# Patient Record
Sex: FEMALE | Race: WHITE | NOT HISPANIC OR LATINO | Employment: OTHER | URBAN - METROPOLITAN AREA
[De-identification: names, ages, dates, MRNs, and addresses within clinical notes are randomized per-mention and may not be internally consistent; named-entity substitution may affect disease eponyms.]

---

## 2018-04-11 ENCOUNTER — HOSPITAL ENCOUNTER (OUTPATIENT)
Dept: RADIOLOGY | Facility: CLINIC | Age: 58
Discharge: HOME | End: 2018-04-11
Attending: NURSE PRACTITIONER
Payer: MEDICARE

## 2018-04-11 DIAGNOSIS — Z12.31 VISIT FOR SCREENING MAMMOGRAM: Primary | ICD-10-CM

## 2018-04-11 DIAGNOSIS — Z12.31 VISIT FOR SCREENING MAMMOGRAM: ICD-10-CM

## 2018-04-11 PROCEDURE — 77067 SCR MAMMO BI INCL CAD: CPT | Mod: 26,,, | Performed by: RADIOLOGY

## 2018-04-11 PROCEDURE — 77063 BREAST TOMOSYNTHESIS BI: CPT | Mod: 26,,, | Performed by: RADIOLOGY

## 2018-04-11 PROCEDURE — 77067 SCR MAMMO BI INCL CAD: CPT | Mod: TC,PO

## 2019-04-02 ENCOUNTER — OFFICE VISIT (OUTPATIENT)
Dept: GASTROENTEROLOGY | Facility: CLINIC | Age: 59
End: 2019-04-02
Payer: MEDICARE

## 2019-04-02 VITALS — HEIGHT: 62 IN | WEIGHT: 240.75 LBS | BODY MASS INDEX: 44.3 KG/M2

## 2019-04-02 DIAGNOSIS — K62.5 BRBPR (BRIGHT RED BLOOD PER RECTUM): ICD-10-CM

## 2019-04-02 DIAGNOSIS — R15.2 FECAL URGENCY: Primary | ICD-10-CM

## 2019-04-02 DIAGNOSIS — Z86.010 HISTORY OF COLON POLYPS: ICD-10-CM

## 2019-04-02 PROCEDURE — 99204 PR OFFICE/OUTPT VISIT, NEW, LEVL IV, 45-59 MIN: ICD-10-PCS | Mod: S$PBB,,, | Performed by: INTERNAL MEDICINE

## 2019-04-02 PROCEDURE — 99212 OFFICE O/P EST SF 10 MIN: CPT | Mod: PBBFAC,PO | Performed by: INTERNAL MEDICINE

## 2019-04-02 PROCEDURE — 99999 PR PBB SHADOW E&M-EST. PATIENT-LVL II: CPT | Mod: PBBFAC,,, | Performed by: INTERNAL MEDICINE

## 2019-04-02 PROCEDURE — 99999 PR PBB SHADOW E&M-EST. PATIENT-LVL II: ICD-10-PCS | Mod: PBBFAC,,, | Performed by: INTERNAL MEDICINE

## 2019-04-02 PROCEDURE — 99204 OFFICE O/P NEW MOD 45 MIN: CPT | Mod: S$PBB,,, | Performed by: INTERNAL MEDICINE

## 2019-04-02 RX ORDER — BREXPIPRAZOLE 2 MG/1
TABLET ORAL
COMMUNITY
Start: 2019-03-19 | End: 2021-10-05

## 2019-04-02 RX ORDER — BUPROPION HYDROCHLORIDE 150 MG/1
TABLET ORAL
COMMUNITY
Start: 2019-03-02 | End: 2021-10-05

## 2019-04-02 RX ORDER — HYDROXYZINE HYDROCHLORIDE 10 MG/1
TABLET, FILM COATED ORAL
COMMUNITY
Start: 2019-02-27 | End: 2022-10-28

## 2019-04-02 RX ORDER — BUPROPION HYDROCHLORIDE 300 MG/1
TABLET ORAL
COMMUNITY
Start: 2019-03-22 | End: 2021-10-05

## 2019-04-02 NOTE — PROGRESS NOTES
"Subjective:       Patient ID: Jonna Pearl is a 59 y.o. female.    This patient is new to me.  Referring provider:  Padmini Lackey for rectal urgency/fecal urgency    Chief Complaint: Fecal urgency    Patient see for fecal urgency, onset several months ago, intermittent, occurring about twice per month with no alleviating/exacerbating factors.  She states that stool is soft and occurs about once per day.  She states that she sometimes sees scant BRBPR and this has been chronic for many years.  She has had adenomas in the past and last scope was with Dr. Winslow in 2013 and she had no polyps at that time per his notes.  She denies weight loss, dysphagia, or upper GI symptoms.  She is not eating a high fiber diet.    Review of Systems   Constitutional: Negative for chills, fatigue and fever.   HENT: Negative for sore throat and trouble swallowing.    Respiratory: Negative for cough, shortness of breath and wheezing.    Cardiovascular: Negative for chest pain and palpitations.   Gastrointestinal: Positive for blood in stool (chronic, small amount, ongoing for many years). Negative for abdominal pain, nausea and vomiting.        + soft stool with fecal urgency     Genitourinary: Negative for dysuria and hematuria.   Musculoskeletal: Negative for arthralgias and myalgias.   Skin: Negative for color change and rash.   Neurological: Negative for dizziness and headaches.   Hematological: Negative for adenopathy.   Psychiatric/Behavioral: Negative for confusion. The patient is not nervous/anxious.    All other systems reviewed and are negative.      Objective:       Vitals:    04/02/19 1331   Weight: 109.2 kg (240 lb 11.9 oz)   Height: 5' 2" (1.575 m)       Physical Exam   Constitutional: She appears well-developed and well-nourished.   HENT:   Head: Normocephalic and atraumatic.   Eyes: Pupils are equal, round, and reactive to light. No scleral icterus.   Neck: Normal range of motion.   Cardiovascular: Normal rate and " regular rhythm.   No murmur heard.  Pulmonary/Chest: Effort normal and breath sounds normal. She has no wheezes.   Abdominal: Soft. Bowel sounds are normal. She exhibits no distension. There is no tenderness.   obese   Musculoskeletal: She exhibits no edema or tenderness.   Lymphadenopathy:     She has no cervical adenopathy.   Neurological: She is alert.   Skin: Skin is warm and dry. No rash noted.   Vitals reviewed.        Lab Results   Component Value Date    WBC 12.73 (H) 10/31/2013    HGB 14.6 10/31/2013    HCT 44.0 10/31/2013    MCV 91 10/31/2013     10/31/2013       CMP  Sodium   Date Value Ref Range Status   10/31/2013 139 136 - 145 mmol/L Final     Potassium   Date Value Ref Range Status   10/31/2013 4.7 3.5 - 5.1 mmol/L Final     Chloride   Date Value Ref Range Status   10/31/2013 107 95 - 110 mmol/L Final     CO2   Date Value Ref Range Status   10/31/2013 24 23 - 29 mmol/L Final     Glucose   Date Value Ref Range Status   10/31/2013 108 70 - 110 mg/dL Final     BUN, Bld   Date Value Ref Range Status   10/31/2013 11 6 - 20 mg/dL Final     Creatinine   Date Value Ref Range Status   10/31/2013 0.9 0.5 - 1.4 mg/dL Final     Calcium   Date Value Ref Range Status   10/31/2013 9.5 8.7 - 10.5 mg/dL Final     Total Protein   Date Value Ref Range Status   05/21/2012 6.8 6.0 - 8.4 g/dL Final     Albumin   Date Value Ref Range Status   05/21/2012 3.6 3.5 - 5.2 g/dl Final     Total Bilirubin   Date Value Ref Range Status   05/21/2012 0.3 0.1 - 1.0 mg/dl Final     Comment:     For infants and newborns, interpretation of results should be based  on gestational age, weight and in agreement with clinical  observations.  .  Premature Infant recommended reference ranges:  Up to 24 hours.............<8.0 mg/dl  Up to 48 hours............<12.0 mg/dl  3-5 days..................<15.0 mg/dl  6-29 days.................<15.0 mg/dl     Alkaline Phosphatase   Date Value Ref Range Status   05/21/2012 122 55 - 135 U/L Final      AST   Date Value Ref Range Status   05/21/2012 23 10 - 40 U/L Final     ALT   Date Value Ref Range Status   05/21/2012 22 10 - 44 U/L Final     Anion Gap   Date Value Ref Range Status   10/31/2013 8 8 - 16 mmol/L Final     eGFR if    Date Value Ref Range Status   10/31/2013 >60.0 >60 mL/min/1.73 m^2 Final     eGFR if non    Date Value Ref Range Status   10/31/2013 >60.0 >60 mL/min/1.73 m^2 Final     Comment:     Calculation used to obtain the estimated glomerular filtration  rate (eGFR) is the CKD-EPI equation. Since race is unknown   in our information system, the eGFR values for   -American and Non--American patients are given   for each creatinine result.     CXR was independently visualized and reviewed by me and showed no acute process.    Old records from Dr. Winslow reviewed and are as summarized above in the HPI.      Assessment:       1. Fecal urgency    2. History of colon polyps    3. BRBPR (bright red blood per rectum)        Plan:       1.  Increase fiber to bulk stool  2.  Sitz baths PRN  3.  Schedule colonoscopy for history of polyps  4.  Check labs on day of scope.  5.  Further recommendations to follow after above.  6.  Communication will be sent to the referring provider regarding my assessment and plan on this patient via EPIC.

## 2019-04-10 ENCOUNTER — ANESTHESIA (OUTPATIENT)
Dept: ENDOSCOPY | Facility: HOSPITAL | Age: 59
End: 2019-04-10
Payer: MEDICARE

## 2019-04-10 ENCOUNTER — HOSPITAL ENCOUNTER (OUTPATIENT)
Facility: HOSPITAL | Age: 59
Discharge: HOME | End: 2019-04-10
Attending: INTERNAL MEDICINE | Admitting: INTERNAL MEDICINE
Payer: MEDICARE

## 2019-04-10 ENCOUNTER — ANESTHESIA EVENT (OUTPATIENT)
Dept: ENDOSCOPY | Facility: HOSPITAL | Age: 59
End: 2019-04-10
Payer: MEDICARE

## 2019-04-10 DIAGNOSIS — Z86.010 HISTORY OF COLON POLYPS: ICD-10-CM

## 2019-04-10 DIAGNOSIS — K64.8 INTERNAL HEMORRHOIDS: Primary | ICD-10-CM

## 2019-04-10 PROBLEM — Z86.0100 HISTORY OF COLON POLYPS: Status: ACTIVE | Noted: 2019-04-10

## 2019-04-10 PROCEDURE — G0105 COLORECTAL SCRN; HI RISK IND: HCPCS | Performed by: INTERNAL MEDICINE

## 2019-04-10 PROCEDURE — G0105 COLORECTAL SCRN; HI RISK IND: HCPCS | Mod: ,,, | Performed by: INTERNAL MEDICINE

## 2019-04-10 PROCEDURE — D9220A PRA ANESTHESIA: Mod: CRNA,,, | Performed by: NURSE ANESTHETIST, CERTIFIED REGISTERED

## 2019-04-10 PROCEDURE — D9220A PRA ANESTHESIA: ICD-10-PCS | Mod: CRNA,,, | Performed by: NURSE ANESTHETIST, CERTIFIED REGISTERED

## 2019-04-10 PROCEDURE — 25000003 PHARM REV CODE 250: Performed by: INTERNAL MEDICINE

## 2019-04-10 PROCEDURE — D9220A PRA ANESTHESIA: Mod: ANES,,, | Performed by: ANESTHESIOLOGY

## 2019-04-10 PROCEDURE — 37000008 HC ANESTHESIA 1ST 15 MINUTES: Performed by: INTERNAL MEDICINE

## 2019-04-10 PROCEDURE — 63600175 PHARM REV CODE 636 W HCPCS: Performed by: NURSE ANESTHETIST, CERTIFIED REGISTERED

## 2019-04-10 PROCEDURE — 37000009 HC ANESTHESIA EA ADD 15 MINS: Performed by: INTERNAL MEDICINE

## 2019-04-10 PROCEDURE — D9220A PRA ANESTHESIA: ICD-10-PCS | Mod: ANES,,, | Performed by: ANESTHESIOLOGY

## 2019-04-10 PROCEDURE — G0105 COLORECTAL SCRN; HI RISK IND: ICD-10-PCS | Mod: ,,, | Performed by: INTERNAL MEDICINE

## 2019-04-10 RX ORDER — SODIUM CHLORIDE 9 MG/ML
INJECTION, SOLUTION INTRAVENOUS CONTINUOUS
Status: DISCONTINUED | OUTPATIENT
Start: 2019-04-10 | End: 2019-04-10 | Stop reason: HOSPADM

## 2019-04-10 RX ORDER — PROPOFOL 10 MG/ML
VIAL (ML) INTRAVENOUS
Status: DISCONTINUED | OUTPATIENT
Start: 2019-04-10 | End: 2019-04-10

## 2019-04-10 RX ADMIN — PROPOFOL 30 MG: 10 INJECTION, EMULSION INTRAVENOUS at 10:04

## 2019-04-10 RX ADMIN — PROPOFOL 100 MG: 10 INJECTION, EMULSION INTRAVENOUS at 10:04

## 2019-04-10 RX ADMIN — SODIUM CHLORIDE: 0.9 INJECTION, SOLUTION INTRAVENOUS at 10:04

## 2019-04-10 NOTE — DISCHARGE INSTRUCTIONS
"Discharge Instructions: After Your Surgery/Procedure  Youve just had surgery. During surgery you were given medicine called anesthesia to keep you relaxed and free of pain. After surgery you may have some pain or nausea. This is common. Here are some tips for feeling better and getting well after surgery.     Stay on schedule with your medication.   Going home  Your doctor or nurse will show you how to take care of yourself when you go home. He or she will also answer your questions. Have an adult family member or friend drive you home.      For your safety we recommend these precaution for the first 24 hours after your procedure:  · Do not drive or use heavy equipment.  · Do not make important decisions or sign legal papers.  · Do not drink alcohol.  · Have someone stay with you, if needed. He or she can watch for problems and help keep you safe.  · Your concentration, balance, coordination, and judgement may be impaired for many hours after anesthesia.  Use caution when ambulating or standing up.     · You may feel weak and "washed out" after anesthesia and surgery.      Subtle residual effects of general anesthesia or sedation with regional / local anesthesia can last more than 24 hours.  Rest for the remainder of the day or longer if your Doctor/Surgeon has advised you to do so.  Although you may feel normal within the first 24 hours, your reflexes and mental ability may be impaired without you realizing it.  You may feel dizzy, lightheaded or sleepy for 24 hours or longer.      Be sure to go to all follow-up visits with your doctor. And rest after your surgery for as long as your doctor tells you to.  Coping with pain  If you have pain after surgery, pain medicine will help you feel better. Take it as told, before pain becomes severe. Also, ask your doctor or pharmacist about other ways to control pain. This might be with heat, ice, or relaxation. And follow any other instructions your surgeon or nurse gives " you.  Tips for taking pain medicine  To get the best relief possible, remember these points:  · Pain medicines can upset your stomach. Taking them with a little food may help.  · Most pain relievers taken by mouth need at least 20 to 30 minutes to start to work.  · Taking medicine on a schedule can help you remember to take it. Try to time your medicine so that you can take it before starting an activity. This might be before you get dressed, go for a walk, or sit down for dinner.  · Constipation is a common side effect of pain medicines. Call your doctor before taking any medicines such as laxatives or stool softeners to help ease constipation. Also ask if you should skip any foods. Drinking lots of fluids and eating foods such as fruits and vegetables that are high in fiber can also help. Remember, do not take laxatives unless your surgeon has prescribed them.  · Drinking alcohol and taking pain medicine can cause dizziness and slow your breathing. It can even be deadly. Do not drink alcohol while taking pain medicine.  · Pain medicine can make you react more slowly to things. Do not drive or run machinery while taking pain medicine.  Your health care provider may tell you to take acetaminophen to help ease your pain. Ask him or her how much you are supposed to take each day. Acetaminophen or other pain relievers may interact with your prescription medicines or other over-the-counter (OTC) drugs. Some prescription medicines have acetaminophen and other ingredients. Using both prescription and OTC acetaminophen for pain can cause you to overdose. Read the labels on your OTC medicines with care. This will help you to clearly know the list of ingredients, how much to take, and any warnings. It may also help you not take too much acetaminophen. If you have questions or do not understand the information, ask your pharmacist or health care provider to explain it to you before you take the OTC medicine.  Managing  nausea  Some people have an upset stomach after surgery. This is often because of anesthesia, pain, or pain medicine, or the stress of surgery. These tips will help you handle nausea and eat healthy foods as you get better. If you were on a special food plan before surgery, ask your doctor if you should follow it while you get better. These tips may help:  · Do not push yourself to eat. Your body will tell you when to eat and how much.  · Start off with clear liquids and soup. They are easier to digest.  · Next try semi-solid foods, such as mashed potatoes, applesauce, and gelatin, as you feel ready.  · Slowly move to solid foods. Dont eat fatty, rich, or spicy foods at first.  · Do not force yourself to have 3 large meals a day. Instead eat smaller amounts more often.  · Take pain medicines with a small amount of solid food, such as crackers or toast, to avoid nausea.     Call your surgeon if  · You still have pain an hour after taking medicine. The medicine may not be strong enough.  · You feel too sleepy, dizzy, or groggy. The medicine may be too strong.  · You have side effects like nausea, vomiting, or skin changes, such as rash, itching, or hives.       If you have obstructive sleep apnea  You were given anesthesia medicine during surgery to keep you comfortable and free of pain. After surgery, you may have more apnea spells because of this medicine and other medicines you were given. The spells may last longer than usual.   At home:  · Keep using the continuous positive airway pressure (CPAP) device when you sleep. Unless your health care provider tells you not to, use it when you sleep, day or night. CPAP is a common device used to treat obstructive sleep apnea.  · Talk with your provider before taking any pain medicine, muscle relaxants, or sedatives. Your provider will tell you about the possible dangers of taking these medicines.  © 0375-9110 [employer]. [address] [place] [address]  [place] [address]. All rights reserved. This information is not intended as a substitute for professional medical care. Always follow your healthcare professional's instructions.    Hemorrhoids    Hemorrhoids are swollen and inflamed veins inside the rectum and near the anus. The rectum is the last several inches of the colon. The anus is the passage between the rectum and the outside of the body.  Causes  The veins can become swollen due to increased pressure in them. This is most often caused by:  · Chronic constipation or diarrhea  · Straining when having a bowel movement  · Sitting too long on the toilet  · A low-fiber diet  · Pregnancy  Symptoms  · Bleeding from the rectum (this may be noticeable after bowel movements)  · Lump near the anus  · Itching around the anus  · Pain around the anus  There are different types of hemorrhoids. Depending on the type you have and the severity, you may be able to treat yourself at home. In some cases, a procedure may be the best treatment option. Your healthcare provider can tell you more about this, if needed.  Home care  General care  · To get relief from pain or itching, try:  ¨ Topical products. Your healthcare provider may prescribe or recommend creams, ointments, or pads that can be applied to the hemorrhoid. Use these exactly as directed.  ¨ Medicines. Your healthcare provider may recommend stool softeners, suppositories, or laxatives to help manage constipation. Use these exactly as directed.  ¨ Sitz baths. A sitz bath involves sitting in a few inches of warm bath water. Be careful not to make the water so hot that you burn yourself--test it before sitting in it. Soak for about 10 to 15 minutes a few times a day. This may help relieve pain.  Tips to help prevent hemorrhoids  · Eat more fiber. Fiber adds bulk to stool and absorbs water as it moves through your colon. This makes stool softer and easier to pass.  ¨ Increase the fiber in your diet with more fiber-rich foods.  These include fresh fruit, vegetables, and whole grains.  ¨ Take a fiber supplement or bulking agent, if advised to by your provider. These include products such as psyllium or methylcellulose.  · Drink plenty of water, if directed to by your provider. This can help keep stool soft.  · Be more active. Frequent exercise aids digestion and helps prevent constipation. It may also help make bowel movements more regular.  · Dont strain during bowel movements. This can make hemorrhoids more likely. Also, dont sit on the toilet for long periods of time.  Follow-up care  Follow up with your healthcare provider, or as advised. If a culture or imaging tests were done, you will be notified of the results when they are ready. This may take a few days or longer.  When to seek medical advice  Call your healthcare provider right away if any of these occur:  · Increased bleeding from the rectum  · Increased pain around the rectum or anus  · Weakness or dizziness  Call 911  Call 911 or return to the emergency department right away if any of these occur:  · Trouble breathing or swallowing  · Fainting or loss of consciousness  · Unusually fast heart rate  · Vomiting blood  · Large amounts of blood in stool  Date Last Reviewed: 6/22/2015  © 7638-0974 [employer]. [address] [place] [address], [place] [address]. All rights reserved. This information is not intended as a substitute for professional medical care. Always follow your healthcare professional's instructions.

## 2019-04-10 NOTE — INTERVAL H&P NOTE
The patient has been examined and the H&P has been reviewed:    I concur with the findings and no changes have occurred since H&P was written.    Anesthesia/Surgery risks, benefits and alternative options discussed and understood by patient/family.          Active Hospital Problems    Diagnosis  POA    History of colon polyps [Z86.010]  Not Applicable      Resolved Hospital Problems   No resolved problems to display.

## 2019-04-10 NOTE — ANESTHESIA PREPROCEDURE EVALUATION
04/10/2019  Jonna Pearl is a 59 y.o., female.    Anesthesia Evaluation      I have reviewed the Medications.     Review of Systems  Anesthesia Hx:  No problems with previous Anesthesia   Social:  Non-Smoker, No Alcohol Use    Cardiovascular:   Hypertension    Pulmonary:   Sleep Apnea    Renal/:   Chronic Renal Disease, CRI    Hepatic/GI:  Hepatic/GI Normal    Neurological:   Headaches    Endocrine:  Endocrine Normal    Psych:   Psychiatric History          Physical Exam  General:  Morbid Obesity    Airway/Jaw/Neck:  Airway Findings: Mouth Opening: Normal Tongue: Normal  General Airway Assessment: Adult, Average  Mallampati: II  Jaw/Neck Findings:  Neck ROM: Normal ROM       Chest/Lungs:  Chest/Lungs Findings: Clear to auscultation, Normal Respiratory Rate     Heart/Vascular:  Heart Findings: Rate: Normal  Rhythm: Regular Rhythm  Sounds: Normal  Heart murmur: negative       Mental Status:  Mental Status Findings:  Cooperative, Alert and Oriented         Anesthesia Plan  Type of Anesthesia, risks & benefits discussed:  Anesthesia Type:  general  Patient's Preference:   Intra-op Monitoring Plan:   Intra-op Monitoring Plan Comments:   Post Op Pain Control Plan:   Post Op Pain Control Plan Comments:   Induction:   IV  Beta Blocker:  Patient is not currently on a Beta-Blocker (No further documentation required).       Informed Consent: Patient understands risks and agrees with Anesthesia plan.  Questions answered. Anesthesia consent signed with patient.  ASA Score: 3     Day of Surgery Review of History & Physical:        Anesthesia Plan Notes: Propofol general        Ready For Surgery From Anesthesia Perspective.

## 2019-04-10 NOTE — TRANSFER OF CARE
"Anesthesia Transfer of Care Note    Patient: Jonan Pearl    Procedure(s) Performed: Procedure(s) (LRB):  COLONOSCOPY (N/A)    Patient location: GI    Anesthesia Type: general    Transport from OR: Transported from OR on 2-3 L/min O2 by NC with adequate spontaneous ventilation    Post pain: adequate analgesia    Post assessment: no apparent anesthetic complications    Post vital signs: stable    Level of consciousness: awake    Nausea/Vomiting: no nausea/vomiting    Complications: none    Transfer of care protocol was followed      Last vitals:   Visit Vitals  /64   Pulse 71   Temp 36.6 °C (97.9 °F) (Skin)   Resp 20   Ht 5' 2" (1.575 m)   Wt 108.9 kg (240 lb)   SpO2 100%   Breastfeeding? No   BMI 43.90 kg/m²     "

## 2019-04-10 NOTE — ANESTHESIA POSTPROCEDURE EVALUATION
Anesthesia Post Evaluation    Patient: Jonna Pearl    Procedure(s) Performed: Procedure(s) (LRB):  COLONOSCOPY (N/A)    Final Anesthesia Type: general  Patient location during evaluation: PACU  Patient participation: Yes- Able to Participate  Level of consciousness: awake and alert  Post-procedure vital signs: reviewed and stable  Pain management: adequate  Airway patency: patent  PONV status at discharge: No PONV  Anesthetic complications: no      Cardiovascular status: hemodynamically stable and blood pressure returned to baseline  Respiratory status: unassisted, spontaneous ventilation and room air  Hydration status: euvolemic  Follow-up not needed.          Vitals Value Taken Time   /74 4/10/2019 11:20 AM   Temp 36.3 °C (97.3 °F) 4/10/2019 11:20 AM   Pulse 75 4/10/2019 11:20 AM   Resp 20 4/10/2019 11:20 AM   SpO2 97 % 4/10/2019 11:20 AM         No case tracking events are documented in the log.      Pain/Escobar Score: Escobar Score: 10 (4/10/2019 11:20 AM)

## 2019-04-10 NOTE — [employer] PATIENT INSTRUCTIONS
Discharge Summary/Instructions after an Endoscopic Procedure  Patient Name: Jonna Pearl  Patient MRN: [id number]  Patient YOB: 1960  Wednesday, April 10, 2019  Matt Ballesteros MD  RESTRICTIONS:  During your procedure today, you received medications for sedation.  These   medications may affect your judgment, balance and coordination.  Therefore,   for 24 hours, you have the following restrictions:   - DO NOT drive a car, operate machinery, make legal/financial decisions,   sign important papers or drink alcohol.    ACTIVITY:  Today: no heavy lifting, straining or running due to procedural   sedation/anesthesia.  The following day: return to full activity including work.  DIET:  Eat and drink normally unless instructed otherwise.     TREATMENT FOR COMMON SIDE EFFECTS:  - Mild abdominal pain, nausea, belching, bloating or excessive gas:  rest,   eat lightly and use a heating pad.  - Sore Throat: treat with throat lozenges and/or gargle with warm salt   water.  - Because air was used during the procedure, expelling large amounts of air   from your rectum or belching is normal.  - If a bowel prep was taken, you may not have a bowel movement for 1-3 days.    This is normal.  SYMPTOMS TO WATCH FOR AND REPORT TO YOUR PHYSICIAN:  1. Abdominal pain or bloating, other than gas cramps.  2. Chest pain.  3. Back pain.  4. Signs of infection such as: chills or fever occurring within 24 hours   after the procedure.  5. Rectal bleeding, which would show as bright red, maroon, or black stools.   (A tablespoon of blood from the rectum is not serious, especially if   hemorrhoids are present.)  6. Vomiting.  7. Weakness or dizziness.  GO DIRECTLY TO THE NEAREST EMERGENCY ROOM IF YOU HAVE ANY OF THE FOLLOWING:      Difficulty breathing              Chills and/or fever over 101 F   Persistent vomiting and/or vomiting blood   Severe abdominal pain   Severe chest pain   Black, tarry stools   Bleeding- more than one  tablespoon   Any other symptom or condition that you feel may need urgent attention  Your doctor recommends these additional instructions:  If any biopsies were taken, your doctors clinic will contact you in 1 to 2   weeks with any results.  - Patient has a contact number available for emergencies.  The signs and   symptoms of potential delayed complications were discussed with the   patient.  Return to normal activities tomorrow.  Written discharge   instructions were provided to the patient.   - High fiber diet.   - Continue present medications.   - Repeat colonoscopy in 5 years for surveillance.   - Discharge patient to home (ambulatory).   - Return to my office PRN.  For questions, problems or results please call your physician - Matt Ballesteros MD at Work:  [phone].  [facility], EMERGENCY ROOM PHONE NUMBER: [phone]  IF A COMPLICATION OR EMERGENCY SITUATION ARISES AND YOU ARE UNABLE TO REACH   YOUR PHYSICIAN - GO DIRECTLY TO THE EMERGENCY ROOM.  Matt Ballesteros MD  4/10/2019 11:05:18 AM  This report has been verified and signed electronically.  [employer]

## 2019-04-11 VITALS
RESPIRATION RATE: 20 BRPM | TEMPERATURE: 97 F | HEIGHT: 62 IN | OXYGEN SATURATION: 97 % | WEIGHT: 240 LBS | DIASTOLIC BLOOD PRESSURE: 74 MMHG | SYSTOLIC BLOOD PRESSURE: 130 MMHG | HEART RATE: 75 BPM | BODY MASS INDEX: 44.16 KG/M2

## 2019-05-21 ENCOUNTER — TELEPHONE (OUTPATIENT)
Dept: FAMILY MEDICINE | Facility: CLINIC | Age: 59
End: 2019-05-21

## 2019-05-21 DIAGNOSIS — Z12.31 SCREENING MAMMOGRAM, ENCOUNTER FOR: Primary | ICD-10-CM

## 2019-05-21 NOTE — TELEPHONE ENCOUNTER
----- Message from Bhakti Ulrich sent at 5/21/2019 10:02 AM CDT -----  Contact: patient  Type: Needs Medical Advice    Who Called:  patient  Additional Information: would like orders for mammo. Patient will call back to verify that orders were put in the system because she does not have a phone number

## 2019-05-21 NOTE — TELEPHONE ENCOUNTER
Have not seen patient since 2013- last mammogram in epic- screening mammo orders created.    General

## 2019-05-21 NOTE — TELEPHONE ENCOUNTER
----- Message from Danielle Vallejo sent at 5/20/2019 12:18 PM CDT -----  Type: Needs Medical Advice    Who Called:  self  Symptoms (please be specific):  Requesting orders for mammogram   How long has patient had these symptoms:  Would like to have an appointment prior to end of May   Pharmacy name and phone #:     Best Call Back Number: has no phone #   Additional Information: will be calling to check on the orders

## 2019-05-22 ENCOUNTER — HOSPITAL ENCOUNTER (OUTPATIENT)
Dept: RADIOLOGY | Facility: CLINIC | Age: 59
Discharge: HOME | End: 2019-05-22
Attending: FAMILY MEDICINE
Payer: MEDICARE

## 2019-05-22 DIAGNOSIS — Z12.31 SCREENING MAMMOGRAM, ENCOUNTER FOR: ICD-10-CM

## 2019-05-22 PROCEDURE — 77067 MAMMO DIGITAL SCREENING BILAT WITH TOMOSYNTHESIS_CAD: ICD-10-PCS | Mod: 26,,, | Performed by: RADIOLOGY

## 2019-05-22 PROCEDURE — 77067 SCR MAMMO BI INCL CAD: CPT | Mod: TC,PO

## 2019-05-22 PROCEDURE — 77063 MAMMO DIGITAL SCREENING BILAT WITH TOMOSYNTHESIS_CAD: ICD-10-PCS | Mod: 26,,, | Performed by: RADIOLOGY

## 2019-05-22 PROCEDURE — 77067 SCR MAMMO BI INCL CAD: CPT | Mod: 26,,, | Performed by: RADIOLOGY

## 2019-05-22 PROCEDURE — 77063 BREAST TOMOSYNTHESIS BI: CPT | Mod: 26,,, | Performed by: RADIOLOGY

## 2019-07-31 ENCOUNTER — DOCUMENTATION ONLY (OUTPATIENT)
Dept: FAMILY MEDICINE | Facility: CLINIC | Age: 59
End: 2019-07-31

## 2019-07-31 NOTE — PROGRESS NOTES
Pre-Visit Chart Review  For Appointment Scheduled on 9-11-19    Health Maintenance Due   Topic Date Due    TETANUS VACCINE  03/22/1977    Lipid Panel  10/31/2018

## 2019-08-20 ENCOUNTER — TELEPHONE (OUTPATIENT)
Dept: FAMILY MEDICINE | Facility: CLINIC | Age: 59
End: 2019-08-20

## 2019-08-20 NOTE — TELEPHONE ENCOUNTER
Tried all #'s listed to contact patient. No answer machine. No answer.   Patient has booked apt with Dr Estes for a physical 9-11-19. Patient has not been seen by Dr Estes since 2013. Patient is listed as pcp = Dr Keatnig.

## 2019-09-05 ENCOUNTER — TELEPHONE (OUTPATIENT)
Dept: FAMILY MEDICINE | Facility: CLINIC | Age: 59
End: 2019-09-05

## 2019-09-05 NOTE — TELEPHONE ENCOUNTER
Patients brother Kit 2nd call. Patients PCP is Dr Lackey. She was last seen by Dr Estes 10-. She needs to cancel apt on 9-11-19 with Dr Estes and book with her pcp.

## 2019-09-11 ENCOUNTER — OFFICE VISIT (OUTPATIENT)
Dept: FAMILY MEDICINE | Facility: CLINIC | Age: 59
End: 2019-09-11
Attending: FAMILY MEDICINE
Payer: MEDICARE

## 2019-09-11 VITALS
DIASTOLIC BLOOD PRESSURE: 78 MMHG | HEART RATE: 81 BPM | WEIGHT: 236.56 LBS | SYSTOLIC BLOOD PRESSURE: 120 MMHG | BODY MASS INDEX: 43.53 KG/M2 | TEMPERATURE: 98 F | RESPIRATION RATE: 18 BRPM | HEIGHT: 62 IN

## 2019-09-11 DIAGNOSIS — Z13.6 ENCOUNTER FOR LIPID SCREENING FOR CARDIOVASCULAR DISEASE: ICD-10-CM

## 2019-09-11 DIAGNOSIS — E66.01 MORBID OBESITY WITH BMI OF 40.0-44.9, ADULT: ICD-10-CM

## 2019-09-11 DIAGNOSIS — Z13.220 ENCOUNTER FOR LIPID SCREENING FOR CARDIOVASCULAR DISEASE: ICD-10-CM

## 2019-09-11 DIAGNOSIS — D69.9 BLEEDING DISORDER: ICD-10-CM

## 2019-09-11 DIAGNOSIS — I10 GOOD HYPERTENSION CONTROL: ICD-10-CM

## 2019-09-11 DIAGNOSIS — K80.20 ASYMPTOMATIC GALLSTONES: ICD-10-CM

## 2019-09-11 DIAGNOSIS — N18.30 STAGE 3 CHRONIC KIDNEY DISEASE: ICD-10-CM

## 2019-09-11 DIAGNOSIS — I10 HYPERTENSION, ESSENTIAL: ICD-10-CM

## 2019-09-11 DIAGNOSIS — K57.30 DIVERTICULOSIS LARGE INTESTINE W/O PERFORATION OR ABSCESS W/O BLEEDING: ICD-10-CM

## 2019-09-11 DIAGNOSIS — Z76.89 ENCOUNTER TO ESTABLISH CARE: Primary | ICD-10-CM

## 2019-09-11 PROCEDURE — 99203 PR OFFICE/OUTPT VISIT, NEW, LEVL III, 30-44 MIN: ICD-10-PCS | Mod: S$PBB,,, | Performed by: FAMILY MEDICINE

## 2019-09-11 PROCEDURE — 99999 PR PBB SHADOW E&M-EST. PATIENT-LVL III: ICD-10-PCS | Mod: PBBFAC,,, | Performed by: FAMILY MEDICINE

## 2019-09-11 PROCEDURE — 99999 PR PBB SHADOW E&M-EST. PATIENT-LVL III: CPT | Mod: PBBFAC,,, | Performed by: FAMILY MEDICINE

## 2019-09-11 PROCEDURE — 99213 OFFICE O/P EST LOW 20 MIN: CPT | Mod: PBBFAC,PO | Performed by: FAMILY MEDICINE

## 2019-09-11 PROCEDURE — 99203 OFFICE O/P NEW LOW 30 MIN: CPT | Mod: S$PBB,,, | Performed by: FAMILY MEDICINE

## 2019-09-11 RX ORDER — VERAPAMIL HYDROCHLORIDE 240 MG/1
240 TABLET, FILM COATED, EXTENDED RELEASE ORAL NIGHTLY
Refills: 2 | COMMUNITY
Start: 2019-07-10 | End: 2023-08-27

## 2019-09-11 RX ORDER — GABAPENTIN 600 MG/1
600 TABLET ORAL 3 TIMES DAILY PRN
Refills: 9 | COMMUNITY
Start: 2019-07-10

## 2019-09-11 RX ORDER — LOSARTAN POTASSIUM 100 MG/1
100 TABLET ORAL DAILY
Qty: 90 TABLET | Refills: 3 | Status: SHIPPED | OUTPATIENT
Start: 2019-09-11 | End: 2021-10-05

## 2019-09-11 RX ORDER — LATANOPROST 50 UG/ML
1 SOLUTION/ DROPS OPHTHALMIC NIGHTLY
Refills: 6 | COMMUNITY
Start: 2019-08-30

## 2019-09-11 NOTE — PROGRESS NOTES
Subjective:       Patient ID: Jonna Pearl is a 59 y.o. female.    Chief Complaint: Annual Exam    59-year-old female former patient of mine not seen since October 31, 2013 coming back to establish care for a physical.  The patient reports she has been having some unusual bleeding problems affecting predominantly the left forearm.  She does not recall any trauma.  Bleeding is more bruising under the skin in what would normally be a relatively high trauma area.  She has no complaints of nose bleeds, gums bleeding, blood in urine, or blood in bowel movements.  She has not noted any excessive bleeding anywhere else.  She has a history of hypertension, atrophic left kidney since childhood, stage 3 chronic kidney disease, sleep apnea, osteoarthritis of the knees, migraines, depression.  She has not had any significant problems with the knees and she is not taking any nonsteroidal agents that would cause bleeding problems. She states she has not had a migraine in years.  She recently had a colonoscopy with Dr. Orozco April 10, 2019 with a five year recheck recommended and only some non bleeding hemorrhoids found.  Five years earlier a colonoscopy by Dr. Winslow did report two diverticuli in the sigmoid colon.  The patient also has an ultrasound of the abdomen and pelvis showing the atrophic kidney and large gallstones which are asymptomatic for what was reported to be a left lower quadrant pain. It may have been attack of diverticulitis but the patient is asymptomatic at this time.  The patient reports she stopped smoking about two years ago.    Past Medical History:  No date: Allergy  No date: Arthritis  No date: Atrophic kidney      Comment:  left. probably childhood illness or disease  No date: Chronic kidney disease  No date: Chronic rhinitis  No date: Colon polyps  No date: Depression  No date: HEARING LOSS  No date: Hypertension  No date: Insomnia  No date: Migraines  No date: Otitis media  No date: Sleep  apnea      Comment:  mild    Past Surgical History:  No date:  SECTION  2014: COLONOSCOPY      Comment:  Dr Escobar 5 year yossi  4/10/2019: COLONOSCOPY; N/A      Comment:  Performed by Matt Orozco MD at [facility]  2014: COLONOSCOPY; N/A      Comment:  Performed by Karthik Winslow MD at [facility] ENDO  No date: HEMORRHOID SURGERY  No date: HYSTERECTOMY  No date: left mandibular node resection  No date: OOPHORECTOMY    Review of patient's family history indicates:  Problem: Cancer      Relation: Sister          Age of Onset: (Not Specified)          Comment: breast  Problem: Diabetes      Relation: Mother          Age of Onset: (Not Specified)  Problem: Early death      Relation: Father          Age of Onset: (Not Specified)  Problem: Diabetes      Relation: Paternal Grandmother          Age of Onset: (Not Specified)  Problem: Breast cancer      Relation: Maternal Aunt          Age of Onset: (Not Specified)  Problem: Breast cancer      Relation: Paternal Aunt          Age of Onset: (Not Specified)    Social History    Tobacco Use      Smoking status: Former Smoker        Packs/day: 1.00        Years: 18.00        Pack years: 18        Quit date: 2018        Years since quittin.4      Smokeless tobacco: Never Used    Alcohol use: No    Drug use: No    Current Outpatient Medications on File Prior to Visit:  buPROPion (WELLBUTRIN XL) 150 MG TB24 tablet, , Disp: , Rfl:   buPROPion (WELLBUTRIN XL) 300 MG 24 hr tablet, , Disp: , Rfl:   gabapentin (NEURONTIN) 600 MG tablet, TAKE 1 TABLET BY MOUTH THREE TIMES DAILY TAKE ONE IN THE MORNING TAKE ONE IN THE AFTERNOON AND ONE AT BEDTIME, Disp: , Rfl: 9  hydrOXYzine HCl (ATARAX) 10 MG Tab, , Disp: , Rfl:   REXULTI 2 mg Tab, , Disp: , Rfl:   verapamil (CALAN-SR) 240 MG CR tablet, Take 240 mg by mouth every evening., Disp: , Rfl: 2  (DISCONTINUED) lisinopril (PRINIVIL,ZESTRIL) 40 MG tablet, Take 1 tablet (40 mg total) by mouth once daily., Disp: 90  tablet, Rfl: 0  latanoprost 0.005 % ophthalmic solution, INSTILL 1 DROP INTO AFFECTED EYE(S) ONCE DAILY AT BEDTIME, Disp: , Rfl: 6  (DISCONTINUED) verapamil (VERELAN) 180 MG C24P, TAKE ONE CAPSULE BY MOUTH ONCE DAILY IN THE EVENING, Disp: 30 capsule, Rfl: 0    No current facility-administered medications on file prior to visit.     TETANUS VACCINE due on 03/22/1977  Shingles Vaccine(1 of 2) due on 03/22/2009  Lipid Panel due on 10/31/2018  Influenza Vaccine(1) due on 09/01/2019      Review of Systems   Constitutional: Negative for chills, diaphoresis, fatigue, fever and unexpected weight change.   HENT: Negative for congestion, ear pain, hearing loss, postnasal drip, sinus pressure and sneezing.    Eyes: Negative for itching and visual disturbance.   Respiratory: Negative for cough, chest tightness, shortness of breath and wheezing.    Cardiovascular: Negative for chest pain, palpitations and leg swelling.   Gastrointestinal: Negative for abdominal pain, blood in stool, constipation, diarrhea, nausea and vomiting.   Genitourinary: Negative for dysuria, frequency, hematuria, menstrual problem, pelvic pain, vaginal bleeding and vaginal discharge.   Musculoskeletal: Negative for arthralgias, back pain, joint swelling and myalgias.   Neurological: Negative for dizziness and headaches.   Hematological: Negative for adenopathy. Bruises/bleeds easily.   Psychiatric/Behavioral: Negative for sleep disturbance. The patient is not nervous/anxious.        Objective:      Physical Exam   Constitutional: She is oriented to person, place, and time. She appears well-developed. No distress.   Good blood pressure control  Morbid obesity with a BMI of 43.3 she is down 17.8 lb since her last visit with me October 31, 2013   HENT:   Head: Normocephalic and atraumatic.   Right Ear: External ear normal.   Left Ear: External ear normal.   Nose: Nose normal.   Mouth/Throat: Oropharynx is clear and moist. No oropharyngeal exudate.   Eyes:  Pupils are equal, round, and reactive to light. Conjunctivae and EOM are normal. Right eye exhibits no discharge. Left eye exhibits no discharge. No scleral icterus.   Neck: Normal range of motion. Neck supple. No JVD present. No tracheal deviation present. No thyromegaly present.   Cardiovascular: Normal rate, regular rhythm and normal heart sounds. Exam reveals no gallop and no friction rub.   No murmur heard.  Carotid pulses 2+ no bruit   Pulmonary/Chest: Effort normal and breath sounds normal. No stridor. No respiratory distress. She has no wheezes. She has no rales. She exhibits no tenderness.   Abdominal: Soft. Bowel sounds are normal. She exhibits no distension and no mass. There is no tenderness. There is no rebound and no guarding. No hernia.   Musculoskeletal: Normal range of motion. She exhibits no edema or tenderness.   Lymphadenopathy:     She has no cervical adenopathy.   Neurological: She is alert and oriented to person, place, and time. She has normal reflexes. She displays normal reflexes. No cranial nerve deficit or sensory deficit. She exhibits normal muscle tone.   Skin: Skin is warm and dry. No rash noted. She is not diaphoretic. No erythema.        Psychiatric: She has a normal mood and affect. Her behavior is normal. Judgment and thought content normal.   Nursing note and vitals reviewed.      Assessment:       1. Encounter to establish care    2. Good hypertension control    3. Stage 3 chronic kidney disease    4. Asymptomatic gallstones    5. Bleeding disorder    6. Encounter for lipid screening for cardiovascular disease    7. Hypertension, essential    8. Diverticulosis large intestine w/o perforation or abscess w/o bleeding    9. Morbid obesity with BMI of 40.0-44.9, adult        Plan:       1. Encounter to establish care    2. Good hypertension control  Good control with no changes needed however we did discuss the [employer] research program that suspects a connection between ACE-inhibitor  use long-term and increased risk of lung cancer.  We discussed alternatives and she did elect to finish up her current supply of lisinopril and then switch to losartan 100 mg daily.  She was instructed to have a blood pressure check about four weeks after making the switch in case the dose needs to be adjusted.  - Lipid panel; Future  - Comprehensive metabolic panel; Future  - CBC auto differential; Future    3. Stage 3 chronic kidney disease  Awaiting labs which will be scheduled fasting  - Comprehensive metabolic panel; Future    4. Asymptomatic gallstones  Multiple stones seen on ultrasound, largest was 3 cm in size, smaller stones were not described    5. Bleeding disorder  Appears to occur only in a relatively high trauma area, doubt that she has any significant bleeding problems in the absence of other symptoms but will await lab results  - CBC auto differential; Future  - Protime-INR; Future    6. Encounter for lipid screening for cardiovascular disease  Await lab results  - Lipid panel; Future    7. Hypertension, essential  See above  - losartan (COZAAR) 100 MG tablet; Take 1 tablet (100 mg total) by mouth once daily.  Dispense: 90 tablet; Refill: 3    8. Diverticulosis large intestine w/o perforation or abscess w/o bleeding  Asymptomatic at this time may have been symptomatic when the ultrasound was done at [facility]    9. Morbid obesity with BMI of 40.0-44.9, adult  Losing well, she is going to [employer] three days weekly

## 2019-09-12 ENCOUNTER — LAB VISIT (OUTPATIENT)
Dept: LAB | Facility: HOSPITAL | Age: 59
End: 2019-09-12
Attending: FAMILY MEDICINE
Payer: MEDICARE

## 2019-09-12 DIAGNOSIS — N18.30 STAGE 3 CHRONIC KIDNEY DISEASE: ICD-10-CM

## 2019-09-12 DIAGNOSIS — I10 GOOD HYPERTENSION CONTROL: ICD-10-CM

## 2019-09-12 DIAGNOSIS — D69.9 BLEEDING DISORDER: ICD-10-CM

## 2019-09-12 DIAGNOSIS — Z13.6 ENCOUNTER FOR LIPID SCREENING FOR CARDIOVASCULAR DISEASE: ICD-10-CM

## 2019-09-12 DIAGNOSIS — Z13.220 ENCOUNTER FOR LIPID SCREENING FOR CARDIOVASCULAR DISEASE: ICD-10-CM

## 2019-09-12 LAB
ALBUMIN SERPL BCP-MCNC: 3.4 G/DL (ref 3.5–5.2)
ALP SERPL-CCNC: 98 U/L (ref 55–135)
ALT SERPL W/O P-5'-P-CCNC: 14 U/L (ref 10–44)
ANION GAP SERPL CALC-SCNC: 8 MMOL/L (ref 8–16)
AST SERPL-CCNC: 18 U/L (ref 10–40)
BASOPHILS # BLD AUTO: 0.03 K/UL (ref 0–0.2)
BASOPHILS NFR BLD: 0.4 % (ref 0–1.9)
BILIRUB SERPL-MCNC: 0.3 MG/DL (ref 0.1–1)
BUN SERPL-MCNC: 21 MG/DL (ref 6–20)
CALCIUM SERPL-MCNC: 9.1 MG/DL (ref 8.7–10.5)
CHLORIDE SERPL-SCNC: 109 MMOL/L (ref 95–110)
CHOLEST SERPL-MCNC: 160 MG/DL (ref 120–199)
CHOLEST/HDLC SERPL: 3.9 {RATIO} (ref 2–5)
CO2 SERPL-SCNC: 25 MMOL/L (ref 23–29)
CREAT SERPL-MCNC: 1.1 MG/DL (ref 0.5–1.4)
DIFFERENTIAL METHOD: ABNORMAL
EOSINOPHIL # BLD AUTO: 0.2 K/UL (ref 0–0.5)
EOSINOPHIL NFR BLD: 2.4 % (ref 0–8)
ERYTHROCYTE [DISTWIDTH] IN BLOOD BY AUTOMATED COUNT: 13.5 % (ref 11.5–14.5)
EST. GFR  (AFRICAN AMERICAN): >60 ML/MIN/1.73 M^2
EST. GFR  (NON AFRICAN AMERICAN): 54.7 ML/MIN/1.73 M^2
GLUCOSE SERPL-MCNC: 83 MG/DL (ref 70–110)
HCT VFR BLD AUTO: 41.2 % (ref 37–48.5)
HDLC SERPL-MCNC: 41 MG/DL (ref 40–75)
HDLC SERPL: 25.6 % (ref 20–50)
HGB BLD-MCNC: 13 G/DL (ref 12–16)
IMM GRANULOCYTES # BLD AUTO: 0.02 K/UL (ref 0–0.04)
IMM GRANULOCYTES NFR BLD AUTO: 0.3 % (ref 0–0.5)
INR PPP: 1 (ref 0.8–1.2)
LDLC SERPL CALC-MCNC: 103.8 MG/DL (ref 63–159)
LYMPHOCYTES # BLD AUTO: 3 K/UL (ref 1–4.8)
LYMPHOCYTES NFR BLD: 41.6 % (ref 18–48)
MCH RBC QN AUTO: 31 PG (ref 27–31)
MCHC RBC AUTO-ENTMCNC: 31.6 G/DL (ref 32–36)
MCV RBC AUTO: 98 FL (ref 82–98)
MONOCYTES # BLD AUTO: 0.6 K/UL (ref 0.3–1)
MONOCYTES NFR BLD: 8.4 % (ref 4–15)
NEUTROPHILS # BLD AUTO: 3.4 K/UL (ref 1.8–7.7)
NEUTROPHILS NFR BLD: 46.9 % (ref 38–73)
NONHDLC SERPL-MCNC: 119 MG/DL
NRBC BLD-RTO: 0 /100 WBC
PLATELET # BLD AUTO: 298 K/UL (ref 150–350)
PMV BLD AUTO: 10.9 FL (ref 9.2–12.9)
POTASSIUM SERPL-SCNC: 4.8 MMOL/L (ref 3.5–5.1)
PROT SERPL-MCNC: 6.1 G/DL (ref 6–8.4)
PROTHROMBIN TIME: 10.5 SEC (ref 9–12.5)
RBC # BLD AUTO: 4.19 M/UL (ref 4–5.4)
SODIUM SERPL-SCNC: 142 MMOL/L (ref 136–145)
TRIGL SERPL-MCNC: 76 MG/DL (ref 30–150)
WBC # BLD AUTO: 7.18 K/UL (ref 3.9–12.7)

## 2019-09-12 PROCEDURE — 36415 COLL VENOUS BLD VENIPUNCTURE: CPT | Mod: PO

## 2019-09-12 PROCEDURE — 85610 PROTHROMBIN TIME: CPT

## 2019-09-12 PROCEDURE — 80053 COMPREHEN METABOLIC PANEL: CPT

## 2019-09-12 PROCEDURE — 85025 COMPLETE CBC W/AUTO DIFF WBC: CPT

## 2019-09-12 PROCEDURE — 80061 LIPID PANEL: CPT

## 2021-01-14 ENCOUNTER — HOSPITAL ENCOUNTER (OUTPATIENT)
Dept: RADIOLOGY | Facility: HOSPITAL | Age: 61
Discharge: HOME | End: 2021-01-14
Attending: NURSE PRACTITIONER
Payer: MEDICARE

## 2021-01-14 DIAGNOSIS — Z12.31 ENCOUNTER FOR SCREENING MAMMOGRAM FOR MALIGNANT NEOPLASM OF BREAST: ICD-10-CM

## 2021-01-14 PROCEDURE — 77067 SCR MAMMO BI INCL CAD: CPT | Mod: 26,,, | Performed by: RADIOLOGY

## 2021-01-14 PROCEDURE — 77067 SCR MAMMO BI INCL CAD: CPT | Mod: TC

## 2021-01-14 PROCEDURE — 77063 MAMMO DIGITAL SCREENING BILAT WITH TOMOSYNTHESIS_CAD: ICD-10-PCS | Mod: 26,,, | Performed by: RADIOLOGY

## 2021-01-14 PROCEDURE — 77067 MAMMO DIGITAL SCREENING BILAT WITH TOMOSYNTHESIS_CAD: ICD-10-PCS | Mod: 26,,, | Performed by: RADIOLOGY

## 2021-01-14 PROCEDURE — 77063 BREAST TOMOSYNTHESIS BI: CPT | Mod: 26,,, | Performed by: RADIOLOGY

## 2021-10-05 ENCOUNTER — OFFICE VISIT (OUTPATIENT)
Dept: ORTHOPEDICS | Facility: CLINIC | Age: 61
End: 2021-10-05
Payer: MEDICARE

## 2021-10-05 VITALS — WEIGHT: 280 LBS | HEIGHT: 62 IN | BODY MASS INDEX: 51.53 KG/M2

## 2021-10-05 DIAGNOSIS — M70.52 PES ANSERINUS BURSITIS OF LEFT KNEE: ICD-10-CM

## 2021-10-05 DIAGNOSIS — M17.11 PRIMARY OSTEOARTHRITIS OF RIGHT KNEE: Primary | ICD-10-CM

## 2021-10-05 DIAGNOSIS — M17.12 PRIMARY OSTEOARTHRITIS OF LEFT KNEE: ICD-10-CM

## 2021-10-05 DIAGNOSIS — M70.51 PES ANSERINUS BURSITIS OF RIGHT KNEE: ICD-10-CM

## 2021-10-05 PROCEDURE — 20610 LARGE JOINT ASPIRATION/INJECTION: BILATERAL ANSERINE BURSA: ICD-10-PCS | Mod: 50,S$GLB,, | Performed by: ORTHOPAEDIC SURGERY

## 2021-10-05 PROCEDURE — 20610 DRAIN/INJ JOINT/BURSA W/O US: CPT | Mod: 50,S$GLB,, | Performed by: ORTHOPAEDIC SURGERY

## 2021-10-05 PROCEDURE — 99204 OFFICE O/P NEW MOD 45 MIN: CPT | Mod: 25,S$GLB,, | Performed by: ORTHOPAEDIC SURGERY

## 2021-10-05 PROCEDURE — 99204 PR OFFICE/OUTPT VISIT, NEW, LEVL IV, 45-59 MIN: ICD-10-PCS | Mod: 25,S$GLB,, | Performed by: ORTHOPAEDIC SURGERY

## 2021-10-05 RX ORDER — KETOCONAZOLE 20 MG/ML
SHAMPOO, SUSPENSION TOPICAL
COMMUNITY
Start: 2021-05-25 | End: 2023-08-27

## 2021-10-05 RX ORDER — TRIAMCINOLONE ACETONIDE 40 MG/ML
40 INJECTION, SUSPENSION INTRA-ARTICULAR; INTRAMUSCULAR
Status: DISCONTINUED | OUTPATIENT
Start: 2021-10-05 | End: 2021-10-05 | Stop reason: HOSPADM

## 2021-10-05 RX ORDER — NYSTATIN 100000 [USP'U]/G
1 POWDER TOPICAL
COMMUNITY
Start: 2021-05-25

## 2021-10-05 RX ORDER — LISINOPRIL 40 MG/1
40 TABLET ORAL DAILY
COMMUNITY
Start: 2021-10-04 | End: 2023-08-27

## 2021-10-05 RX ORDER — TIMOLOL MALEATE 5 MG/ML
1 SOLUTION/ DROPS OPHTHALMIC 2 TIMES DAILY
COMMUNITY
Start: 2021-09-24

## 2021-10-05 RX ADMIN — TRIAMCINOLONE ACETONIDE 40 MG: 40 INJECTION, SUSPENSION INTRA-ARTICULAR; INTRAMUSCULAR at 01:10

## 2022-06-20 ENCOUNTER — DOCUMENTATION ONLY (OUTPATIENT)
Dept: ENDOCRINOLOGY | Facility: CLINIC | Age: 62
End: 2022-06-20

## 2022-06-20 ENCOUNTER — LAB VISIT (OUTPATIENT)
Dept: LAB | Facility: HOSPITAL | Age: 62
End: 2022-06-20
Attending: INTERNAL MEDICINE
Payer: MEDICARE

## 2022-06-20 ENCOUNTER — OFFICE VISIT (OUTPATIENT)
Dept: ENDOCRINOLOGY | Facility: CLINIC | Age: 62
End: 2022-06-20
Payer: MEDICARE

## 2022-06-20 VITALS
BODY MASS INDEX: 53.71 KG/M2 | HEART RATE: 71 BPM | HEIGHT: 62 IN | OXYGEN SATURATION: 99 % | TEMPERATURE: 98 F | WEIGHT: 291.88 LBS | DIASTOLIC BLOOD PRESSURE: 86 MMHG | SYSTOLIC BLOOD PRESSURE: 120 MMHG

## 2022-06-20 DIAGNOSIS — I10 ESSENTIAL HYPERTENSION: ICD-10-CM

## 2022-06-20 DIAGNOSIS — R73.03 PREDIABETES: ICD-10-CM

## 2022-06-20 DIAGNOSIS — R53.82 CHRONIC FATIGUE: ICD-10-CM

## 2022-06-20 DIAGNOSIS — R94.6 THYROID FUNCTION TEST ABNORMAL: ICD-10-CM

## 2022-06-20 DIAGNOSIS — Z78.0 POSTMENOPAUSAL: ICD-10-CM

## 2022-06-20 DIAGNOSIS — R73.09 DYSGLYCEMIA: ICD-10-CM

## 2022-06-20 DIAGNOSIS — R73.9 HYPERGLYCEMIA: ICD-10-CM

## 2022-06-20 DIAGNOSIS — I12.9 HYPERTENSIVE CHRONIC KIDNEY DISEASE WITH STAGE 1 THROUGH STAGE 4 CHRONIC KIDNEY DISEASE, OR UNSPECIFIED CHRONIC KIDNEY DISEASE: ICD-10-CM

## 2022-06-20 DIAGNOSIS — E66.01 MORBID OBESITY: ICD-10-CM

## 2022-06-20 DIAGNOSIS — R63.5 WEIGHT GAIN: ICD-10-CM

## 2022-06-20 DIAGNOSIS — E55.9 HYPOVITAMINOSIS D: ICD-10-CM

## 2022-06-20 DIAGNOSIS — E78.5 DYSLIPIDEMIA: ICD-10-CM

## 2022-06-20 DIAGNOSIS — E88.810 DYSMETABOLIC SYNDROME: ICD-10-CM

## 2022-06-20 DIAGNOSIS — Z86.010 HISTORY OF COLONIC POLYPS: ICD-10-CM

## 2022-06-20 DIAGNOSIS — I10 ESSENTIAL HYPERTENSION: Primary | ICD-10-CM

## 2022-06-20 DIAGNOSIS — G47.33 OBSTRUCTIVE SLEEP APNEA SYNDROME: ICD-10-CM

## 2022-06-20 LAB
25(OH)D3+25(OH)D2 SERPL-MCNC: 40 NG/ML (ref 30–96)
ALBUMIN SERPL BCP-MCNC: 3.5 G/DL (ref 3.5–5.2)
ALP SERPL-CCNC: 67 U/L (ref 55–135)
ALT SERPL W/O P-5'-P-CCNC: 17 U/L (ref 10–44)
AMYLASE SERPL-CCNC: 69 U/L (ref 20–110)
ANION GAP SERPL CALC-SCNC: 9 MMOL/L (ref 8–16)
AST SERPL-CCNC: 18 U/L (ref 10–40)
BASOPHILS # BLD AUTO: 0.03 K/UL (ref 0–0.2)
BASOPHILS NFR BLD: 0.3 % (ref 0–1.9)
BILIRUB SERPL-MCNC: 0.4 MG/DL (ref 0.1–1)
BUN SERPL-MCNC: 15 MG/DL (ref 8–23)
CA-I BLDV-SCNC: 1.24 MMOL/L (ref 1.06–1.42)
CALCIUM SERPL-MCNC: 9.4 MG/DL (ref 8.7–10.5)
CHLORIDE SERPL-SCNC: 106 MMOL/L (ref 95–110)
CHOLEST SERPL-MCNC: 154 MG/DL (ref 120–199)
CHOLEST/HDLC SERPL: 3.5 {RATIO} (ref 2–5)
CO2 SERPL-SCNC: 25 MMOL/L (ref 23–29)
CORTIS SERPL-MCNC: 7.4 UG/DL
CREAT SERPL-MCNC: 0.9 MG/DL (ref 0.5–1.4)
DHEA-S SERPL-MCNC: 143.2 UG/DL (ref 29.7–182.2)
DIFFERENTIAL METHOD: ABNORMAL
EOSINOPHIL # BLD AUTO: 0.2 K/UL (ref 0–0.5)
EOSINOPHIL NFR BLD: 1.9 % (ref 0–8)
ERYTHROCYTE [DISTWIDTH] IN BLOOD BY AUTOMATED COUNT: 14.7 % (ref 11.5–14.5)
EST. GFR  (AFRICAN AMERICAN): >60 ML/MIN/1.73 M^2
EST. GFR  (NON AFRICAN AMERICAN): >60 ML/MIN/1.73 M^2
ESTIMATED AVG GLUCOSE: 114 MG/DL (ref 68–131)
GLUCOSE SERPL-MCNC: 103 MG/DL (ref 70–110)
HBA1C MFR BLD: 5.6 % (ref 4–5.6)
HCT VFR BLD AUTO: 39.7 % (ref 37–48.5)
HDLC SERPL-MCNC: 44 MG/DL (ref 40–75)
HDLC SERPL: 28.6 % (ref 20–50)
HGB BLD-MCNC: 12 G/DL (ref 12–16)
IMM GRANULOCYTES # BLD AUTO: 0.03 K/UL (ref 0–0.04)
IMM GRANULOCYTES NFR BLD AUTO: 0.3 % (ref 0–0.5)
LDLC SERPL CALC-MCNC: 91.4 MG/DL (ref 63–159)
LIPASE SERPL-CCNC: 19 U/L (ref 4–60)
LYMPHOCYTES # BLD AUTO: 2.9 K/UL (ref 1–4.8)
LYMPHOCYTES NFR BLD: 27.5 % (ref 18–48)
MCH RBC QN AUTO: 29.8 PG (ref 27–31)
MCHC RBC AUTO-ENTMCNC: 30.2 G/DL (ref 32–36)
MCV RBC AUTO: 99 FL (ref 82–98)
MONOCYTES # BLD AUTO: 0.8 K/UL (ref 0.3–1)
MONOCYTES NFR BLD: 7.6 % (ref 4–15)
NEUTROPHILS # BLD AUTO: 6.7 K/UL (ref 1.8–7.7)
NEUTROPHILS NFR BLD: 62.4 % (ref 38–73)
NONHDLC SERPL-MCNC: 110 MG/DL
NRBC BLD-RTO: 0 /100 WBC
PLATELET # BLD AUTO: 378 K/UL (ref 150–450)
PMV BLD AUTO: 11.1 FL (ref 9.2–12.9)
POTASSIUM SERPL-SCNC: 4.3 MMOL/L (ref 3.5–5.1)
PROT SERPL-MCNC: 6.6 G/DL (ref 6–8.4)
PTH-INTACT SERPL-MCNC: 95.1 PG/ML (ref 9–77)
RBC # BLD AUTO: 4.03 M/UL (ref 4–5.4)
SODIUM SERPL-SCNC: 140 MMOL/L (ref 136–145)
T3 SERPL-MCNC: 78 NG/DL (ref 60–180)
T4 FREE SERPL-MCNC: 1.04 NG/DL (ref 0.71–1.51)
TRIGL SERPL-MCNC: 93 MG/DL (ref 30–150)
TSH SERPL DL<=0.005 MIU/L-ACNC: 2.47 UIU/ML (ref 0.4–4)
URATE SERPL-MCNC: 7 MG/DL (ref 2.4–5.7)
WBC # BLD AUTO: 10.69 K/UL (ref 3.9–12.7)

## 2022-06-20 PROCEDURE — 85025 COMPLETE CBC W/AUTO DIFF WBC: CPT | Performed by: INTERNAL MEDICINE

## 2022-06-20 PROCEDURE — 83690 ASSAY OF LIPASE: CPT | Performed by: INTERNAL MEDICINE

## 2022-06-20 PROCEDURE — 82150 ASSAY OF AMYLASE: CPT | Performed by: INTERNAL MEDICINE

## 2022-06-20 PROCEDURE — 80053 COMPREHEN METABOLIC PANEL: CPT | Performed by: INTERNAL MEDICINE

## 2022-06-20 PROCEDURE — 84443 ASSAY THYROID STIM HORMONE: CPT | Performed by: INTERNAL MEDICINE

## 2022-06-20 PROCEDURE — 84439 ASSAY OF FREE THYROXINE: CPT | Performed by: INTERNAL MEDICINE

## 2022-06-20 PROCEDURE — 99999 PR PBB SHADOW E&M-EST. PATIENT-LVL IV: CPT | Mod: PBBFAC,,, | Performed by: INTERNAL MEDICINE

## 2022-06-20 PROCEDURE — 82330 ASSAY OF CALCIUM: CPT | Performed by: INTERNAL MEDICINE

## 2022-06-20 PROCEDURE — 80061 LIPID PANEL: CPT | Performed by: INTERNAL MEDICINE

## 2022-06-20 PROCEDURE — 83525 ASSAY OF INSULIN: CPT | Performed by: INTERNAL MEDICINE

## 2022-06-20 PROCEDURE — 82626 DEHYDROEPIANDROSTERONE: CPT | Performed by: INTERNAL MEDICINE

## 2022-06-20 PROCEDURE — 82024 ASSAY OF ACTH: CPT | Performed by: INTERNAL MEDICINE

## 2022-06-20 PROCEDURE — 83036 HEMOGLOBIN GLYCOSYLATED A1C: CPT | Performed by: INTERNAL MEDICINE

## 2022-06-20 PROCEDURE — 99204 PR OFFICE/OUTPT VISIT, NEW, LEVL IV, 45-59 MIN: ICD-10-PCS | Mod: S$PBB,,, | Performed by: INTERNAL MEDICINE

## 2022-06-20 PROCEDURE — 82088 ASSAY OF ALDOSTERONE: CPT | Performed by: INTERNAL MEDICINE

## 2022-06-20 PROCEDURE — 82306 VITAMIN D 25 HYDROXY: CPT | Performed by: INTERNAL MEDICINE

## 2022-06-20 PROCEDURE — 99214 OFFICE O/P EST MOD 30 MIN: CPT | Mod: PBBFAC,PO | Performed by: INTERNAL MEDICINE

## 2022-06-20 PROCEDURE — 84550 ASSAY OF BLOOD/URIC ACID: CPT | Performed by: INTERNAL MEDICINE

## 2022-06-20 PROCEDURE — 99999 PR PBB SHADOW E&M-EST. PATIENT-LVL IV: ICD-10-PCS | Mod: PBBFAC,,, | Performed by: INTERNAL MEDICINE

## 2022-06-20 PROCEDURE — 84480 ASSAY TRIIODOTHYRONINE (T3): CPT | Performed by: INTERNAL MEDICINE

## 2022-06-20 PROCEDURE — 82533 TOTAL CORTISOL: CPT | Performed by: INTERNAL MEDICINE

## 2022-06-20 PROCEDURE — 83970 ASSAY OF PARATHORMONE: CPT | Performed by: INTERNAL MEDICINE

## 2022-06-20 PROCEDURE — 99204 OFFICE O/P NEW MOD 45 MIN: CPT | Mod: S$PBB,,, | Performed by: INTERNAL MEDICINE

## 2022-06-20 PROCEDURE — 82627 DEHYDROEPIANDROSTERONE: CPT | Performed by: INTERNAL MEDICINE

## 2022-06-20 RX ORDER — ERGOCALCIFEROL 1.25 MG/1
50000 CAPSULE ORAL
Status: ON HOLD | COMMUNITY
Start: 2022-03-14 | End: 2023-08-11 | Stop reason: CLARIF

## 2022-06-20 NOTE — PROGRESS NOTES
I have reviewed the body composition on the patient obtained today 06/20/22.  The full details are scanned into the media tab section of the patients chart.  The % body fat is 53% (24-36%) indicate marked obesity.   Her estimated BMR based on Martinez [place] equation is 1896 kcal/dy and thus estimated AMARJIT based on her sedentary activity levels is ~ 2370kcal/day.  Suggested total daily dietary caloric intake goal to enable establishing sustained caloric deficit to enable steady weight loss till cheryl; ~ 2000kcl/day. Will await measured RMR for validation/correction of these estimates.

## 2022-06-20 NOTE — PROGRESS NOTES
"Subjective:      Patient ID: Jonna Pearl is a 62 y.o. female.    Chief Complaint:  Obesity    Patient is a 62 yr old lady seen for initial care visit today on account of chronic weight management and chronic fatigue.    History of Present Illness    The patient, Ms Pearl is a 62 yr old postmenopausal lady seen for initial care visit today on account of morbid obesity for chronic weight management and chronic fatigue.  Her background comorbidities are detailed below;    1. Dysmetabolic syndrome     2. History of colonic polyps     3. Morbid obesity     4. Essential hypertension     5. Postmenopausal     6. Weight gain     7. Chronic fatigue     8. Hypovitaminosis D       Her baseline Cambridge score is 9 and she does have known OSAS for which she uses a CPAP mask.  Compared to 3 yrs ago she has gained ~ 55 lb!!  Patient does have exertional dyspnea and major right knee arthralgia.  Patient is on long standing neurontin which she takes 3-4 weekly but in on daily amounts of naprosyn.    Patient is s/p JOAO+ BSO on account of severe menorrhagia ?? When. She was never on long standing HRT.  She does not have major headaches.  She has been told she was prediabetes.  Patients mother also has diabetes.  There is no known family history of thyroid disease though.  Patient does not smoke. Stopped smoking ~ 10 yrs ago. Prior this she smoked 1PPD. She smoked for 25-30 yrs.  Patient does not drink ETOH.  Grav 1 Para 1+0 (1 alive).  Patient is presently on disability and used to be a .  Father;  5  6" Max weight; 150lbs  Mother; 5 2" Max weight; 330lbs. She has 5 siblings (2 sisters alive, one passed way and 2 brothers).  None of her siblings have major weight problems.  She used to use Kasia José Miguel when she was much younger but otherwise has taken hydrocut and many other OTC weight supplement agents.  She has not been on prescriptions for weight management though.    Patient typically eats 2 meals daily and often " "skips break fast. Preferred snacks; pop corn.  Patients preferred drinks are diet coke. She doesn't drink fruit juices nor regular soda consistently.      Review of Systems   Constitutional: Positive for unexpected weight change. Negative for activity change, appetite change, diaphoresis and fatigue.   HENT: Negative for facial swelling, hearing loss, nosebleeds, sinus pressure, sore throat, trouble swallowing and voice change.    Eyes: Negative for photophobia and visual disturbance.   Respiratory: Negative for cough and shortness of breath.    Cardiovascular: Negative for chest pain, palpitations and leg swelling.   Gastrointestinal: Negative for abdominal distention, abdominal pain, constipation, diarrhea, nausea and vomiting.   Endocrine: Negative for polydipsia, polyphagia and polyuria.   Genitourinary: Negative for difficulty urinating, dysuria, flank pain, frequency, hematuria and urgency.   Musculoskeletal: Positive for arthralgias (mainly of right knee). Negative for back pain, gait problem, joint swelling, myalgias, neck pain and neck stiffness.   Skin: Negative for color change, pallor and rash.   Neurological: Negative for dizziness, tremors, seizures, syncope, weakness, light-headedness, numbness and headaches.   Hematological: Bruises/bleeds easily.   Psychiatric/Behavioral: Positive for sleep disturbance (OSAS on treatment with CPAP). Negative for agitation, confusion, dysphoric mood and hallucinations. The patient is not nervous/anxious.        Objective: /86 (BP Location: Left arm, Patient Position: Sitting, BP Method: Large (Manual))   Pulse 71   Temp 98.2 °F (36.8 °C) (Oral)   Ht 5' 1.5" (1.562 m)   Wt 132.4 kg (291 lb 14.2 oz)   SpO2 99%   BMI 54.26 kg/m²  Body surface area is 2.4 meters squared.   Neck circ;16", waist circ; 64" hip circ; 55" waist to hip ratio; 1.16         Physical Exam  Vitals and nursing note reviewed.   Constitutional:       General: She is not in acute " distress.     Appearance: She is well-developed. She is obese. She is not ill-appearing or diaphoretic.      Comments: Pleasant morbidly obese lady. Not pale, anicteric and afebrile. Well hydrated. Not in any acute nor chronic distress and not chronically ill looking Acyanotic. Has no moon facies, no malar flushing and no facial excess acne.  Has multiple neck and upper chest skin tags.   HENT:      Head: Normocephalic and atraumatic. Not macrocephalic. No right periorbital erythema or left periorbital erythema. Hair is normal.      Comments: Nil nuchal AN. Nil nuchal nor supraclavicular fat pads. Mallampati grade 3 fauces.     Nose: Nose normal.      Mouth/Throat:      Mouth: Mucous membranes are not pale and not dry.      Pharynx: No oropharyngeal exudate.   Eyes:      General: Lids are normal. No scleral icterus.        Right eye: No discharge.         Left eye: No discharge.      Extraocular Movements: Extraocular movements intact.      Conjunctiva/sclera: Conjunctivae normal.      Pupils: Pupils are equal, round, and reactive to light.   Neck:      Thyroid: No thyroid mass or thyromegaly.      Vascular: Normal carotid pulses. No carotid bruit or JVD.      Trachea: Trachea and phonation normal. No tracheal deviation.      Comments: Thick neck.  Cardiovascular:      Rate and Rhythm: Normal rate and regular rhythm.      Chest Wall: PMI is not displaced.      Pulses: Normal pulses.      Heart sounds: Normal heart sounds, S1 normal and S2 normal. No murmur heard.    No gallop.   Pulmonary:      Effort: Pulmonary effort is normal. No respiratory distress.      Breath sounds: Normal breath sounds. No stridor. No wheezing, rhonchi or rales.   Abdominal:      General: Bowel sounds are normal. There is no distension.      Palpations: Abdomen is soft. There is no hepatomegaly, splenomegaly or mass.      Tenderness: There is no abdominal tenderness. There is no guarding or rebound.      Hernia: No hernia is present. There  is no hernia in the ventral area.      Comments: Obese anterior abdominal wall. No striae   Musculoskeletal:         General: No swelling or tenderness.      Right shoulder: No deformity, bony tenderness or crepitus. Normal range of motion. Normal strength. Normal pulse.      Cervical back: Normal range of motion and neck supple. No rigidity or tenderness.      Comments: No pedal edema nor calf swelling or tenderness.   Lymphadenopathy:      Cervical: No cervical adenopathy.   Skin:     General: Skin is warm and dry.      Coloration: Skin is not jaundiced or pale.      Findings: No bruising, ecchymosis, erythema, petechiae or rash.      Nails: There is no clubbing.      Comments: Has multiple old and fresh ecchymoses; more on upper than lower limbs.   Neurological:      General: No focal deficit present.      Mental Status: She is alert and oriented to person, place, and time.      Cranial Nerves: No cranial nerve deficit.      Sensory: Sensation is intact. No sensory deficit.      Motor: Motor function is intact. No tremor, atrophy or abnormal muscle tone.      Coordination: Coordination is intact. Coordination normal.      Gait: Gait is intact. Gait normal.      Deep Tendon Reflexes: Reflexes are normal and symmetric. Reflexes normal.   Psychiatric:         Attention and Perception: Attention normal.         Mood and Affect: Mood normal.         Speech: Speech normal.         Behavior: Behavior normal. Behavior is cooperative.         Thought Content: Thought content normal.         Judgment: Judgment normal.         Lab Review:     No recent labs available for review in the [facility] data repository.    Assessment:     1. Essential hypertension  Comprehensive Metabolic Panel    Microalbumin/Creatinine Ratio, Urine    Urinalysis    Aldosterone    Renin    Ambulatory referral/consult to Nutrition Services   2. History of colonic polyps  CBC Auto Differential   3. Morbid obesity  Cortisol    DHEA    Nursing  communication    Nursing communication   4. Dysmetabolic syndrome  Comprehensive Metabolic Panel    CBC Auto Differential    Uric Acid    Lipid Panel    Amylase    Lipase    Calcitonin    Insulin, Random    Nursing communication    Nursing communication   5. Postmenopausal  DXA Bone Density Spine And Hip   6. Weight gain  DHEA-Sulfate    ACTH    Aldosterone    Lipid Panel    Cortisol    DHEA    Amylase    Lipase    Calcitonin    Insulin, Random   7. Chronic fatigue     8. Hypovitaminosis D  Vitamin D    Calcium, Ionized    PTH, Intact   9. Dysglycemia  Hemoglobin A1C   10. Thyroid function test abnormal  T4, Free    T3    Thyroglobulin    TSH   11. Dyslipidemia  Lipid Panel   12. Obstructive sleep apnea syndrome     13. Hyperglycemia     14. Prediabetes  Ambulatory referral/consult to Nutrition Services   15. Hypertensive chronic kidney disease with stage 1 through stage 4 chronic kidney disease, or unspecified chronic kidney disease   Ambulatory referral/consult to Nutrition Services      Regarding morbid obesity with progressive weight gain; Discussed with patient strategies for long term weight management. Provided documents to with medical literature for available resources for chronic weight management. To obtain basic screening labs to exclude secondary endocrine contributors to her progressive weight gain. To obtain baseline body composition and RMR measurement to guide lifestyle interventions.  To have formal dietary consultation to assist with developing a sustainable calorie deficit plan. Patient will likely need addition of pharmaceuticals and possibly also elective bariatric surgery as  Other options to be deployed as needed.  Regarding dysmetabolic syndrome; to obtain screening labs as detailed above.  Regarding reported prediabetes; to check current HBA1c and depending on results may need to consider addition of metformin to current management plan.  Regarding progressive weight gain; as  above.  Regarding chronic fatigue; to obtain basic screening lab as detailed above including thyroid and adrenal functional screening.  Regarding hypovitaminosis D; to check 25 OH Vit D and replete as needed based on results.  Regarding OSAS; to continue CPAP therapy as before.  Regarding essential hypertension; to continue serial ambulatory tracking of blood pressure and pulse rate trends. For now no change to her current antihypertensive regimen.  Regarding lipid profile; no established history of dyslipidemia; to check current lipid panel and decide based on results if antilipidemic therapy is indicated. May benefit from low dose asa (81mg QD) for primary ASCVD prophylaxis.    Plan:       FFup in ~ 4mths.

## 2022-06-21 DIAGNOSIS — N39.0 URINARY TRACT INFECTION WITHOUT HEMATURIA, SITE UNSPECIFIED: Primary | ICD-10-CM

## 2022-06-21 LAB
INSULIN COLLECTION INTERVAL: NORMAL
INSULIN SERPL-ACNC: 13.3 UU/ML

## 2022-06-21 RX ORDER — SULFAMETHOXAZOLE AND TRIMETHOPRIM 800; 160 MG/1; MG/1
1 TABLET ORAL 2 TIMES DAILY
Qty: 10 TABLET | Refills: 0 | Status: SHIPPED | OUTPATIENT
Start: 2022-06-21 | End: 2022-06-26

## 2022-06-22 ENCOUNTER — TELEPHONE (OUTPATIENT)
Dept: ENDOCRINOLOGY | Facility: CLINIC | Age: 62
End: 2022-06-22
Payer: MEDICARE

## 2022-06-22 LAB
CALCIT SERPL-MCNC: <5 PG/ML
THRYOGLOBULIN INTERPRETATION: ABNORMAL
THYROGLOB AB SERPL-ACNC: <1.8 IU/ML
THYROGLOB SERPL-MCNC: 3.7 NG/ML

## 2022-06-22 NOTE — TELEPHONE ENCOUNTER
Attempted to contact Ms. Pearl X 2 to call Lisa Umaña with Nutritional Services @ [phone] & schedule an  but there was no answer. A VM was left.

## 2022-06-23 LAB — ALDOST SERPL-MCNC: 11.1 NG/DL

## 2022-06-24 ENCOUNTER — DOCUMENTATION ONLY (OUTPATIENT)
Dept: ENDOCRINOLOGY | Facility: CLINIC | Age: 62
End: 2022-06-24

## 2022-06-24 ENCOUNTER — CLINICAL SUPPORT (OUTPATIENT)
Dept: ENDOCRINOLOGY | Facility: CLINIC | Age: 62
End: 2022-06-24
Payer: MEDICARE

## 2022-06-24 VITALS — WEIGHT: 290.56 LBS | BODY MASS INDEX: 53.47 KG/M2 | HEIGHT: 62 IN

## 2022-06-24 LAB
ACTH PLAS-MCNC: 14 PG/ML (ref 0–46)
RENIN PLAS-CCNC: 1.2 NG/ML/H

## 2022-06-24 PROCEDURE — 99211 OFF/OP EST MAY X REQ PHY/QHP: CPT | Mod: PBBFAC,PO

## 2022-06-24 PROCEDURE — 99999 PR PBB SHADOW E&M-EST. PATIENT-LVL I: CPT | Mod: PBBFAC,,,

## 2022-06-24 PROCEDURE — 99999 PR PBB SHADOW E&M-EST. PATIENT-LVL I: ICD-10-PCS | Mod: PBBFAC,,,

## 2022-06-24 NOTE — PROGRESS NOTES
Pt. arrived for a [employer] procedure reading. Two patient identifiers were utilized for verification. [employer] procedure explained with verbalization of understanding. [employer] procedure performed. Pt. tolerated well without any complaints. Dr. Hunter to call with the interpretation & a copy of the report was given to the patient.

## 2022-06-30 ENCOUNTER — TELEPHONE (OUTPATIENT)
Dept: ENDOCRINOLOGY | Facility: CLINIC | Age: 62
End: 2022-06-30

## 2022-06-30 LAB — DHEA SERPL-MCNC: 1.08 NG/ML (ref 0.63–4.7)

## 2022-06-30 NOTE — TELEPHONE ENCOUNTER
----- Message from Sylvain Hunter MD sent at 6/24/2022  1:41 PM CDT -----  Regarding: Body composition and metabolic rate measurements  Kindly inform the patient that her measured metabolic rate is >500kcal/day less than what it should be based on her age, sex and frame. This may be the result of menopause, genetcis or both but this is a big part of why she has been gaining weight the way she has. To improve this she has to ensure she is taking nor more than  1300-1500kcal/day from her meals. She can get this fixed by either doing calorir couting herself or enrolling in a commercial program like weight [employer], [employer] or [employer] to help her do this. She also needs to increase her daily physical activity levels and start tracking her daily steps with an aim of ~ 10,000 steps daily. At her follow up visit we can discuss medication options to assist her in her weight loss effort.  Thanks    Sylvain Hunter MD

## 2022-07-01 ENCOUNTER — HOSPITAL ENCOUNTER (OUTPATIENT)
Dept: RADIOLOGY | Facility: CLINIC | Age: 62
Discharge: HOME | End: 2022-07-01
Attending: INTERNAL MEDICINE
Payer: MEDICARE

## 2022-07-01 DIAGNOSIS — Z78.0 POSTMENOPAUSAL: ICD-10-CM

## 2022-07-01 PROBLEM — M85.89 OSTEOPENIA OF MULTIPLE SITES: Status: ACTIVE | Noted: 2022-07-01

## 2022-07-01 PROCEDURE — 77080 DEXA BONE DENSITY SPINE HIP: ICD-10-PCS | Mod: 26,,, | Performed by: RADIOLOGY

## 2022-07-01 PROCEDURE — 77080 DXA BONE DENSITY AXIAL: CPT | Mod: 26,,, | Performed by: RADIOLOGY

## 2022-07-01 PROCEDURE — 77080 DXA BONE DENSITY AXIAL: CPT | Mod: TC,PO

## 2022-07-18 ENCOUNTER — NUTRITION (OUTPATIENT)
Dept: NUTRITION | Facility: HOSPITAL | Age: 62
End: 2022-07-18
Payer: MEDICARE

## 2022-07-18 DIAGNOSIS — E66.01 MORBID OBESITY WITH BMI OF 50.0-59.9, ADULT: Primary | ICD-10-CM

## 2022-07-18 DIAGNOSIS — N18.30 STAGE 3 CHRONIC KIDNEY DISEASE, UNSPECIFIED WHETHER STAGE 3A OR 3B CKD: ICD-10-CM

## 2022-07-18 PROCEDURE — 97802 MEDICAL NUTRITION INDIV IN: CPT | Mod: GZ

## 2022-07-18 PROCEDURE — G0447 BEHAVIOR COUNSEL OBESITY 15M: HCPCS

## 2022-07-18 NOTE — PROGRESS NOTES
Diagnosis/Reason for Referral: Obesity  Medical Nutrition Prescription: Behavioral Counseling for Obesity      Ht: 61.5in   Weight: 290#   BMI: 53.91    Weight History:  Wt Readings from Last 3 Encounters:   06/24/22 131.8 kg (290 lb 9.1 oz)   06/20/22 132.4 kg (291 lb 14.2 oz)   10/05/21 127 kg (280 lb)        Weight Hx: Gained 50# over the past week    Sleep: varies, takes melatonin and on CPAP    Food allergies/intolerances: none    Meals: 2-3 meals, 1 snack    24hr recall:   BF:sausage biscuit   Lunch: pizza  Dinner: shrimp platter with fries and mac & cheese  Snacks: white cheddar popcorn  Fluids: 1L flavored water and 1-2 diet sodas    Exercise: none d/t kneed problems    Medications: reviewed    Reviewed:    ? Nutrition and meal planning  ? 1500 calorie meal plan   ? Plate method  ? Behavioral modification tips  ? Weight loss tips  ? 14-day meal ideas    Comments:  Patient reports goal for this visit is to help her lose weight. She has gained 50# over the last year and is trying to lose weight. RD reviewed the above with patient. Patient noted that she wants to get a pedometer to count steps, has a hard time walking d/t knee and noted that she has ankle edema.     Encouraged patient to keep sodium down to 2000mg, write down meals/calories, and increase physical activity. We discussed following the plate method, increasing intake of fruits and veggies.     Patient voiced understanding of the information given and had pertinent questions.     Handouts on the above information were given and contact information provided. Patient will return on 8/10/22.     Goals: 1. Keep log of meals and calories   2. Increase physical activity while seated (exercise handout provided)     Thank you for the referral.     Lisa Umaña, MS, RD/LDN

## 2022-08-10 ENCOUNTER — NUTRITION (OUTPATIENT)
Dept: NUTRITION | Facility: HOSPITAL | Age: 62
End: 2022-08-10
Payer: MEDICARE

## 2022-08-10 VITALS
BODY MASS INDEX: 52.81 KG/M2 | HEIGHT: 62 IN | WEIGHT: 287 LBS | HEIGHT: 62 IN | WEIGHT: 287 LBS | BODY MASS INDEX: 52.81 KG/M2

## 2022-08-10 DIAGNOSIS — N18.30 STAGE 3 CHRONIC KIDNEY DISEASE, UNSPECIFIED WHETHER STAGE 3A OR 3B CKD: ICD-10-CM

## 2022-08-10 DIAGNOSIS — E66.01 MORBID OBESITY WITH BMI OF 50.0-59.9, ADULT: Primary | ICD-10-CM

## 2022-08-10 PROCEDURE — 97803 MED NUTRITION INDIV SUBSEQ: CPT | Mod: GZ

## 2022-08-10 PROCEDURE — G0447 BEHAVIOR COUNSEL OBESITY 15M: HCPCS

## 2022-08-10 NOTE — PROGRESS NOTES
Diagnosis/Reason for Referral: Obesity  Medical Nutrition Prescription: Behavioral Counseling for Obesity      Ht: 61.5in   Weight: 287#   BMI: 53    Weight History:  Wt Readings from Last 5 Encounters:   08/10/22 130.2 kg (287 lb)   06/24/22 131.8 kg (290 lb 9.1 oz)   06/20/22 132.4 kg (291 lb 14.2 oz)   10/05/21 127 kg (280 lb)   09/11/19 107.3 kg (236 lb 8.9 oz)        Meals: 2-3 meals/day, 1-2 snacks    24hr recall:   BF: sausage and biscuit   Lunch and dinner: 1 muffaletta sandwich (eaten throughout lunch and dinner)  Snacks: sausage and biscuit sandwich, saltine crackers and cheese  Fluids: 4-6 water bottles, diet Dr. Pepper    Exercise: none    Comments:  Patient reports counting calories and sodium, writing meals down which has helped her learn foods high in sodium and calories. She is going back to the store in the next few days and is going to buy other food options that are low in sodium and calories such as fruits and vegetables. She agreed to increase physical activity to at least 5 minutes for 5 days/week, we discussed some easy ideas and is getting a pedometer eventually.     Patient noted that she has had some issues at home and got out of track with writing down meals/counting calories but goal is to restart this week.       Goals: 1. 5 minutes of physical activity daily    2. Restart logging meals and calories     Thank you for the referral.     Lisa Umaña, MS, RD/LDN

## 2022-10-10 ENCOUNTER — HOSPITAL ENCOUNTER (EMERGENCY)
Facility: HOSPITAL | Age: 62
Discharge: HOME | End: 2022-10-10
Attending: EMERGENCY MEDICINE
Payer: MEDICARE

## 2022-10-10 VITALS
SYSTOLIC BLOOD PRESSURE: 151 MMHG | BODY MASS INDEX: 53.35 KG/M2 | DIASTOLIC BLOOD PRESSURE: 69 MMHG | RESPIRATION RATE: 18 BRPM | TEMPERATURE: 98 F | HEART RATE: 64 BPM | OXYGEN SATURATION: 100 % | WEIGHT: 287 LBS

## 2022-10-10 DIAGNOSIS — R10.13 EPIGASTRIC ABDOMINAL PAIN: Primary | ICD-10-CM

## 2022-10-10 DIAGNOSIS — R10.9 ABDOMINAL PAIN: ICD-10-CM

## 2022-10-10 LAB
ALBUMIN SERPL BCP-MCNC: 3.1 G/DL (ref 3.5–5.2)
ALP SERPL-CCNC: 72 U/L (ref 55–135)
ALT SERPL W/O P-5'-P-CCNC: 17 U/L (ref 10–44)
ANION GAP SERPL CALC-SCNC: 8 MMOL/L (ref 8–16)
AST SERPL-CCNC: 16 U/L (ref 10–40)
BASOPHILS # BLD AUTO: 0.02 K/UL (ref 0–0.2)
BASOPHILS NFR BLD: 0.2 % (ref 0–1.9)
BILIRUB SERPL-MCNC: 0.3 MG/DL (ref 0.1–1)
BILIRUB UR QL STRIP: NEGATIVE
BUN SERPL-MCNC: 19 MG/DL (ref 8–23)
CALCIUM SERPL-MCNC: 10.1 MG/DL (ref 8.7–10.5)
CHLORIDE SERPL-SCNC: 105 MMOL/L (ref 95–110)
CLARITY UR: CLEAR
CO2 SERPL-SCNC: 26 MMOL/L (ref 23–29)
COLOR UR: COLORLESS
CREAT SERPL-MCNC: 0.9 MG/DL (ref 0.5–1.4)
DIFFERENTIAL METHOD: ABNORMAL
EOSINOPHIL # BLD AUTO: 0.2 K/UL (ref 0–0.5)
EOSINOPHIL NFR BLD: 1.4 % (ref 0–8)
ERYTHROCYTE [DISTWIDTH] IN BLOOD BY AUTOMATED COUNT: 14.6 % (ref 11.5–14.5)
EST. GFR  (NO RACE VARIABLE): >60 ML/MIN/1.73 M^2
GLUCOSE SERPL-MCNC: 88 MG/DL (ref 70–110)
GLUCOSE UR QL STRIP: NEGATIVE
HCT VFR BLD AUTO: 35.9 % (ref 37–48.5)
HGB BLD-MCNC: 11.7 G/DL (ref 12–16)
HGB UR QL STRIP: NEGATIVE
IMM GRANULOCYTES # BLD AUTO: 0.04 K/UL (ref 0–0.04)
IMM GRANULOCYTES NFR BLD AUTO: 0.3 % (ref 0–0.5)
KETONES UR QL STRIP: NEGATIVE
LEUKOCYTE ESTERASE UR QL STRIP: NEGATIVE
LIPASE SERPL-CCNC: 30 U/L (ref 4–60)
LYMPHOCYTES # BLD AUTO: 3.1 K/UL (ref 1–4.8)
LYMPHOCYTES NFR BLD: 24.5 % (ref 18–48)
MCH RBC QN AUTO: 30 PG (ref 27–31)
MCHC RBC AUTO-ENTMCNC: 32.6 G/DL (ref 32–36)
MCV RBC AUTO: 92 FL (ref 82–98)
MONOCYTES # BLD AUTO: 1.1 K/UL (ref 0.3–1)
MONOCYTES NFR BLD: 8.5 % (ref 4–15)
NEUTROPHILS # BLD AUTO: 8.2 K/UL (ref 1.8–7.7)
NEUTROPHILS NFR BLD: 65.1 % (ref 38–73)
NITRITE UR QL STRIP: NEGATIVE
NRBC BLD-RTO: 0 /100 WBC
PH UR STRIP: 7 [PH] (ref 5–8)
PLATELET # BLD AUTO: 350 K/UL (ref 150–450)
PMV BLD AUTO: 10.3 FL (ref 9.2–12.9)
POTASSIUM SERPL-SCNC: 4 MMOL/L (ref 3.5–5.1)
PROT SERPL-MCNC: 6.3 G/DL (ref 6–8.4)
PROT UR QL STRIP: NEGATIVE
RBC # BLD AUTO: 3.9 M/UL (ref 4–5.4)
SODIUM SERPL-SCNC: 139 MMOL/L (ref 136–145)
SP GR UR STRIP: <1.005 (ref 1–1.03)
TROPONIN I SERPL DL<=0.01 NG/ML-MCNC: 0.04 NG/ML (ref 0–0.03)
TROPONIN I SERPL DL<=0.01 NG/ML-MCNC: <0.006 NG/ML (ref 0–0.03)
URN SPEC COLLECT METH UR: ABNORMAL
UROBILINOGEN UR STRIP-ACNC: NEGATIVE EU/DL
WBC # BLD AUTO: 12.63 K/UL (ref 3.9–12.7)

## 2022-10-10 PROCEDURE — 84484 ASSAY OF TROPONIN QUANT: CPT | Performed by: PHYSICIAN ASSISTANT

## 2022-10-10 PROCEDURE — 93010 ELECTROCARDIOGRAM REPORT: CPT | Mod: ,,, | Performed by: INTERNAL MEDICINE

## 2022-10-10 PROCEDURE — 84484 ASSAY OF TROPONIN QUANT: CPT | Mod: 91 | Performed by: EMERGENCY MEDICINE

## 2022-10-10 PROCEDURE — 83690 ASSAY OF LIPASE: CPT | Performed by: PHYSICIAN ASSISTANT

## 2022-10-10 PROCEDURE — 93005 ELECTROCARDIOGRAM TRACING: CPT

## 2022-10-10 PROCEDURE — 93010 EKG 12-LEAD: ICD-10-PCS | Mod: ,,, | Performed by: INTERNAL MEDICINE

## 2022-10-10 PROCEDURE — 81003 URINALYSIS AUTO W/O SCOPE: CPT | Performed by: PHYSICIAN ASSISTANT

## 2022-10-10 PROCEDURE — 25000003 PHARM REV CODE 250: Performed by: PHYSICIAN ASSISTANT

## 2022-10-10 PROCEDURE — 25000003 PHARM REV CODE 250: Performed by: EMERGENCY MEDICINE

## 2022-10-10 PROCEDURE — 99284 EMERGENCY DEPT VISIT MOD MDM: CPT | Mod: 25

## 2022-10-10 PROCEDURE — 36000 PLACE NEEDLE IN VEIN: CPT

## 2022-10-10 PROCEDURE — 85025 COMPLETE CBC W/AUTO DIFF WBC: CPT | Performed by: PHYSICIAN ASSISTANT

## 2022-10-10 PROCEDURE — 80053 COMPREHEN METABOLIC PANEL: CPT | Performed by: PHYSICIAN ASSISTANT

## 2022-10-10 RX ORDER — ONDANSETRON 4 MG/1
4 TABLET, ORALLY DISINTEGRATING ORAL EVERY 8 HOURS PRN
Qty: 15 TABLET | Refills: 0 | Status: SHIPPED | OUTPATIENT
Start: 2022-10-10 | End: 2022-10-17

## 2022-10-10 RX ORDER — MAG HYDROX/ALUMINUM HYD/SIMETH 200-200-20
5 SUSPENSION, ORAL (FINAL DOSE FORM) ORAL
Status: COMPLETED | OUTPATIENT
Start: 2022-10-10 | End: 2022-10-10

## 2022-10-10 RX ORDER — ONDANSETRON 4 MG/1
4 TABLET, ORALLY DISINTEGRATING ORAL
Status: COMPLETED | OUTPATIENT
Start: 2022-10-10 | End: 2022-10-10

## 2022-10-10 RX ORDER — FAMOTIDINE 20 MG/1
20 TABLET, FILM COATED ORAL 2 TIMES DAILY
Qty: 60 TABLET | Refills: 0 | Status: ON HOLD | OUTPATIENT
Start: 2022-10-10 | End: 2023-08-11 | Stop reason: HOSPADM

## 2022-10-10 RX ADMIN — ALUMINUM HYDROXIDE, MAGNESIUM HYDROXIDE, AND SIMETHICONE 5 ML: 200; 200; 20 SUSPENSION ORAL at 03:10

## 2022-10-10 RX ADMIN — ONDANSETRON 4 MG: 4 TABLET, ORALLY DISINTEGRATING ORAL at 02:10

## 2022-10-10 NOTE — ED PROVIDER NOTES
Encounter Date: 10/10/2022       History     Chief Complaint   Patient presents with    Abdominal Pain     Pt reports generalized lower abd pain with nausea that began last night, last BM yesterday, vss, denies urinary symptoms      62-year-old female, with history of arthritis, atrophic kidney, hypertension, presents to the ED for abdominal pain. Patient reports that she has abdominal problem for a long time due to her obesity, was referred to GI before but lost follow up. Usually her abdominal pain is subside with Tums but last night she felt nauseous and vomited. Abdominal pain diffuse and cramping, intermittent, no known aggravating or alleviating factors. Denies alcohol use or history of gallstone. Endorses taking naproxen chronically for knee pain. Reports normal bowel movement last night, no bloody stool, denies fever/chill, chest pain or shortness of breath.        Review of patient's allergies indicates:   Allergen Reactions    Neomycin-polymyxin Swelling    Cephalexin      Other reaction(s): Unknown    Doxycycline      Other reaction(s): Unknown    Iodinated contrast media      Other reaction(s): Unknown    Penicillins      Other reaction(s): Unknown    Valsartan      Other reaction(s): Unknown     Past Medical History:   Diagnosis Date    Allergy     Arthritis     Atrophic kidney     left. probably childhood illness or disease    Chronic kidney disease     Chronic rhinitis     Colon polyps     Depression     HEARING LOSS     Hypertension     Insomnia     Migraines     Otitis media     Sleep apnea     mild     Past Surgical History:   Procedure Laterality Date     SECTION      COLONOSCOPY  2014    Dr Escobar 5 year yossi    COLONOSCOPY N/A 4/10/2019    Procedure: COLONOSCOPY;  Surgeon: Matt Orozco MD;  Location: [facility];  Service: Endoscopy;  Laterality: N/A;    HEMORRHOID SURGERY      HYSTERECTOMY      left mandibular node resection      OOPHORECTOMY       Family History   Problem  Relation Age of Onset    Cancer Sister         breast    Diabetes Mother     Early death Father     Diabetes Paternal Grandmother     Breast cancer Maternal Aunt     Breast cancer Paternal Aunt      Social History     Tobacco Use    Smoking status: Former     Packs/day: 1.00     Years: 18.00     Pack years: 18.00     Types: Cigarettes     Quit date: 2018     Years since quittin.5    Smokeless tobacco: Never   Substance Use Topics    Alcohol use: No    Drug use: No     Review of Systems   Constitutional:  Negative for chills and fever.   HENT:  Negative for congestion and sore throat.    Eyes:  Negative for pain and redness.   Respiratory:  Negative for cough and shortness of breath.    Cardiovascular:  Negative for chest pain and palpitations.   Gastrointestinal:  Positive for abdominal pain, nausea and vomiting. Negative for abdominal distention, constipation and diarrhea.   Genitourinary:  Negative for dysuria and flank pain.   Musculoskeletal:  Negative for back pain and neck pain.   Skin:  Negative for rash.   Neurological:  Negative for weakness and headaches.   Psychiatric/Behavioral:  Negative for behavioral problems and confusion.      Physical Exam     Initial Vitals [10/10/22 1310]   BP Pulse Resp Temp SpO2   (!) 180/77 76 18 97.9 °F (36.6 °C) 97 %      MAP       --         Physical Exam    Nursing note and vitals reviewed.  Constitutional: She appears well-developed. No distress.   HENT:   Head: Normocephalic and atraumatic.   Mouth/Throat: Oropharynx is clear and moist.   Eyes: Conjunctivae and EOM are normal.   Neck: No JVD present.   Normal range of motion.  Cardiovascular:  Normal rate, regular rhythm, normal heart sounds and intact distal pulses.           Pulmonary/Chest: Breath sounds normal. No respiratory distress.   Abdominal: Abdomen is soft. Bowel sounds are normal. She exhibits no distension. There is no abdominal tenderness. There is no rebound and no guarding.   Musculoskeletal:          General: No tenderness or edema. Normal range of motion.      Cervical back: Normal range of motion.     Neurological: She is alert and oriented to person, place, and time. She has normal strength.   Skin: Skin is warm and dry. Capillary refill takes less than 2 seconds.       ED Course   Procedures  Labs Reviewed   CBC W/ AUTO DIFFERENTIAL - Abnormal; Notable for the following components:       Result Value    RBC 3.90 (*)     Hemoglobin 11.7 (*)     Hematocrit 35.9 (*)     RDW 14.6 (*)     Gran # (ANC) 8.2 (*)     Mono # 1.1 (*)     All other components within normal limits   COMPREHENSIVE METABOLIC PANEL - Abnormal; Notable for the following components:    Albumin 3.1 (*)     All other components within normal limits   URINALYSIS, REFLEX TO URINE CULTURE - Abnormal; Notable for the following components:    Color, UA Colorless (*)     Specific Gravity, UA <1.005 (*)     All other components within normal limits    Narrative:     Specimen Source->Urine   TROPONIN I - Abnormal; Notable for the following components:    Troponin I 0.039 (*)     All other components within normal limits   LIPASE   TROPONIN I   TROPONIN I          Imaging Results    None          Medications   ondansetron disintegrating tablet 4 mg (4 mg Oral Given 10/10/22 1414)   aluminum-magnesium hydroxide-simethicone 200-200-20 mg/5 mL suspension 5 mL (5 mLs Oral Given 10/10/22 1526)     Medical Decision Making:   Initial Assessment:   62-year-old female, with history of arthritis, atrophic kidney, hypertension, presents to the ED for abdominal pain. Patient is well appearing, afebrile, hemodynamically stable. Abdomen is obese but soft, non-tender to palpation.   Differential Diagnosis:   Gastritis, colitis, pancreatitis, UTI, ACS, doubt cholecystitis.            ED Course as of 10/11/22 0711   Mon Oct 10, 2022   1453 WBC: 12.63 [NC]   1453 Hemoglobin(!): 11.7 [NC]   1453 Platelets: 350 [NC]   1511 Sodium: 139 [NC]   1511 Potassium: 4.0 [NC]    1511 BUN: 19 [NC]   1511 Creatinine: 0.9 [NC]   1547 Lipase: 30 [NC]   1649 Troponin I(!): 0.039  Mildly elevated, will repeat [NC]   1649 Re-examination: patient reports symptoms resolved with the ED treatment. Will consider discharge with GI follow up if delta troponin is negative [NC]      ED Course User Index  [NC] Bj Jules MD                 Clinical Impression:   Final diagnoses:  [R10.9] Abdominal pain  [R10.13] Epigastric abdominal pain (Primary)      ED Disposition Condition    Discharge Stable          ED Prescriptions       Medication Sig Dispense Start Date End Date Auth. Provider    famotidine (PEPCID) 20 MG tablet Take 1 tablet (20 mg total) by mouth 2 (two) times daily. 60 tablet 10/10/2022 11/9/2022 Isra Tirado MD    ondansetron (ZOFRAN ODT) 4 MG TbDL Take 1 tablet (4 mg total) by mouth every 8 (eight) hours as needed (nausea/vomiting). 15 tablet 10/10/2022 10/17/2022 Isra Tirado MD          Follow-up Information       Follow up With Specialties Details Why Contact Info    [facility] Schedule an appointment as soon as possible for a visit   [address] [facility]  [address]  [facility] [id number] [phone]               Bj Jules MD  Resident  10/11/22 2942

## 2022-10-10 NOTE — ED NOTES
Patient arrives for evaluation of generalized abdominal pain x 6-8  months - today she has experienced some nausea without emesis - states a decrease in appetite since yesterday - patient in no apparent distress

## 2022-10-10 NOTE — FIRST PROVIDER EVALUATION
Emergency Department TeleTriage Encounter Note      CHIEF COMPLAINT    Chief Complaint   Patient presents with    Abdominal Pain     Pt reports generalized lower abd pain with nausea that began last night, last BM yesterday, vss, denies urinary symptoms        VITAL SIGNS   Initial Vitals [10/10/22 1310]   BP Pulse Resp Temp SpO2   (!) 180/77 76 18 97.9 °F (36.6 °C) 97 %      MAP       --            ALLERGIES    Review of patient's allergies indicates:   Allergen Reactions    Neomycin-polymyxin Swelling    Cephalexin      Other reaction(s): Unknown    Doxycycline      Other reaction(s): Unknown    Iodinated contrast media      Other reaction(s): Unknown    Penicillins      Other reaction(s): Unknown    Valsartan      Other reaction(s): Unknown       PROVIDER TRIAGE NOTE  HPI: Jonna Pearl, a 62 y.o. female presents to the ED for evaluation of lower abdominal pain and nausea x 2 - 3 days.      Constitutional: well nourished, well developed, appearing stated age, NAD   HEENT: NCAT, symmetrical lids, No obvious facial deformity.  Normal phonation. Normal Conjunctiva, Gross EOMs intact   Neck: NAROM   Respiratory: Normal effort.  No obvious use of accessory muscles   Musculoskeletal: Moved upper extremities well   Neuro: Alert, answers questions appropriately    Psych: appropriate mood and affect          ORDERS  Labs Reviewed - No data to display    ED Orders (720h ago, onward)      None              Virtual Visit Note: The provider triage portion of this emergency department evaluation and documentation was performed via [employer], a HIPAA-compliant telemedicine application, in concert with a tele-presenter in the room. A face to face patient evaluation with one of my colleagues will occur once the patient is placed in an emergency department room.      DISCLAIMER: This note was prepared with [employer] voice recognition transcription software. Garbled syntax, mangled pronouns, and other bizarre constructions may  be attributed to that software system.

## 2022-10-10 NOTE — DISCHARGE INSTRUCTIONS
Thank you for coming in to see us at [facility]! It was nice to meet you, and I hope you feel better soon. Please feel free to return to the ER at any time should your symptoms get worse, or if you have different emergent concerns.    Our goal in the emergency department is to always give you outstanding care and exceptional service. You may receive a survey by mail or e-mail in the next week regarding your experience in our ED. We would greatly appreciate your completing and returning the survey. Your feedback provides us with a way to recognize our staff who give very good care and it helps us learn how to improve when your experience was below our aspiration of excellence.       Sincerely,    Isra Tirado MD  Medical Director  Emergency Department  [facility]

## 2022-10-20 ENCOUNTER — OFFICE VISIT (OUTPATIENT)
Dept: ENDOCRINOLOGY | Facility: CLINIC | Age: 62
End: 2022-10-20
Payer: MEDICARE

## 2022-10-20 VITALS
HEART RATE: 58 BPM | DIASTOLIC BLOOD PRESSURE: 80 MMHG | TEMPERATURE: 98 F | BODY MASS INDEX: 52.07 KG/M2 | SYSTOLIC BLOOD PRESSURE: 118 MMHG | OXYGEN SATURATION: 98 % | HEIGHT: 62 IN | WEIGHT: 282.94 LBS

## 2022-10-20 DIAGNOSIS — E66.9 OBESITY (BMI 30-39.9): ICD-10-CM

## 2022-10-20 DIAGNOSIS — G44.221 CHRONIC TENSION-TYPE HEADACHE, INTRACTABLE: ICD-10-CM

## 2022-10-20 DIAGNOSIS — M85.89 OSTEOPENIA OF MULTIPLE SITES: Primary | ICD-10-CM

## 2022-10-20 PROCEDURE — 99999 PR PBB SHADOW E&M-EST. PATIENT-LVL IV: ICD-10-PCS | Mod: PBBFAC,,, | Performed by: PHYSICIAN ASSISTANT

## 2022-10-20 PROCEDURE — 99999 PR PBB SHADOW E&M-EST. PATIENT-LVL IV: CPT | Mod: PBBFAC,,, | Performed by: PHYSICIAN ASSISTANT

## 2022-10-20 PROCEDURE — 99214 OFFICE O/P EST MOD 30 MIN: CPT | Mod: PBBFAC,PO | Performed by: PHYSICIAN ASSISTANT

## 2022-10-20 PROCEDURE — 99214 PR OFFICE/OUTPT VISIT, EST, LEVL IV, 30-39 MIN: ICD-10-PCS | Mod: S$PBB,,, | Performed by: PHYSICIAN ASSISTANT

## 2022-10-20 PROCEDURE — 99214 OFFICE O/P EST MOD 30 MIN: CPT | Mod: S$PBB,,, | Performed by: PHYSICIAN ASSISTANT

## 2022-10-20 RX ORDER — SEMAGLUTIDE 1.34 MG/ML
0.25 INJECTION, SOLUTION SUBCUTANEOUS
Qty: 1 PEN | Refills: 5 | Status: SHIPPED | OUTPATIENT
Start: 2022-10-20 | End: 2022-10-28

## 2022-10-20 RX ORDER — SEMAGLUTIDE 0.25 MG/.5ML
0.25 INJECTION, SOLUTION SUBCUTANEOUS
Qty: 2 ML | Refills: 3 | Status: SHIPPED | OUTPATIENT
Start: 2022-10-20 | End: 2022-10-20

## 2022-10-20 NOTE — PROGRESS NOTES
"CC: Osteopenia    HPI: Jonna Pearl is a 62 y.o. female here for osteopenia along with pending conditions listed in the Visit Diagnosis. +FHx of DM in her mother. Seeing Lisa Umaña. Dexa 7/22 shows osteopenia in the hip and spine. She started gaining wt in 10/21. Her mother gained weight when she become elderly. She is on a 1300 calorie diet. She walks 3-4x weekly. No falls, fractures or steriod injections.    Diet:   BF-banana, juice  LH-steak  DN-chicken, potato salad  Snacks on crackers. Drink water.    PMHx, PSHx: reviewed in epic.  Social Hx: no ETOH/tobacco use.     Wt Readings from Last 20 Encounters:   10/20/22 128.4 kg (282 lb 15.4 oz)   10/10/22 130.2 kg (287 lb)   08/10/22 130.2 kg (287 lb)   08/10/22 130.2 kg (287 lb)   06/24/22 131.8 kg (290 lb 9.1 oz)   06/20/22 132.4 kg (291 lb 14.2 oz)   10/05/21 127 kg (280 lb)   09/11/19 107.3 kg (236 lb 8.9 oz)   04/10/19 108.9 kg (240 lb)   04/02/19 109.2 kg (240 lb 11.9 oz)   01/17/14 108.9 kg (240 lb)   10/31/13 115.4 kg (254 lb 6.4 oz)   12/12/12 120.7 kg (266 lb)   10/31/12 119.7 kg (263 lb 14.4 oz)   10/17/12 117.9 kg (260 lb)   10/10/12 118.2 kg (260 lb 8 oz)   07/11/12 117.8 kg (259 lb 9.6 oz)   06/25/12 115.3 kg (254 lb 1.6 oz)   05/18/12 115.9 kg (255 lb 9.6 oz)   02/10/12 113.4 kg (250 lb)       ROS:   Constitutional: + wt loss (9 lbs).   Eyes: No recent visual changes  Cardiovascular: Denies current anginal symptoms  Respiratory: Denies current respiratory difficulty  Gastrointestinal: Denies recent bowel disturbances  GenitoUrinary - No dysuria  Skin: No new skin rash  Neurologic: No focal neurologic complaints  Musculoskeletal: no joint pain  Endocrine: no polyphagia, polydipsia or polyuria  Remainder ROS negative     /80 (BP Location: Left arm, Patient Position: Sitting, BP Method: Large (Manual))   Pulse (!) 58   Temp 97.5 °F (36.4 °C) (Oral)   Ht 5' 1.5" (1.562 m)   Wt 128.4 kg (282 lb 15.4 oz)   SpO2 98%   BMI 52.60 kg/m²  "     Personally reviewed labs below:    Lab Results   Component Value Date    TSH 2.468 06/20/2022    Z5NVEPF 78 06/20/2022    FREET4 1.04 06/20/2022        Chemistry        Component Value Date/Time     10/10/2022 1415    K 4.0 10/10/2022 1415     10/10/2022 1415    CO2 26 10/10/2022 1415    BUN 19 10/10/2022 1415    CREATININE 0.9 10/10/2022 1415    GLU 88 10/10/2022 1415        Component Value Date/Time    CALCIUM 10.1 10/10/2022 1415    ALKPHOS 72 10/10/2022 1415    AST 16 10/10/2022 1415    ALT 17 10/10/2022 1415    BILITOT 0.3 10/10/2022 1415    ESTGFRAFRICA >60.0 06/20/2022 1040    EGFRNONAA >60.0 06/20/2022 1040           Lab Results   Component Value Date    HGBA1C 5.6 06/20/2022      PE:  GENERAL: middle aged female, Well developed, well nourished  NECK: Supple neck, normal thyroid. No bruit  LYMPHATIC: No cervical or supraclavicular lymphadenopathy  CARDIOVASCULAR: Normal heart sounds, no pedal edema  RESPIRATORY: Normal effort, clear to auscultation  MUSC: 2+ DTR UE/LE  NEURO: steady gait, CN ll-Xll grossly intact  PSYCH: normal mood and affect    Assessment/Plan:   1. Osteopenia of multiple sites        2. Chronic tension-type headache, intractable  topiramate (TOPAMAX) 50 MG tablet      3. Obesity (BMI 30-39.9)  DISCONTINUED: semaglutide, weight loss, (WEGOVY) 0.25 mg/0.5 mL PnIj         Osteopenia-Dexa 7/24. Continue taking  vitamin D and  calcium. Also, participate in weight bearing and resistance exercises for 40 min 4x weekly to maintain your bone density.   Obesity-Body mass index is 52.6 kg/m². Increase exercise to 30 min daily.  HA-start topiramate 50 mg qd.     Print medgem interpretation from Dr. Hunter  3 mths

## 2022-10-24 ENCOUNTER — TELEPHONE (OUTPATIENT)
Dept: ENDOCRINOLOGY | Facility: CLINIC | Age: 62
End: 2022-10-24
Payer: MEDICARE

## 2022-10-24 RX ORDER — TOPIRAMATE 50 MG/1
50 TABLET, FILM COATED ORAL DAILY
Qty: 30 TABLET | Refills: 11 | Status: SHIPPED | OUTPATIENT
Start: 2022-10-24 | End: 2023-10-24

## 2022-10-24 NOTE — TELEPHONE ENCOUNTER
Informed patient [facility]  verbalized understanding.

## 2022-10-28 DIAGNOSIS — R10.13 ABDOMINAL PAIN, EPIGASTRIC: ICD-10-CM

## 2022-10-28 DIAGNOSIS — N18.9 ANEMIA IN CKD (CHRONIC KIDNEY DISEASE): ICD-10-CM

## 2022-10-28 DIAGNOSIS — D63.1 ANEMIA IN CKD (CHRONIC KIDNEY DISEASE): ICD-10-CM

## 2022-10-28 DIAGNOSIS — K21.9 GERD (GASTROESOPHAGEAL REFLUX DISEASE): Primary | ICD-10-CM

## 2022-11-14 ENCOUNTER — HOSPITAL ENCOUNTER (OUTPATIENT)
Dept: RADIOLOGY | Facility: HOSPITAL | Age: 62
Discharge: HOME | End: 2022-11-14
Attending: INTERNAL MEDICINE
Payer: MEDICARE

## 2022-11-14 DIAGNOSIS — N18.9 ANEMIA IN CKD (CHRONIC KIDNEY DISEASE): ICD-10-CM

## 2022-11-14 DIAGNOSIS — D63.1 ANEMIA IN CKD (CHRONIC KIDNEY DISEASE): ICD-10-CM

## 2022-11-14 DIAGNOSIS — K21.9 GERD (GASTROESOPHAGEAL REFLUX DISEASE): ICD-10-CM

## 2022-11-14 DIAGNOSIS — R10.13 ABDOMINAL PAIN, EPIGASTRIC: ICD-10-CM

## 2022-11-14 PROCEDURE — 76705 ECHO EXAM OF ABDOMEN: CPT | Mod: TC,PO

## 2022-11-30 ENCOUNTER — TELEPHONE (OUTPATIENT)
Dept: ENDOCRINOLOGY | Facility: CLINIC | Age: 62
End: 2022-11-30
Payer: MEDICARE

## 2022-12-20 ENCOUNTER — HOSPITAL ENCOUNTER (OUTPATIENT)
Dept: RADIOLOGY | Facility: HOSPITAL | Age: 62
Discharge: HOME | End: 2022-12-20
Attending: ORTHOPAEDIC SURGERY
Payer: MEDICARE

## 2022-12-20 ENCOUNTER — OFFICE VISIT (OUTPATIENT)
Dept: ORTHOPEDICS | Facility: CLINIC | Age: 62
End: 2022-12-20
Payer: MEDICARE

## 2022-12-20 VITALS — WEIGHT: 283.06 LBS | HEIGHT: 62 IN | RESPIRATION RATE: 16 BRPM | BODY MASS INDEX: 52.09 KG/M2

## 2022-12-20 DIAGNOSIS — M17.12 PRIMARY OSTEOARTHRITIS OF LEFT KNEE: ICD-10-CM

## 2022-12-20 DIAGNOSIS — M17.11 PRIMARY OSTEOARTHRITIS OF RIGHT KNEE: Primary | ICD-10-CM

## 2022-12-20 DIAGNOSIS — M17.11 PRIMARY OSTEOARTHRITIS OF RIGHT KNEE: ICD-10-CM

## 2022-12-20 PROCEDURE — 20610 DRAIN/INJ JOINT/BURSA W/O US: CPT | Mod: PBBFAC,PN,RT | Performed by: ORTHOPAEDIC SURGERY

## 2022-12-20 PROCEDURE — 99999 PR PBB SHADOW E&M-EST. PATIENT-LVL III: ICD-10-PCS | Mod: PBBFAC,,, | Performed by: ORTHOPAEDIC SURGERY

## 2022-12-20 PROCEDURE — 99203 OFFICE O/P NEW LOW 30 MIN: CPT | Mod: 25,S$PBB,, | Performed by: ORTHOPAEDIC SURGERY

## 2022-12-20 PROCEDURE — 73562 X-RAY EXAM OF KNEE 3: CPT | Mod: 26,LT,, | Performed by: RADIOLOGY

## 2022-12-20 PROCEDURE — 99213 OFFICE O/P EST LOW 20 MIN: CPT | Mod: PBBFAC,PN | Performed by: ORTHOPAEDIC SURGERY

## 2022-12-20 PROCEDURE — 73562 XR KNEE ORTHO RIGHT WITH FLEXION: ICD-10-PCS | Mod: 26,LT,, | Performed by: RADIOLOGY

## 2022-12-20 PROCEDURE — 20610 PR DRAIN/INJECT LARGE JOINT/BURSA: ICD-10-PCS | Mod: S$PBB,RT,, | Performed by: ORTHOPAEDIC SURGERY

## 2022-12-20 PROCEDURE — 73564 XR KNEE ORTHO RIGHT WITH FLEXION: ICD-10-PCS | Mod: 26,RT,, | Performed by: RADIOLOGY

## 2022-12-20 PROCEDURE — 73564 X-RAY EXAM KNEE 4 OR MORE: CPT | Mod: 26,RT,, | Performed by: RADIOLOGY

## 2022-12-20 PROCEDURE — 99203 PR OFFICE/OUTPT VISIT, NEW, LEVL III, 30-44 MIN: ICD-10-PCS | Mod: 25,S$PBB,, | Performed by: ORTHOPAEDIC SURGERY

## 2022-12-20 PROCEDURE — 73564 X-RAY EXAM KNEE 4 OR MORE: CPT | Mod: TC,PN,RT

## 2022-12-20 PROCEDURE — 99999 PR PBB SHADOW E&M-EST. PATIENT-LVL III: CPT | Mod: PBBFAC,,, | Performed by: ORTHOPAEDIC SURGERY

## 2022-12-20 PROCEDURE — 20610 DRAIN/INJ JOINT/BURSA W/O US: CPT | Mod: S$PBB,RT,, | Performed by: ORTHOPAEDIC SURGERY

## 2022-12-20 RX ADMIN — Medication 4 ML: at 01:12

## 2022-12-20 NOTE — PROCEDURES
Large Joint Aspiration/Injection: R knee joint    Date/Time: 12/20/2022 1:00 PM  Performed by: Mirza Jeffries MD  Authorized by: Mirza Jeffries MD     Consent Done?:  Yes (Verbal)  Indications:  Pain  Site marked: the procedure site was marked    Timeout: prior to procedure the correct patient, procedure, and site was verified      Details:  Needle Size:  22 G  Approach:  Anterolateral  Location:  Knee  Site:  R knee joint  Medications:  4 mL hyaluronate sodium, stabilized 88 mg/4 mL  Patient tolerance:  Patient tolerated the procedure well with no immediate complications

## 2022-12-20 NOTE — PROGRESS NOTES
Patient ID: Jonna Pearl is a 62 y.o. female    Chief Complaint: No chief complaint on file.      History of Present Illness:    Jonna Pearl is a pleasant 62 y.o. female who presents for evaluation of right knee pain as well as shin pain. She  reports pain for the past few months. She does endorse a history of trauma.  She sustained a fall x2.  Walking and prolonged sitting and standing makes it worse and nothing makes it better. The pain is mostly medial. She does endorse nighttime pain.   She is independent with all activities of daily living.     In the past she has tried the following treatments:    NSAIDS -- patient did not tolerate  Cortisone injection -- helped for a few days    Instability: No  Mechanical Symptoms: No    Diabetic:  No  Smoker:  No        PAST MEDICAL HISTORY:   Past Medical History:   Diagnosis Date    Allergy     Arthritis     Atrophic kidney     left. probably childhood illness or disease    Chronic kidney disease     Chronic rhinitis     Colon polyps     Depression     HEARING LOSS     Hypertension     Insomnia     Migraines     Otitis media     Sleep apnea     mild     PAST SURGICAL HISTORY:   Past Surgical History:   Procedure Laterality Date     SECTION      COLONOSCOPY  2014    Dr Escobar 5 year yossi    COLONOSCOPY N/A 4/10/2019    Procedure: COLONOSCOPY;  Surgeon: Matt Orozco MD;  Location: [facility];  Service: Endoscopy;  Laterality: N/A;    HEMORRHOID SURGERY      HYSTERECTOMY      left mandibular node resection      OOPHORECTOMY       FAMILY HISTORY:   Family History   Problem Relation Age of Onset    Cancer Sister         breast    Diabetes Mother     Early death Father     Diabetes Paternal Grandmother     Breast cancer Maternal Aunt     Breast cancer Paternal Aunt      SOCIAL HISTORY:   Social History     Occupational History    Not on file   Tobacco Use    Smoking status: Former     Packs/day: 1.00     Years: 18.00     Pack years: 18.00     Types:  Cigarettes     Quit date: 2018     Years since quittin.7    Smokeless tobacco: Never   Substance and Sexual Activity    Alcohol use: No    Drug use: No    Sexual activity: Not on file        MEDICATIONS:   Current Outpatient Medications:     ergocalciferol (ERGOCALCIFEROL) 50,000 unit Cap, Take 50,000 Units by mouth every 7 days., Disp: , Rfl:     famotidine (PEPCID) 20 MG tablet, Take 1 tablet (20 mg total) by mouth 2 (two) times daily., Disp: 60 tablet, Rfl: 0    gabapentin (NEURONTIN) 600 MG tablet, TAKE 1 TABLET BY MOUTH THREE TIMES DAILY TAKE ONE IN THE MORNING TAKE ONE IN THE AFTERNOON AND ONE AT BEDTIME, Disp: , Rfl: 9    ketoconazole (NIZORAL) 2 % shampoo, APPLY ONE APPLICATION TO WET HAIR EVERY 3 TO 4 DAYS AS NEEDED, Disp: , Rfl:     latanoprost 0.005 % ophthalmic solution, INSTILL 1 DROP INTO AFFECTED EYE(S) ONCE DAILY AT BEDTIME, Disp: , Rfl: 6    lisinopriL (PRINIVIL,ZESTRIL) 40 MG tablet, , Disp: , Rfl:     NYSTOP powder, APPLY ONE APPLICATIONT TO AFFECED AREA TWICE A DAY AS DIRECTED, Disp: , Rfl:     timolol maleate 0.5% (TIMOPTIC) 0.5 % Drop, INSTILL 1 DROP INTO EACH EYE TWICE DAILY, Disp: , Rfl:     topiramate (TOPAMAX) 50 MG tablet, Take 1 tablet (50 mg total) by mouth once daily., Disp: 30 tablet, Rfl: 11    verapamil (CALAN-SR) 240 MG CR tablet, Take 240 mg by mouth every evening., Disp: , Rfl: 2  ALLERGIES:   Review of patient's allergies indicates:   Allergen Reactions    Neomycin-polymyxin Swelling    Cephalexin      Other reaction(s): Unknown    Doxycycline      Other reaction(s): Unknown    Iodinated contrast media      Other reaction(s): Unknown    Penicillins      Other reaction(s): Unknown    Valsartan      Other reaction(s): Unknown         Physical Exam     Vitals:    22 1321   Resp: 16     Alert and oriented to person, place and time. No acute distress. Well-groomed, not ill appearing. Pupils round and reactive, normal respiratory effort, no audible wheezing.     GENERAL:   A well-developed, well-nourished 62 y.o. female who is alert and       oriented in no acute distress.      Gait: She  walks with a antalgic gait.                   EXTREMITIES:  Examination of lower extremities reveals there is no visible mass or deformity.      Right knee:  ROM 0-125    Ligamentously stable to varus/valgus stress.    Anterior and posterior drawers negative.    + pain over pes bursa.    No warmth    No erythema    Effusion No    medial joint line tenderness    Positive Patellofemoral grind/crepitus     The skin over both lower extremities is normal and unremarkable.  She has a  painless range of motion of the hips and ankles bilaterally.   Sensation is intact in both lower extremities.    There are no motor deficits in the lower extremities bilaterally.   Pedal pulses are palpable distally bilaterally.    She has no calf tenderness to palpation nor edema.       Imaging:     X-Ray: I have reviewed all pertinent results/findings and my personal findings are:  Severe osteoarthritis of the right knee with medial compartment disease.  No evidence of fracture.  Subchondral sclerosis.  Varus deformity.      Assessment & Plan    Primary osteoarthritis of right knee  -     Ambulatory referral/consult to Physical/Occupational Therapy; Future; Expected date: 12/27/2022    Primary osteoarthritis of left knee  -     Ambulatory referral/consult to Physical/Occupational Therapy; Future; Expected date: 12/27/2022         I made the decision to obtain old records of the patient including previous notes and imaging. New imaging, if ordered today of the extremity or extremities, were evaluated. I independently reviewed and interpreted the radiographs and/or MRIs/CT scan today as well as prior imaging.    We discussed at length different treatment options including anti-inflammatories, acetaminophen, rest, ice, heat, formal physical therapy including strengthening and stretching exercises, home exercise programs,  injections, dry needling, and finally surgical intervention if conservative treatment fails.       Patient would like to proceed with a trial of:     Formal Physical Therapy  Visco injection right knee  Weight loss        All questions were answered and patient is agreeable to the above plan.

## 2023-01-09 ENCOUNTER — HOSPITAL ENCOUNTER (OUTPATIENT)
Dept: PREADMISSION TESTING | Facility: HOSPITAL | Age: 63
Discharge: HOME | End: 2023-01-09
Attending: INTERNAL MEDICINE
Payer: MEDICARE

## 2023-01-09 DIAGNOSIS — Z01.818 PRE-OP TESTING: Primary | ICD-10-CM

## 2023-01-09 LAB
BASOPHILS # BLD AUTO: 0.04 K/UL (ref 0–0.2)
BASOPHILS NFR BLD: 0.4 % (ref 0–1.9)
DIFFERENTIAL METHOD: ABNORMAL
EOSINOPHIL # BLD AUTO: 0.1 K/UL (ref 0–0.5)
EOSINOPHIL NFR BLD: 1.2 % (ref 0–8)
ERYTHROCYTE [DISTWIDTH] IN BLOOD BY AUTOMATED COUNT: 13.9 % (ref 11.5–14.5)
HCT VFR BLD AUTO: 38.8 % (ref 37–48.5)
HGB BLD-MCNC: 12.2 G/DL (ref 12–16)
IMM GRANULOCYTES # BLD AUTO: 0.04 K/UL (ref 0–0.04)
IMM GRANULOCYTES NFR BLD AUTO: 0.4 % (ref 0–0.5)
LYMPHOCYTES # BLD AUTO: 3.7 K/UL (ref 1–4.8)
LYMPHOCYTES NFR BLD: 33.7 % (ref 18–48)
MCH RBC QN AUTO: 29.2 PG (ref 27–31)
MCHC RBC AUTO-ENTMCNC: 31.4 G/DL (ref 32–36)
MCV RBC AUTO: 93 FL (ref 82–98)
MONOCYTES # BLD AUTO: 1 K/UL (ref 0.3–1)
MONOCYTES NFR BLD: 9.4 % (ref 4–15)
NEUTROPHILS # BLD AUTO: 6 K/UL (ref 1.8–7.7)
NEUTROPHILS NFR BLD: 54.9 % (ref 38–73)
NRBC BLD-RTO: 0 /100 WBC
PLATELET # BLD AUTO: 433 K/UL (ref 150–450)
PMV BLD AUTO: 9.6 FL (ref 9.2–12.9)
RBC # BLD AUTO: 4.18 M/UL (ref 4–5.4)
WBC # BLD AUTO: 10.87 K/UL (ref 3.9–12.7)

## 2023-01-09 PROCEDURE — 85025 COMPLETE CBC W/AUTO DIFF WBC: CPT | Performed by: ANESTHESIOLOGY

## 2023-01-09 PROCEDURE — 36415 COLL VENOUS BLD VENIPUNCTURE: CPT | Performed by: ANESTHESIOLOGY

## 2023-01-12 ENCOUNTER — ANESTHESIA (OUTPATIENT)
Dept: SURGERY | Facility: HOSPITAL | Age: 63
End: 2023-01-12
Payer: MEDICARE

## 2023-01-12 ENCOUNTER — HOSPITAL ENCOUNTER (OUTPATIENT)
Facility: HOSPITAL | Age: 63
Discharge: HOME | End: 2023-01-12
Attending: INTERNAL MEDICINE | Admitting: INTERNAL MEDICINE
Payer: MEDICARE

## 2023-01-12 ENCOUNTER — ANESTHESIA EVENT (OUTPATIENT)
Dept: SURGERY | Facility: HOSPITAL | Age: 63
End: 2023-01-12
Payer: MEDICARE

## 2023-01-12 VITALS
OXYGEN SATURATION: 98 % | WEIGHT: 275 LBS | SYSTOLIC BLOOD PRESSURE: 101 MMHG | BODY MASS INDEX: 50.61 KG/M2 | HEART RATE: 72 BPM | DIASTOLIC BLOOD PRESSURE: 63 MMHG | RESPIRATION RATE: 15 BRPM | HEIGHT: 62 IN | HEART RATE: 76 BPM | SYSTOLIC BLOOD PRESSURE: 140 MMHG | OXYGEN SATURATION: 97 % | TEMPERATURE: 98 F | RESPIRATION RATE: 18 BRPM | DIASTOLIC BLOOD PRESSURE: 54 MMHG

## 2023-01-12 DIAGNOSIS — K26.9 DUODENAL ULCER: ICD-10-CM

## 2023-01-12 PROCEDURE — 43239 EGD BIOPSY SINGLE/MULTIPLE: CPT | Performed by: INTERNAL MEDICINE

## 2023-01-12 PROCEDURE — 25000003 PHARM REV CODE 250: Performed by: NURSE ANESTHETIST, CERTIFIED REGISTERED

## 2023-01-12 PROCEDURE — D9220A PRA ANESTHESIA: ICD-10-PCS | Mod: CRNA,,, | Performed by: NURSE ANESTHETIST, CERTIFIED REGISTERED

## 2023-01-12 PROCEDURE — 27200043 HC FORCEPS, BIOPSY: Performed by: INTERNAL MEDICINE

## 2023-01-12 PROCEDURE — 88305 TISSUE EXAM BY PATHOLOGIST: CPT | Mod: TC,59

## 2023-01-12 PROCEDURE — 63600175 PHARM REV CODE 636 W HCPCS: Performed by: NURSE ANESTHETIST, CERTIFIED REGISTERED

## 2023-01-12 PROCEDURE — D9220A PRA ANESTHESIA: Mod: CRNA,,, | Performed by: NURSE ANESTHETIST, CERTIFIED REGISTERED

## 2023-01-12 PROCEDURE — D9220A PRA ANESTHESIA: Mod: ANES,,, | Performed by: ANESTHESIOLOGY

## 2023-01-12 PROCEDURE — 37000008 HC ANESTHESIA 1ST 15 MINUTES: Performed by: INTERNAL MEDICINE

## 2023-01-12 PROCEDURE — D9220A PRA ANESTHESIA: ICD-10-PCS | Mod: ANES,,, | Performed by: ANESTHESIOLOGY

## 2023-01-12 PROCEDURE — 37000009 HC ANESTHESIA EA ADD 15 MINS: Performed by: INTERNAL MEDICINE

## 2023-01-12 RX ORDER — SODIUM CHLORIDE 0.9 % (FLUSH) 0.9 %
2 SYRINGE (ML) INJECTION
Status: DISCONTINUED | OUTPATIENT
Start: 2023-01-12 | End: 2023-01-12 | Stop reason: HOSPADM

## 2023-01-12 RX ORDER — PROPOFOL 10 MG/ML
VIAL (ML) INTRAVENOUS
Status: DISCONTINUED | OUTPATIENT
Start: 2023-01-12 | End: 2023-01-12

## 2023-01-12 RX ADMIN — PROPOFOL 50 MG: 10 INJECTION, EMULSION INTRAVENOUS at 08:01

## 2023-01-12 RX ADMIN — PROPOFOL 40 MG: 10 INJECTION, EMULSION INTRAVENOUS at 08:01

## 2023-01-12 RX ADMIN — SODIUM CHLORIDE: 0.9 INJECTION, SOLUTION INTRAVENOUS at 08:01

## 2023-01-12 RX ADMIN — PROPOFOL 50 MG: 10 INJECTION, EMULSION INTRAVENOUS at 09:01

## 2023-01-12 NOTE — PROVATION PATIENT INSTRUCTIONS
Discharge Summary/Instructions after an Endoscopic Procedure  Patient Name: Jonna Pearl  Patient MRN: [id number]  Patient YOB: 1960  Thursday, January 12, 2023  Merrill Vela MD  RESTRICTIONS:  During your procedure today, you received medications for sedation.  These   medications may affect your judgment, balance and coordination.  Therefore,   for 24 hours, you have the following restrictions:   - DO NOT drive a car, operate machinery, make legal/financial decisions,   sign important papers or drink alcohol.    ACTIVITY:  Today: no heavy lifting, straining or running due to procedural   sedation/anesthesia.  The following day: return to full activity including work.  DIET:  Eat and drink normally unless instructed otherwise.     TREATMENT FOR COMMON SIDE EFFECTS:  - Mild abdominal pain, nausea, belching, bloating or excessive gas:  rest,   eat lightly and use a heating pad.  - Sore Throat: treat with throat lozenges and/or gargle with warm salt   water.  - Because air was used during the procedure, expelling large amounts of air   from your rectum or belching is normal.  - If a bowel prep was taken, you may not have a bowel movement for 1-3 days.    This is normal.  SYMPTOMS TO WATCH FOR AND REPORT TO YOUR PHYSICIAN:  1. Abdominal pain or bloating, other than gas cramps.  2. Chest pain.  3. Back pain.  4. Signs of infection such as: chills or fever occurring within 24 hours   after the procedure.  5. Rectal bleeding, which would show as bright red, maroon, or black stools.   (A tablespoon of blood from the rectum is not serious, especially if   hemorrhoids are present.)  6. Vomiting.  7. Weakness or dizziness.  GO DIRECTLY TO THE NEAREST EMERGENCY ROOM IF YOU HAVE ANY OF THE FOLLOWING:      Difficulty breathing              Chills and/or fever over 101 F   Persistent vomiting and/or vomiting blood   Severe abdominal pain   Severe chest pain   Black, tarry stools   Bleeding- more than one  tablespoon   Any other symptom or condition that you feel may need urgent attention  Your doctor recommends these additional instructions:  If any biopsies were taken, your doctors clinic will contact you in 1 to 2   weeks with any results.  - Patient has a contact number available for emergencies.  The signs and   symptoms of potential delayed complications were discussed with the   patient.  Return to normal activities tomorrow.  Written discharge   instructions were provided to the patient.   - Resume previous diet.   - Continue present medications.   - Await pathology results.   - Continue ppi daily  - Discharge patient to home (with escort).  For questions, problems or results please call your physician - Merrill Vela MD at Work:  [phone].  Former[facility], later [facility], EMERGENCY ROOM PHONE NUMBER: [phone]  IF A COMPLICATION OR EMERGENCY SITUATION ARISES AND YOU ARE UNABLE TO REACH   YOUR PHYSICIAN - GO DIRECTLY TO THE EMERGENCY ROOM.  MD Merrill Carrillo MD  1/12/2023 9:07:24 AM  This report has been verified and signed electronically.  Dear patient,  As a result of recent federal legislation (The Federal Cures Act), you may   receive lab or pathology results from your procedure in your [facility]   account before your physician is able to contact you. Your physician or   their representative will relay the results to you with their   recommendations at their soonest availability.  Thank you,  PROVATION

## 2023-01-12 NOTE — TRANSFER OF CARE
"Anesthesia Transfer of Care Note    Patient: Jonna Pearl    Procedure(s) Performed: Procedure(s) (LRB):  EGD (ESOPHAGOGASTRODUODENOSCOPY) (N/A)    Patient location: GI    Anesthesia Type: MAC    Transport from OR: Transported from OR on room air with adequate spontaneous ventilation    Post pain: adequate analgesia    Post assessment: no apparent anesthetic complications    Post vital signs: stable    Level of consciousness: awake and alert    Nausea/Vomiting: no nausea/vomiting    Complications: none    Transfer of care protocol was followed      Last vitals:   Visit Vitals  BP (!) 148/73   Pulse 70   Temp 36.5 °C (97.7 °F)   Resp 16   Ht 5' 1.5" (1.562 m)   Wt 124.7 kg (275 lb)   SpO2 99%   BMI 51.12 kg/m²     "

## 2023-01-12 NOTE — ANESTHESIA POSTPROCEDURE EVALUATION
Anesthesia Post Evaluation    Patient: Jonna Pearl    Procedure(s) Performed: Procedure(s) (LRB):  EGD (ESOPHAGOGASTRODUODENOSCOPY) (N/A)    Final Anesthesia Type: MAC      Patient location during evaluation: GI PACU  Patient participation: Yes- Able to Participate  Level of consciousness: awake and alert  Post-procedure vital signs: reviewed and stable  Pain management: adequate  Airway patency: patent    PONV status at discharge: No PONV  Anesthetic complications: no      Cardiovascular status: blood pressure returned to baseline and stable  Respiratory status: unassisted and room air  Hydration status: euvolemic  Follow-up not needed.          Vitals Value Taken Time   /63 01/12/23 0930   Temp 36.4 °C (97.5 °F) 01/12/23 0923   Pulse 68 01/12/23 0933   Resp 18 01/12/23 0930   SpO2 98 % 01/12/23 0933   Vitals shown include unvalidated device data.      No case tracking events are documented in the log.      Pain/Escobar Score: No data recorded

## 2023-01-12 NOTE — H&P
GASTROENTEROLOGY PRE-PROCEDURE H&P NOTE  Patient Name: Jonna Pearl  Patient MRN: [id number]  Patient : 1960    Service date: 2023    PCP: GIOVANNI Floyd    No chief complaint on file.      HPI: Patient is a 62 y.o. female with PMHx as below here for evaluation of     Jonna Pearl is a 62 year old female patient who is seen today for an initial visit. pt with acute on chronic mild to moderate epigastric burning pain better with a lot of tums / pepcid.  present for months worse over past week. went to [place] [facility] and had labs showing hgb 11 and hct 35 wiht mcv 92.  no overt bleeding. uses mobic. stools normal. has h/o gallstones.  last colon in  and polyps per patient.      Chart Review      PMH  -CKD 3, WES, obesity, migraines, h/o polyps 2019, htn, djd, tics, chronic fatigue, galltstones    .     Past Medical History:  Past Medical History:   Diagnosis Date    Allergy     Arthritis     Atrophic kidney     left. probably childhood illness or disease    Chronic kidney disease     Chronic rhinitis     Colon polyps     Depression     HEARING LOSS     Hiatal hernia     Hypertension     Insomnia     Migraines     Otitis media     Sleep apnea     mild    TIA (transient ischemic attack)         Past Surgical History:  Past Surgical History:   Procedure Laterality Date     SECTION      COLONOSCOPY  2014    Dr Escobar 5 year yossi    COLONOSCOPY N/A 4/10/2019    Procedure: COLONOSCOPY;  Surgeon: Matt Orozco MD;  Location: [facility];  Service: Endoscopy;  Laterality: N/A;    HEMORRHOID SURGERY      HYSTERECTOMY      left mandibular node resection      OOPHORECTOMY          Home Medications:  No medications prior to admission.       Inpatient Medications:        Review of patient's allergies indicates:   Allergen Reactions    Neomycin-polymyxin Swelling    Cephalexin      Other reaction(s): Unknown    Doxycycline      Other reaction(s): Unknown    Iodinated contrast media      Other  reaction(s): Unknown    Penicillins      Other reaction(s): Unknown    Valsartan      Other reaction(s): Unknown       Social History:   Social History     Occupational History    Not on file   Tobacco Use    Smoking status: Former     Packs/day: 1.00     Years: 18.00     Pack years: 18.00     Types: Cigarettes     Quit date: 2018     Years since quittin.7    Smokeless tobacco: Never   Substance and Sexual Activity    Alcohol use: No    Drug use: No    Sexual activity: Not on file       Family History:   Family History   Problem Relation Age of Onset    Cancer Sister         breast    Diabetes Mother     Early death Father     Diabetes Paternal Grandmother     Breast cancer Maternal Aunt     Breast cancer Paternal Aunt        Review of Systems:  A 10 point review of systems was performed and was normal, except as mentioned in the HPI, including constitutional, HEENT, heme, lymph, cardiovascular, respiratory, gastrointestinal, genitourinary, neurologic, endocrine, psychiatric and musculoskeletal.      OBJECTIVE:    Physical Exam:  24 Hour Vital Sign Ranges:    Most recent vitals: There were no vitals taken for this visit.   GEN: well-developed, well-nourished, awake and alert, non-toxic appearing adult  HEENT: PERRL, sclera anicteric, oral mucosa pink and moist without lesion  NECK: trachea midline; Good ROM  CV: regular rate and rhythm, no murmurs or gallops  RESP: clear to auscultation bilaterally, no wheezes, rhonci or rales  ABD: soft, non-tender, non-distended, normal bowel sounds  EXT: no swelling or edema, 2+ pulses distally  SKIN: no rashes or jaundice  PSYCH: normal affect    Labs:   Recent Labs     23  1012   WBC 10.87   MCV 93        Recent Labs     23  1012      K 4.0      CO2 23   BUN 21        No results for input(s): ALB in the last 72 hours.    Invalid input(s): ALKP, SGOT, SGPT, TBIL, DBIL, TPRO  No results for input(s): PT, INR, PTT in the last 72  hours.      IMPRESSION / RECOMMENDATIONS:  Pt with nsaid use reporting epigastric discomfort with known h/o gallstones  -suspect more dyspesia  -get egd and sent in ppi  -gb us if egd negative  -check iron labs and if TAINA encouraged cscope.      with interventions as warranted.   RIsks, benefits, alternatives discussed in detail regarding upcoming procedures and sedation. Some of the more common endoscopic complications include but not limited to immediate or delayed perforation, bleeding, infections, pain, inadvertent injury to surrounding tissue / organs and possible need for surgical evaluation. Patient expressed understanding, all questions answered and will proceed with procedure as planned.     Merrill Vela  1/12/2023  7:07 AM

## 2023-01-12 NOTE — ANESTHESIA PREPROCEDURE EVALUATION
2023  Jonna Pearl is a 62 y.o., female.    Patient Active Problem List   Diagnosis    Pain in joint, upper arm    Spinal enthesopathy    Hypertension, essential    Renal insufficiency    Personal history of kidney stones    DJD (degenerative joint disease) of knee    Migraine    History of colonic polyps    ALLERGIC RHINITIS    History of colon polyps    Diverticulosis large intestine w/o perforation or abscess w/o bleeding    Morbid obesity with BMI of 40.0-44.9, adult    Asymptomatic gallstones    Stage 3 chronic kidney disease    Morbid obesity    Dysmetabolic syndrome    Essential hypertension    Postmenopausal    Weight gain    Chronic fatigue    Hypovitaminosis D    Obstructive sleep apnea syndrome    Osteopenia of multiple sites       Past Surgical History:   Procedure Laterality Date     SECTION      COLONOSCOPY  2014    Dr Escobar 5 year yossi    COLONOSCOPY N/A 4/10/2019    Procedure: COLONOSCOPY;  Surgeon: Matt Orozco MD;  Location: [facility];  Service: Endoscopy;  Laterality: N/A;    HEMORRHOID SURGERY      HYSTERECTOMY      left mandibular node resection      OOPHORECTOMY          Tobacco Use:  The patient  reports that she quit smoking about 4 years ago. Her smoking use included cigarettes. She has a 18.00 pack-year smoking history. She has never used smokeless tobacco.     Results for orders placed or performed during the hospital encounter of 10/10/22   EKG 12-lead    Collection Time: 10/10/22  3:34 PM    Narrative    Test Reason : R10.9,    Vent. Rate : 064 BPM     Atrial Rate : 064 BPM     P-R Int : 144 ms          QRS Dur : 076 ms      QT Int : 410 ms       P-R-T Axes : 055 048 062 degrees     QTc Int : 422 ms    Sinus rhythm with Premature supraventricular complexes  Otherwise normal ECG  When compared with ECG of 2008  11:39,  Premature supraventricular complexes are now Present  Confirmed by Lm Day MD ([id number]) on 10/11/2022 6:30:49 PM    Referred By: CAROLYNERR   SELF           Confirmed By:Lm Day MD             Lab Results   Component Value Date    WBC 10.87 01/09/2023    HGB 12.2 01/09/2023    HCT 38.8 01/09/2023    MCV 93 01/09/2023     01/09/2023     BMP  Lab Results   Component Value Date     01/09/2023    K 4.0 01/09/2023     01/09/2023    CO2 23 01/09/2023    BUN 21 01/09/2023    CREATININE 1.1 01/09/2023    CALCIUM 8.9 01/09/2023    ANIONGAP 9 01/09/2023     01/09/2023    GLU 88 10/10/2022     06/20/2022       No results found for this or any previous visit.            Pre-op Assessment    I have reviewed the Patient Summary Reports.     I have reviewed the Nursing Notes. I have reviewed the NPO Status.   I have reviewed the Medications.     Review of Systems  Anesthesia Hx:  No problems with previous Anesthesia  Denies Family Hx of Anesthesia complications.   Denies Personal Hx of Anesthesia complications.   Social:  Non-Smoker    Hematology/Oncology:  Hematology Normal        EENT/Dental:   chronic allergic rhinitis   Cardiovascular:   Hypertension    Pulmonary:   Sleep Apnea    Education provided regarding risk of obstructive sleep apnea     Renal/:   Chronic Renal Disease (Stage III), CKD    Hepatic/GI:   Hiatal Hernia,    Musculoskeletal:   Arthritis (DJD knees)     Neurological:   TIA, Headaches (migraines)    Endocrine:  Endocrine Normal  Morbid Obesity / BMI > 40  Psych:   depression          Physical Exam  General: Well nourished    Airway:  Mallampati: II   Mouth Opening: Normal  TM Distance: Normal  Tongue: Normal  Neck ROM: Normal ROM    Dental:  Intact    Chest/Lungs:  Clear to auscultation, Normal Respiratory Rate    Heart:  Rate: Normal  Rhythm: Regular Rhythm        Anesthesia Plan  Type of Anesthesia, risks & benefits discussed:    Anesthesia Type:  MAC  Intra-op Monitoring Plan: Standard ASA Monitors  Post Op Pain Control Plan: IV/PO Opioids PRN  (medical reason for not using multimodal pain management)  Informed Consent: Informed consent signed with the Patient and all parties understand the risks and agree with anesthesia plan.  All questions answered.   ASA Score: 3  Anesthesia Plan Notes: MAC with propofol    Ready For Surgery From Anesthesia Perspective.     .

## 2023-01-13 ENCOUNTER — CLINICAL SUPPORT (OUTPATIENT)
Dept: REHABILITATION | Facility: HOSPITAL | Age: 63
End: 2023-01-13
Attending: ORTHOPAEDIC SURGERY
Payer: MEDICARE

## 2023-01-13 DIAGNOSIS — R26.9 GAIT ABNORMALITY: ICD-10-CM

## 2023-01-13 DIAGNOSIS — M17.12 PRIMARY OSTEOARTHRITIS OF LEFT KNEE: ICD-10-CM

## 2023-01-13 DIAGNOSIS — M17.11 PRIMARY OSTEOARTHRITIS OF RIGHT KNEE: ICD-10-CM

## 2023-01-13 PROCEDURE — 97161 PT EVAL LOW COMPLEX 20 MIN: CPT | Mod: PN

## 2023-01-13 PROCEDURE — 97110 THERAPEUTIC EXERCISES: CPT | Mod: PN

## 2023-01-13 NOTE — PLAN OF CARE
Addended by: JAMAR CABRAL on: 9/30/2020 03:22 PM     Modules accepted: Orders     [facility]   Physical Therapy Initial Evaluation     Date: 1/13/2023   Name: Jonna Pearl  [facility] Number: [id number]    Therapy Diagnosis:   Encounter Diagnoses   Name Primary?    Primary osteoarthritis of right knee     Primary osteoarthritis of left knee     Gait abnormality      Physician: Mirza Jeffries MD    Physician Orders: PT Eval and Treat   Medical Diagnosis from Referral: Primary OA of right knee.  Primary OA of left knee.  Evaluation Date: 1/13/2023  Authorization Period Expiration: 12/30/23  Plan of Care Expiration: 3/17/23  Progress Note Due: 2/12/23  Visit # / Visits authorized: 1/ 20   FOTO: 58/100    Precautions: Standard     Time In: 1405  Time Out: 1450  Total Appointment Time (timed & untimed codes): 45 minutes      SUBJECTIVE     Date of onset: Insidious    History of current condition - Jonna reports: pain in both knees started ~ 8 monts ago after pt fell at home and landed on her knees.Right hurts more then left.Pt reports difficulties with ADLs,functional activities,homemaking,walking    Falls: 8/12 ago x 1.    Imaging, see Epic.    Prior Therapy: none  Social History: in home lives with their family  Occupation: Not working.  Prior Level of Function: Independent.  Current Level of Function: Modified independent    Pain:  Current 6/10, worst 9/10, best 4/10   Location: bilateral knees.  Description: Aching, Dull, Throbbing, Deep, Sharp, and Variable  Aggravating Factors: Standing, Bending, Walking, Extension, Flexing, Lifting, and Getting out of bed/chair  Easing Factors: relaxation, pain medication, lying down, rest, and elevation    Patients goals: walk with less pain     Medical History:   Past Medical History:   Diagnosis Date    Allergy     Arthritis     Atrophic kidney     left. probably childhood illness or disease    Chronic kidney disease     Chronic rhinitis     Colon polyps     Depression     HEARING LOSS     Hiatal hernia     Hypertension      Insomnia     Migraines     Otitis media     Sleep apnea     mild    TIA (transient ischemic attack)        Surgical History:   Jonna Pearl  has a past surgical history that includes  section; Hysterectomy; Hemorrhoid surgery; left mandibular node resection; Colonoscopy (2014); Oophorectomy; and Colonoscopy (N/A, 4/10/2019).    Medications:   Jonna has a current medication list which includes the following prescription(s): ergocalciferol, famotidine, gabapentin, ketoconazole, latanoprost, lisinopril, nystop, timolol maleate 0.5%, topiramate, and verapamil.    Allergies:   Review of patient's allergies indicates:   Allergen Reactions    Neomycin-polymyxin Swelling    Cephalexin      Other reaction(s): Unknown    Doxycycline      Other reaction(s): Unknown    Iodinated contrast media      Other reaction(s): Unknown    Penicillins      Other reaction(s): Unknown    Valsartan      Other reaction(s): Unknown          OBJECTIVE       Knee  Right   Left  Pain/Dysfunction with Movement    AROM PROM MMT AROM PROM MMT    Flexion 110 120 3- 120 125 3-    Extension -10 0 3- -5 0 3-      Gait guarded,antalgic    Special tests  Right knee  Anterior and posterior drawer test;neg  Bren's;neg  DADIR;neg  Knee is stable to varus and valgus stress.  Palpation;medial joint .    Left knee;   Anterior and posterior drawer test;neg.  BREN;S;neg.  FADIR;NEG  Knee is stable to varus and valgus stress.  Palpation;no tenderness.      Limitation/Restriction for FOTO knee Survey    Therapist reviewed FOTO scores for Jonna Pearl on 2023.   FOTO documents entered into EPIC - see Media section.    Limitation Score: 42%         TREATMENT     Total Treatment time (time-based codes) separate from Evaluation: 8 minutes      Jonna received the treatments listed below:    8 min.    PATIENT EDUCATION AND HOME EXERCISES     Education provided:   - Role of PT.POC.    ASSESSMENT     Jonna is a 62 y.o. female  referred to outpatient Physical Therapy with a medical diagnosis of primary OA of both knees. Patient presents with ROM and strength deficits,gait abnormality,impaired function due to pain and above listed deficits.    Patient prognosis is Fair.   Patient will benefit from skilled outpatient Physical Therapy to address the deficits stated above and in the chart below, provide patient /family education, and to maximize patientt's level of independence.     Plan of care discussed with patient: Yes  Patient's spiritual, cultural and educational needs considered and patient is agreeable to the plan of care and goals as stated below:     Anticipated Barriers for therapy: no    Medical Necessity is demonstrated by the following  History  Co-morbidities and personal factors that may impact the plan of care Co-morbidities:   high BMI    Personal Factors:   no deficits     low   Examination  Body Structures and Functions, activity limitations and participation restrictions that may impact the plan of care Body Regions:   lower extremities    Body Systems:    gross symmetry  ROM  strength  gait    Participation Restrictions:   no    Activity limitations:   Learning and applying knowledge  no deficits    General Tasks and Commands  no deficits    Communication  no deficits    Mobility  lifting and carrying objects  walking    Self care  no deficits    Domestic Life  no deficits    Interactions/Relationships  no deficits    Life Areas  no deficits    Community and Social Life  no deficits         low   Clinical Presentation stable and uncomplicated low   Decision Making/ Complexity Score: low     Goals:  Short Term Goals (STG) # weeks Goal Review Date Reviewed Date Met   Pt will demonstrate independence with initial HEP to facilitate therex progression and improvements in functional mobility 3 Initial 1/13/2023    The patient will increase bilateral lower extremity strength to greater than or equal to 1/2 manual muscle grade  for all deficient areas in order to facilitate ambulation x 100 without pain 3 Initial 1/13/2023 .   Pt will start HEP 3 Initial 1/13/2023      Initial 1/13/2023 1/13/2023 1/13/2023 1/13/2023 1/13/2023 1/13/2023 1/13/2023      Long Term Goals (LTG) # weeks Goal Review Date Reviewed Date Met   The patient will improve the Lower Extremity Functional Scale to less than 40% Disability 6 Initial 1/13/2023    The patient will increase bilateral lower extremity strength to greater than or equal to 1.5 manual muscle grade for all deficient areas in order to get in and out of vehicle with no limitations 6 Initial 1/13/2023    The patient will increase bilateral hip ROM to WFL in order to improve stride length for more normalized gait pattern.0-135 6 Initial 1/13/2023    Pt will ambulate 300' without pain 6 Initial 1/13/2023    Pt will return to previous LOF 6 Initial 1/13/2023    Tolerable pain level 0-4/10. 6 Initial 1/13/2023 1/13/2023 1/13/2023 1/13/2023 1/13/2023       PLAN   Plan of care Certification: 1/13/2023 to 3/17/23.    Outpatient Physical Therapy 2 times weekly for 6 weeks to include the following interventions: Electrical Stimulation PRN, Gait Training, Manual Therapy, Moist Heat/ Ice, Patient Education, Therapeutic Activities, Therapeutic Exercise, and dry needling PRN.IMS PRN. .     Merrill Courtney, PT      I CERTIFY THE NEED FOR THESE SERVICES FURNISHED UNDER THIS PLAN OF TREATMENT AND WHILE UNDER MY CARE   Physician's comments:     Physician's Signature: ___________________________________________________

## 2023-01-18 NOTE — PROGRESS NOTES
[facility]   Physical Therapy Treatment Note     Name: Jonna Pearl  Clinic Number: [id number]    Therapy Diagnosis:   Encounter Diagnosis   Name Primary?    Gait abnormality Yes     Physician: Mirza Jeffries MD    Visit Date: 1/19/2023    Physician Orders: PT Eval and Treat   Medical Diagnosis from Referral: Primary OA of right knee.  Primary OA of left knee.  Evaluation Date: 1/13/2023  Authorization Period Expiration: 12/30/23  Plan of Care Expiration: 3/17/23  Progress Note Due: 2/12/23  Visit # / Visits authorized: 1/ 20   FOTO: 58/100     Precautions: Standard     PTA Visit #: 1/5     Time In: 1105  Time Out: 1140  Total Billable Time: 35 minutes    SUBJECTIVE     Pt reports: right knee is hurting today.   She was not compliant with home exercise program.  Response to previous treatment: eval   Functional change: ongoing    Pain: 8/10  Location: right knee      OBJECTIVE     Objective Measures updated at progress report unless specified.     Treatment     Jonna received the treatments listed below:      therapeutic exercises to develop strength and ROM for 35 minutes including:    Bike x 8 minutes   Quad sets 30x   Short Arc Quads 30x Bilateral   Straight Leg Raise 30x   Bridges 20x   Hip adduction 30x  Hip abduction 30x  Long Arc Quad 30x   Standing heel/toe raises 20x         Patient Education and Home Exercises     Home Exercises Provided and Patient Education Provided     Education provided:   - home exercises     Written Home Exercises Provided: yes. Exercises were reviewed and Jonna was able to demonstrate them prior to the end of the session.  Jonna demonstrated good  understanding of the education provided. See EMR under Patient Instructions for exercises provided during therapy sessions    ASSESSMENT     Patient tolerated session fair limited by overall fatigue and lifestyle. Home exercises given today to improve carry over with therapy. Continue to progress to  reduce right knee discomfort and improve functional mobility.     Jonna Is progressing well towards her goals.   Pt prognosis is Good.     Pt will continue to benefit from skilled outpatient physical therapy to address the deficits listed in the problem list box on initial evaluation, provide pt/family education and to maximize pt's level of independence in the home and community environment.     Pt's spiritual, cultural and educational needs considered and pt agreeable to plan of care and goals.     Anticipated barriers to physical therapy: none     Goals:   Short Term Goals (STG) # weeks Goal Review Date Reviewed Date Met   Pt will demonstrate independence with initial HEP to facilitate therex progression and improvements in functional mobility 3 Ongoing 1/19/2023       The patient will increase bilateral lower extremity strength to greater than or equal to 1/2 manual muscle grade for all deficient areas in order to facilitate ambulation x 100 without pain 3 Ongoing 1/19/2023   .   Pt will start HEP 3 Ongoing 1/19/2023                                                                                      Long Term Goals (LTG) # weeks Goal Review Date Reviewed Date Met   The patient will improve the Lower Extremity Functional Scale to less than 40% Disability 6 Ongoing 1/19/2023       The patient will increase bilateral lower extremity strength to greater than or equal to 1.5 manual muscle grade for all deficient areas in order to get in and out of vehicle with no limitations 6 Ongoing 1/19/2023       The patient will increase bilateral hip ROM to WFL in order to improve stride length for more normalized gait pattern.0-135 6 Ongoing 1/19/2023       Pt will ambulate 300' without pain 6 Ongoing 1/19/2023       Pt will return to previous LOF 6 Ongoing 1/19/2023       Tolerable pain level 0-4/10. 6 Ongoing 1/19/2023                                                        PLAN     Continue per Plan of Care     Viridiana  Ritu, PTA

## 2023-01-19 ENCOUNTER — TELEPHONE (OUTPATIENT)
Dept: ORTHOPEDICS | Facility: CLINIC | Age: 63
End: 2023-01-19
Payer: MEDICARE

## 2023-01-19 ENCOUNTER — CLINICAL SUPPORT (OUTPATIENT)
Dept: REHABILITATION | Facility: HOSPITAL | Age: 63
End: 2023-01-19
Payer: MEDICARE

## 2023-01-19 DIAGNOSIS — R26.9 GAIT ABNORMALITY: Primary | ICD-10-CM

## 2023-01-19 PROCEDURE — 97110 THERAPEUTIC EXERCISES: CPT | Mod: PN,CQ

## 2023-01-23 NOTE — PROGRESS NOTES
I have reviewed the results of the patients measured RMR using the Medgem device and the full details are in the media tab section of the chart.  The measured RMR is 1330kca/day. In comparison to the estimated BMR using the Martinez Jackson equation (which was 1896kcal /day) this is a 566 kcal/day deficit!!  Consequently thus patient true estimated AMARJIT is ~ 1670kcal/day and thus her daily dietary intake budget to enable her achieve negative caloric balanace to enable her lose weight would be ~ 1300-1500kcal/day.  The result also indicates the importance of her significantly increasing her daily physical activity levels to increase her daily energy output. To inform patient of findings and suggested interventions.     Walk in

## 2023-01-24 ENCOUNTER — CLINICAL SUPPORT (OUTPATIENT)
Dept: REHABILITATION | Facility: HOSPITAL | Age: 63
End: 2023-01-24
Payer: MEDICARE

## 2023-01-24 DIAGNOSIS — R26.9 GAIT ABNORMALITY: Primary | ICD-10-CM

## 2023-01-24 PROCEDURE — 97110 THERAPEUTIC EXERCISES: CPT | Mod: PN

## 2023-01-24 NOTE — PROGRESS NOTES
[facility]   Physical Therapy Treatment Note     Name: Jonna Pearl  Clinic Number: [id number]    Therapy Diagnosis:   Encounter Diagnosis   Name Primary?    Gait abnormality Yes     Physician: Mirza Jeffries MD    Visit Date: 1/24/2023    Physician Orders: PT Eval and Treat   Medical Diagnosis from Referral: Primary OA of right knee.  Primary OA of left knee.  Evaluation Date: 1/13/2023  Authorization Period Expiration: 12/30/23  Plan of Care Expiration: 3/17/23  Progress Note Due: 2/12/23  Visit # / Visits authorized: 1/ 20   FOTO: 58/100     Precautions: Standard     PTA Visit #: 1/5     Time In: 1300  Time Out: 1353  Total Billable Time: 53 minutes    SUBJECTIVE     Pt reports: right knee is hurting today.   She was not compliant with home exercise program.  Response to previous treatment: eval   Functional change: ongoing    Pain: 9/10  Location: right knee      OBJECTIVE     Objective Measures updated at progress report unless specified.     Treatment     Jonna received the treatments listed below:      therapeutic exercises to develop strength and ROM for 53 minutes including:    Bike x 10 minutes Shuttle 25# 6'  Quad sets 30x   Short Arc Quads 30x Bilateral   Straight Leg Raise 30x Bilaterally  Bridges 20x pt refused  Hip adduction 30x  Hip abduction 30x  Long Arc Quad 30x   Standing heel/toe raises 20x       Patient Education and Home Exercises     Home Exercises Provided and Patient Education Provided     Education provided:   - home exercises     Written Home Exercises Provided: yes. Exercises were reviewed and Jonna was able to demonstrate them prior to the end of the session.  Jonna demonstrated good  understanding of the education provided. See EMR under Patient Instructions for exercises provided during therapy sessions    ASSESSMENT     Patient tolerated session fair limited by overall fatigue and lifestyle. Home exercises given today to improve carry over with  therapy. Continue to progress to reduce right knee discomfort and improve functional mobility.     Jonna Is progressing well towards her goals.   Pt prognosis is Good.     Pt will continue to benefit from skilled outpatient physical therapy to address the deficits listed in the problem list box on initial evaluation, provide pt/family education and to maximize pt's level of independence in the home and community environment.     Pt's spiritual, cultural and educational needs considered and pt agreeable to plan of care and goals.     Anticipated barriers to physical therapy: none     Goals:   Short Term Goals (STG) # weeks Goal Review Date Reviewed Date Met   Pt will demonstrate independence with initial HEP to facilitate therex progression and improvements in functional mobility 3 Ongoing 1/24/2023       The patient will increase bilateral lower extremity strength to greater than or equal to 1/2 manual muscle grade for all deficient areas in order to facilitate ambulation x 100 without pain 3 Ongoing 1/24/2023   .   Pt will start HEP 3 Ongoing 1/24/2023                                                                                      Long Term Goals (LTG) # weeks Goal Review Date Reviewed Date Met   The patient will improve the Lower Extremity Functional Scale to less than 40% Disability 6 Ongoing 1/24/2023       The patient will increase bilateral lower extremity strength to greater than or equal to 1.5 manual muscle grade for all deficient areas in order to get in and out of vehicle with no limitations 6 Ongoing 1/24/2023       The patient will increase bilateral hip ROM to WFL in order to improve stride length for more normalized gait pattern.0-135 6 Ongoing 1/24/2023       Pt will ambulate 300' without pain 6 Ongoing 1/24/2023       Pt will return to previous LOF 6 Ongoing 1/24/2023       Tolerable pain level 0-4/10. 6 Ongoing 1/24/2023                                                        PLAN      Continue per Plan of Care     Merrill Courtney, PT

## 2023-02-07 ENCOUNTER — CLINICAL SUPPORT (OUTPATIENT)
Dept: REHABILITATION | Facility: HOSPITAL | Age: 63
End: 2023-02-07
Payer: MEDICARE

## 2023-02-07 DIAGNOSIS — R26.9 GAIT ABNORMALITY: Primary | ICD-10-CM

## 2023-02-07 PROCEDURE — 97110 THERAPEUTIC EXERCISES: CPT | Mod: PN

## 2023-02-07 NOTE — PROGRESS NOTES
[facility]   Physical Therapy Treatment Note     Name: Jonna Pearl  Clinic Number: [id number]    Therapy Diagnosis:   Encounter Diagnosis   Name Primary?    Gait abnormality Yes     Physician: Mirza Jeffries MD    Visit Date: 2/7/2023    Physician Orders: PT Eval and Treat   Medical Diagnosis from Referral: Primary OA of right knee.  Primary OA of left knee.  Evaluation Date: 1/13/2023  Authorization Period Expiration: 12/30/23  Plan of Care Expiration: 3/17/23  Progress Note Due: 2/12/23  Visit # / Visits authorized: 3/ 20   FOTO: 58/100     Precautions: Standard     PTA Visit #: 0/5     Time In: 1300  Time Out: 1353  Total Billable Time: 53 minutes    SUBJECTIVE     Pt reports: both knees is hurting today.   She was not compliant with home exercise program.  Response to previous treatment: eval   Functional change: ongoing    Pain: 5/10  Location: both knees      OBJECTIVE     Objective Measures updated at progress report unless specified.     Treatment     Jonna received the treatments listed below:      therapeutic exercises to develop strength and ROM for 53 minutes including:    Bike x 8.5 minutes Shuttle 25# 6'  Quad sets 30x   Short Arc Quads 30x Bilateral   Straight Leg Raise 30x Bilaterally  Bridges 20x pt refused  Hip adduction 30x w/ball  Hip abduction 30x RTB  Long Arc Quad 30x   // bars  Standing heel/toe raises 30x   Mini squats 30 airex  Gastroc stretch 3x30    Patient Education and Home Exercises     Home Exercises Provided and Patient Education Provided     Education provided:   - home exercises     Written Home Exercises Provided: yes. Exercises were reviewed and Jonna was able to demonstrate them prior to the end of the session.  Jonna demonstrated good  understanding of the education provided. See EMR under Patient Instructions for exercises provided during therapy sessions    ASSESSMENT     Patient tolerated session fair limited by overall fatigue and  lifestyle. Home exercises given today to improve carry over with therapy. Continue to progress to reduce right knee discomfort and improve functional mobility.     Jonna Is progressing well towards her goals.   Pt prognosis is Good.     Pt will continue to benefit from skilled outpatient physical therapy to address the deficits listed in the problem list box on initial evaluation, provide pt/family education and to maximize pt's level of independence in the home and community environment.     Pt's spiritual, cultural and educational needs considered and pt agreeable to plan of care and goals.     Anticipated barriers to physical therapy: none     Goals:   Short Term Goals (STG) # weeks Goal Review Date Reviewed Date Met   Pt will demonstrate independence with initial HEP to facilitate therex progression and improvements in functional mobility 3 Ongoing 2/7/2023       The patient will increase bilateral lower extremity strength to greater than or equal to 1/2 manual muscle grade for all deficient areas in order to facilitate ambulation x 100 without pain 3 Ongoing 2/7/2023   .   Pt will start HEP 3 Ongoing 2/7/2023                                                                                      Long Term Goals (LTG) # weeks Goal Review Date Reviewed Date Met   The patient will improve the Lower Extremity Functional Scale to less than 40% Disability 6 Ongoing 2/7/2023       The patient will increase bilateral lower extremity strength to greater than or equal to 1.5 manual muscle grade for all deficient areas in order to get in and out of vehicle with no limitations 6 Ongoing 2/7/2023       The patient will increase bilateral hip ROM to WFL in order to improve stride length for more normalized gait pattern.0-135 6 Ongoing 2/7/2023       Pt will ambulate 300' without pain 6 Ongoing 2/7/2023       Pt will return to previous LOF 6 Ongoing 2/7/2023       Tolerable pain level 0-4/10. 6 Ongoing 2/7/2023                                                         PLAN     Continue per Plan of Care     Merrill Courtney, PT

## 2023-02-09 ENCOUNTER — CLINICAL SUPPORT (OUTPATIENT)
Dept: REHABILITATION | Facility: HOSPITAL | Age: 63
End: 2023-02-09
Payer: MEDICARE

## 2023-02-09 DIAGNOSIS — R26.9 GAIT ABNORMALITY: Primary | ICD-10-CM

## 2023-02-09 PROCEDURE — 97110 THERAPEUTIC EXERCISES: CPT | Mod: PN

## 2023-02-09 NOTE — PROGRESS NOTES
[facility]   Physical Therapy Treatment Note     Name: Jonna Pearl  Clinic Number: [id number]    Therapy Diagnosis:   Encounter Diagnosis   Name Primary?    Gait abnormality Yes     Physician: Mirza Jeffries MD    Visit Date: 2/9/2023    Physician Orders: PT Eval and Treat   Medical Diagnosis from Referral: Primary OA of right knee.  Primary OA of left knee.  Evaluation Date: 1/13/2023  Authorization Period Expiration: 12/30/23  Plan of Care Expiration: 3/17/23  Progress Note Due: 2/12/23  Visit # / Visits authorized: 3/ 20   FOTO: 58/100     Precautions: Standard     PTA Visit #: 0/5     Time In: 1300  Time Out: 1353  Total Billable Time: 40 minutes    SUBJECTIVE     Pt reports: both knees is hurting less today.   She was not compliant with home exercise program.  Response to previous treatment: eval   Functional change: ongoing    Pain: 4/10  Location: both knees      OBJECTIVE     Objective Measures updated at progress report unless specified.     Treatment     Jonna received the treatments listed below:      therapeutic exercises to develop strength and ROM for 53 minutes including:    Bike x 8.5 minutes Shuttle 25# 6'  Quad sets 30x   Short Arc Quads 30x Bilateral   Straight Leg Raise 30x Bilaterally  Bridges 20x pt refused  Hip adduction 30x w/ball  Hip abduction 30x RTB  Long Arc Quad 30x   // bars  Standing heel/toe raises 30x   Mini squats 30 airex  Gastroc stretch 3x30    Patient Education and Home Exercises     Home Exercises Provided and Patient Education Provided     Education provided:   - home exercises     Written Home Exercises Provided: yes. Exercises were reviewed and Jonna was able to demonstrate them prior to the end of the session.  Jonna demonstrated good  understanding of the education provided. See EMR under Patient Instructions for exercises provided during therapy sessions    ASSESSMENT     During 53 minutes of therapeutic exercise, lory Coyle  supervised by a rehabilitation technician under the direction of the treating therapist.   Patient tolerated session fair limited by overall fatigue and lifestyle. Home exercises given today to improve carry over with therapy. Continue to progress to reduce right knee discomfort and improve functional mobility.     Jonna Is progressing well towards her goals.   Pt prognosis is Good.     Pt will continue to benefit from skilled outpatient physical therapy to address the deficits listed in the problem list box on initial evaluation, provide pt/family education and to maximize pt's level of independence in the home and community environment.     Pt's spiritual, cultural and educational needs considered and pt agreeable to plan of care and goals.     Anticipated barriers to physical therapy: none     Goals:   Short Term Goals (STG) # weeks Goal Review Date Reviewed Date Met   Pt will demonstrate independence with initial HEP to facilitate therex progression and improvements in functional mobility 3 Ongoing 2/9/2023       The patient will increase bilateral lower extremity strength to greater than or equal to 1/2 manual muscle grade for all deficient areas in order to facilitate ambulation x 100 without pain 3 Ongoing 2/9/2023   .   Pt will start HEP 3 Ongoing 2/9/2023                                                                                      Long Term Goals (LTG) # weeks Goal Review Date Reviewed Date Met   The patient will improve the Lower Extremity Functional Scale to less than 40% Disability 6 Ongoing 2/9/2023       The patient will increase bilateral lower extremity strength to greater than or equal to 1.5 manual muscle grade for all deficient areas in order to get in and out of vehicle with no limitations 6 Ongoing 2/9/2023       The patient will increase bilateral hip ROM to WFL in order to improve stride length for more normalized gait pattern.0-135 6 Ongoing 2/9/2023       Pt will ambulate 300'  without pain 6 Ongoing 2/9/2023       Pt will return to previous LOF 6 Ongoing 2/9/2023       Tolerable pain level 0-4/10. 6 Ongoing 2/9/2023                                                        PLAN     Continue per Plan of Care     Merrill Courtney, PT

## 2023-02-14 ENCOUNTER — CLINICAL SUPPORT (OUTPATIENT)
Dept: REHABILITATION | Facility: HOSPITAL | Age: 63
End: 2023-02-14
Payer: MEDICARE

## 2023-02-14 DIAGNOSIS — R26.9 GAIT ABNORMALITY: Primary | ICD-10-CM

## 2023-02-14 PROCEDURE — 97110 THERAPEUTIC EXERCISES: CPT | Mod: PN

## 2023-02-14 NOTE — PROGRESS NOTES
[facility]   Physical Therapy Treatment Note     Name: Jonna Pearl  Clinic Number: [id number]    Therapy Diagnosis:   Encounter Diagnosis   Name Primary?    Gait abnormality Yes     Physician: Mirza Jeffries MD    Visit Date: 2/14/2023    Physician Orders: PT Eval and Treat   Medical Diagnosis from Referral: Primary OA of right knee.  Primary OA of left knee.  Evaluation Date: 1/13/2023  Authorization Period Expiration: 12/30/23  Plan of Care Expiration: 3/17/23  Progress Note Due: 2/12/23  Visit # / Visits authorized: 3/ 20   FOTO: 58/100     Precautions: Standard     PTA Visit #: 0/5     Time In: 215  Time Out: 300  Total Billable Time: 45 minutes    SUBJECTIVE     Pt reports: both knees hurt she had to take pain pill  She was not compliant with home exercise program.  Response to previous treatment: eval   Functional change: ongoing    Pain: 4/10  Location: both knees      OBJECTIVE     Objective Measures updated at progress report unless specified.     Treatment     Jonna received the treatments listed below:      therapeutic exercises to develop strength and ROM for 53 minutes including:    Bike x 10 minutes Shuttle 25# 6'  Quad sets 30x   Short Arc Quads 30x Bilateral 2#  Straight Leg Raise 30x Bilaterally  Bridges 20x pt refused  Hip adduction 30x w/ball  Hip abduction 30x RTB  Long Arc Quad 30x   // bars  Standing heel/toe raises 30x   Mini squats 30 airex  Gastroc stretch 3x30    Patient Education and Home Exercises     Home Exercises Provided and Patient Education Provided     Education provided:   - home exercises     Written Home Exercises Provided: yes. Exercises were reviewed and Jonna was able to demonstrate them prior to the end of the session.  Jonna demonstrated good  understanding of the education provided. See EMR under Patient Instructions for exercises provided during therapy sessions    ASSESSMENT     Patient tolerated session fair limited by overall  fatigue and lifestyle. Home exercises given today to improve carry over with therapy. Continue to progress to reduce right knee discomfort and improve functional mobility.     Jonna Is progressing well towards her goals.   Pt prognosis is Good.     Pt will continue to benefit from skilled outpatient physical therapy to address the deficits listed in the problem list box on initial evaluation, provide pt/family education and to maximize pt's level of independence in the home and community environment.     Pt's spiritual, cultural and educational needs considered and pt agreeable to plan of care and goals.     Anticipated barriers to physical therapy: none     Goals:   Short Term Goals (STG) # weeks Goal Review Date Reviewed Date Met   Pt will demonstrate independence with initial HEP to facilitate therex progression and improvements in functional mobility 3 Ongoing 2/14/2023       The patient will increase bilateral lower extremity strength to greater than or equal to 1/2 manual muscle grade for all deficient areas in order to facilitate ambulation x 100 without pain 3 Ongoing 2/14/2023   .   Pt will start HEP 3 Ongoing 2/14/2023                                                                                      Long Term Goals (LTG) # weeks Goal Review Date Reviewed Date Met   The patient will improve the Lower Extremity Functional Scale to less than 40% Disability 6 Ongoing 2/14/2023       The patient will increase bilateral lower extremity strength to greater than or equal to 1.5 manual muscle grade for all deficient areas in order to get in and out of vehicle with no limitations 6 Ongoing 2/14/2023       The patient will increase bilateral hip ROM to WFL in order to improve stride length for more normalized gait pattern.0-135 6 Ongoing 2/14/2023       Pt will ambulate 300' without pain 6 Ongoing 2/14/2023       Pt will return to previous LOF 6 Ongoing 2/14/2023       Tolerable pain level 0-4/10. 6 Ongoing  2/14/2023                                                        PLAN     Continue per Plan of Care     Rui Layne, PT

## 2023-02-16 ENCOUNTER — CLINICAL SUPPORT (OUTPATIENT)
Dept: REHABILITATION | Facility: HOSPITAL | Age: 63
End: 2023-02-16
Payer: MEDICARE

## 2023-02-16 DIAGNOSIS — R26.9 GAIT ABNORMALITY: Primary | ICD-10-CM

## 2023-02-16 PROCEDURE — 97110 THERAPEUTIC EXERCISES: CPT | Mod: PN,CQ

## 2023-02-16 NOTE — PROGRESS NOTES
[facility]   Physical Therapy Treatment Note     Name: Jonna Pearl  Clinic Number: [id number]    Therapy Diagnosis:   Encounter Diagnosis   Name Primary?    Gait abnormality Yes     Physician: Mirza Jeffries MD    Visit Date: 2/16/2023    Physician Orders: PT Eval and Treat   Medical Diagnosis from Referral: Primary OA of right knee.  Primary OA of left knee.  Evaluation Date: 1/13/2023  Authorization Period Expiration: 12/30/23  Plan of Care Expiration: 3/17/23  Progress Note Due: 2/12/23  Visit # / Visits authorized: 6/ 20   FOTO: 58/100     Precautions: Standard     PTA Visit #: 1/5     Time In: 211 (patient late)   Time Out: 245 (patient needed to leave early)  Total Billable Time: 34 minutes    SUBJECTIVE     Pt reports: doing alright upon arrival.  She was not compliant with home exercise program.  Response to previous treatment: no complaints reported   Functional change: ongoing    Pain: 4/10  Location: both knees      OBJECTIVE     Objective Measures updated at progress report unless specified.     Treatment     Jonna received the treatments listed below:      therapeutic exercises to develop strength and ROM for 34 minutes including:    Bike x 10 minutes   Quad sets 30x   Short Arc Quads 30x Bilateral 2#  Straight Leg Raise 30x Bilaterally  Bridges 20x pt refused  Hip adduction 30x w/ball  Hip abduction 30x RTB  Long Arc Quad 30x     // bars  Standing heel/toe raises 30x   Mini squats 30 airex  Gastroc stretch 3x30    Patient Education and Home Exercises     Home Exercises Provided and Patient Education Provided     Education provided:   - home exercises     Written Home Exercises Provided: yes. Exercises were reviewed and Jonna was able to demonstrate them prior to the end of the session.  Jonna demonstrated good  understanding of the education provided. See EMR under Patient Instructions for exercises provided during therapy sessions    ASSESSMENT     Patient  performed exercises fair with focus on Bilateral knee strength and stability. Limited with progressions due to time since patient arrived late and needed to leave early. Challenged appropriately with overall strength and endurance.     Jonna Is progressing well towards her goals.   Pt prognosis is Good.     Pt will continue to benefit from skilled outpatient physical therapy to address the deficits listed in the problem list box on initial evaluation, provide pt/family education and to maximize pt's level of independence in the home and community environment.     Pt's spiritual, cultural and educational needs considered and pt agreeable to plan of care and goals.     Anticipated barriers to physical therapy: none     Goals:   Short Term Goals (STG) # weeks Goal Review Date Reviewed Date Met   Pt will demonstrate independence with initial HEP to facilitate therex progression and improvements in functional mobility 3 Ongoing 2/16/2023       The patient will increase bilateral lower extremity strength to greater than or equal to 1/2 manual muscle grade for all deficient areas in order to facilitate ambulation x 100 without pain 3 Ongoing 2/16/2023   .   Pt will start HEP 3 Ongoing 2/16/2023                                                                                      Long Term Goals (LTG) # weeks Goal Review Date Reviewed Date Met   The patient will improve the Lower Extremity Functional Scale to less than 40% Disability 6 Ongoing 2/16/2023       The patient will increase bilateral lower extremity strength to greater than or equal to 1.5 manual muscle grade for all deficient areas in order to get in and out of vehicle with no limitations 6 Ongoing 2/16/2023       The patient will increase bilateral hip ROM to WFL in order to improve stride length for more normalized gait pattern.0-135 6 Ongoing 2/16/2023       Pt will ambulate 300' without pain 6 Ongoing 2/16/2023       Pt will return to previous LOF 6 Ongoing  2/16/2023       Tolerable pain level 0-4/10. 6 Ongoing 2/16/2023                                                        PLAN     Continue per Plan of Care     Viridiana Chaney, PTA

## 2023-03-06 ENCOUNTER — TELEPHONE (OUTPATIENT)
Dept: ORTHOPEDICS | Facility: CLINIC | Age: 63
End: 2023-03-06
Payer: MEDICARE

## 2023-03-06 NOTE — TELEPHONE ENCOUNTER
----- Message from Rohith Rocha sent at 3/6/2023  8:18 AM CST -----  Regarding: est pt Fx her R arm and wants to be seen ASAP, call pt   Contact: pt   est pt Fx her R arm and wants to be seen ASAP, call pt

## 2023-03-07 ENCOUNTER — OFFICE VISIT (OUTPATIENT)
Dept: ORTHOPEDICS | Facility: CLINIC | Age: 63
End: 2023-03-07
Payer: MEDICARE

## 2023-03-07 DIAGNOSIS — S52.124A CLOSED NONDISPLACED FRACTURE OF HEAD OF RIGHT RADIUS, INITIAL ENCOUNTER: Primary | ICD-10-CM

## 2023-03-07 PROCEDURE — 24650 CLTX RDL HEAD/NCK FX WO MNPJ: CPT | Mod: S$PBB,RT,, | Performed by: ORTHOPAEDIC SURGERY

## 2023-03-07 PROCEDURE — 24650 CLTX RDL HEAD/NCK FX WO MNPJ: CPT | Mod: PBBFAC,PN | Performed by: ORTHOPAEDIC SURGERY

## 2023-03-07 PROCEDURE — 99214 PR OFFICE/OUTPT VISIT, EST, LEVL IV, 30-39 MIN: ICD-10-PCS | Mod: S$PBB,57,, | Performed by: ORTHOPAEDIC SURGERY

## 2023-03-07 PROCEDURE — 24650 PR CLOSED RX RADIAL HEAD/NECK FX: ICD-10-PCS | Mod: S$PBB,RT,, | Performed by: ORTHOPAEDIC SURGERY

## 2023-03-07 PROCEDURE — 99214 OFFICE O/P EST MOD 30 MIN: CPT | Mod: S$PBB,57,, | Performed by: ORTHOPAEDIC SURGERY

## 2023-03-07 NOTE — PROGRESS NOTES
Patient ID: Jonna Pearl is a 62 y.o. female    Chief Complaint: No chief complaint on file.      History of Present Illness:    This is a pleasant 62 y.o. female who presents for evaluation of right elbow and forearm pain. She reports an injury 2-3 days ago while falling onto an outstretched hand. She had immediate pain and difficulty bearing weight on that extremity. She presented to outside ER and was diagnosed with a radial head fracture. She was placed in a splint and referred to us for orthopedic evaluation. She is independent with activities of daily living at baseline. She walks without an assistive device.     Diabetic: No  Smoker: No  Hx of DVT, PE: No    Hand dominance: Right        PAST MEDICAL HISTORY:   Past Medical History:   Diagnosis Date    Allergy     Arthritis     Atrophic kidney     left. probably childhood illness or disease    Chronic kidney disease     Chronic rhinitis     Colon polyps     Depression     HEARING LOSS     Hiatal hernia     Hypertension     Insomnia     Migraines     Otitis media     Sleep apnea     mild    TIA (transient ischemic attack)      PAST SURGICAL HISTORY:   Past Surgical History:   Procedure Laterality Date     SECTION      COLONOSCOPY  2014    Dr Escobar 5 year yossi    COLONOSCOPY N/A 4/10/2019    Procedure: COLONOSCOPY;  Surgeon: Matt Orozco MD;  Location: [facility];  Service: Endoscopy;  Laterality: N/A;    ESOPHAGOGASTRODUODENOSCOPY N/A 2023    Procedure: EGD (ESOPHAGOGASTRODUODENOSCOPY);  Surgeon: Merrill Vela MD;  Location: [facility];  Service: Endoscopy;  Laterality: N/A;    HEMORRHOID SURGERY      HYSTERECTOMY      left mandibular node resection      OOPHORECTOMY       FAMILY HISTORY:   Family History   Problem Relation Age of Onset    Cancer Sister         breast    Diabetes Mother     Early death Father     Diabetes Paternal Grandmother     Breast cancer Maternal Aunt     Breast cancer Paternal Aunt      SOCIAL HISTORY:    Social History     Occupational History    Not on file   Tobacco Use    Smoking status: Former     Packs/day: 1.00     Years: 18.00     Pack years: 18.00     Types: Cigarettes     Quit date: 2018     Years since quittin.9    Smokeless tobacco: Never   Substance and Sexual Activity    Alcohol use: No    Drug use: No    Sexual activity: Not on file        MEDICATIONS:   Current Outpatient Medications:     ergocalciferol (ERGOCALCIFEROL) 50,000 unit Cap, Take 50,000 Units by mouth every 7 days., Disp: , Rfl:     famotidine (PEPCID) 20 MG tablet, Take 1 tablet (20 mg total) by mouth 2 (two) times daily., Disp: 60 tablet, Rfl: 0    gabapentin (NEURONTIN) 600 MG tablet, TAKE 1 TABLET BY MOUTH THREE TIMES DAILY TAKE ONE IN THE MORNING TAKE ONE IN THE AFTERNOON AND ONE AT BEDTIME, Disp: , Rfl: 9    ketoconazole (NIZORAL) 2 % shampoo, APPLY ONE APPLICATION TO WET HAIR EVERY 3 TO 4 DAYS AS NEEDED, Disp: , Rfl:     latanoprost 0.005 % ophthalmic solution, INSTILL 1 DROP INTO AFFECTED EYE(S) ONCE DAILY AT BEDTIME, Disp: , Rfl: 6    lisinopriL (PRINIVIL,ZESTRIL) 40 MG tablet, , Disp: , Rfl:     NYSTOP powder, APPLY ONE APPLICATIONT TO AFFECED AREA TWICE A DAY AS DIRECTED, Disp: , Rfl:     timolol maleate 0.5% (TIMOPTIC) 0.5 % Drop, INSTILL 1 DROP INTO EACH EYE TWICE DAILY, Disp: , Rfl:     topiramate (TOPAMAX) 50 MG tablet, Take 1 tablet (50 mg total) by mouth once daily., Disp: 30 tablet, Rfl: 11    verapamil (CALAN-SR) 240 MG CR tablet, Take 240 mg by mouth every evening., Disp: , Rfl: 2  ALLERGIES:   Review of patient's allergies indicates:   Allergen Reactions    Neomycin-polymyxin Swelling    Cephalexin      Other reaction(s): Unknown    Doxycycline      Other reaction(s): Unknown    Iodinated contrast media      Other reaction(s): Unknown    Penicillins      Other reaction(s): Unknown    Valsartan      Other reaction(s): Unknown         Physical Exam     There were no vitals filed for this visit.  Alert and  oriented to person, place and time. No acute distress. Well-groomed, not ill appearing. Pupils round and reactive, normal respiratory effort, no audible wheezing.     There is mild bruising ecchymosis of the right elbow and forearm.  There is tenderness over the radial head.  There was no mechanical block.  There is pain with pronation and supination.  There is extension to 10° and flexion 90°.  No pain at the DRUJ.  No snuffbox tenderness.  Neurovascularly intact.      Imaging:       X-Ray: I have reviewed all pertinent results/findings and my personal findings are:  Minimally displaced fracture of the right radial head.  There is congruent radial capitellar and ulnar humeral joint      Assessment & Plan    Closed nondisplaced fracture of head of right radius, initial encounter  -     Ambulatory referral/consult to Physical/Occupational Therapy; Future; Expected date: 03/14/2023         Treatment options were discussed in detail.  I discussed her x-rays which show fracture of the right radial head status post fall.  We discussed operative and nonoperative treatment options.  We discussed early functional physical therapy and range of motion with formal PT.  We discussed operative treatment with open reduction and fixation versus radial head replacement.  Based on the fracture displacement and patient's functional status I think this will heal without long-term sequela with nonoperative treatment.  We will get her enrolled in early functional physical therapy to work on range of motion and strengthening.  She will be nonweightbearing greater than 5 lb.  I will see her back in 3 weeks for repeat x-rays of the right elbow and range-of-motion check.

## 2023-03-21 PROBLEM — M25.521 RIGHT ELBOW PAIN: Status: ACTIVE | Noted: 2023-03-21

## 2023-03-21 NOTE — PROGRESS NOTES
Patient evaluated and plan of care established.  Please sign and return if in agreement.    Thank you,  HERNANDEZ Florian       Occupational Therapy [facility]  Initial Evaluation     Date: 3/28/2023  Name: Jonna Pearl  [facility] Number: [id number]    Therapy Diagnosis: S52.124A (ICD-10-CM) - Closed nondisplaced fracture of head of right radius  Encounter Diagnosis   Name Primary?    Right elbow pain      Physician: Mirza Jeffries MD    Physician Orders: S52.124A (ICD-10-CM) - Closed nondisplaced fracture of head of right radius; evaluate and treat; Patient can progress with strength as tolerated with therapy  Surgical Procedure and Date: Not Applicable   Dominant Side: left  Date of Onset: 2023   post injury: 3 weeks, 4 days  History / Mechanism of Injury: Patient had a fall early March on an outstretched hand and hurt her (Right) elbow and forearm.  She had immediate pain and difficulty bearing weight on that extremity. She presented to outside urgent care and was diagnosed with a radial head fracture. She was placed in a splint and referred to orthopedic for evaluation on 2023. She is independent with activities of daily living at baseline.  Previous Therapy:  outpatient Physical Therapist for lower extremity     Environmental Concerns/Fall Risk: FALL RISK !!!!  Language: None  Cultural/Spiritual: None  Abuse/Neglect/Nutritional: None  Imaging / Tests: See Epic ( per note on 2023) Minimally displaced fracture of the right radial head.  There is congruent radial capitellar and ulnar humeral joint    Past Medical History/Physical Systems Review:    has a past medical history of Allergy, Arthritis, Atrophic kidney, Chronic kidney disease, Chronic rhinitis, Colon polyps, Depression, HEARING LOSS, Hiatal hernia, Hypertension, Insomnia, Migraines, Otitis media, Sleep apnea, and TIA (transient ischemic attack).     has a past surgical history that includes  section; Hysterectomy; Hemorrhoid  "surgery; left mandibular node resection; Colonoscopy (1/17/2014); Oophorectomy; Colonoscopy (N/A, 4/10/2019); and Esophagogastroduodenoscopy (N/A, 1/12/2023).    has a current medication list which includes the following prescription(s): ergocalciferol, famotidine, gabapentin, ketoconazole, latanoprost, lisinopril, nystop, timolol maleate 0.5%, topiramate, and verapamil.    Review of patient's allergies indicates:   Allergen Reactions    Neomycin-polymyxin Swelling    Cephalexin      Other reaction(s): Unknown    Doxycycline      Other reaction(s): Unknown    Iodinated contrast media      Other reaction(s): Unknown    Penicillins      Other reaction(s): Unknown    Valsartan      Other reaction(s): Unknown        Insurance Authorization Period Expiration: 03/07/2023-03/06/2024  Plan of Care Certification Period: 03/28/2023-06/28/2023  Date of Return to MD: as needed    Occupation:  Not Applicable   Working presently: disability  Workers Compensation:  no      Visit # / Visits authorized: 1 / 1  Time In:  3:45  Time Out: 4:25  Total Billable Time: 20  minutes        Precautions:  Patient. Reports, "no known Cancer, no pacemaker, no allergies to adhesives, allergies to latex."     Subjective     Patient Reports, "I had a fall in early March and landed on my (Right) hand.  I went to the ED where they did xrays and told me a had a radial head fracture.  They gave me a splint and referred me to orthopedics.  I saw the orthopedic doctor on 03/07/2023 and he referred me to therapy for my (Right) elbow. He told me I did not need the splint and just wrap it in an Ace wrap.  I went again today back to the doctor and they did an xray.  He said the xray looked good.  I told him I was still in pain and he said the pain would get better.  He did not gave me any limitation.  I am feeling weak.  I have trouble reaching to turn off/ on the lamp, carrying a grocery bag, opening bottle, reaching for the seatbelt, turn the faucet off " "outside."    Pain   At rest:  5/10  With work/ Activity:  7/10  Sleepin/10  Location of Pain: (Right) elbow    Patient's Goals for Therapy: decrease pain increase function    Objective     Sensation: grossly intact  Scar / Wound: Not Applicable   Edema:   centimeters (Right) / (Left)  Proximal Wrist Crease:  16.9  / 15.8  Elbow Crease: 30.4  / 29.0                                                            Active Range of Motion: elbow/wrist                         (Right)   //  (Left)  Elbow Extension/Flexion: 12 / 126  // 0/139  Dorsal Flexion /Volar Flexion:  60 / 60  //  70/85  Radial Deviation /Ulnar Deviation: 15 / 40 // 15/35  Supination / Pronation:  55 / 75 //  85/90    Passive Range of Motion: elbow/wrist     ( submaximally, within pain tolerance)                      (Right)     Elbow Extension/Flexion:  5 / 134  Dorsal Flexion /Volar Flexion : 75 / 70  Radial Deviation /Ulnar Deviation: WNL / WNL   Supination / Pronation: 70 / 80    Manual Muscle Test:  Elbow / Wrist   (submaximally within pain tolerance)         (Right)       Elbow Flexion:      3/5  Elbow Extension:   3-/5  Dorsal Flexion:  3/5  Volar Flexion:  3-/5         Strength: (ELLEN Dynamometer in pounds),Position II   (submaximally within pain tolerance)     (Right):  20  (Left):  45    Pinch Strength: (Pinch Gauge in pounds)   (submaximally within pain tolerance)                (Right) /  (Left)  Lateral Pinch:  7  / 11  3 Point Pinch:  4    /  8   2 Point Pinch: 2  / 5           FOTO SCORE: 43%       Treatment Included:   Occupational Therapy  evaluation and instruction in written Home Exercise Program including 0# low load prolonged stretch on towel roll x 30 seconds holds x 5 repetitions;  0# bicep curls for elbow Extension / flexion (Supination  / neutral / Pronation) x 10 repetitions each; and 0# Supination /Pronation x 3 seconds holds x 10 repetitions x 3 x daily.    Patient. Educated on modalities for home pain management, " activity modifications and precautions.   Patient. Demo understanding of above.     Patient/Family Education: role of Occupational Therapy, goals for Occupational Therapy, scheduling/cancellations - patient verbalized understanding. Discussed insurance limitations with patient.        Assessment:     Problem List :       Decreased Range of motion,  Decreased  and pinch strength,   Decreased Manual Muscle Testing,   Decreased functional abilities,  Increased pain,   Edema      During the evaluation, the patient required Minimal modification or assistance.  The following co-morbid conditions/personal factors may affect the plan of care: none .        Jonna Pearl is a 62 y.o. female referred to outpatient occupational therapy and presents with a medical diagnosis of S52.124A (ICD-10-CM) - Closed nondisplaced fracture of head of right radius, resulting in limited range of motion, strength, functional abilities, increased pain and inflammation and demonstrates limitations as described in the chart above. Following medical record review it is determined that patient will benefit from occupational therapy services in order to maximize pain free and/or functional use of right elbow. The following goals were discussed with the patient and patient is in agreement with them as to be addressed in the treatment plan. The patient's rehab potential is Good.     Anticipated Barriers to Therapy: None     The following goals were discussed with the patient and patient is in agreement with them as to be addressed in the treatment plan.     Goals:   Goals:  1)   Patient to be Independent with Home exercise program and modalities for pain management by 1 week.   2)   Patient will report   5/10 pain on average with activity to assist with exercises by 6-8 weeks.  3)   Patient will increase range of Motion by 3-5 degrees to increase functional use for activities of daily living by 6-8 weeks.  4)   Patient will increase Manual  Muscle Testing by a grade to assist with lifting light groceries by 6-8 weeks.   5)    Patient will increase  strength 3-5 pounds to open containers by 6-8 weeks.   6)   Patient will increase pinch by 1-3 pounds for buttoning by 6-8 weeks.    7)   Patient will decrease edema by 0.1-0.3 millimeters  to increase joint mobility /flexibility by 6-8 weeks.         Plan:   Patient to be treated by Occupational Therapy    2    times per week for     90 days   during the certification period from   03/28/2023  to 06/28/2023     to achieve the established goals.  Treatment to include :  paraffin, fluidotherapy, manual therapy/joint mobilizations, kinesiotaping, dry needling performed by certified physical therapist,   modalities for pain management, Ultrasound 1.0 /3.0mhz, therapeutic exercises/activities,        strengthening,    as well as any other treatments deemed necessary based on the patient's needs or progress.     Will reassess as needed and adjust treatment plan accordingly.              I certify the need for these services furnished under this plan of treatment and while under my care    ____________________________________                         __________________  Physician/Referring Practitioner                                               Date of Signature    Rosetta Gonzales OT

## 2023-03-27 DIAGNOSIS — S52.124A CLOSED NONDISPLACED FRACTURE OF HEAD OF RIGHT RADIUS, INITIAL ENCOUNTER: Primary | ICD-10-CM

## 2023-03-28 ENCOUNTER — HOSPITAL ENCOUNTER (OUTPATIENT)
Dept: RADIOLOGY | Facility: HOSPITAL | Age: 63
Discharge: HOME | End: 2023-03-28
Attending: ORTHOPAEDIC SURGERY
Payer: MEDICARE

## 2023-03-28 ENCOUNTER — CLINICAL SUPPORT (OUTPATIENT)
Dept: REHABILITATION | Facility: HOSPITAL | Age: 63
End: 2023-03-28
Attending: ORTHOPAEDIC SURGERY
Payer: MEDICARE

## 2023-03-28 ENCOUNTER — OFFICE VISIT (OUTPATIENT)
Dept: ORTHOPEDICS | Facility: CLINIC | Age: 63
End: 2023-03-28
Payer: MEDICARE

## 2023-03-28 VITALS — RESPIRATION RATE: 16 BRPM | WEIGHT: 275 LBS | BODY MASS INDEX: 51.92 KG/M2 | HEIGHT: 61 IN

## 2023-03-28 DIAGNOSIS — M25.521 RIGHT ELBOW PAIN: ICD-10-CM

## 2023-03-28 DIAGNOSIS — S52.124A CLOSED NONDISPLACED FRACTURE OF HEAD OF RIGHT RADIUS, INITIAL ENCOUNTER: ICD-10-CM

## 2023-03-28 DIAGNOSIS — S52.124A CLOSED NONDISPLACED FRACTURE OF HEAD OF RIGHT RADIUS, INITIAL ENCOUNTER: Primary | ICD-10-CM

## 2023-03-28 PROCEDURE — 97110 THERAPEUTIC EXERCISES: CPT | Mod: PN

## 2023-03-28 PROCEDURE — 99999 PR PBB SHADOW E&M-EST. PATIENT-LVL III: ICD-10-PCS | Mod: PBBFAC,,, | Performed by: ORTHOPAEDIC SURGERY

## 2023-03-28 PROCEDURE — 73080 X-RAY EXAM OF ELBOW: CPT | Mod: 26,RT,, | Performed by: RADIOLOGY

## 2023-03-28 PROCEDURE — 97165 OT EVAL LOW COMPLEX 30 MIN: CPT | Mod: PN

## 2023-03-28 PROCEDURE — 99024 POSTOP FOLLOW-UP VISIT: CPT | Mod: POP,,, | Performed by: ORTHOPAEDIC SURGERY

## 2023-03-28 PROCEDURE — 73080 XR ELBOW COMPLETE 3 VIEW RIGHT: ICD-10-PCS | Mod: 26,RT,, | Performed by: RADIOLOGY

## 2023-03-28 PROCEDURE — 73080 X-RAY EXAM OF ELBOW: CPT | Mod: TC,PN,RT

## 2023-03-28 PROCEDURE — 99213 OFFICE O/P EST LOW 20 MIN: CPT | Mod: PBBFAC,PN | Performed by: ORTHOPAEDIC SURGERY

## 2023-03-28 PROCEDURE — 99024 PR POST-OP FOLLOW-UP VISIT: ICD-10-PCS | Mod: POP,,, | Performed by: ORTHOPAEDIC SURGERY

## 2023-03-28 PROCEDURE — 99999 PR PBB SHADOW E&M-EST. PATIENT-LVL III: CPT | Mod: PBBFAC,,, | Performed by: ORTHOPAEDIC SURGERY

## 2023-03-28 NOTE — PROGRESS NOTES
Patient ID: Jonna Pearl is a 63 y.o. female    Chief Complaint:   Chief Complaint   Patient presents with    Right Elbow - Pain, Injury       History of Present Illness:    This is a pleasant 63 y.o. female who presents for evaluation of right elbow and forearm pain. She reports an injury 2-3 days ago while falling onto an outstretched hand. She had immediate pain and difficulty bearing weight on that extremity. She presented to outside ER and was diagnosed with a radial head fracture. She was placed in a splint and referred to us for orthopedic evaluation. She is independent with activities of daily living at baseline. She walks without an assistive device.     Diabetic: No  Smoker: No  Hx of DVT, PE: No    Hand dominance: Right  ________________________________________________________________    Interval history 2023 : patient returns today for fracture follow up. They are being currently treated for a radial head fracture. At the last visit they were placed in a sling for comfort.  Overall doing well.  Pain well controlled.  Currently are in physical therapy but has not had any appointments yet.  Denies any paresthesias, numbness or tingling.  Had a rash of the right elbow from the Ace wrap but otherwise doing well.        PAST MEDICAL HISTORY:   Past Medical History:   Diagnosis Date    Allergy     Arthritis     Atrophic kidney     left. probably childhood illness or disease    Chronic kidney disease     Chronic rhinitis     Colon polyps     Depression     HEARING LOSS     Hiatal hernia     Hypertension     Insomnia     Migraines     Otitis media     Sleep apnea     mild    TIA (transient ischemic attack)      PAST SURGICAL HISTORY:   Past Surgical History:   Procedure Laterality Date     SECTION      COLONOSCOPY  2014    Dr Escobar 5 year yossi    COLONOSCOPY N/A 4/10/2019    Procedure: COLONOSCOPY;  Surgeon: Matt Orozco MD;  Location: [facility];  Service: Endoscopy;  Laterality:  N/A;    ESOPHAGOGASTRODUODENOSCOPY N/A 2023    Procedure: EGD (ESOPHAGOGASTRODUODENOSCOPY);  Surgeon: Merrill Vela MD;  Location: [facility];  Service: Endoscopy;  Laterality: N/A;    HEMORRHOID SURGERY      HYSTERECTOMY      left mandibular node resection      OOPHORECTOMY       FAMILY HISTORY:   Family History   Problem Relation Age of Onset    Cancer Sister         breast    Diabetes Mother     Early death Father     Diabetes Paternal Grandmother     Breast cancer Maternal Aunt     Breast cancer Paternal Aunt      SOCIAL HISTORY:   Social History     Occupational History    Not on file   Tobacco Use    Smoking status: Former     Packs/day: 1.00     Years: 18.00     Pack years: 18.00     Types: Cigarettes     Quit date: 2018     Years since quittin.9    Smokeless tobacco: Never   Substance and Sexual Activity    Alcohol use: No    Drug use: No    Sexual activity: Not on file        MEDICATIONS:   Current Outpatient Medications:     ergocalciferol (ERGOCALCIFEROL) 50,000 unit Cap, Take 50,000 Units by mouth every 7 days., Disp: , Rfl:     gabapentin (NEURONTIN) 600 MG tablet, TAKE 1 TABLET BY MOUTH THREE TIMES DAILY TAKE ONE IN THE MORNING TAKE ONE IN THE AFTERNOON AND ONE AT BEDTIME, Disp: , Rfl: 9    ketoconazole (NIZORAL) 2 % shampoo, APPLY ONE APPLICATION TO WET HAIR EVERY 3 TO 4 DAYS AS NEEDED, Disp: , Rfl:     latanoprost 0.005 % ophthalmic solution, INSTILL 1 DROP INTO AFFECTED EYE(S) ONCE DAILY AT BEDTIME, Disp: , Rfl: 6    lisinopriL (PRINIVIL,ZESTRIL) 40 MG tablet, , Disp: , Rfl:     NYSTOP powder, APPLY ONE APPLICATIONT TO AFFECED AREA TWICE A DAY AS DIRECTED, Disp: , Rfl:     timolol maleate 0.5% (TIMOPTIC) 0.5 % Drop, INSTILL 1 DROP INTO EACH EYE TWICE DAILY, Disp: , Rfl:     topiramate (TOPAMAX) 50 MG tablet, Take 1 tablet (50 mg total) by mouth once daily., Disp: 30 tablet, Rfl: 11    verapamil (CALAN-SR) 240 MG CR tablet, Take 240 mg by mouth every evening., Disp: , Rfl: 2     famotidine (PEPCID) 20 MG tablet, Take 1 tablet (20 mg total) by mouth 2 (two) times daily., Disp: 60 tablet, Rfl: 0  ALLERGIES:   Review of patient's allergies indicates:   Allergen Reactions    Neomycin-polymyxin Swelling    Cephalexin      Other reaction(s): Unknown    Doxycycline      Other reaction(s): Unknown    Iodinated contrast media      Other reaction(s): Unknown    Penicillins      Other reaction(s): Unknown    Valsartan      Other reaction(s): Unknown         Physical Exam     Vitals:    03/28/23 1342   Resp: 16     Alert and oriented to person, place and time. No acute distress. Well-groomed, not ill appearing. Pupils round and reactive, normal respiratory effort, no audible wheezing.     There is no significant bruising or ecchymosis of the right elbow and forearm.  There is improved tenderness over the radial head.  There was no mechanical block.  There is pain with pronation and supination.  There is extension to 10° and flexion 120°.  No pain at the DRUJ.  No snuffbox tenderness.  Neurovascularly intact.      Imaging:       X-Ray: I have reviewed all pertinent results/findings and my personal findings are:  Minimally displaced fracture of the right radial head.  There is congruent radial capitellar and ulnar humeral joint.  No interval displacement since previous x-ray.  Less than 1 mm step-off.      Assessment & Plan    Closed nondisplaced fracture of head of right radius, initial encounter           Treatment options were discussed in detail.  She is scheduled to start physical therapy this week.  Her range of motion is fairly good at this point despite not having any therapy yet.  We discussed continuation of home exercise program and physical therapy.  I have no range-of-motion restrictions.  Patient can progress with strength as tolerated with therapy.  She wants to follow up as needed.

## 2023-03-28 NOTE — PLAN OF CARE
Patient evaluated and plan of care established.  Please sign and return if in agreement.    Thank you,  HERNANDEZ Florian       Occupational Therapy [facility]  Initial Evaluation     Date: 3/28/2023  Name: Jonna Pearl  [facility] Number: [id number]    Therapy Diagnosis: S52.124A (ICD-10-CM) - Closed nondisplaced fracture of head of right radius  Encounter Diagnosis   Name Primary?    Right elbow pain      Physician: Mirza Jeffries MD    Physician Orders: S52.124A (ICD-10-CM) - Closed nondisplaced fracture of head of right radius; evaluate and treat; Patient can progress with strength as tolerated with therapy  Surgical Procedure and Date: Not Applicable   Dominant Side: left  Date of Onset: 2023   post injury: 3 weeks, 4 days  History / Mechanism of Injury: Patient had a fall early March on an outstretched hand and hurt her (Right) elbow and forearm.  She had immediate pain and difficulty bearing weight on that extremity. She presented to outside urgent care and was diagnosed with a radial head fracture. She was placed in a splint and referred to orthopedic for evaluation on 2023. She is independent with activities of daily living at baseline.  Previous Therapy:  outpatient Physical Therapist for lower extremity     Environmental Concerns/Fall Risk: FALL RISK !!!!  Language: None  Cultural/Spiritual: None  Abuse/Neglect/Nutritional: None  Imaging / Tests: See Epic ( per note on 2023) Minimally displaced fracture of the right radial head.  There is congruent radial capitellar and ulnar humeral joint    Past Medical History/Physical Systems Review:    has a past medical history of Allergy, Arthritis, Atrophic kidney, Chronic kidney disease, Chronic rhinitis, Colon polyps, Depression, HEARING LOSS, Hiatal hernia, Hypertension, Insomnia, Migraines, Otitis media, Sleep apnea, and TIA (transient ischemic attack).     has a past surgical history that includes  section; Hysterectomy; Hemorrhoid  "surgery; left mandibular node resection; Colonoscopy (1/17/2014); Oophorectomy; Colonoscopy (N/A, 4/10/2019); and Esophagogastroduodenoscopy (N/A, 1/12/2023).    has a current medication list which includes the following prescription(s): ergocalciferol, famotidine, gabapentin, ketoconazole, latanoprost, lisinopril, nystop, timolol maleate 0.5%, topiramate, and verapamil.    Review of patient's allergies indicates:   Allergen Reactions    Neomycin-polymyxin Swelling    Cephalexin      Other reaction(s): Unknown    Doxycycline      Other reaction(s): Unknown    Iodinated contrast media      Other reaction(s): Unknown    Penicillins      Other reaction(s): Unknown    Valsartan      Other reaction(s): Unknown        Insurance Authorization Period Expiration: 03/07/2023-03/06/2024  Plan of Care Certification Period: 03/28/2023-06/28/2023  Date of Return to MD: as needed    Occupation:  Not Applicable   Working presently: disability  Workers Compensation:  no      Visit # / Visits authorized: 1 / 1  Time In:  3:45  Time Out: 4:25  Total Billable Time: 20  minutes        Precautions:  Patient. Reports, "no known Cancer, no pacemaker, no allergies to adhesives, allergies to latex."     Subjective     Patient Reports, "I had a fall in early March and landed on my (Right) hand.  I went to the ED where they did xrays and told me a had a radial head fracture.  They gave me a splint and referred me to orthopedics.  I saw the orthopedic doctor on 03/07/2023 and he referred me to therapy for my (Right) elbow. He told me I did not need the splint and just wrap it in an Ace wrap.  I went again today back to the doctor and they did an xray.  He said the xray looked good.  I told him I was still in pain and he said the pain would get better.  He did not gave me any limitation.  I am feeling weak.  I have trouble reaching to turn off/ on the lamp, carrying a grocery bag, opening bottle, reaching for the seatbelt, turn the faucet off " "outside."    Pain   At rest:  5/10  With work/ Activity:  7/10  Sleepin/10  Location of Pain: (Right) elbow    Patient's Goals for Therapy: decrease pain increase function    Objective     Sensation: grossly intact  Scar / Wound: Not Applicable   Edema:   centimeters (Right) / (Left)  Proximal Wrist Crease:  16.9  / 15.8  Elbow Crease: 30.4  / 29.0                                                            Active Range of Motion: elbow/wrist                         (Right)   //  (Left)  Elbow Extension/Flexion: 12 / 126  // 0/139  Dorsal Flexion /Volar Flexion:  60 / 60  //  70/85  Radial Deviation /Ulnar Deviation: 15 / 40 // 15/35  Supination / Pronation:  55 / 75 //  85/90    Passive Range of Motion: elbow/wrist     ( submaximally, within pain tolerance)                      (Right)     Elbow Extension/Flexion:  5 / 134  Dorsal Flexion /Volar Flexion : 75 / 70  Radial Deviation /Ulnar Deviation: WNL / WNL   Supination / Pronation: 70 / 80    Manual Muscle Test:  Elbow / Wrist   (submaximally within pain tolerance)         (Right)       Elbow Flexion:      3/5  Elbow Extension:   3-/5  Dorsal Flexion:  3/5  Volar Flexion:  3-/5         Strength: (ELLEN Dynamometer in pounds),Position II   (submaximally within pain tolerance)     (Right):  20  (Left):  45    Pinch Strength: (Pinch Gauge in pounds)   (submaximally within pain tolerance)                (Right) /  (Left)  Lateral Pinch:  7  / 11  3 Point Pinch:  4    /  8   2 Point Pinch: 2  / 5           FOTO SCORE: 43%       Treatment Included:   Occupational Therapy  evaluation and instruction in written Home Exercise Program including 0# low load prolonged stretch on towel roll x 30 seconds holds x 5 repetitions;  0# bicep curls for elbow Extension / flexion (Supination  / neutral / Pronation) x 10 repetitions each; and 0# Supination /Pronation x 3 seconds holds x 10 repetitions x 3 x daily.    Patient. Educated on modalities for home pain management, " activity modifications and precautions.   Patient. Demo understanding of above.     Patient/Family Education: role of Occupational Therapy, goals for Occupational Therapy, scheduling/cancellations - patient verbalized understanding. Discussed insurance limitations with patient.        Assessment:     Problem List :       Decreased Range of motion,  Decreased  and pinch strength,   Decreased Manual Muscle Testing,   Decreased functional abilities,  Increased pain,   Edema      During the evaluation, the patient required Minimal modification or assistance.  The following co-morbid conditions/personal factors may affect the plan of care: none .        Jonna Pearl is a 62 y.o. female referred to outpatient occupational therapy and presents with a medical diagnosis of S52.124A (ICD-10-CM) - Closed nondisplaced fracture of head of right radius, resulting in limited range of motion, strength, functional abilities, increased pain and inflammation and demonstrates limitations as described in the chart above. Following medical record review it is determined that patient will benefit from occupational therapy services in order to maximize pain free and/or functional use of right elbow. The following goals were discussed with the patient and patient is in agreement with them as to be addressed in the treatment plan. The patient's rehab potential is Good.     Anticipated Barriers to Therapy: None     The following goals were discussed with the patient and patient is in agreement with them as to be addressed in the treatment plan.     Goals:   Goals:  1)   Patient to be Independent with Home exercise program and modalities for pain management by 1 week.   2)   Patient will report   5/10 pain on average with activity to assist with exercises by 6-8 weeks.  3)   Patient will increase range of Motion by 3-5 degrees to increase functional use for activities of daily living by 6-8 weeks.  4)   Patient will increase Manual  Muscle Testing by a grade to assist with lifting light groceries by 6-8 weeks.   5)    Patient will increase  strength 3-5 pounds to open containers by 6-8 weeks.   6)   Patient will increase pinch by 1-3 pounds for buttoning by 6-8 weeks.    7)   Patient will decrease edema by 0.1-0.3 millimeters  to increase joint mobility /flexibility by 6-8 weeks.         Plan:   Patient to be treated by Occupational Therapy    2    times per week for     90 days   during the certification period from   03/28/2023  to 06/28/2023     to achieve the established goals.  Treatment to include :  paraffin, fluidotherapy, manual therapy/joint mobilizations, kinesiotaping, dry needling performed by certified physical therapist,   modalities for pain management, Ultrasound 1.0 /3.0mhz, therapeutic exercises/activities,        strengthening,    as well as any other treatments deemed necessary based on the patient's needs or progress.     Will reassess as needed and adjust treatment plan accordingly.              I certify the need for these services furnished under this plan of treatment and while under my care    ____________________________________                         __________________  Physician/Referring Practitioner                                               Date of Signature    Rosetta Gonzales OT

## 2023-03-29 ENCOUNTER — PATIENT MESSAGE (OUTPATIENT)
Dept: REHABILITATION | Facility: HOSPITAL | Age: 63
End: 2023-03-29
Payer: MEDICARE

## 2023-03-29 NOTE — PROGRESS NOTES
"                                Occupational Therapy Daily Treatment Note       Date: 4/3/2023  Name: Jonna Pearl  Clinic Number: [id number]    Therapy Diagnosis: S52.124A (ICD-10-CM) - Closed nondisplaced fracture of head of right radius  Encounter Diagnosis   Name Primary?    Right elbow pain Yes     Physician: Mirza Jeffries MD    Physician Orders: S52.124A (ICD-10-CM) - Closed nondisplaced fracture of head of right radius; evaluate and treat;Patient can progress with strength as tolerated with therapy   Medical Diagnosis: S52.124A (ICD-10-CM) - Closed nondisplaced fracture of head of right radius; (Right) elbow pain  Surgical Procedure and Date: Not Applicable     Insurance Authorization Period Expiration: 03/28/2023-12/31/2023   Plan of Care Certification Period: 03/28/2023-06/28/2023  Date of Return to MD: as needed    Evaluation FOTO: 03/28/2023 = 43%    Visit # / Visits authorized: 1 / 20   Time In: 1:00  Time Out: 1:40   Total Billable Time: 38  minutes    Precautions:  Standard;  FALL RISK !!! Latex allergy      Post Injury:  4 weeks, 3 days      Subjective     Patient reports: "I have not been doing the exercises at home.  I am feeling ok today.  Getting wet clothes out of the washing machine is difficult."    Pain: 4 /10   Location of pain: (Right) elbow     Objective    Patient seen by Occupational Therapy this session. Tx consisted of:        Manual therapy techniques to increase joint mobilization /// soft tissue mobilization   x   8   minutes:     -Manual Therapy techniques to  (Right) elbow and wrist  including  LIGHT stretching, and Passive Range of Motion to increase joint mobility, range of motion  and for pain management  x  4 minutes     -Soft Tissue Mobilization to (Right) hand into wrist and forearm from distal to proximal to decrease edema and increase range of motion and functional abilities  x  2  minutes    -Soft Tissue Massage to distal biceps tendon and muscle belly to relax " "muscle and increase range of motion and functional abilities  x  2  minutes      Therapeutic  Exercises to improve functional performance while increasing strength, endurance, range of motion,  and flexibility  x   30  minutes:      - Low load prolonged stretch with Moist Heat (on towel roll) in sitting for elbow Extension x 5 minutes ( rest breaks as needed)     - 0# bicep curls for elbow Extension / flexion ( Supination  / neutral / Pronation) x 10 repetitions each  - 0# pulleys for elbow Extension  x 3 minutes   - Wrist wheel for Supination / Pronation stretch x 10 repetitions   - Wrist roller coaster for Active Range of Motion  of wrist in all planes x 5 repetitions    - Weighted ball on tabletop for Dorsiflexion /Volar Flexion  stretches x 20 repetitions   - LARGE dowel board for elbow Extension /Flexion and Supination /Pronation x 2 repetitions   - RED Theraputty for composite  x 20 repetitions   - RED Theraputty for rolling x 20 repetitions   - RED Theraputty for  PVC for "cookie cutting" and medicine top x 10 repetitions   - Wooden pegboard with GREEN clothespins with 3PT pinch x 2 repetitions      Initiate in future therapy sessions:    - ### digiflex for isolated x 10 repetitions;  ### digiflex for composite x 20 repetitions      -  ###Progressive Hand Gripper (black spring) for composite  x 20 repetitions   - ### Theraputty for pulling x 10 Repetitions    - YELLOW digiweb for composite digital Extension and  intrinsics x 20 repetitions  -  ### Dorsiflexion/Volar flexion (Supination /Pronation ), Radial Deviation / Ulnar Deviation x 10 repetitions  -  #### dowel for Supination /Pronation  x 20 repetitions    - ### Flexbar for Supination /Pronation  and Dorsiflexion /Volar Flexion x 20 repetitions    - ### Wrist exerstick in standing for Dorsiflexion / Volar Flexion  x 3 repetitions       - 1# UBE in clockwise motion x 6 minutes to increase endurance and range of motion   - Biceps stretch on wall " "with Elbow Extension x 15 second holds x 5 repetitions  -  ### Theraband for scapular retraction x 20 repetitions  - Isometrics with   ####   Theraband for External Rotation and Internal Rotation x 10 repetitions  -  ### Theraband for shoulder Flexion, Abduction, Extension, External Rotation and Internal Rotation x ###  repetitions       Assessment     Patient will continue to benefit from skilled Occupational Therapy intervention to increase functional abilities, range of motion, and strength and pain control.  Patient. demonstrated proper understanding of each exercise.  Patient continues to require verbal and tactile cues for throughout therapy session to maintain position and prevent compensation.   Patient continues to be limited in functional and leisurely pursuits. Pain limits patient's participation in activities of daily living.  Patient is not able to carryout necessary vocational tasks.   Patient's spiritual, cultural and educational needs considered and patient agreeable to plan of care and goals.  Patient is making good progress towards established goals.  Patient tolerated exercises within pain tolerance.   Reviewed Home Exercise Program with patient., see EPIC under "patient instructions" for provided exercises, activity modifications and limitations, modalities for home pain management.  Patient. demo understanding of above.    Patient demo fatigue during composite  with Theraputty but able to complete exercise.       New/Revised Goals: Continue Plan Of Care   Goals:  1)   Patient to be Independent with Home exercise program and modalities for pain management by 1 week. (MET)  2)   Patient will report   5/10 pain on average with activity to assist with exercises by 6-8 weeks. ( In Progress)   3)   Patient will increase range of Motion by 3-5 degrees to increase functional use for activities of daily living by 6-8 weeks.  ( In Progress)   4)   Patient will increase Manual Muscle Testing by a grade " to assist with lifting light groceries by 6-8 weeks. (In Progress)   5)    Patient will increase  strength 3-5 pounds to open containers by 6-8 weeks. (In Progress)  6)   Patient will increase pinch by 1-3 pounds for buttoning by 6-8 weeks.  (In Progress)   7)   Patient will decrease edema by 0.1-0.3 millimeters  to increase joint mobility /flexibility by 6-8 weeks.  ( In Progress)     Plan      Continue 2x week during the 90 day certification period   03/28/2023   to 06/28/2023   with established Plan Of Care in pursuit of Occupational Therapy goals.      Rosetta Gonzales, OT

## 2023-04-03 ENCOUNTER — CLINICAL SUPPORT (OUTPATIENT)
Dept: REHABILITATION | Facility: HOSPITAL | Age: 63
End: 2023-04-03
Attending: ORTHOPAEDIC SURGERY
Payer: MEDICARE

## 2023-04-03 DIAGNOSIS — M25.521 RIGHT ELBOW PAIN: Primary | ICD-10-CM

## 2023-04-03 PROCEDURE — 97110 THERAPEUTIC EXERCISES: CPT | Mod: PN

## 2023-04-03 PROCEDURE — 97140 MANUAL THERAPY 1/> REGIONS: CPT | Mod: PN

## 2023-04-08 ENCOUNTER — DOCUMENTATION ONLY (OUTPATIENT)
Dept: ENDOCRINOLOGY | Facility: CLINIC | Age: 63
End: 2023-04-08
Payer: MEDICARE

## 2023-04-08 DIAGNOSIS — E55.9 HYPOVITAMINOSIS D: Primary | ICD-10-CM

## 2023-04-08 DIAGNOSIS — M85.89 OSTEOPENIA OF MULTIPLE SITES: ICD-10-CM

## 2023-04-08 DIAGNOSIS — G47.33 OBSTRUCTIVE SLEEP APNEA SYNDROME: ICD-10-CM

## 2023-04-08 DIAGNOSIS — E78.5 DYSLIPIDEMIA: ICD-10-CM

## 2023-04-08 DIAGNOSIS — R94.6 THYROID FUNCTION TEST ABNORMAL: ICD-10-CM

## 2023-04-08 DIAGNOSIS — R73.9 HYPERGLYCEMIA: ICD-10-CM

## 2023-04-08 DIAGNOSIS — I10 ESSENTIAL HYPERTENSION: ICD-10-CM

## 2023-04-08 DIAGNOSIS — E88.810 DYSMETABOLIC SYNDROME: ICD-10-CM

## 2023-04-08 DIAGNOSIS — R73.03 PREDIABETES: ICD-10-CM

## 2023-04-08 PROBLEM — G43.711 CHRONIC MIGRAINE WITHOUT AURA, WITH INTRACTABLE MIGRAINE, SO STATED, WITH STATUS MIGRAINOSUS: Status: ACTIVE | Noted: 2023-04-08

## 2023-04-08 PROBLEM — Z87.442 HISTORY OF NEPHROLITHIASIS: Status: ACTIVE | Noted: 2023-04-08

## 2023-04-08 NOTE — PROGRESS NOTES
Subjective:      Patient ID: Jonna Pearl is a 63 y.o. female.    Chief Complaint:      Patient is a 63 yr old postmenopausal lady seen in [facility] today on account of chronic weight management and chronic fatigue. This is a video televisit and thus the examination aspects of the visit are limited.      History of Present Illness    The patient, Ms Pearl is a 62 yr old postmenopausal lady seen in [facility] today on account of morbid obesity for chronic weight management and chronic fatigue.  This is a video televisit.  The patient location is: Home  The chief complaint leading to consultation is: Dysmetabolic syndrome and Obesity    Visit type: Synchronous video and audio televisit    Face to Face time with patient: ~ ?? minutes of total time spent on the encounter, which includes face to face time and non-face to face time preparing to see the patient (eg, review of tests), Obtaining and/or reviewing separately obtained history, Documenting clinical information in the electronic or other health record, Independently interpreting results (not separately reported) and communicating results to the patient/family/caregiver, or Care coordination (not separately reported).         Each patient to whom he or she provides medical services by telemedicine is:  (1) informed of the relationship between the physician and patient and the respective role of any other health care provider with respect to management of the patient; and (2) notified that he or she may decline to receive medical services by telemedicine and may withdraw from such care at any time.    Notes:     Her background comorbidities are detailed below;    1. Dysmetabolic syndrome     2. History of colonic polyps     3. Morbid obesity     4. Essential hypertension     5. Postmenopausal     6. Weight gain     7. Chronic fatigue     8. Hypovitaminosis D       Her baseline Hoven score is 9 and she does have known OSAS for which she uses a CPAP mask.  Compared to 3  "yrs ago she has gained ~ 55 lb!!  Patient does have exertional dyspnea and major right knee arthralgia.  Patient is on long standing neurontin which she takes 3-4 weekly but in on daily amounts of naprosyn.    Patient is s/p JOAO+ BSO on account of severe menorrhagia ?? When. She was never on long standing HRT.  She does not have major headaches.  She has been told she was prediabetes.  Patients mother also has diabetes.  There is no known family history of thyroid disease though.  Patient does not smoke. Stopped smoking ~ 10 yrs ago. Prior this she smoked 1PPD. She smoked for 25-30 yrs.  Patient does not drink ETOH.  Grav 1 Para 1+0 (1 alive).  Patient is presently on disability and used to be a .  Father;  5  6" Max weight; 150lbs  Mother; 5 2" Max weight; 330lbs. She has 5 siblings (2 sisters alive, one passed way and 2 brothers).  None of her siblings have major weight problems.  She used to use Kasia José Miguel when she was much younger but otherwise has taken hydrocut and many other OTC weight supplement agents.  She has not been on prescriptions for weight management though.    Patient typically eats 2 meals daily and often skips break fast. Preferred snacks; pop corn.  Patients preferred drinks are diet coke. She doesn't drink fruit juices nor regular soda consistently.    I have reviewed the body composition on the patient obtained today 06/20/22.  The full details are scanned into the media tab section of the patients chart.  The % body fat is 53% (24-36%) indicate marked obesity.   Her estimated BMR based on Martinez [place] equation is 1896 kcal/dy and thus estimated AMARJIT based on her sedentary activity levels is ~ 2370kcal/day.  Suggested total daily dietary caloric intake goal to enable establishing sustained caloric deficit to enable steady weight loss till cheryl; ~ 2000kcl/day.    I have reviewed the results of the patients measured RMR using the Jumia device and the full details are in the " media tab section of the chart.  The measured RMR is 1330kca/day. In comparison to the estimated BMR using the Martinez [place] equation (which was 1896kcal /day) this is a 566 kcal/day deficit!!  Consequently thus patient true estimated AMARJIT is ~ 1670kcal/day and thus her daily dietary intake budget to enable her achieve negative caloric balanace to enable her lose weight would be ~ 1300-1500kcal/day.  The result also indicates the importance of her significantly increasing her daily physical activity levels to increase her daily energy output. To inform patient of findings and suggested interventions.            Review of Systems   Constitutional:  Positive for unexpected weight change. Negative for activity change, appetite change, diaphoresis and fatigue.   HENT:  Negative for facial swelling, hearing loss, nosebleeds, sinus pressure, sore throat, trouble swallowing and voice change.    Eyes:  Negative for photophobia and visual disturbance.   Respiratory:  Negative for cough and shortness of breath.    Cardiovascular:  Negative for chest pain, palpitations and leg swelling.   Gastrointestinal:  Negative for abdominal distention, abdominal pain, constipation, diarrhea, nausea and vomiting.   Endocrine: Negative for polydipsia, polyphagia and polyuria.   Genitourinary:  Negative for difficulty urinating, dysuria, flank pain, frequency, hematuria and urgency.   Musculoskeletal:  Positive for arthralgias (mainly of right knee). Negative for back pain, gait problem, joint swelling, myalgias, neck pain and neck stiffness.   Skin:  Negative for color change, pallor and rash.   Neurological:  Negative for dizziness, tremors, seizures, syncope, weakness, light-headedness, numbness and headaches.   Hematological:  Bruises/bleeds easily.   Psychiatric/Behavioral:  Positive for sleep disturbance (OSAS on treatment with CPAP). Negative for agitation, confusion, dysphoric mood and hallucinations. The patient is not  nervous/anxious.      Objective: Nil vitals; video televisit. Her latest recorded weight from 03/23 was 275lbs representing a 16lb weight deficit from ~ 10 mths ago.         Physical Exam  Vitals and nursing note reviewed.   Constitutional:       General: She is not in acute distress.     Appearance: She is well-developed. She is obese. She is not ill-appearing or diaphoretic.      Comments: Pleasant morbidly obese lady. Not pale, anicteric and afebrile. Well hydrated. Not in any acute nor chronic distress and not chronically ill looking Acyanotic. Has no moon facies, no malar flushing and no facial excess acne.  Has multiple neck and upper chest skin tags.   HENT:      Head: Normocephalic and atraumatic. Not macrocephalic. No right periorbital erythema or left periorbital erythema. Hair is normal.      Comments: Nil nuchal AN. Nil nuchal nor supraclavicular fat pads. Mallampati grade 3 fauces.     Nose: Nose normal.      Mouth/Throat:      Mouth: Mucous membranes are not pale and not dry.      Pharynx: No oropharyngeal exudate.   Eyes:      General: Lids are normal. No scleral icterus.        Right eye: No discharge.         Left eye: No discharge.      Extraocular Movements: Extraocular movements intact.      Conjunctiva/sclera: Conjunctivae normal.      Pupils: Pupils are equal, round, and reactive to light.   Neck:      Thyroid: No thyroid mass or thyromegaly.      Vascular: Normal carotid pulses. No carotid bruit or JVD.      Trachea: Trachea and phonation normal. No tracheal deviation.      Comments: Thick neck.  Cardiovascular:      Rate and Rhythm: Normal rate and regular rhythm.      Chest Wall: PMI is not displaced.      Pulses: Normal pulses.      Heart sounds: Normal heart sounds, S1 normal and S2 normal. No murmur heard.    No gallop.   Pulmonary:      Effort: Pulmonary effort is normal. No respiratory distress.      Breath sounds: Normal breath sounds. No stridor. No wheezing, rhonchi or rales.    Abdominal:      General: Bowel sounds are normal. There is no distension.      Palpations: Abdomen is soft. There is no hepatomegaly, splenomegaly or mass.      Tenderness: There is no abdominal tenderness. There is no guarding or rebound.      Hernia: No hernia is present. There is no hernia in the ventral area.      Comments: Obese anterior abdominal wall. No striae   Musculoskeletal:         General: No swelling or tenderness.      Right shoulder: No deformity, bony tenderness or crepitus. Normal range of motion. Normal strength. Normal pulse.      Cervical back: Normal range of motion and neck supple. No rigidity or tenderness.      Comments: No pedal edema nor calf swelling or tenderness.   Lymphadenopathy:      Cervical: No cervical adenopathy.   Skin:     General: Skin is warm and dry.      Coloration: Skin is not jaundiced or pale.      Findings: No bruising, ecchymosis, erythema, petechiae or rash.      Nails: There is no clubbing.      Comments: Has multiple old and fresh ecchymoses; more on upper than lower limbs.   Neurological:      General: No focal deficit present.      Mental Status: She is alert and oriented to person, place, and time.      Cranial Nerves: No cranial nerve deficit.      Sensory: Sensation is intact. No sensory deficit.      Motor: Motor function is intact. No tremor, atrophy or abnormal muscle tone.      Coordination: Coordination is intact. Coordination normal.      Gait: Gait is intact. Gait normal.      Deep Tendon Reflexes: Reflexes are normal and symmetric. Reflexes normal.   Psychiatric:         Attention and Perception: Attention normal.         Mood and Affect: Mood normal.         Speech: Speech normal.         Behavior: Behavior normal. Behavior is cooperative.         Thought Content: Thought content normal.         Judgment: Judgment normal.       Lab Review:        Latest Reference Range & Units 06/20/22 10:30 06/20/22 10:40 10/10/22 13:20 10/10/22 14:15 10/10/22  15:09 10/10/22 17:28 01/09/23 10:12   WBC 3.90 - 12.70 K/uL  10.69  12.63   10.87   RBC 4.00 - 5.40 M/uL  4.03  3.90 (L)   4.18   Hemoglobin 12.0 - 16.0 g/dL  12.0  11.7 (L)   12.2   Hematocrit 37.0 - 48.5 %  39.7  35.9 (L)   38.8   MCV 82 - 98 fL  99 (H)  92   93   MCH 27.0 - 31.0 pg  29.8  30.0   29.2   MCHC 32.0 - 36.0 g/dL  30.2 (L)  32.6   31.4 (L)   RDW 11.5 - 14.5 %  14.7 (H)  14.6 (H)   13.9   Platelets 150 - 450 K/uL  378  350   433   MPV 9.2 - 12.9 fL  11.1  10.3   9.6   Gran % 38.0 - 73.0 %  62.4  65.1   54.9   Lymph % 18.0 - 48.0 %  27.5  24.5   33.7   Mono % 4.0 - 15.0 %  7.6  8.5   9.4   Eosinophil % 0.0 - 8.0 %  1.9  1.4   1.2   Basophil % 0.0 - 1.9 %  0.3  0.2   0.4   Immature Granulocytes 0.0 - 0.5 %  0.3  0.3   0.4   Gran # (ANC) 1.8 - 7.7 K/uL  6.7  8.2 (H)   6.0   Lymph # 1.0 - 4.8 K/uL  2.9  3.1   3.7   Mono # 0.3 - 1.0 K/uL  0.8  1.1 (H)   1.0   Eos # 0.0 - 0.5 K/uL  0.2  0.2   0.1   Baso # 0.00 - 0.20 K/uL  0.03  0.02   0.04   Immature Grans (Abs) 0.00 - 0.04 K/uL  0.03  0.04   0.04   nRBC 0 /100 WBC  0  0   0   Differential Method   Automated  Automated   Automated   Sodium 136 - 145 mmol/L  140  139   136   Potassium 3.5 - 5.1 mmol/L  4.3  4.0   4.0   Chloride 95 - 110 mmol/L  106  105   104   CO2 23 - 29 mmol/L  25  26   23   Anion Gap 8 - 16 mmol/L  9  8   9   BUN 8 - 23 mg/dL  15  19   21   Creatinine 0.5 - 1.4 mg/dL  0.9  0.9   1.1   eGFR >60 mL/min/1.73 m^2    >60   56.5 !   eGFR if non African American >60 mL/min/1.73 m^2  >60.0        eGFR if African American >60 mL/min/1.73 m^2  >60.0        Glucose 70 - 110 mg/dL  103  88   101   Calcium 8.7 - 10.5 mg/dL  9.4  10.1   8.9   Ionized Calcium 1.06 - 1.42 mmol/L  1.24        Alkaline Phosphatase 55 - 135 U/L  67  72      PROTEIN TOTAL 6.0 - 8.4 g/dL  6.6  6.3      Albumin 3.5 - 5.2 g/dL  3.5  3.1 (L)      Uric Acid 2.4 - 5.7 mg/dL  7.0 (H)        BILIRUBIN TOTAL 0.1 - 1.0 mg/dL  0.4  0.3      AST 10 - 40 U/L  18  16      ALT 10 - 44 U/L   17  17      Amylase 20 - 110 U/L  69        Lipase 4 - 60 U/L  19  30      Cholesterol 120 - 199 mg/dL  154        HDL 40 - 75 mg/dL  44        HDL/Cholesterol Ratio 20.0 - 50.0 %  28.6        LDL Cholesterol External 63.0 - 159.0 mg/dL  91.4        Non-HDL Cholesterol mg/dL  110        Total Cholesterol/HDL Ratio 2.0 - 5.0   3.5        Triglycerides 30 - 150 mg/dL  93        Troponin I 0.000 - 0.026 ng/mL   0.039 (H)   <0.006    Vit D, 25-Hydroxy 30 - 96 ng/mL  40        ALDOSTERONE ng/dL  11.1        Cortisol ug/dL  7.40        Hemoglobin A1C External 4.0 - 5.6 %  5.6        Estimated Avg Glucose 68 - 131 mg/dL  114        Insulin <25.0 uU/mL  13.3        Insulin Collection Interval   random        ACTH 0 - 46 pg/mL  14        TSH 0.400 - 4.000 uIU/mL  2.468        T3, Total 60 - 180 ng/dL  78        Free T4 0.71 - 1.51 ng/dL  1.04        Thyroglobulin Interpretation   SEE BELOW        Thyroglobulin Antibody Screen <1.8 IU/mL  <1.8        Thyroglobulin, Tumor Marker ng/mL  3.7 (H)        PTH 9.0 - 77.0 pg/mL  95.1 (H)        Calcitonin <=7.6 pg/mL  <5.0        DHEA 0.630 - 4.700 ng/mL  1.084        DHEA-SO4 29.7 - 182.2 ug/dL  143.2        Specimen UA  Urine, Clean Catch    Urine, Clean Catch     Color, UA Yellow, Straw, Batsheva  Yellow    Colorless !     Appearance, UA Clear  Hazy !    Clear     Specific Gravity, UA 1.005 - 1.030  1.015    <1.005 !     pH, UA 5.0 - 8.0  6.0    7.0     Protein, UA Negative  Negative    Negative     Glucose, UA Negative  Negative    Negative     Ketones, UA Negative  Negative    Negative     Occult Blood UA Negative  Negative    Negative     NITRITE UA Negative  Positive !    Negative     UROBILINOGEN UA <2.0 EU/dL     Negative     Bilirubin (UA) Negative  Negative    Negative     Leukocytes, UA Negative  3+ !    Negative     RBC, UA 0 - 4 /hpf 6 (H)         WBC, UA 0 - 5 /hpf 75 (H)         Bacteria, UA None-Occ /hpf Many !         Microscopic Comment  SEE COMMENT          Creatinine, Urine 15.0 - 325.0 mg/dL 96.0         Urine Microalbumin ug/mL 12.0         MICROALB/CREAT RATIO 0.0 - 30.0 ug/mg 12.5         (L): Data is abnormally low  (H): Data is abnormally high  !: Data is abnormal    Assessment:     1. Dysmetabolic syndrome        2. Morbid obesity        3. Weight gain        4. History of colonic polyps        5. Diverticulosis large intestine w/o perforation or abscess w/o bleeding        6. Asymptomatic gallstones        7. Osteopenia of multiple sites        8. Obstructive sleep apnea syndrome        9. Hypovitaminosis D        10. Stage 3 chronic kidney disease, unspecified whether stage 3a or 3b CKD        11. Hypertension, essential        12. Postmenopausal        13. History of nephrolithiasis        14. Chronic migraine without aura, with intractable migraine, so stated, with status migrainosus             Regarding morbid obesity with progressive weight gain; Discussed with patient strategies for long term weight management. Provided documents to with medical literature for available resources for chronic weight management. To obtain basic screening labs to exclude secondary endocrine contributors to her progressive weight gain. To obtain baseline body composition and RMR measurement to guide lifestyle interventions.  To have formal dietary consultation to assist with developing a sustainable calorie deficit plan. Patient will likely need addition of pharmaceuticals and possibly also elective bariatric surgery as  Other options to be deployed as needed.  Regarding dysmetabolic syndrome; to obtain screening labs as detailed above.  Regarding reported prediabetes; to check current HBA1c and depending on results may need to consider addition of metformin to current management plan.  Regarding progressive weight gain; as above.  Regarding chronic fatigue; to obtain basic screening lab as detailed above including thyroid and adrenal functional screening.  Regarding  hypovitaminosis D; to check 25 OH Vit D and replete as needed based on results.  Regarding OSAS; to continue CPAP therapy as before.  Regarding essential hypertension; to continue serial ambulatory tracking of blood pressure and pulse rate trends. For now no change to her current antihypertensive regimen.  Regarding lipid profile; no established history of dyslipidemia; to check current lipid panel and decide based on results if antilipidemic therapy is indicated. May benefit from low dose asa (81mg QD) for primary ASCVD prophylaxis.    Plan:       The patient cancelled her scheduled appt right before the visit was to commence. She will need to call back to reschedule but may get the ordered labs (which are non fasting ) prior her rescheduled visit date.

## 2023-04-12 NOTE — PROGRESS NOTES
"                                Occupational Therapy Daily Treatment Note       Date: 4/19/2023  Name: Jonna Pearl  Clinic Number: [id number]    Therapy Diagnosis: S52.124A (ICD-10-CM) - Closed nondisplaced fracture of head of right radius  Encounter Diagnosis   Name Primary?    Right elbow pain Yes     Physician: Mirza Jeffries MD    Physician Orders: S52.124A (ICD-10-CM) - Closed nondisplaced fracture of head of right radius; evaluate and treat;Patient can progress with strength as tolerated with therapy   Medical Diagnosis: S52.124A (ICD-10-CM) - Closed nondisplaced fracture of head of right radius; (Right) elbow pain  Surgical Procedure and Date: Not Applicable     Insurance Authorization Period Expiration: 03/28/2023-12/31/2023   Plan of Care Certification Period: 03/28/2023-06/28/2023  Date of Return to MD: as needed    Evaluation FOTO: 03/28/2023 = 43%    Visit # / Visits authorized: 2 / 20   Time In: 1:00  Time Out:  1:40   Total Billable Time: 38  minutes    Precautions:  Standard;  FALL RISK !!! Latex allergy      Post Injury:  6 weeks, 5 days      Subjective     Patient reports: "I think the pain in my arm is a little better.  I am having a dull ache in my forearm.  I have trouble reaching behind my head to do my hair, turning the lamp switch."    Pain: 3 /10   Location of pain: (Right) elbow     Objective    Patient seen by Occupational Therapy this session. Tx consisted of:        Manual therapy techniques to increase joint mobilization /// soft tissue mobilization   x   8   minutes:     -Manual Therapy techniques to  (Right) elbow and wrist  including  LIGHT stretching, and Passive Range of Motion to increase joint mobility, range of motion  and for pain management  x  4 minutes     -Soft Tissue Mobilization to (Right) hand into wrist and forearm from distal to proximal to decrease edema and increase range of motion and functional abilities  x  2  minutes    -Soft Tissue Massage to distal biceps " "tendon and muscle belly to relax muscle and increase range of motion and functional abilities  x  2  minutes      Therapeutic  Exercises to improve functional performance while increasing strength, endurance, range of motion,  and flexibility  x   30  minutes:      - Low load prolonged stretch with Moist Heat (on towel roll) in sitting for elbow Extension x 5 minutes ( rest breaks as needed)     - 1# bicep curls for elbow Extension / flexion ( Supination  / neutral / Pronation) x 10 repetitions each  - 0# pulleys for elbow Extension  x 3 minutes   - Wrist wheel for Supination / Pronation stretch x 10 repetitions   - Wrist roller coaster for Active Range of Motion  of wrist in all planes x 5 repetitions -NP   - Weighted ball on tabletop for Dorsiflexion /Volar Flexion  stretches x 20 repetitions -NP  - LARGE dowel board for elbow Extension /Flexion and Supination /Pronation x 2 repetitions   - HAMMER for Supination /Pronation  x 10 repetitions   - 25# Progressive Hand Gripper ( black spring)  for composite  x 20 repetitions   - RED Theraputty for rolling x 20 repetitions   - RED Theraputty for  PVC for "cookie cutting" and medicine top x 10 repetitions   - YELLOW digiflex for isolated x 10 repetitions;  RED digiflex for composite x 20 repetitions  - Wooden pegboard with GREEN clothespins with 3PT pinch x 2 repetitions    -NP = Not Performed     Initiate in future therapy sessions:       - ### Theraputty for pulling x 10 Repetitions    - YELLOW digiweb for composite digital Extension and  intrinsics x 20 repetitions  -  ### Dorsiflexion/Volar flexion (Supination /Pronation ), Radial Deviation / Ulnar Deviation x 10 repetitions   - ### Flexbar for Supination /Pronation  and Dorsiflexion /Volar Flexion x 20 repetitions    - ### Wrist exerstick in standing for Dorsiflexion / Volar Flexion  x 3 repetitions       - 1# UBE in clockwise motion x 6 minutes to increase endurance and range of motion   - Biceps stretch on " "wall with Elbow Extension x 15 second holds x 5 repetitions  -  ### Theraband for scapular retraction x 20 repetitions  - Isometrics with   ####   Theraband for External Rotation and Internal Rotation x 10 repetitions  -  ### Theraband for shoulder Flexion, Abduction, Extension, External Rotation and Internal Rotation x ###  repetitions       Assessment     Patient will continue to benefit from skilled Occupational Therapy intervention to increase functional abilities, range of motion, and strength and pain control.  Patient. demonstrated proper understanding of each exercise.  Patient continues to require verbal and tactile cues for throughout therapy session to maintain position and prevent compensation.   Patient continues to be limited in functional and leisurely pursuits. Pain limits patient's participation in activities of daily living.  Patient is not able to carryout necessary vocational tasks.   Patient's spiritual, cultural and educational needs considered and patient agreeable to plan of care and goals.  Patient is making good progress towards established goals.  Patient tolerated exercises within pain tolerance.   Reviewed Home Exercise Program with patient., see EPIC under "patient instructions" for provided exercises, activity modifications and limitations, modalities for home pain management.  Patient. demo understanding of above.    Patient tolerated addition of hammer for Supination / Pronation stretch with tolerable stretching pain reported.  Patient tolerated addition of Progressive Hand Gripper for composite ; digiflex for isolated and composite digital flexion; increase in resistance during bicep curls for elbow Extension / flexion  with no reported pain.        New/Revised Goals: Continue Plan Of Care   Goals:  1)   Patient to be Independent with Home exercise program and modalities for pain management by 1 week. (MET)  2)   Patient will report   5/10 pain on average with activity to assist " with exercises by 6-8 weeks. ( In Progress)   3)   Patient will increase range of Motion by 3-5 degrees to increase functional use for activities of daily living by 6-8 weeks.  ( In Progress)   4)   Patient will increase Manual Muscle Testing by a grade to assist with lifting light groceries by 6-8 weeks. (In Progress)   5)    Patient will increase  strength 3-5 pounds to open containers by 6-8 weeks. (In Progress)  6)   Patient will increase pinch by 1-3 pounds for buttoning by 6-8 weeks.  (In Progress)   7)   Patient will decrease edema by 0.1-0.3 millimeters  to increase joint mobility /flexibility by 6-8 weeks.  ( In Progress)     Plan      Continue 2x week during the 90 day certification period   03/28/2023   to 06/28/2023   with established Plan Of Care in pursuit of Occupational Therapy goals.      Rosetta Gonzales, OT

## 2023-04-13 ENCOUNTER — CLINICAL SUPPORT (OUTPATIENT)
Dept: REHABILITATION | Facility: HOSPITAL | Age: 63
End: 2023-04-13
Attending: ORTHOPAEDIC SURGERY
Payer: MEDICARE

## 2023-04-13 DIAGNOSIS — R26.9 GAIT ABNORMALITY: Primary | ICD-10-CM

## 2023-04-13 PROCEDURE — 97110 THERAPEUTIC EXERCISES: CPT | Mod: PN

## 2023-04-13 NOTE — PROGRESS NOTES
[facility]   Physical Therapy Treatment Note     Name: Jonna Pearl  Clinic Number: [id number]    Therapy Diagnosis:   Encounter Diagnosis   Name Primary?    Gait abnormality Yes       Physician: Mirza Jeffries MD    Visit Date: 4/13/2023    Physician Orders: PT Eval and Treat   Medical Diagnosis from Referral: Primary OA of right knee.  Primary OA of left knee.  Evaluation Date: 1/13/2023  Authorization Period Expiration: 12/30/23  Plan of Care Expiration: 5/13/23  Visit # / Visits authorized: 7/ 20   FOTO: 58/100     Precautions: Standard     PTA Visit #: 1/5     Time In: 1000a  Time Out: 1045a  Total Billable Time: 45 minutes    SUBJECTIVE     Pt reports: she was in therapy down the street but is doing hand therapy here so requested a transfer. States her knee pain started after a fall. Had x-rays but was only told she has bad OA. States she has pain with walking and prolonged standing. States her therapy was working well at the other clinic she was at. States she uses a cane at times when she has to walk long distances.     She was not compliant with home exercise program.  Response to previous treatment: no complaints reported   Functional change: ongoing    Pain: 4/10  Location: both knees      OBJECTIVE     ROM: -2-125 (painful)  SLB: 3s on R; 2s on L   5xSTS: 13s without use of UE's    Treatment     Jonna received the treatments listed below:      therapeutic exercises to develop strength and ROM for 45 minutes including:    Assessment as above   Heel prop 5min x 2#   SLR 2# 3 x 5   SL Hip Abd 3 x 8   Seated LAQ machine SL 10# 4 x 8   Seated Hip Abd machine 50# 4 x 8   NuStep for aerobic conditioning x 8 min Level 3    Patient Education and Home Exercises     Home Exercises Provided and Patient Education Provided     Education provided:   - home exercises   - POC     Written Home Exercises Provided: yes. Exercises were reviewed and Jonna was able to demonstrate them  prior to the end of the session.  Jonna demonstrated good  understanding of the education provided. See EMR under Patient Instructions for exercises provided during therapy sessions    ASSESSMENT     Ms. Coyle is a new transfer patient to our clinic and presents with impairments and functional deficits after having a fall months ago. She displays decreased knee ROM and hip/knee weakness. She remains appropriate for skilled PT to address these deficits. She is agreeable to continue therapy for 1 month at 2x/week. I have adjusted her goals for this next month. HEP provided at the end of session.     Jonna Is progressing well towards her goals.   Pt prognosis is Good.     Pt will continue to benefit from skilled outpatient physical therapy to address the deficits listed in the problem list box on initial evaluation, provide pt/family education and to maximize pt's level of independence in the home and community environment.     Pt's spiritual, cultural and educational needs considered and pt agreeable to plan of care and goals.     Anticipated barriers to physical therapy: none     Goals:   Pt will be I with HEP to supplement PT visits and manage symptoms outside of therapy  Pt will be able to balance for 10s on RLE   Pt will perform 5xSTS in 10 seconds     PLAN     Continue per Plan of Care     Nathan Schroeder, PT

## 2023-04-19 ENCOUNTER — CLINICAL SUPPORT (OUTPATIENT)
Dept: REHABILITATION | Facility: HOSPITAL | Age: 63
End: 2023-04-19
Payer: MEDICARE

## 2023-04-19 ENCOUNTER — CLINICAL SUPPORT (OUTPATIENT)
Dept: REHABILITATION | Facility: HOSPITAL | Age: 63
End: 2023-04-19
Attending: ORTHOPAEDIC SURGERY
Payer: MEDICARE

## 2023-04-19 DIAGNOSIS — M25.521 RIGHT ELBOW PAIN: Primary | ICD-10-CM

## 2023-04-19 DIAGNOSIS — R26.9 GAIT ABNORMALITY: Primary | ICD-10-CM

## 2023-04-19 PROCEDURE — 97140 MANUAL THERAPY 1/> REGIONS: CPT | Mod: PN

## 2023-04-19 PROCEDURE — 97110 THERAPEUTIC EXERCISES: CPT | Mod: PN

## 2023-04-19 NOTE — PROGRESS NOTES
"                                Occupational Therapy Daily Treatment Note       Date: 4/26/2023  Name: Jonna Pearl  Clinic Number: [id number]    Therapy Diagnosis: S52.124A (ICD-10-CM) - Closed nondisplaced fracture of head of right radius  Encounter Diagnosis   Name Primary?    Right elbow pain Yes     Physician: Mirza Jeffries MD    Physician Orders: S52.124A (ICD-10-CM) - Closed nondisplaced fracture of head of right radius; evaluate and treat;Patient can progress with strength as tolerated with therapy   Medical Diagnosis: S52.124A (ICD-10-CM) - Closed nondisplaced fracture of head of right radius; (Right) elbow pain  Surgical Procedure and Date: Not Applicable     Insurance Authorization Period Expiration: 03/28/2023-12/31/2023   Plan of Care Certification Period: 03/28/2023-06/28/2023  Date of Return to MD: as needed    Evaluation FOTO: 03/28/2023 = 43%    Visit # / Visits authorized: 3 / 20   Time In: 3:55  Time Out:  4:35   Total Billable Time: 38  minutes    Precautions:  Standard;  FALL RISK !!! Latex allergy      Post Injury:  7 weeks, 5 days      Subjective     Patient reports: "I am still having difficulty reaching back.  But I think its getting better. I am having more pain in my shoulder today."      Pain: 4 /10   Location of pain: (Right) elbow     Objective    Patient seen by Occupational Therapy this session. Tx consisted of:        Manual therapy techniques to increase joint mobilization /// soft tissue mobilization   x   8   minutes:     -Manual Therapy techniques to  (Right) elbow and wrist  including  LIGHT stretching, and Passive Range of Motion to increase joint mobility, range of motion  and for pain management  x  4 minutes   -Soft Tissue Mobilization to (Right) hand into wrist and forearm from distal to proximal to decrease edema and increase range of motion and functional abilities  x  2  minutes  -Soft Tissue Massage to distal biceps tendon and muscle belly to relax muscle and " "increase range of motion and functional abilities  x  2  minutes      Therapeutic  Exercises to improve functional performance while increasing strength, endurance, range of motion,  and flexibility  x   22  minutes:      - Low load prolonged stretch with Moist Heat (on towel roll) in sitting for elbow Extension x 5 minutes ( rest breaks as needed)     - 1# bicep curls for elbow Extension / flexion ( Supination  / neutral / Pronation) x 10 repetitions each  - 0# pulleys for elbow Extension  x 3 minutes   - Wrist wheel for Supination / Pronation stretch x 10 repetitions   - Wrist roller coaster for Active Range of Motion  of wrist in all planes x 5 repetitions -NP   - Weighted ball on tabletop for Dorsiflexion /Volar Flexion  stretches x 20 repetitions -NP  - HAMMER for Supination /Pronation  x 10 repetitions   - 25# Progressive Hand Gripper ( black spring)  for composite  x 20 repetitions   - RED Theraputty for rolling x 20 repetitions -NP  - RED Theraputty for  PVC for "cookie cutting" and medicine top x 10 repetitions   - YELLOW digiflex for isolated x 10 repetitions;  GREEN digiflex for composite x 20 repetitions      Therapeutic Activities to improve functional performance with ADLs and IADLs  x   8  minutes:    - 1# UBE in clockwise motion x 4 minutes to increase endurance and range of motion  - YELLOW Flexbar for Supination /Pronation  and Dorsiflexion /Volar Flexion x 10 repetitions   - Wooden pegboard with GREEN clothespins with 3PT pinch x 2 repetitions      -NP = Not Performed     Initiate in future therapy sessions:    - LARGE dowel board for elbow Extension /Flexion and Supination /Pronation x 2 repetitions      - ### Theraputty for pulling x 10 Repetitions    - YELLOW digiweb for composite digital Extension and  intrinsics x 20 repetitions  -  ### Dorsiflexion/Volar flexion (Supination /Pronation ), Radial Deviation / Ulnar Deviation x 10 repetitions   - ### Wrist exerstick in standing for " "Dorsiflexion / Volar Flexion  x 3 repetitions        - Biceps stretch on wall with Elbow Extension x 15 second holds x 5 repetitions  -  ### Theraband for scapular retraction x 20 repetitions  - Isometrics with   ####   Theraband for External Rotation and Internal Rotation x 10 repetitions  -  ### Theraband for shoulder Flexion, Abduction, Extension, External Rotation and Internal Rotation x ###  repetitions       Assessment     Patient will continue to benefit from skilled Occupational Therapy intervention to increase functional abilities, range of motion, and strength and pain control.  Patient. demonstrated proper understanding of each exercise.  Patient continues to require verbal and tactile cues for throughout therapy session to maintain position and prevent compensation.   Patient continues to be limited in functional and leisurely pursuits. Pain limits patient's participation in activities of daily living.  Patient is not able to carryout necessary vocational tasks.   Patient's spiritual, cultural and educational needs considered and patient agreeable to plan of care and goals.  Patient is making good progress towards established goals.  Patient tolerated exercises within pain tolerance.   Reviewed Home Exercise Program with patient., see EPIC under "patient instructions" for provided exercises, activity modifications and limitations, modalities for home pain management.  Patient. demo understanding of above.    Patient tolerated addition of ergometer with no reported pain but demo fatigue.   Patient tolerated addition of Flexbar for Supination /Pronation, Dorsal Flexion / Volar Flexion; increase in digiflex for composite digital flexion with no reported pain.        New/Revised Goals: Continue Plan Of Care   Goals:  1)   Patient to be Independent with Home exercise program and modalities for pain management by 1 week. (MET)  2)   Patient will report   5/10 pain on average with activity to assist with " exercises by 6-8 weeks. ( In Progress)   3)   Patient will increase range of Motion by 3-5 degrees to increase functional use for activities of daily living by 6-8 weeks.  ( In Progress)   4)   Patient will increase Manual Muscle Testing by a grade to assist with lifting light groceries by 6-8 weeks. (In Progress)   5)    Patient will increase  strength 3-5 pounds to open containers by 6-8 weeks. (In Progress)  6)   Patient will increase pinch by 1-3 pounds for buttoning by 6-8 weeks.  (In Progress)   7)   Patient will decrease edema by 0.1-0.3 millimeters  to increase joint mobility /flexibility by 6-8 weeks.  ( In Progress)     Plan      Continue 2x week during the 90 day certification period   03/28/2023   to 06/28/2023   with established Plan Of Care in pursuit of Occupational Therapy goals.      Rosetta Gonzales, OT

## 2023-04-19 NOTE — PROGRESS NOTES
[facility]   Physical Therapy Treatment Note     Name: Jonna Pearl  Clinic Number: [id number]    Therapy Diagnosis:   Encounter Diagnosis   Name Primary?    Gait abnormality Yes     Physician: Mirza Jeffries MD    Visit Date: 4/19/2023    Physician Orders: PT Eval and Treat   Medical Diagnosis from Referral: Primary OA of right knee.  Primary OA of left knee.  Evaluation Date: 1/13/2023  Authorization Period Expiration: 12/30/23  Plan of Care Expiration: 5/13/23  Visit # / Visits authorized: 8/ 20   FOTO: 58/100     Precautions: Standard     PTA Visit #: 1/5     Time In: 1100a  Time Out: 1154a  Total Billable Time: 54 minutes    SUBJECTIVE     Pt reports: she is doing well. Has been performing her exercises.     She was not compliant with home exercise program.  Response to previous treatment: no complaints reported   Functional change: ongoing    Pain: 4/10  Location: both knees      OBJECTIVE     ROM: -2-125 (painful)  SLB: 3s on R; 2s on L   5xSTS: 13s without use of UE's    Treatment     Jonna received the treatments listed below:      PT Tech assisted with 20 min of the session    therapeutic exercises to develop strength and ROM for 45 minutes including:    Assessment as above   Heel prop 5min x 2#   SLR 2# 3 x 5   SL Hip Abd 3 x 8   Standing Hip Ext 3# 3 x 10 B   DL Shuttle 75# 3 x 10   SL Shuttle 50# 3 x 10   NuStep for aerobic conditioning x 8 min Level 3    Patient Education and Home Exercises     Home Exercises Provided and Patient Education Provided     Education provided:   - home exercises   - POC     Written Home Exercises Provided: yes. Exercises were reviewed and Jonna was able to demonstrate them prior to the end of the session.  Jonna demonstrated good  understanding of the education provided. See EMR under Patient Instructions for exercises provided during therapy sessions    ASSESSMENT     Ms. Coyle tolerated tx well. She reported fatigue by the end of  the session. Pt educated to continue to work on HEP and to incorporate exercise into her ADL's to improve her endurance and tolerance to physical activity.     Jonna Is progressing well towards her goals.   Pt prognosis is Good.     Pt will continue to benefit from skilled outpatient physical therapy to address the deficits listed in the problem list box on initial evaluation, provide pt/family education and to maximize pt's level of independence in the home and community environment.     Pt's spiritual, cultural and educational needs considered and pt agreeable to plan of care and goals.     Anticipated barriers to physical therapy: none     Goals:   Pt will be I with HEP to supplement PT visits and manage symptoms outside of therapy  Pt will be able to balance for 10s on RLE   Pt will perform 5xSTS in 10 seconds     PLAN     Continue per Plan of Care     Nathan Schroeder, PT

## 2023-04-26 ENCOUNTER — CLINICAL SUPPORT (OUTPATIENT)
Dept: REHABILITATION | Facility: HOSPITAL | Age: 63
End: 2023-04-26
Payer: MEDICARE

## 2023-04-26 ENCOUNTER — CLINICAL SUPPORT (OUTPATIENT)
Dept: REHABILITATION | Facility: HOSPITAL | Age: 63
End: 2023-04-26
Attending: ORTHOPAEDIC SURGERY
Payer: MEDICARE

## 2023-04-26 DIAGNOSIS — R26.9 GAIT ABNORMALITY: Primary | ICD-10-CM

## 2023-04-26 DIAGNOSIS — M25.521 RIGHT ELBOW PAIN: Primary | ICD-10-CM

## 2023-04-26 PROCEDURE — 97140 MANUAL THERAPY 1/> REGIONS: CPT | Mod: PN

## 2023-04-26 PROCEDURE — 97110 THERAPEUTIC EXERCISES: CPT | Mod: PN

## 2023-04-26 PROCEDURE — 97530 THERAPEUTIC ACTIVITIES: CPT | Mod: PN

## 2023-04-26 NOTE — PROGRESS NOTES
"                                Occupational Therapy Daily Treatment Note       Date: 5/1/2023  Name: Jonna Pearl  Clinic Number: [id number]    Therapy Diagnosis: S52.124A (ICD-10-CM) - Closed nondisplaced fracture of head of right radius  Encounter Diagnosis   Name Primary?    Right elbow pain Yes     Physician: Mirza Jeffries MD    Physician Orders: S52.124A (ICD-10-CM) - Closed nondisplaced fracture of head of right radius; evaluate and treat;Patient can progress with strength as tolerated with therapy   Medical Diagnosis: S52.124A (ICD-10-CM) - Closed nondisplaced fracture of head of right radius; (Right) elbow pain  Surgical Procedure and Date: Not Applicable     Insurance Authorization Period Expiration: 03/28/2023-12/31/2023   Plan of Care Certification Period: 03/28/2023-06/28/2023  Date of Return to MD: as needed    Evaluation FOTO: 03/28/2023 = 43%    Visit # / Visits authorized: 5 / 20   Time In: 11:00  Time Out:  11:40   Total Billable Time: 38  minutes    Precautions:  Standard;  FALL RISK !!! Latex allergy      Post Injury:  8 weeks, 3 days      Subjective     Patient reports: "I am pretty itchy on the back side of my elbow.  I am still having trouble reaching back.  I think the strength is coming back but still a little weak."       Pain: 4 /10   Location of pain: (Right) elbow     Objective    Patient seen by Occupational Therapy this session. Tx consisted of:        Manual therapy techniques to increase joint mobilization /// soft tissue mobilization   x   8   minutes:     -Manual Therapy techniques to  (Right) elbow and wrist  including  LIGHT stretching, and Passive Range of Motion to increase joint mobility, range of motion  and for pain management  x  4 minutes   -Soft Tissue Mobilization to (Right) hand into wrist and forearm from distal to proximal to decrease edema and increase range of motion and functional abilities  x  2  minutes  -Soft Tissue Massage to distal biceps tendon and " "muscle belly to relax muscle and increase range of motion and functional abilities  x  2  minutes      Therapeutic  Exercises to improve functional performance while increasing strength, endurance, range of motion,  and flexibility  x   21 minutes:      - Low load prolonged stretch with Moist Heat (on towel roll) in sitting for elbow Extension x 3 minutes ( rest breaks as needed)     - 1# bicep curls for elbow Extension / flexion ( Supination  / neutral / Pronation) x 10 repetitions each  - 0# pulleys for elbow Extension  x 3 minutes   - YELLOW Theraband for Wrist wheel for Supination / Pronation  x 10 repetitions   - Wrist roller coaster for Active Range of Motion  of wrist in all planes x 5 repetitions -NP   - Weighted ball on tabletop for Dorsiflexion /Volar Flexion  stretches x 20 repetitions -NP  - HAMMER for Supination /Pronation  x 10 repetitions   - 25# Progressive Hand Gripper ( black spring)  for composite  x 20 repetitions   - RED Theraputty for rolling x 20 repetitions -NP  - RED Theraputty for  PVC for "cookie cutting" and medicine top x 10 repetitions   - RED digiflex for isolated x 10 repetitions;  GREEN digiflex for composite x 20 repetitions      Therapeutic Activities to improve functional performance with ADLs and IADLs  x   9  minutes:    - 1# UBE in clockwise motion x 5 minutes to increase endurance and range of motion  - YELLOW Flexbar for Supination /Pronation  and Dorsiflexion /Volar Flexion x 10 repetitions   - Wooden pegboard with GREEN clothespins with 3PT pinch x 2 repetitions      -NP = Not Performed     Initiate in future therapy sessions:    - LARGE dowel board for elbow Extension /Flexion and Supination /Pronation x 2 repetitions      - ### Theraputty for pulling x 10 Repetitions    - YELLOW digiweb for composite digital Extension and  intrinsics x 20 repetitions  -  ### Dorsiflexion/Volar flexion (Supination /Pronation ), Radial Deviation / Ulnar Deviation x 10 repetitions   - " "### Wrist exerstick in standing for Dorsiflexion / Volar Flexion  x 3 repetitions        - Biceps stretch on wall with Elbow Extension x 15 second holds x 5 repetitions  -  ### Theraband for scapular retraction x 20 repetitions  - Isometrics with   ####   Theraband for External Rotation and Internal Rotation x 10 repetitions  -  ### Theraband for shoulder Flexion, Abduction, Extension, External Rotation and Internal Rotation x ###  repetitions       Assessment     Patient will continue to benefit from skilled Occupational Therapy intervention to increase functional abilities, range of motion, and strength and pain control.  Patient. demonstrated proper understanding of each exercise.  Patient continues to require verbal and tactile cues for throughout therapy session to maintain position and prevent compensation.   Patient continues to be limited in functional and leisurely pursuits. Pain limits patient's participation in activities of daily living.  Patient is not able to carryout necessary vocational tasks.   Patient's spiritual, cultural and educational needs considered and patient agreeable to plan of care and goals.  Patient is making good progress towards established goals.  Patient tolerated exercises within pain tolerance.   Reviewed Home Exercise Program with patient., see EPIC under "patient instructions" for provided exercises, activity modifications and limitations, modalities for home pain management.  Patient. demo understanding of above.    Patient tolerated increase in resistance during digilfex for isolated digital flexion; addition of Theraband with wrist wheel no reported pain.  Patient tolerated increase in time during ergometer with no reported pain but demo fatigue.        New/Revised Goals: Continue Plan Of Care   Goals:  1)   Patient to be Independent with Home exercise program and modalities for pain management by 1 week. (MET)  2)   Patient will report   5/10 pain on average with activity " to assist with exercises by 6-8 weeks. ( In Progress)   3)   Patient will increase range of Motion by 3-5 degrees to increase functional use for activities of daily living by 6-8 weeks.  ( In Progress)   4)   Patient will increase Manual Muscle Testing by a grade to assist with lifting light groceries by 6-8 weeks. (In Progress)   5)    Patient will increase  strength 3-5 pounds to open containers by 6-8 weeks. (In Progress)  6)   Patient will increase pinch by 1-3 pounds for buttoning by 6-8 weeks.  (In Progress)   7)   Patient will decrease edema by 0.1-0.3 millimeters  to increase joint mobility /flexibility by 6-8 weeks.  ( In Progress)     Plan      Continue 2x week during the 90 day certification period   03/28/2023   to 06/28/2023   with established Plan Of Care in pursuit of Occupational Therapy goals.      Rosetta Gonzales, OT

## 2023-04-26 NOTE — PROGRESS NOTES
[facility]   Physical Therapy Treatment Note     Name: Jonna Pearl  Clinic Number: [id number]    Therapy Diagnosis:   Encounter Diagnosis   Name Primary?    Gait abnormality Yes     Physician: Mirza Jeffries MD    Visit Date: 4/26/2023    Physician Orders: PT Eval and Treat   Medical Diagnosis from Referral: Primary OA of right knee.  Primary OA of left knee.  Evaluation Date: 1/13/2023  Authorization Period Expiration: 12/30/23  Plan of Care Expiration: 5/13/23  Visit # / Visits authorized: 9/ 20   FOTO: 58/100     Precautions: Standard     PTA Visit #: 1/5     Time In: 300pm  Time Out: 354pm  Total Billable Time: 54 minutes    SUBJECTIVE     Pt reports: she is doing well. Has been performing her exercises. Agreeable to d/c in 2 weeks.     She was not compliant with home exercise program.  Response to previous treatment: no complaints reported   Functional change: ongoing    Pain: 0/10  Location: both knees      OBJECTIVE     ROM: -2-125 (painful)  SLB: 3s on R; 2s on L   5xSTS: 13s without use of UE's    Treatment     Jonna received the treatments listed below:      PT Tech assisted with 40 min of the session    therapeutic exercises to develop strength and ROM for 54 minutes including:    SLR 2# 3 x 10   SL Hip Abd 3 x 8   Standing Hip Ext 3# 3 x 10 B   DL Shuttle 75# 3 x 10   SL Shuttle 50# 3 x 10   Seated Knee ext 10# 3 x 10   Seated Hip Abd 50# 3 x 10   NuStep for aerobic conditioning x 8 min Level 3    Patient Education and Home Exercises     Home Exercises Provided and Patient Education Provided     Education provided:   - home exercises   - POC     Written Home Exercises Provided: yes. Exercises were reviewed and Jonna was able to demonstrate them prior to the end of the session.  Jonna demonstrated good  understanding of the education provided. See EMR under Patient Instructions for exercises provided during therapy sessions    ASSESSMENT     Ms. Coyle tolerated  tx well. She reported fatigue throughout the session and required more rest breaks compared to last session. Pt verbalized understanding that she has 2 more weeks and that she should join a gym for long term maintenance plan.     Jonna Is progressing well towards her goals.   Pt prognosis is Good.     Pt will continue to benefit from skilled outpatient physical therapy to address the deficits listed in the problem list box on initial evaluation, provide pt/family education and to maximize pt's level of independence in the home and community environment.     Pt's spiritual, cultural and educational needs considered and pt agreeable to plan of care and goals.     Anticipated barriers to physical therapy: none     Goals:   Pt will be I with HEP to supplement PT visits and manage symptoms outside of therapy  Pt will be able to balance for 10s on RLE   Pt will perform 5xSTS in 10 seconds     PLAN     Continue per Plan of Care     Nathan Schroeder, PT

## 2023-05-01 ENCOUNTER — CLINICAL SUPPORT (OUTPATIENT)
Dept: REHABILITATION | Facility: HOSPITAL | Age: 63
End: 2023-05-01
Payer: MEDICARE

## 2023-05-01 DIAGNOSIS — M25.521 RIGHT ELBOW PAIN: Primary | ICD-10-CM

## 2023-05-01 DIAGNOSIS — R26.9 GAIT ABNORMALITY: Primary | ICD-10-CM

## 2023-05-01 PROCEDURE — 97110 THERAPEUTIC EXERCISES: CPT | Mod: PN

## 2023-05-01 PROCEDURE — 97530 THERAPEUTIC ACTIVITIES: CPT | Mod: PN

## 2023-05-01 PROCEDURE — 97140 MANUAL THERAPY 1/> REGIONS: CPT | Mod: PN

## 2023-05-01 NOTE — PROGRESS NOTES
[facility]   Physical Therapy Treatment Note     Name: Jonna Pearl  Clinic Number: [id number]    Therapy Diagnosis:   Encounter Diagnosis   Name Primary?    Gait abnormality Yes       Physician: Mirza Jeffries MD    Visit Date: 5/1/2023    Physician Orders: PT Eval and Treat   Medical Diagnosis from Referral: Primary OA of right knee.  Primary OA of left knee.  Evaluation Date: 1/13/2023  Authorization Period Expiration: 12/30/23  Plan of Care Expiration: 5/13/23  Visit # / Visits authorized: 10/ 20   FOTO: 58/100     Precautions: Standard     PTA Visit #: 1/5     Time In: 1145am  Time Out: 1225pm  Total Billable Time: 40 minutes    SUBJECTIVE     Pt reports: she is doing well. Denies muscle soreness after last session but states that the knee is still sore.     She was not compliant with home exercise program.  Response to previous treatment: no complaints reported   Functional change: ongoing     Pain: 0/10  Location: both knees      OBJECTIVE     ROM: -2-125 (painful)  SLB: 3s on R; 2s on L   5xSTS: 13s without use of UE's    Treatment     Jonna received the treatments listed below:      therapeutic exercises to develop strength and ROM for 40 minutes including:    Standing Hip Abd 5# 3 x 8    Standing Hip Ext 5# 3 x 8    DL Box Squat at 24 inch height 3 x max reps ; holding 10# DB    Standing Hip Ext on shuttle 12# 3 x 8 B   Seated Hip Abd 70# 4 x 8   Seated DL Knee ext 30# 4 x 8   NuStep for aerobic conditioning x 6 min Level 3    Patient Education and Home Exercises     Home Exercises Provided and Patient Education Provided     Education provided:   - home exercises   - POC     Written Home Exercises Provided: yes. Exercises were reviewed and Jonna was able to demonstrate them prior to the end of the session.  Jonna demonstrated good  understanding of the education provided. See EMR under Patient Instructions for exercises provided during therapy sessions    ASSESSMENT      Ms. Coyle tolerated tx well. Progressed her intensity with exercises this visit. Pt required prolonged rest breaks this session. Will continue to progress as tolerated.     Jonna Is progressing well towards her goals.   Pt prognosis is Good.     Pt will continue to benefit from skilled outpatient physical therapy to address the deficits listed in the problem list box on initial evaluation, provide pt/family education and to maximize pt's level of independence in the home and community environment.     Pt's spiritual, cultural and educational needs considered and pt agreeable to plan of care and goals.     Anticipated barriers to physical therapy: none     Goals:   Pt will be I with HEP to supplement PT visits and manage symptoms outside of therapy  Pt will be able to balance for 10s on RLE   Pt will perform 5xSTS in 10 seconds     PLAN     Continue per Plan of Care     Nathan Schroeder, PT

## 2023-05-01 NOTE — PROGRESS NOTES
"                                Occupational Therapy Daily Treatment Note       Date: 5/3/2023  Name: Jonna Pearl  Clinic Number: [id number]    Therapy Diagnosis: S52.124A (ICD-10-CM) - Closed nondisplaced fracture of head of right radius  Encounter Diagnosis   Name Primary?    Right elbow pain Yes     Physician: Mirza Jeffries MD    Physician Orders: S52.124A (ICD-10-CM) - Closed nondisplaced fracture of head of right radius; evaluate and treat;Patient can progress with strength as tolerated with therapy   Medical Diagnosis: S52.124A (ICD-10-CM) - Closed nondisplaced fracture of head of right radius; (Right) elbow pain  Surgical Procedure and Date: Not Applicable     Insurance Authorization Period Expiration: 03/28/2023-12/31/2023   Plan of Care Certification Period: 03/28/2023-06/28/2023  Date of Return to MD: as needed    Evaluation FOTO: 03/28/2023 = 43%    Visit # / Visits authorized: 6 / 20   Time In: 1:45  Time Out:  2:25  Total Billable Time: 38  minutes    Precautions:  Standard;  FALL RISK !!! Latex allergy      Post Injury:  8 weeks, 5 days      Subjective     Patient reports: "I think the rash on my arm is getting better.  I am still having trouble reaching back with my arm"      Pain: 4 /10   Location of pain: (Right) elbow     Objective    Patient seen by Occupational Therapy this session. Tx consisted of:        Manual therapy techniques to increase joint mobilization /// soft tissue mobilization   x   8   minutes:     -Manual Therapy techniques to  (Right) elbow and wrist  including  LIGHT stretching, and Passive Range of Motion to increase joint mobility, range of motion  and for pain management  x  4 minutes   -Soft Tissue Mobilization to (Right) hand into wrist and forearm from distal to proximal to decrease edema and increase range of motion and functional abilities  x  2  minutes  -Soft Tissue Massage to distal biceps tendon and muscle belly to relax muscle and increase range of motion and " "functional abilities  x  2  minutes      Therapeutic  Exercises to improve functional performance while increasing strength, endurance, range of motion,  and flexibility  x   21 minutes:      - Low load prolonged stretch with Moist Heat (on towel roll) in sitting for elbow Extension x 0 minutes ( rest breaks as needed)  -NP     - 1# bicep curls for elbow Extension / flexion ( Supination  / neutral / Pronation) x 10 repetitions each  - 0# pulleys for elbow Extension  x 3 minutes  - YELLOW Theraband in standing for scap retraction x 20 repetitions    - YELLOW Theraband for Wrist wheel for Supination / Pronation  x 10 repetitions  - HAMMER for Supination /Pronation  x 10 repetitions   - 25# Progressive Hand Gripper ( black spring)  for composite  x 20 repetitions   - RED Theraputty for  PVC for "cookie cutting" and medicine top x 10 repetitions   - RED digiflex for isolated x 10 repetitions;  GREEN digiflex for composite x 20 repetitions      Therapeutic Activities to improve functional performance with ADLs and IADLs  x   9  minutes:    - 1# UBE in clockwise motion x 5 minutes to increase endurance and range of motion  - YELLOW Flexbar for Supination /Pronation  and Dorsiflexion /Volar Flexion x 10 repetitions   - Wooden pegboard with GREEN clothespins with 3PT pinch x 2 repetitions      -NP = Not Performed     Initiate in future therapy sessions:    - LARGE dowel board for elbow Extension /Flexion and Supination /Pronation x 2 repetitions      - ### Theraband in standing for tricep press x 10 repetitions    - ### Theraputty for pulling x 10 Repetitions    - YELLOW digiweb for composite digital Extension and  intrinsics x 20 repetitions  -  ### Dorsiflexion/Volar flexion (Supination /Pronation ), Radial Deviation / Ulnar Deviation x 10 repetitions   - ### Wrist exerstick in standing for Dorsiflexion / Volar Flexion  x 3 repetitions        - Biceps stretch on wall with Elbow Extension x 15 second holds x 5 " "repetitions  - Isometrics with   ####   Theraband for External Rotation and Internal Rotation x 10 repetitions  -  ### Theraband for shoulder Flexion, Abduction, Extension, External Rotation and Internal Rotation x ###  repetitions       Assessment     Patient will continue to benefit from skilled Occupational Therapy intervention to increase functional abilities, range of motion, and strength and pain control.  Patient. demonstrated proper understanding of each exercise.  Patient continues to require verbal and tactile cues for throughout therapy session to maintain position and prevent compensation.   Patient continues to be limited in functional and leisurely pursuits. Pain limits patient's participation in activities of daily living.  Patient is not able to carryout necessary vocational tasks.   Patient's spiritual, cultural and educational needs considered and patient agreeable to plan of care and goals.  Patient is making good progress towards established goals.  Patient tolerated exercises within pain tolerance.   Reviewed Home Exercise Program with patient., see EPIC under "patient instructions" for provided exercises, activity modifications and limitations, modalities for home pain management.  Patient. demo understanding of above.    Patient tolerated addition of Theraband for scap retraction with no reported pain. .        New/Revised Goals: Continue Plan Of Care   Goals:  1)   Patient to be Independent with Home exercise program and modalities for pain management by 1 week. (MET)  2)   Patient will report   5/10 pain on average with activity to assist with exercises by 6-8 weeks. ( In Progress)   3)   Patient will increase range of Motion by 3-5 degrees to increase functional use for activities of daily living by 6-8 weeks.  ( In Progress)   4)   Patient will increase Manual Muscle Testing by a grade to assist with lifting light groceries by 6-8 weeks. (In Progress)   5)    Patient will increase  " strength 3-5 pounds to open containers by 6-8 weeks. (In Progress)  6)   Patient will increase pinch by 1-3 pounds for buttoning by 6-8 weeks.  (In Progress)   7)   Patient will decrease edema by 0.1-0.3 millimeters  to increase joint mobility /flexibility by 6-8 weeks.  ( In Progress)     Plan      Continue 2x week during the 90 day certification period   03/28/2023   to 06/28/2023   with established Plan Of Care in pursuit of Occupational Therapy goals.      Rosetta Gonzales, OT

## 2023-05-03 ENCOUNTER — CLINICAL SUPPORT (OUTPATIENT)
Dept: REHABILITATION | Facility: HOSPITAL | Age: 63
End: 2023-05-03
Payer: MEDICARE

## 2023-05-03 DIAGNOSIS — R26.9 GAIT ABNORMALITY: Primary | ICD-10-CM

## 2023-05-03 DIAGNOSIS — M25.521 RIGHT ELBOW PAIN: Primary | ICD-10-CM

## 2023-05-03 PROCEDURE — 97110 THERAPEUTIC EXERCISES: CPT | Mod: PN

## 2023-05-03 PROCEDURE — 97530 THERAPEUTIC ACTIVITIES: CPT | Mod: PN

## 2023-05-03 PROCEDURE — 97140 MANUAL THERAPY 1/> REGIONS: CPT | Mod: PN

## 2023-05-03 NOTE — PROGRESS NOTES
"                                Occupational Therapy Progress Note       Date: 5/10/2023  Name: Jonna Pearl  Clinic Number: [id number]    Therapy Diagnosis: S52.124A (ICD-10-CM) - Closed nondisplaced fracture of head of right radius  Encounter Diagnosis   Name Primary?    Right elbow pain Yes     Physician: Mirza Jeffries MD    Physician Orders: S52.124A (ICD-10-CM) - Closed nondisplaced fracture of head of right radius; evaluate and treat;Patient can progress with strength as tolerated with therapy   Medical Diagnosis: S52.124A (ICD-10-CM) - Closed nondisplaced fracture of head of right radius; (Right) elbow pain  Surgical Procedure and Date: Not Applicable     Insurance Authorization Period Expiration: 03/28/2023-12/31/2023   Plan of Care Certification Period: 03/28/2023-06/28/2023  Date of Return to MD: as needed    Evaluation FOTO: 03/28/2023 = 43%  Reassessment FOTO: 05/10/2023 = 42%    Visit # / Visits authorized: 7 / 20   Time In: 1:45  Time Out:  2:25  Total Billable Time: 38  minutes    Precautions:  Standard;  FALL RISK !!! Latex allergy      Post Injury:  9 weeks, 5 days      Subjective     Patient reports: "I am feeling better in my arm.  I am still getting pains from my wrist up my arm."      Pain: 2 /10   Location of pain: (Right) elbow     Objective    Patient seen by Occupational Therapy this session. Tx consisted of:        Manual therapy techniques to increase joint mobilization /// soft tissue mobilization   x   8   minutes:     -Manual Therapy techniques to  (Right) elbow and wrist  including  LIGHT stretching, and Passive Range of Motion to increase joint mobility, range of motion  and for pain management  x  4 minutes   -Soft Tissue Mobilization to (Right) hand into wrist and forearm from distal to proximal to decrease edema and increase range of motion and functional abilities  x  2  minutes  -Soft Tissue Massage to distal biceps tendon and muscle belly to relax muscle and increase range " "of motion and functional abilities  x  2  minutes      Therapeutic  Exercises to improve functional performance while increasing strength, endurance, range of motion,  and flexibility  x   21 minutes:       - 2# bicep curls for elbow Extension / flexion ( Supination  / neutral / Pronation) x 10 repetitions each  - 0# pulleys for elbow Extension  x 3 minutes -NP  - YELLOW Theraband in standing for scap retraction x 20 repetitions    - YELLOW Theraband for Wrist wheel for Supination / Pronation  x 10 repetitions  - HAMMER for Supination /Pronation  x 10 repetitions   - 25# Progressive Hand Gripper ( black spring)  for composite  x 20 repetitions   - RED Theraputty for  PVC for "cookie cutting" and medicine top x 10 repetitions   - RED digiflex for isolated x 10 repetitions;  GREEN digiflex for composite x 20 repetitions      Therapeutic Activities to improve functional performance with ADLs and IADLs  x   9  minutes:    - 1# UBE in clockwise motion x 5 minutes to increase endurance and range of motion  - YELLOW Flexbar for Supination /Pronation  and Dorsiflexion /Volar Flexion x 10 repetitions   - Wooden pegboard with GREEN clothespins with 3PT pinch x 2 repetitions      -NP = Not Performed     Initiate in future therapy sessions:       - ### Theraband in standing for tricep press x 10 repetitions    - ### Theraputty for pulling x 10 Repetitions    - YELLOW digiweb for composite digital Extension and  intrinsics x 20 repetitions  -  ### Dorsiflexion/Volar flexion (Supination /Pronation ), Radial Deviation / Ulnar Deviation x 10 repetitions   - ### Wrist exerstick in standing for Dorsiflexion / Volar Flexion  x 3 repetitions       - Isometrics with   ####   Theraband for External Rotation and Internal Rotation x 10 repetitions  -  ### Theraband for shoulder Flexion, Abduction, Extension, External Rotation and Internal Rotation x ###  repetitions      Patient reassessed as follows:    Edema:   centimeters (Right) / " (Left)  Proximal Wrist Crease:  16.8 (-0.1)  / 15.8  Elbow Crease: 29.6 (-0.8)  / 29.0                                                            Active Range of Motion: elbow/wrist                         (Right)   //  (Left)  Elbow Extension/Flexion: 0 (+12) / 135 (+9)  // 0/139  Dorsal Flexion /Volar Flexion:  65 (+5)  / 75 (+15)   //  70/85  Radial Deviation /Ulnar Deviation: 15 / 40 // 15/35  Supination / Pronation:  60 (+5)  / 80 (+5)  //  85/90    Passive Range of Motion: elbow/wrist     ( submaximally, within pain tolerance)                      (Right)     Elbow Extension/Flexion:  0 (+5)  / 136 (+2)   Dorsal Flexion /Volar Flexion : 75 / 80 (+10)   Radial Deviation /Ulnar Deviation: WNL / WNL   Supination / Pronation: 75 (+5) / 85 (+5)       Manual Muscle Test:  Elbow / Wrist   (submaximally within pain tolerance)         (Right)       Elbow Flexion:      4/5  (+2)   Elbow Extension:   4/5  (+3)   Dorsal Flexion:  4/5  (+2)   Volar Flexion:  4/5  (+3)          Strength: (ELLEN Dynamometer in pounds),Position II   (submaximally within pain tolerance)     (Right):  30 (+10)  (Left):  45    Pinch Strength: (Pinch Gauge in pounds)   (submaximally within pain tolerance)                (Right) /  (Left)  Lateral Pinch:  8 (+1)   / 11  3 Point Pinch:  5 (+1)     /  8   2 Point Pinch: 3 (+1)   / 5        Assessment     Patient will continue to benefit from skilled Occupational Therapy intervention to increase functional abilities, range of motion, and strength and pain control.  Patient. demonstrated proper understanding of each exercise.  Patient continues to require verbal and tactile cues for throughout therapy session to maintain position and prevent compensation.   Patient continues to be limited in functional and leisurely pursuits. Pain limits patient's participation in activities of daily living.  Patient is not able to carryout necessary vocational tasks.   Patient's spiritual, cultural and  "educational needs considered and patient agreeable to plan of care and goals.  Patient is making good progress towards established goals.  Patient tolerated exercises within pain tolerance.   Reviewed Home Exercise Program with patient., see EPIC under "patient instructions" for provided exercises, activity modifications and limitations, modalities for home pain management.  Patient. demo understanding of above.    Patient tolerated increase in resistance during bicep curls  with no reported pain. .      Patient demo decreased edema in Proximal Wrist Crease and elbow crease.  Patient demo increased Active range of motion for (Right) elbow Extension, and flexion, (Right) wrist Dorsal Flexion, Volar Flexion,  Supination, Pronation; Passive range of motion for (Right) elbow Extension and flexion, (Right) wrist Volar Flexion, Supination, and Pronation.  Patient demo increased Manual Muscle Testing for (Right) elbow Extension and flexion, (Right) wrist Dorsal Flexion, Volar Flexion.  Patient demo increased  strength, Lateral Pinch, 3 Point Pinch, and 2 Point Pinch strength.       New/Revised Goals: Continue Plan Of Care   Goals:  1)   Patient to be Independent with Home exercise program and modalities for pain management by 1 week. (MET)  2)   Patient will report   1/10 pain on average with activity to assist with exercises by 6-8 weeks. (Revised 05/10/2023)   3)   Patient will increase range of Motion by 3-5 degrees to increase functional use for activities of daily living by 6-8 weeks.  ( In Progress)   4)   Patient will increase Manual Muscle Testing by a grade to assist with lifting light groceries by 6-8 weeks. (In Progress)   5)    Patient will increase  strength 3-5 pounds to open containers by 6-8 weeks. (In Progress)  6)   Patient will increase pinch by 1-3 pounds for buttoning by 6-8 weeks.  (In Progress)   7)   Patient will decrease edema by 0.1-0.3 millimeters  to increase joint mobility /flexibility " by 6-8 weeks.  ( In Progress)     Plan      Continue 2x week during the 90 day certification period   03/28/2023   to 06/28/2023   with established Plan Of Care in pursuit of Occupational Therapy goals.      Rosetta Gonzales, OT

## 2023-05-03 NOTE — PROGRESS NOTES
[facility]   Physical Therapy Treatment Note     Name: Jonna Pearl  Clinic Number: [id number]    Therapy Diagnosis:   Encounter Diagnosis   Name Primary?    Gait abnormality Yes     Physician: Mirza Jeffries MD    Visit Date: 5/3/2023    Physician Orders: PT Eval and Treat   Medical Diagnosis from Referral: Primary OA of right knee.  Primary OA of left knee.  Evaluation Date: 1/13/2023  Authorization Period Expiration: 12/30/23  Plan of Care Expiration: 5/13/23  Visit # / Visits authorized: 11/ 20   FOTO: 58/100     Precautions: Standard     PTA Visit #: 1/5     Time In: 225pm  Time Out: 315pm  Total Billable Time: 50 minutes    SUBJECTIVE     Pt reports: she is doing well. Denies muscle soreness after last session but states that the knee is still sore.     She was not compliant with home exercise program.  Response to previous treatment: no complaints reported   Functional change: ongoing     Pain: 0/10  Location: both knees      OBJECTIVE     ROM: -2-125 (painful)  SLB: 3s on R; 2s on L   5xSTS: 13s without use of UE's    Treatment     Jonna received the treatments listed below:      PT Tech for 15 minutes    therapeutic exercises to develop strength and ROM for 50 minutes including:    Standing Hip Abd 5# 3 x 8    Standing Hip Ext 5# 3 x 8    DL Box Squat at 24 inch height 3 x max reps ; holding 5# DB in each hand and pressing overhead     Standing Hip Ext on shuttle 12# 3 x 8 B   Seated Hip Abd 70# 4 x 8   Seated DL Knee ext 30# 4 x 8   NuStep for aerobic conditioning x 6 min Level 3    Patient Education and Home Exercises     Home Exercises Provided and Patient Education Provided     Education provided:   - home exercises   - POC     Written Home Exercises Provided: yes. Exercises were reviewed and Jonna was able to demonstrate them prior to the end of the session.  Jonna demonstrated good  understanding of the education provided. See EMR under Patient Instructions for  exercises provided during therapy sessions    ASSESSMENT     Ms. Coyle tolerated tx well. She continues to require prolonged rest breaks this session. Would benefit from strength training long term like we have discussed on numerous occasions.     Jonna Is progressing well towards her goals.   Pt prognosis is Good.     Pt will continue to benefit from skilled outpatient physical therapy to address the deficits listed in the problem list box on initial evaluation, provide pt/family education and to maximize pt's level of independence in the home and community environment.     Pt's spiritual, cultural and educational needs considered and pt agreeable to plan of care and goals.     Anticipated barriers to physical therapy: none     Goals:   Pt will be I with HEP to supplement PT visits and manage symptoms outside of therapy  Pt will be able to balance for 10s on RLE   Pt will perform 5xSTS in 10 seconds     PLAN     Continue per Plan of Care     Nathan Schroeder, PT

## 2023-05-10 ENCOUNTER — CLINICAL SUPPORT (OUTPATIENT)
Dept: REHABILITATION | Facility: HOSPITAL | Age: 63
End: 2023-05-10
Payer: MEDICARE

## 2023-05-10 DIAGNOSIS — M25.521 RIGHT ELBOW PAIN: Primary | ICD-10-CM

## 2023-05-10 DIAGNOSIS — R26.9 GAIT ABNORMALITY: Primary | ICD-10-CM

## 2023-05-10 PROCEDURE — 97140 MANUAL THERAPY 1/> REGIONS: CPT | Mod: PN

## 2023-05-10 PROCEDURE — 97530 THERAPEUTIC ACTIVITIES: CPT | Mod: PN

## 2023-05-10 PROCEDURE — 97110 THERAPEUTIC EXERCISES: CPT | Mod: PN

## 2023-05-10 NOTE — PROGRESS NOTES
[facility]   Physical Therapy Treatment Note     Name: Jonna Pearl  Clinic Number: [id number]    Therapy Diagnosis:   Encounter Diagnosis   Name Primary?    Gait abnormality Yes     Physician: Mirza Jeffries MD    Visit Date: 5/10/2023    Physician Orders: PT Eval and Treat   Medical Diagnosis from Referral: Primary OA of right knee.  Primary OA of left knee.  Evaluation Date: 1/13/2023  Authorization Period Expiration: 12/30/23  Plan of Care Expiration: 5/13/23  Visit # / Visits authorized: 12/ 20   FOTO: 58/100     Precautions: Standard     PTA Visit #: 1/5     Time In: 220pm  Time Out: 305pm  Total Billable Time: 45 minutes    SUBJECTIVE     Pt reports: she is doing well. States she was able to walk in the park for an hour while taking breaks.     She was not compliant with home exercise program.  Response to previous treatment: no complaints reported   Functional change: ongoing     Pain: 0/10  Location: both knees      OBJECTIVE     ROM: -2-125 (painful)  SLB: 3s on R; 2s on L   5xSTS: 13s without use of UE's    Treatment     Jonna received the treatments listed below:      therapeutic exercises to develop strength and ROM for 50 minutes including:    Standing Hip Abd 5# 2 x 10    Standing Hip Ext 5# 2 x 10  DL Shuttle 87# 3 x 8   SL Shuttle 50# 3 x 10   Sled Push 3 x 10 yards; rest as needed   Seated Hip Abd 55# 3 x muscle burn   Seated DL Knee ext 30# 4 x 8   NuStep for aerobic conditioning x 8 min Level 3    Patient Education and Home Exercises     Home Exercises Provided and Patient Education Provided     Education provided:   - home exercises   - POC     Written Home Exercises Provided: yes. Exercises were reviewed and Jonna was able to demonstrate them prior to the end of the session.  Jonna demonstrated good  understanding of the education provided. See EMR under Patient Instructions for exercises provided during therapy sessions    ASSESSMENT     Ms. Coyle  tolerated tx well. Progressing load gradually. Pt would continue to benefit from skilled PT til the end of this POC.     Jonna Is progressing well towards her goals.   Pt prognosis is Good.     Pt will continue to benefit from skilled outpatient physical therapy to address the deficits listed in the problem list box on initial evaluation, provide pt/family education and to maximize pt's level of independence in the home and community environment.     Pt's spiritual, cultural and educational needs considered and pt agreeable to plan of care and goals.     Anticipated barriers to physical therapy: none     Goals:   Pt will be I with HEP to supplement PT visits and manage symptoms outside of therapy  Pt will be able to balance for 10s on RLE   Pt will perform 5xSTS in 10 seconds     PLAN     Continue per Plan of Care     Nathan Schroeder, PT

## 2023-05-10 NOTE — PROGRESS NOTES
"                                Occupational Therapy Daily Treatment Note       Date: 5/12/2023  Name: Jonna Pearl  Clinic Number: [id number]    Therapy Diagnosis: S52.124A (ICD-10-CM) - Closed nondisplaced fracture of head of right radius  Encounter Diagnosis   Name Primary?    Right elbow pain Yes     Physician: Mirza Jeffries MD    Physician Orders: S52.124A (ICD-10-CM) - Closed nondisplaced fracture of head of right radius; evaluate and treat;Patient can progress with strength as tolerated with therapy   Medical Diagnosis: S52.124A (ICD-10-CM) - Closed nondisplaced fracture of head of right radius; (Right) elbow pain  Surgical Procedure and Date: Not Applicable     Insurance Authorization Period Expiration: 03/28/2023-12/31/2023   Plan of Care Certification Period: 03/28/2023-06/28/2023  Date of Return to MD: as needed    Evaluation FOTO: 03/28/2023 = 43%  Reassessment FOTO: 05/10/2023 = 42%    Visit # / Visits authorized: 8 / 20   Time In: 1:35  Time Out:  2:15  Total Billable Time: 38  minutes    Precautions:  Standard;  FALL RISK !!! Latex allergy      Post Injury:  10 weeks, 0 days      Subjective     Patient reports: "I think my arm is slowly getting better.  I helped a friend trim some bushes yesterday and I think I over used my arm.  I am more sore today."      Pain: 4 /10   Location of pain: (Right) elbow     Objective    Patient seen by Occupational Therapy this session. Tx consisted of:        Manual therapy techniques to increase joint mobilization /// soft tissue mobilization   x   8   minutes:     -Manual Therapy techniques to  (Right) elbow and wrist  including  LIGHT stretching, and Passive Range of Motion to increase joint mobility, range of motion  and for pain management  x  4 minutes   -Soft Tissue Mobilization to (Right) hand into wrist and forearm from distal to proximal to decrease edema and increase range of motion and functional abilities  x  2  minutes  -Soft Tissue Massage to " "distal biceps tendon and muscle belly to relax muscle and increase range of motion and functional abilities  x  2  minutes      Therapeutic  Exercises to improve functional performance while increasing strength, endurance, range of motion,  and flexibility  x   21 minutes:       - 2# bicep curls for elbow Extension / flexion ( Supination  / neutral / Pronation) x 10 repetitions each  - 0# pulleys for elbow Extension  x 3 minutes -NP  - YELLOW Theraband in standing for scap retraction x 20 repetitions    - RED Theraband in standing for tricep press x 10 repetitions     - YELLOW Theraband for Wrist wheel for Supination / Pronation  x 10 repetitions  - HAMMER for Supination /Pronation  x 10 repetitions   - 25# Progressive Hand Gripper ( black spring)  for composite  x 20 repetitions   - RED Theraputty for  PVC for "cookie cutting" and medicine top x 10 repetitions   - RED Theraputty for pulling x 10 Repetitions   - RED digiflex for isolated x 10 repetitions;  GREEN digiflex for composite x 20 repetitions        Therapeutic Activities to improve functional performance with ADLs and IADLs  x   9  minutes:    - 1# UBE in clockwise motion x 5 minutes to increase endurance and range of motion  - YELLOW Flexbar for Supination /Pronation  and Dorsiflexion /Volar Flexion x 10 repetitions   - Wooden pegboard with GREEN clothespins with 3PT pinch x 2 repetitions      -NP = Not Performed     Initiate in future therapy sessions:     - YELLOW digiweb for composite digital Extension and  intrinsics x 20 repetitions  -  ### Dorsiflexion/Volar flexion (Supination /Pronation ), Radial Deviation / Ulnar Deviation x 10 repetitions   - ### Wrist exerstick in standing for Dorsiflexion / Volar Flexion  x 3 repetitions       - Isometrics with   ####   Theraband for External Rotation and Internal Rotation x 10 repetitions  -  ### Theraband for shoulder Flexion, Abduction, Extension, External Rotation and Internal Rotation x ###  " "repetitions      Assessment     Patient will continue to benefit from skilled Occupational Therapy intervention to increase functional abilities, range of motion, and strength and pain control.  Patient. demonstrated proper understanding of each exercise.  Patient continues to require verbal and tactile cues for throughout therapy session to maintain position and prevent compensation.   Patient continues to be limited in functional and leisurely pursuits. Pain limits patient's participation in activities of daily living.  Patient is not able to carryout necessary vocational tasks.   Patient's spiritual, cultural and educational needs considered and patient agreeable to plan of care and goals.  Patient is making good progress towards established goals.  Patient tolerated exercises within pain tolerance.   Reviewed Home Exercise Program with patient., see EPIC under "patient instructions" for provided exercises, activity modifications and limitations, modalities for home pain management.  Patient. demo understanding of above.    Patient tolerated addition of Theraband in standing for tricep press; Theraputty for pulling with no reported pain.       New/Revised Goals: Continue Plan Of Care   Goals:  1)   Patient to be Independent with Home exercise program and modalities for pain management by 1 week. (MET)  2)   Patient will report   1/10 pain on average with activity to assist with exercises by 6-8 weeks. (Revised 05/10/2023)   3)   Patient will increase range of Motion by 3-5 degrees to increase functional use for activities of daily living by 6-8 weeks.  ( In Progress)   4)   Patient will increase Manual Muscle Testing by a grade to assist with lifting light groceries by 6-8 weeks. (In Progress)   5)    Patient will increase  strength 3-5 pounds to open containers by 6-8 weeks. (In Progress)  6)   Patient will increase pinch by 1-3 pounds for buttoning by 6-8 weeks.  (In Progress)   7)   Patient will decrease " edema by 0.1-0.3 millimeters  to increase joint mobility /flexibility by 6-8 weeks.  ( In Progress)     Plan      Continue 2x week during the 90 day certification period   03/28/2023   to 06/28/2023   with established Plan Of Care in pursuit of Occupational Therapy goals.      Rosetta Gonzales, OT

## 2023-05-12 ENCOUNTER — CLINICAL SUPPORT (OUTPATIENT)
Dept: REHABILITATION | Facility: HOSPITAL | Age: 63
End: 2023-05-12
Payer: MEDICARE

## 2023-05-12 ENCOUNTER — TELEPHONE (OUTPATIENT)
Dept: ORTHOPEDICS | Facility: CLINIC | Age: 63
End: 2023-05-12
Payer: MEDICARE

## 2023-05-12 DIAGNOSIS — R26.9 GAIT ABNORMALITY: Primary | ICD-10-CM

## 2023-05-12 DIAGNOSIS — S52.124A CLOSED NONDISPLACED FRACTURE OF HEAD OF RIGHT RADIUS, INITIAL ENCOUNTER: Primary | ICD-10-CM

## 2023-05-12 DIAGNOSIS — M25.521 RIGHT ELBOW PAIN: Primary | ICD-10-CM

## 2023-05-12 PROCEDURE — 97110 THERAPEUTIC EXERCISES: CPT | Mod: PN

## 2023-05-12 PROCEDURE — 97140 MANUAL THERAPY 1/> REGIONS: CPT | Mod: PN

## 2023-05-12 PROCEDURE — 97530 THERAPEUTIC ACTIVITIES: CPT | Mod: PN

## 2023-05-12 NOTE — TELEPHONE ENCOUNTER
----- Message from Jayleen Yañez MA sent at 5/12/2023  9:37 AM CDT -----  Contact: pt  PT ordered ,    Wants order for acupuncture   Callback      [facility] pt office

## 2023-05-12 NOTE — TELEPHONE ENCOUNTER
No they are wanting acupuncture orders. They have been placed, but PT is now saying the dx has to be back pain...

## 2023-05-12 NOTE — PROGRESS NOTES
"                                Occupational Therapy Daily Treatment Note       Date: 5/17/2023  Name: Jonna Pearl  Clinic Number: [id number]    Therapy Diagnosis: S52.124A (ICD-10-CM) - Closed nondisplaced fracture of head of right radius  Encounter Diagnosis   Name Primary?    Right elbow pain Yes     Physician: Mirza Jeffries MD    Physician Orders: S52.124A (ICD-10-CM) - Closed nondisplaced fracture of head of right radius; evaluate and treat;Patient can progress with strength as tolerated with therapy   Medical Diagnosis: S52.124A (ICD-10-CM) - Closed nondisplaced fracture of head of right radius; (Right) elbow pain  Surgical Procedure and Date: Not Applicable     Insurance Authorization Period Expiration: 03/28/2023-12/31/2023   Plan of Care Certification Period: 03/28/2023-06/28/2023  Date of Return to MD: as needed    Evaluation FOTO: 03/28/2023 = 43%  Reassessment FOTO: 05/10/2023 = 42%    Visit # / Visits authorized: 9 / 20   Time In: 11:00  Time Out:  11:38  Total Billable Time: 38  minutes    Precautions:  Standard;  FALL RISK !!! Latex allergy      Post Injury:  10 weeks, 5 days      Subjective     Patient reports: "I am having a little pain on the side of my elbow.  I have had the pain in my elbow since I trimmed the bushes last week.  I am really tired after Physical Therapy."     Pain: 5 /10   Location of pain: (Right) elbow     Objective    Patient seen by Occupational Therapy this session. Tx consisted of:        Manual therapy techniques to increase joint mobilization /// soft tissue mobilization   x   8   minutes:     -Manual Therapy techniques to  (Right) elbow and wrist  including  LIGHT stretching, and Passive Range of Motion to increase joint mobility, range of motion  and for pain management  x  4 minutes   -Soft Tissue Mobilization to (Right) hand into wrist and forearm from distal to proximal to decrease edema and increase range of motion and functional abilities  x  2  minutes  -Soft " "Tissue Massage to distal biceps tendon and muscle belly to relax muscle and increase range of motion and functional abilities  x  2  minutes      Therapeutic  Exercises to improve functional performance while increasing strength, endurance, range of motion,  and flexibility  x   21 minutes:       - 2# bicep curls for elbow Extension / flexion ( Supination  / neutral / Pronation) x 10 repetitions each  - 0# pulleys for elbow Extension  x 3 minutes -NP  - YELLOW Theraband in standing for scap retraction x 20 repetitions    - RED Theraband in standing for tricep press x 10 repetitions     - YELLOW Theraband for Wrist wheel for Supination / Pronation  x 10 repetitions  - HAMMER for Supination /Pronation  x 10 repetitions   - 35# Progressive Hand Gripper ( black spring)  for composite  x 20 repetitions   - RED Theraputty for  PVC for "cookie cutting" and medicine top x 10 repetitions   - RED Theraputty for pulling x 10 Repetitions   - RED digiflex for isolated x 10 repetitions;  GREEN digiflex for composite x 20 repetitions        Therapeutic Activities to improve functional performance with ADLs and IADLs  x   9  minutes:    - 1# UBE in clockwise motion x 5 minutes to increase endurance and range of motion  - YELLOW Flexbar for Supination /Pronation  and Dorsiflexion /Volar Flexion x 10 repetitions   - Wooden pegboard with GREEN clothespins with 3PT pinch x 2 repetitions           -NP = Not Performed     Initiate in future therapy sessions:     - YELLOW digiweb for composite digital Extension and  intrinsics x 20 repetitions  -  ### Dorsiflexion/Volar flexion (Supination /Pronation ), Radial Deviation / Ulnar Deviation x 10 repetitions   - ### Wrist exerstick in standing for Dorsiflexion / Volar Flexion  x 3 repetitions       - Isometrics with   ####   Theraband for External Rotation and Internal Rotation x 10 repetitions  -  ### Theraband for shoulder Flexion, Abduction, Extension, External Rotation and Internal " "Rotation x ###  repetitions      Assessment     Patient will continue to benefit from skilled Occupational Therapy intervention to increase functional abilities, range of motion, and strength and pain control.  Patient. demonstrated proper understanding of each exercise.  Patient continues to require verbal and tactile cues for throughout therapy session to maintain position and prevent compensation.   Patient continues to be limited in functional and leisurely pursuits. Pain limits patient's participation in activities of daily living.  Patient is not able to carryout necessary vocational tasks.   Patient's spiritual, cultural and educational needs considered and patient agreeable to plan of care and goals.  Patient is making good progress towards established goals.  Patient tolerated exercises within pain tolerance.   Reviewed Home Exercise Program with patient., see EPIC under "patient instructions" for provided exercises, activity modifications and limitations, modalities for home pain management.  Patient. demo understanding of above.      Patient tolerated increase in resistance during Progressive Hand Gripper for composite  with no reported pain but demo fatigue.      New/Revised Goals: Continue Plan Of Care   Goals:  1)   Patient to be Independent with Home exercise program and modalities for pain management by 1 week. (MET)  2)   Patient will report   1/10 pain on average with activity to assist with exercises by 6-8 weeks. (Revised 05/10/2023)   3)   Patient will increase range of Motion by 3-5 degrees to increase functional use for activities of daily living by 6-8 weeks.  ( In Progress)   4)   Patient will increase Manual Muscle Testing by a grade to assist with lifting light groceries by 6-8 weeks. (In Progress)   5)    Patient will increase  strength 3-5 pounds to open containers by 6-8 weeks. (In Progress)  6)   Patient will increase pinch by 1-3 pounds for buttoning by 6-8 weeks.  (In " Progress)   7)   Patient will decrease edema by 0.1-0.3 millimeters  to increase joint mobility /flexibility by 6-8 weeks.  ( In Progress)     Plan      Continue 2x week during the 90 day certification period   03/28/2023   to 06/28/2023   with established Plan Of Care in pursuit of Occupational Therapy goals.      Rosetta Gonzales, OT

## 2023-05-12 NOTE — PROGRESS NOTES
[facility]   Physical Therapy Treatment Note    Name: Jonna Pearl  Clinic Number: [id number]    Therapy Diagnosis:   Encounter Diagnosis   Name Primary?    Gait abnormality Yes       Physician: Mirza Jeffries MD    Visit Date: 5/12/2023    Physician Orders: PT Eval and Treat   Medical Diagnosis from Referral: Primary OA of right knee.  Primary OA of left knee.  Evaluation Date: 1/13/2023  Authorization Period Expiration: 12/30/23  Plan of Care Expiration: 5/13/23  Visit # / Visits authorized: 13/20   FOTO: 58/100     Precautions: Standard     PTA Visit #: 1/5     Time In: 220pm  Time Out: 300pm  Total Billable Time: 40 minutes    SUBJECTIVE     Pt reports: she is doing well. Knows she needs to continue strengthening long term.    She was not compliant with home exercise program.  Response to previous treatment: no complaints reported   Functional change: walking program    Pain: 0/10  Location: both knees      OBJECTIVE     Assessment:  SLB: 5s on R  5xSTS: 9s without use of UE's    Treatment     Jonna received the treatments listed below:      therapeutic exercises to develop strength and ROM for 40 minutes including:    Standing Hip Abd 5# 2 x 10    Standing Hip Ext 5# 2 x 10  DL Shuttle 87# 3 x 8   SL Shuttle 50# 3 x 10   Sled Push 3 x 10 yards; rest as needed   Seated Hip Abd 55# 3 x muscle burn   Seated DL Knee ext 30# 4 x 8   NuStep for aerobic conditioning x 8 min Level 3    Patient Education and Home Exercises     Home Exercises Provided and Patient Education Provided     Education provided:   - home exercises   - POC     Written Home Exercises Provided: yes. Exercises were reviewed and Jonna was able to demonstrate them prior to the end of the session.  Jonna demonstrated good  understanding of the education provided. See EMR under Patient Instructions for exercises provided during therapy sessions    ASSESSMENT     Ms. Coyle tolerated tx well. Pt educated to  continue HEP of walking program and LE strengthening to improve load tolerance long term. Pt verbalized understanding and was agreeable to d/c next visit.     Goals:   Pt will be I with HEP to supplement PT visits and manage symptoms outside of therapy - met  Pt will be able to balance for 10s on RLE - not met  Pt will perform 5xSTS in 10 seconds - met    PLAN     D/c    Nathan Schroeder, PT

## 2023-05-17 ENCOUNTER — CLINICAL SUPPORT (OUTPATIENT)
Dept: REHABILITATION | Facility: HOSPITAL | Age: 63
End: 2023-05-17
Payer: MEDICARE

## 2023-05-17 DIAGNOSIS — M25.521 RIGHT ELBOW PAIN: Primary | ICD-10-CM

## 2023-05-17 DIAGNOSIS — R26.9 GAIT ABNORMALITY: Primary | ICD-10-CM

## 2023-05-17 PROCEDURE — 97530 THERAPEUTIC ACTIVITIES: CPT | Mod: PN

## 2023-05-17 PROCEDURE — 97110 THERAPEUTIC EXERCISES: CPT | Mod: PN

## 2023-05-17 PROCEDURE — 97140 MANUAL THERAPY 1/> REGIONS: CPT | Mod: PN

## 2023-05-17 NOTE — PROGRESS NOTES
[facility]   Physical Therapy Discharge Summary    Name: Jonna Pearl  Clinic Number: [id number]    Therapy Diagnosis:   Encounter Diagnosis   Name Primary?    Gait abnormality Yes         Physician: Mirza Jeffries MD    Visit Date: 5/17/2023    Physician Orders: PT Eval and Treat   Medical Diagnosis from Referral: Primary OA of right knee.  Primary OA of left knee.  Evaluation Date: 1/13/2023  Authorization Period Expiration: 12/30/23  Plan of Care Expiration: 5/13/23  Visit # / Visits authorized: 14/20   FOTO: 58/100     Precautions: Standard     PTA Visit #: 1/5     Time In: 950am  Time Out: 1040am  Total Billable Time: 50 minutes    SUBJECTIVE     Pt reports: she is doing well. Knows she needs to continue strengthening long term. Agreeable to d/c with HEP and walking program    She was not compliant with home exercise program.  Response to previous treatment: no complaints reported   Functional change: walking program    Pain: 0/10  Location: both knees      OBJECTIVE     Assessment:  SLB: 5s on R  5xSTS: 9s without use of UE's    Treatment     Jonna received the treatments listed below:        Physical Therapy technician assisted with treatment under direct supervision of treating therapist. Patient received 1:1 treatments for 10 minutes      therapeutic exercises to develop strength and ROM for 50 minutes including:    Standing Hip Abd 5# 3 x muscle burn     Standing Hip Ext 5# 3 x muscle burn  DL Shuttle 87# 3 x 8   SL Shuttle 50# 3 x 10   Sled Push 3 x 10 yards; rest as needed   Seated Hip Abd 55# 3 x muscle burn   Seated DL Knee ext 30# 4 x 8   NuStep for aerobic conditioning x 8 min Level 3    Patient Education and Home Exercises     Home Exercises Provided and Patient Education Provided     Education provided:   - home exercises   - POC     Written Home Exercises Provided: yes. Exercises were reviewed and Jonna was able to demonstrate them prior to the end of the  session.  Jonna demonstrated good  understanding of the education provided. See EMR under Patient Instructions for exercises provided during therapy sessions    ASSESSMENT     Ms. Coyle tolerated tx well. She was agreeable to d/c this visit. Pt reminded to continue her walking program and hip/quad strengthening at home. Pt verbalized understanding.     Goals:   Pt will be I with HEP to supplement PT visits and manage symptoms outside of therapy - met  Pt will be able to balance for 10s on RLE - not met  Pt will perform 5xSTS in 10 seconds - met    PLAN     D/c    Nathan Schroeder, PT

## 2023-05-18 NOTE — PROGRESS NOTES
"                                Occupational Therapy Daily Treatment Note       Date: 5/25/2023  Name: Jonna Pearl  Clinic Number: [id number]    Therapy Diagnosis: S52.124A (ICD-10-CM) - Closed nondisplaced fracture of head of right radius  Encounter Diagnosis   Name Primary?    Right elbow pain Yes     Physician: Mirza Jeffries MD    Physician Orders: S52.124A (ICD-10-CM) - Closed nondisplaced fracture of head of right radius; evaluate and treat;Patient can progress with strength as tolerated with therapy   Medical Diagnosis: S52.124A (ICD-10-CM) - Closed nondisplaced fracture of head of right radius; (Right) elbow pain  Surgical Procedure and Date: Not Applicable     Insurance Authorization Period Expiration: 03/28/2023-12/31/2023   Plan of Care Certification Period: 03/28/2023-06/28/2023  Date of Return to MD: as needed    Evaluation FOTO: 03/28/2023 = 43%  Reassessment FOTO: 05/10/2023 = 42%    Visit # / Visits authorized: 10 / 20   Time In: 2:20  Time Out:  3:00  Total Billable Time: 38  minutes    Precautions:  Standard;  FALL RISK !!! Latex allergy      Post Injury:  11 weeks, 6 days      Subjective     Patient reports: "I think I am getting stronger in my arm.  I am in a little less pain today.  I have been helping "     Pain: 3  /10   Location of pain: (Right) elbow     Objective    Patient seen by Occupational Therapy this session. Tx consisted of:        Manual therapy techniques to increase joint mobilization /// soft tissue mobilization   x   8   minutes:     -Manual Therapy techniques to  (Right) elbow and wrist  including  LIGHT stretching, and Passive Range of Motion to increase joint mobility, range of motion  and for pain management  x  4 minutes   -Soft Tissue Mobilization to (Right) hand into wrist and forearm from distal to proximal to decrease edema and increase range of motion and functional abilities  x  2  minutes  -Soft Tissue Massage to distal biceps tendon and muscle belly to relax " "muscle and increase range of motion and functional abilities  x  2  minutes      Therapeutic  Exercises to improve functional performance while increasing strength, endurance, range of motion,  and flexibility  x   21 minutes:       - 2# bicep curls for elbow Extension / flexion ( Supination  / neutral / Pronation) x 10 repetitions each  - 0# pulleys for elbow Extension  x 3 minutes -NP  - YELLOW Theraband in standing for scap retraction x 20 repetitions    - RED Theraband in standing for tricep press x 10 repetitions     - YELLOW Theraband for Wrist wheel for Supination / Pronation  x 10 repetitions  - HAMMER for Supination /Pronation  x 10 repetitions   - 35# Progressive Hand Gripper ( black spring)  for composite  x 20 repetitions   - RED Theraputty for  PVC for "cookie cutting" and medicine top x 10 repetitions   - RED Theraputty for pulling x 10 Repetitions   - RED digiflex for isolated x 10 repetitions;  GREEN digiflex for composite x 20 repetitions        Therapeutic Activities to improve functional performance with ADLs and IADLs  x   9  minutes:    - 1.0# UBE in clockwise motion x 4 minutes to increase endurance and range of motion  - YELLOW Flexbar for Supination /Pronation  and Dorsiflexion /Volar Flexion x 10 repetitions   - Wooden pegboard with GREEN clothespins with 3PT pinch x 2 repetitions  - YELLOW digiweb for composite digital Extension and  intrinsics x 10 repetitions           -NP = Not Performed     Initiate in future therapy sessions:       -  ### Dorsiflexion/Volar flexion (Supination /Pronation ), Radial Deviation / Ulnar Deviation x 10 repetitions   - ### Wrist exerstick in standing for Dorsiflexion / Volar Flexion  x 3 repetitions       - Isometrics with   ####   Theraband for External Rotation and Internal Rotation x 10 repetitions  -  ### Theraband for shoulder Flexion, Abduction, Extension, External Rotation and Internal Rotation x ###  repetitions      Assessment     Patient will " "continue to benefit from skilled Occupational Therapy intervention to increase functional abilities, range of motion, and strength and pain control.  Patient. demonstrated proper understanding of each exercise.  Patient continues to require verbal and tactile cues for throughout therapy session to maintain position and prevent compensation.   Patient continues to be limited in functional and leisurely pursuits. Pain limits patient's participation in activities of daily living.  Patient is not able to carryout necessary vocational tasks.   Patient's spiritual, cultural and educational needs considered and patient agreeable to plan of care and goals.  Patient is making good progress towards established goals.  Patient tolerated exercises within pain tolerance.   Reviewed Home Exercise Program with patient., see EPIC under "patient instructions" for provided exercises, activity modifications and limitations, modalities for home pain management.  Patient. demo understanding of above.      Attempted to increase resistance during ergometer but unable 2* to fatigue.      Patient tolerated addition of digiweb for intrinsics and composite digital Extension with no reported pain.     New/Revised Goals: Continue Plan Of Care   Goals:  1)   Patient to be Independent with Home exercise program and modalities for pain management by 1 week. (MET)  2)   Patient will report   1/10 pain on average with activity to assist with exercises by 6-8 weeks. (Revised 05/10/2023)   3)   Patient will increase range of Motion by 3-5 degrees to increase functional use for activities of daily living by 6-8 weeks.  ( In Progress)   4)   Patient will increase Manual Muscle Testing by a grade to assist with lifting light groceries by 6-8 weeks. (In Progress)   5)    Patient will increase  strength 3-5 pounds to open containers by 6-8 weeks. (In Progress)  6)   Patient will increase pinch by 1-3 pounds for buttoning by 6-8 weeks.  (In Progress) "   7)   Patient will decrease edema by 0.1-0.3 millimeters  to increase joint mobility /flexibility by 6-8 weeks.  ( In Progress)     Plan      Continue 2x week during the 90 day certification period   03/28/2023   to 06/28/2023   with established Plan Of Care in pursuit of Occupational Therapy goals.      Rosetta Gonzales, OT

## 2023-05-25 ENCOUNTER — CLINICAL SUPPORT (OUTPATIENT)
Dept: REHABILITATION | Facility: HOSPITAL | Age: 63
End: 2023-05-25
Payer: MEDICARE

## 2023-05-25 DIAGNOSIS — M25.521 RIGHT ELBOW PAIN: Primary | ICD-10-CM

## 2023-05-25 PROCEDURE — 97140 MANUAL THERAPY 1/> REGIONS: CPT | Mod: PN

## 2023-05-25 PROCEDURE — 97110 THERAPEUTIC EXERCISES: CPT | Mod: PN

## 2023-05-25 PROCEDURE — 97530 THERAPEUTIC ACTIVITIES: CPT | Mod: PN

## 2023-05-25 NOTE — PROGRESS NOTES
"                                Occupational Therapy Daily Treatment Note       Date: 5/30/2023  Name: Jonna Pearl  Clinic Number: [id number]    Therapy Diagnosis: S52.124A (ICD-10-CM) - Closed nondisplaced fracture of head of right radius  Encounter Diagnosis   Name Primary?    Right elbow pain Yes     Physician: Mirza Jeffries MD    Physician Orders: S52.124A (ICD-10-CM) - Closed nondisplaced fracture of head of right radius; evaluate and treat;Patient can progress with strength as tolerated with therapy   Medical Diagnosis: S52.124A (ICD-10-CM) - Closed nondisplaced fracture of head of right radius; (Right) elbow pain  Surgical Procedure and Date: Not Applicable     Insurance Authorization Period Expiration: 03/28/2023-12/31/2023   Plan of Care Certification Period: 03/28/2023-06/28/2023  Date of Return to MD: as needed    Evaluation FOTO: 03/28/2023 = 43%  Reassessment FOTO: 05/10/2023 = 42%    Visit # / Visits authorized: 11 / 20   Time In: 2:15  Time Out:  2:55  Total Billable Time: 38  minutes    Precautions:  Standard;  FALL RISK !!! Latex allergy      Post Injury:  12 weeks, 4 days      Subjective     Patient reports: "I am in about the same pain as I was last time in my arm.  I still have trouble opening containers"     Pain: 3  /10   Location of pain: (Right) elbow     Objective    Patient seen by Occupational Therapy this session. Tx consisted of:        Manual therapy techniques to increase joint mobilization /// soft tissue mobilization   x   8   minutes:     -Manual Therapy techniques to  (Right) elbow and wrist  including  LIGHT stretching, and Passive Range of Motion to increase joint mobility, range of motion  and for pain management  x  4 minutes   -Soft Tissue Mobilization to (Right) hand into wrist and forearm from distal to proximal to decrease edema and increase range of motion and functional abilities  x  2  minutes  -Soft Tissue Massage to distal biceps tendon and muscle belly to relax " "muscle and increase range of motion and functional abilities  x  2  minutes      Therapeutic  Exercises to improve functional performance while increasing strength, endurance, range of motion,  and flexibility  x   21 minutes:       - 2# bicep curls for elbow Extension / flexion ( Supination  / neutral / Pronation) x 10 repetitions each  - 0# pulleys for elbow Extension  x 3 minutes -NP  - YELLOW Theraband in standing for scap retraction x 20 repetitions    - RED Theraband in standing for tricep press x 10 repetitions     - YELLOW Theraband for Wrist wheel for Supination / Pronation  x 10 repetitions  - HAMMER for Supination /Pronation  x 10 repetitions   - 35# Progressive Hand Gripper ( black spring)  for composite  x 20 repetitions   - RED Theraputty for  PVC for "cookie cutting" and medicine top x 10 repetitions   - RED Theraputty for pulling x 10 Repetitions   - RED digiflex for isolated x 10 repetitions;  GREEN digiflex for composite x 20 repetitions        Therapeutic Activities to improve functional performance with ADLs and IADLs  x   9  minutes:    - 1.0# UBE in clockwise motion x 6 minutes to increase endurance and range of motion  - RED Flexbar for Supination /Pronation  and Dorsiflexion /Volar Flexion x 10 repetitions   - Wooden pegboard with GREEN clothespins with 3PT pinch x 2 repetitions  - YELLOW digiweb for composite digital Extension x 10 repetitions   - RED digiweb for  intrinsics x 10 repetitions           -NP = Not Performed     Initiate in future therapy sessions:       -  ### Dorsiflexion/Volar flexion (Supination /Pronation ), Radial Deviation / Ulnar Deviation x 10 repetitions   - ### Wrist exerstick in standing for Dorsiflexion / Volar Flexion  x 3 repetitions       - Isometrics with   ####   Theraband for External Rotation and Internal Rotation x 10 repetitions  -  ### Theraband for shoulder Flexion, Abduction, Extension, External Rotation and Internal Rotation x ###  " "repetitions      Assessment     Patient will continue to benefit from skilled Occupational Therapy intervention to increase functional abilities, range of motion, and strength and pain control.  Patient. demonstrated proper understanding of each exercise.  Patient continues to require verbal and tactile cues for throughout therapy session to maintain position and prevent compensation.   Patient continues to be limited in functional and leisurely pursuits. Pain limits patient's participation in activities of daily living.  Patient is not able to carryout necessary vocational tasks.   Patient's spiritual, cultural and educational needs considered and patient agreeable to plan of care and goals.  Patient is making good progress towards established goals.  Patient tolerated exercises within pain tolerance.   Reviewed Home Exercise Program with patient., see EPIC under "patient instructions" for provided exercises, activity modifications and limitations, modalities for home pain management.  Patient. demo understanding of above.      Patient tolerated increase in resistance during digiweb for intrinsics, Flexbar for Supination / Pronation, Dorsal Flexion, Volar Flexion, increase in duration during ergometer with no reported pain.     New/Revised Goals: Continue Plan Of Care   Goals:  1)   Patient to be Independent with Home exercise program and modalities for pain management by 1 week. (MET)  2)   Patient will report   1/10 pain on average with activity to assist with exercises by 6-8 weeks. (Revised 05/10/2023)   3)   Patient will increase range of Motion by 3-5 degrees to increase functional use for activities of daily living by 6-8 weeks.  ( In Progress)   4)   Patient will increase Manual Muscle Testing by a grade to assist with lifting light groceries by 6-8 weeks. (In Progress)   5)    Patient will increase  strength 3-5 pounds to open containers by 6-8 weeks. (In Progress)  6)   Patient will increase pinch by " 1-3 pounds for buttoning by 6-8 weeks.  (In Progress)   7)   Patient will decrease edema by 0.1-0.3 millimeters  to increase joint mobility /flexibility by 6-8 weeks.  ( In Progress)     Plan      Continue 2x week during the 90 day certification period   03/28/2023   to 06/28/2023   with established Plan Of Care in pursuit of Occupational Therapy goals.      Rosetta Gonzales, OT

## 2023-05-30 ENCOUNTER — CLINICAL SUPPORT (OUTPATIENT)
Dept: REHABILITATION | Facility: HOSPITAL | Age: 63
End: 2023-05-30
Payer: MEDICARE

## 2023-05-30 DIAGNOSIS — M25.521 RIGHT ELBOW PAIN: Primary | ICD-10-CM

## 2023-05-30 PROCEDURE — 97110 THERAPEUTIC EXERCISES: CPT | Mod: PN

## 2023-05-30 PROCEDURE — 97530 THERAPEUTIC ACTIVITIES: CPT | Mod: PN

## 2023-05-30 PROCEDURE — 97140 MANUAL THERAPY 1/> REGIONS: CPT | Mod: PN

## 2023-06-12 NOTE — PROGRESS NOTES
"                                Occupational Therapy Discharge Summary       Date: 6/14/2023  Name: Jonna Pearl  Clinic Number: [id number]    Therapy Diagnosis: S52.124A (ICD-10-CM) - Closed nondisplaced fracture of head of right radius  Encounter Diagnosis   Name Primary?    Right elbow pain Yes     Physician: Mirza Jeffries MD    Physician Orders: S52.124A (ICD-10-CM) - Closed nondisplaced fracture of head of right radius; evaluate and treat;Patient can progress with strength as tolerated with therapy   Medical Diagnosis: S52.124A (ICD-10-CM) - Closed nondisplaced fracture of head of right radius; (Right) elbow pain  Surgical Procedure and Date: Not Applicable     Insurance Authorization Period Expiration: 03/28/2023-12/31/2023   Plan of Care Certification Period: 03/28/2023-06/28/2023  Date of Return to MD: as needed    Evaluation FOTO: 03/28/2023 = 43%  Reassessment FOTO: 05/10/2023 = 42%   Discharge FOTO: 06/14/2023 = 33%    Visit # / Visits authorized: 12 / 20   Time In: 2:25  Time Out:  2:48  Total Billable Time: 23 minutes    Precautions:  Standard;  FALL RISK !!! Latex allergy            Subjective     Patient reports: "I think I am on the road to recovery.  I think I can continue my exercises at home."    Pain:  2 /10   Location of pain: (Right) elbow     Objective    Patient seen by Occupational Therapy this session. Tx consisted of:        Therapeutic Activities to reassess edema, functional abilities, range of motion, Manual Muscle Testing,  and pinch strength, update Home Exercise Program, and review Home Exercise Program, modalities for home pain management, and activity modifications  x   23  minutes:    -Updated Home Exercise Program: Patient provided with and educated on written Home Exercise Program with RED Theraputty for composite  10-20 repetitions (within pain tolerance)  and 3 Point Pinch x 10 repetitions x 2 x daily.  See EMR under "patient instructions."  Patient demo " "understanding of above.      Patient reassessed as follows:    Edema:   centimeters (Right) / (Left)  Proximal Wrist Crease:  16.5 (-0.3)  / 15.8  Elbow Crease: 28.8 (-0.8)  / 29.0                                          Active Range of Motion: elbow/wrist                         (Right)   //  (Left)  Elbow Extension/Flexion: 0  / 138 (+3)  // 0/139  Dorsal Flexion /Volar Flexion:  70 (+5)  / 80 (+5)   //  70/85  Radial Deviation /Ulnar Deviation: 15 / 40 // 15/35  Supination / Pronation:  70 (+10)  / 85 (+5)  //  85/90    Passive Range of Motion: elbow/wrist     ( submaximally, within pain tolerance)                      (Right)     Elbow Extension/Flexion:  0   / 136   Dorsal Flexion /Volar Flexion : 75 / 80   Radial Deviation /Ulnar Deviation: WNL / WNL   Supination / Pronation: 75 (+5) / 90 (+5)       Manual Muscle Test:  Elbow / Wrist   (submaximally within pain tolerance)         (Right)       Elbow Flexion:      5/5  (+1)   Elbow Extension:   5/5  (+1)   Dorsal Flexion:  5/5  (+1)   Volar Flexion:  5/5  (+1)         Strength: (ELLEN Dynamometer in pounds),Position II   (submaximally within pain tolerance)     (Right):  45 (+15)  (Left):  45    Pinch Strength: (Pinch Gauge in pounds)   (submaximally within pain tolerance)                (Right) /  (Left)  Lateral Pinch: 11 (+3)   / 11  3 Point Pinch:  9 (+4)     /  8   2 Point Pinch: 3 (+1)   / 5         Assessment      Patient to be discharged home 2* to reaching max benefit from skilled outpatient Occupational Therapy at this time.  Patient to continue Home Exercise Program to maintain range of motion, strength, functional abilities, and pain management.  Patient to follow up with doctor as needed. Patient's spiritual, cultural and educational needs considered and patient agreeable to plan of care and goals.   Reviewed Home Exercise Program with patient., see EPIC under "patient instructions" for provided exercises, activity modifications and " limitations, modalities for home pain management.  Patient. demo understanding of above.      Patient met 6 / 7 established goals.  Patient continues to report 2/10 pain on average with activity.   Patient demo decrease edema in (Right) elbow crease and Proximal Wrist Crease.  Patient demo increased Active range of motion for (Right) elbow flexion, (Right) wrist Dorsal Flexion, Volar Flexion, Supination, and Pronation;  Passive range of motion for (Right) wrist Supination and Pronation.  Patient demo increased Manual Muscle Testing for (Right) elbow Extension, and flexion, (Right) wrist Dorsal Flexion, Volar Flexion, Radial Deviation, and Ulnar Deviation. Patient demo increased  and Lateral Pinch, 3 Point Pinch, and 2 Point Pinch strength.     Goals:  1)   Patient to be Independent with Home exercise program and modalities for pain management by 1 week. (MET)  2)   Patient will report   1/10 pain on average with activity to assist with exercises by 6-8 weeks. (Not MET)   3)   Patient will increase range of Motion by 3-5 degrees to increase functional use for activities of daily living by 6-8 weeks.  (MET)   4)   Patient will increase Manual Muscle Testing by a grade to assist with lifting light groceries by 6-8 weeks. (MET)   5)    Patient will increase  strength 3-5 pounds to open containers by 6-8 weeks. (MET)   6)   Patient will increase pinch by 1-3 pounds for buttoning by 6-8 weeks.  (MET)   7)   Patient will decrease edema by 0.1-0.3 millimeters  to increase joint mobility /flexibility by 6-8 weeks.  (MET    Plan      Patient to be discharged home 2* to reaching max benefit from skilled outpatient Occupational Therapy at this time.   Patient to follow up with doctor as needed.          Rosetta Gonzales, OT

## 2023-06-14 ENCOUNTER — CLINICAL SUPPORT (OUTPATIENT)
Dept: REHABILITATION | Facility: HOSPITAL | Age: 63
End: 2023-06-14
Payer: MEDICARE

## 2023-06-14 DIAGNOSIS — M25.521 RIGHT ELBOW PAIN: Primary | ICD-10-CM

## 2023-06-14 PROCEDURE — 97530 THERAPEUTIC ACTIVITIES: CPT | Mod: PN

## 2023-06-20 ENCOUNTER — CLINICAL SUPPORT (OUTPATIENT)
Dept: REHABILITATION | Facility: HOSPITAL | Age: 63
End: 2023-06-20
Attending: ORTHOPAEDIC SURGERY
Payer: MEDICARE

## 2023-06-20 DIAGNOSIS — S52.124A NONDISPLACED FRACTURE OF HEAD OF RIGHT RADIUS, INITIAL ENCOUNTER FOR CLOSED FRACTURE: ICD-10-CM

## 2023-06-20 DIAGNOSIS — S52.124A CLOSED NONDISPLACED FRACTURE OF HEAD OF RIGHT RADIUS, INITIAL ENCOUNTER: ICD-10-CM

## 2023-06-20 DIAGNOSIS — G89.29 CHRONIC BILATERAL LOW BACK PAIN WITHOUT SCIATICA: ICD-10-CM

## 2023-06-20 DIAGNOSIS — M54.50 CHRONIC BILATERAL LOW BACK PAIN WITHOUT SCIATICA: ICD-10-CM

## 2023-06-20 PROCEDURE — 97811 ACUP 1/> W/O ESTIM EA ADD 15: CPT | Mod: PN

## 2023-06-20 PROCEDURE — 97810 ACUP 1/> WO ESTIM 1ST 15 MIN: CPT | Mod: PN

## 2023-06-22 PROBLEM — S52.124A NONDISPLACED FRACTURE OF HEAD OF RIGHT RADIUS, INITIAL ENCOUNTER FOR CLOSED FRACTURE: Status: ACTIVE | Noted: 2023-06-22

## 2023-06-22 PROBLEM — M54.50 CHRONIC BILATERAL LOW BACK PAIN WITHOUT SCIATICA: Status: ACTIVE | Noted: 2023-06-22

## 2023-06-22 PROBLEM — G89.29 CHRONIC BILATERAL LOW BACK PAIN WITHOUT SCIATICA: Status: ACTIVE | Noted: 2023-06-22

## 2023-06-22 NOTE — PROGRESS NOTES
Acupuncture Evaluation Note     Name: Jonna Pearl  [facility] Number: [id number]    Traditional Chinese Medicine (TCM) Diagnosis: Qi Stagnation  Medical Diagnosis:   Encounter Diagnoses   Name Primary?    Closed nondisplaced fracture of head of right radius, initial encounter     Nondisplaced fracture of head of right radius, initial encounter for closed fracture     Chronic bilateral low back pain without sciatica         Evaluation Date: 6/20/23    Visit #/Visits authorized: 1/12     Precautions: Standard    Subjective     Chief Concern: Closed nondisplaced fracture of head of right radius. Chronic low back pain. Skin irritation on elbows.    Medical necessity is demonstrated by the following IMPAIRMENTS: Medical Necessity: Decreased mobility limits day to day activities, social, and emergent situations              Aggravating Factors:  changing positions and pressure   Relieving Factors:  rest    Symptom Description:     Quality:  Aching  Severity:  Varies, but could be 5/10  Frequency:  when active      Treatment       Acupuncture points used:  K3, UB 62, SI 4, LI 5, GB 20  Needles In: 10  Needles Out: 10  Needles W/O STIM placed: 9am  Needles W/O STIM removed: 9:45am      Assessment     After treatment, patient felt relaxed and lessened stiffness/pain    Patient prognosis is Fair.     Patient will continue to benefit from acupuncture treatment to address the deficits listed in the problem list box on initial evaluation, provide patient family education and to maximize pt's level of independence in the home and community environment.     Patient's spiritual, cultural and educational needs considered and pt agreeable to plan of care and goals.     Anticipated barriers to treatment: none    Plan     Recommend every 2 weeks for 12 sessions with midway re-assess.      Education:  Patient is aware of cumulative benefit of acupuncture

## 2023-07-20 ENCOUNTER — CLINICAL SUPPORT (OUTPATIENT)
Dept: REHABILITATION | Facility: HOSPITAL | Age: 63
End: 2023-07-20
Payer: MEDICARE

## 2023-07-20 DIAGNOSIS — M54.50 CHRONIC BILATERAL LOW BACK PAIN WITHOUT SCIATICA: Primary | ICD-10-CM

## 2023-07-20 DIAGNOSIS — G89.29 CHRONIC BILATERAL LOW BACK PAIN WITHOUT SCIATICA: Primary | ICD-10-CM

## 2023-07-20 DIAGNOSIS — S52.124A NONDISPLACED FRACTURE OF HEAD OF RIGHT RADIUS, INITIAL ENCOUNTER FOR CLOSED FRACTURE: ICD-10-CM

## 2023-07-20 PROCEDURE — 97810 ACUP 1/> WO ESTIM 1ST 15 MIN: CPT | Mod: PN

## 2023-07-20 PROCEDURE — 97811 ACUP 1/> W/O ESTIM EA ADD 15: CPT | Mod: PN

## 2023-07-23 NOTE — PROGRESS NOTES
Acupuncture Evaluation Note     Name: Jonna Pearl  [facility] Number: [id number]    Traditional Chinese Medicine (TCM) Diagnosis: Qi Stagnation  Medical Diagnosis:   Encounter Diagnoses   Name Primary?    Chronic bilateral low back pain without sciatica Yes    Nondisplaced fracture of head of right radius, initial encounter for closed fracture         Evaluation Date: 7/20/23    Visit #/Visits authorized: 2/12     Precautions: Standard    Subjective     Chief Concern: Closed nondisplaced fracture of head of right radius. Chronic low back pain. Skin irritation on elbows.    Medical necessity is demonstrated by the following IMPAIRMENTS: Medical Necessity: Decreased mobility limits day to day activities, social, and emergent situations              Aggravating Factors:  changing positions and pressure   Relieving Factors:  rest    Symptom Description:     Quality:  Aching  Severity:  Varies, but could be 5/10  Frequency:  when active      Treatment       Acupuncture points used:  K3, UB 62, SI 4, LI 5, GB 20  Needles In: 10  Needles Out: 10  Needles W/O STIM placed: 1:45pm  [place] W/O STIM removed: 2:30pm       Assessment     After treatment, patient felt relaxed and lessened stiffness/pain    Patient prognosis is Fair.     Patient will continue to benefit from acupuncture treatment to address the deficits listed in the problem list box on initial evaluation, provide patient family education and to maximize pt's level of independence in the home and community environment.     Patient's spiritual, cultural and educational needs considered and pt agreeable to plan of care and goals.     Anticipated barriers to treatment: none    Plan     Recommend every 2 weeks for 12 sessions with midway re-assess.      Education:  Patient is aware of cumulative benefit of acupuncture

## 2023-08-03 ENCOUNTER — CLINICAL SUPPORT (OUTPATIENT)
Dept: REHABILITATION | Facility: HOSPITAL | Age: 63
End: 2023-08-03
Payer: MEDICARE

## 2023-08-03 DIAGNOSIS — M54.50 CHRONIC BILATERAL LOW BACK PAIN WITHOUT SCIATICA: Primary | ICD-10-CM

## 2023-08-03 DIAGNOSIS — G89.29 CHRONIC BILATERAL LOW BACK PAIN WITHOUT SCIATICA: Primary | ICD-10-CM

## 2023-08-03 DIAGNOSIS — S52.124A NONDISPLACED FRACTURE OF HEAD OF RIGHT RADIUS, INITIAL ENCOUNTER FOR CLOSED FRACTURE: ICD-10-CM

## 2023-08-03 PROCEDURE — 97811 ACUP 1/> W/O ESTIM EA ADD 15: CPT | Mod: PN

## 2023-08-03 PROCEDURE — 97810 ACUP 1/> WO ESTIM 1ST 15 MIN: CPT | Mod: PN

## 2023-08-05 NOTE — PROGRESS NOTES
Acupuncture Evaluation Note     Name: Jonna Pearl  [facility] Number: [id number]    Traditional Chinese Medicine (TCM) Diagnosis: Qi Stagnation  Medical Diagnosis:   Encounter Diagnoses   Name Primary?    Chronic bilateral low back pain without sciatica Yes    Nondisplaced fracture of head of right radius, initial encounter for closed fracture         Evaluation Date: 8/2/23    Visit #/Visits authorized: 3/12     Precautions: Standard    Subjective     Chief Concern: Closed nondisplaced fracture of head of right radius. Chronic low back pain. Skin irritation on elbows.    Medical necessity is demonstrated by the following IMPAIRMENTS: Medical Necessity: Decreased mobility limits day to day activities, social, and emergent situations              Aggravating Factors:  changing positions and pressure   Relieving Factors:  rest    Symptom Description:     Quality:  Aching  Severity:  Varies, but could be 5/10  Frequency:  when active      Treatment       Acupuncture points used:  K3, UB 62, SI 4, LI 5, GB 20  Needles In: 10  Needles Out: 10  Needles W/O STIM placed: 2:30pm  [place] W/O STIM removed: 3:15pm       Assessment     After treatment, patient felt relaxed and lessened stiffness/pain    Patient prognosis is Fair.     Patient will continue to benefit from acupuncture treatment to address the deficits listed in the problem list box on initial evaluation, provide patient family education and to maximize pt's level of independence in the home and community environment.     Patient's spiritual, cultural and educational needs considered and pt agreeable to plan of care and goals.     Anticipated barriers to treatment: none    Plan     Recommend every 2 weeks for 12 sessions with midway re-assess.      Education:  Patient is aware of cumulative benefit of acupuncture

## 2023-08-06 ENCOUNTER — HOSPITAL ENCOUNTER (INPATIENT)
Facility: HOSPITAL | Age: 63
LOS: 4 days | Discharge: HOME | DRG: 392 | End: 2023-08-11
Attending: EMERGENCY MEDICINE | Admitting: INTERNAL MEDICINE
Payer: MEDICARE

## 2023-08-06 DIAGNOSIS — I49.9 ARRHYTHMIA: ICD-10-CM

## 2023-08-06 DIAGNOSIS — R07.9 CHEST PAIN: ICD-10-CM

## 2023-08-06 DIAGNOSIS — K57.92 DIVERTICULITIS: Primary | ICD-10-CM

## 2023-08-06 DIAGNOSIS — I49.8 BIGEMINY: ICD-10-CM

## 2023-08-06 DIAGNOSIS — R00.1 BRADYCARDIA, DRUG INDUCED: ICD-10-CM

## 2023-08-06 DIAGNOSIS — T50.905A BRADYCARDIA, DRUG INDUCED: ICD-10-CM

## 2023-08-06 PROBLEM — E66.01 MORBID OBESITY WITH BMI OF 40.0-44.9, ADULT: Chronic | Status: ACTIVE | Noted: 2019-09-11

## 2023-08-06 LAB
ALBUMIN SERPL BCP-MCNC: 3.5 G/DL (ref 3.5–5.2)
ALP SERPL-CCNC: 88 U/L (ref 55–135)
ALT SERPL W/O P-5'-P-CCNC: 18 U/L (ref 10–44)
ANION GAP SERPL CALC-SCNC: 9 MMOL/L (ref 8–16)
AST SERPL-CCNC: 24 U/L (ref 10–40)
BASOPHILS # BLD AUTO: 0.03 K/UL (ref 0–0.2)
BASOPHILS NFR BLD: 0.2 % (ref 0–1.9)
BILIRUB SERPL-MCNC: 0.8 MG/DL (ref 0.1–1)
BNP SERPL-MCNC: 167 PG/ML (ref 0–99)
BUN SERPL-MCNC: 21 MG/DL (ref 8–23)
CALCIUM SERPL-MCNC: 8.6 MG/DL (ref 8.7–10.5)
CHLORIDE SERPL-SCNC: 110 MMOL/L (ref 95–110)
CO2 SERPL-SCNC: 19 MMOL/L (ref 23–29)
CREAT SERPL-MCNC: 0.9 MG/DL (ref 0.5–1.4)
DIFFERENTIAL METHOD: ABNORMAL
EOSINOPHIL # BLD AUTO: 0.1 K/UL (ref 0–0.5)
EOSINOPHIL NFR BLD: 0.4 % (ref 0–8)
ERYTHROCYTE [DISTWIDTH] IN BLOOD BY AUTOMATED COUNT: 17.8 % (ref 11.5–14.5)
EST. GFR  (NO RACE VARIABLE): >60 ML/MIN/1.73 M^2
GLUCOSE SERPL-MCNC: 133 MG/DL (ref 70–110)
GLUCOSE SERPL-MCNC: 99 MG/DL (ref 70–110)
HCT VFR BLD AUTO: 36.8 % (ref 37–48.5)
HGB BLD-MCNC: 11.8 G/DL (ref 12–16)
IMM GRANULOCYTES # BLD AUTO: 0.05 K/UL (ref 0–0.04)
IMM GRANULOCYTES NFR BLD AUTO: 0.3 % (ref 0–0.5)
LIPASE SERPL-CCNC: 29 U/L (ref 4–60)
LYMPHOCYTES # BLD AUTO: 1.5 K/UL (ref 1–4.8)
LYMPHOCYTES NFR BLD: 9.7 % (ref 18–48)
MAGNESIUM SERPL-MCNC: 2 MG/DL (ref 1.6–2.6)
MCH RBC QN AUTO: 28 PG (ref 27–31)
MCHC RBC AUTO-ENTMCNC: 32.1 G/DL (ref 32–36)
MCV RBC AUTO: 87 FL (ref 82–98)
MONOCYTES # BLD AUTO: 0.6 K/UL (ref 0.3–1)
MONOCYTES NFR BLD: 3.6 % (ref 4–15)
NEUTROPHILS # BLD AUTO: 13.5 K/UL (ref 1.8–7.7)
NEUTROPHILS NFR BLD: 85.8 % (ref 38–73)
NRBC BLD-RTO: 0 /100 WBC
PLATELET # BLD AUTO: 439 K/UL (ref 150–450)
PMV BLD AUTO: 10.3 FL (ref 9.2–12.9)
POTASSIUM SERPL-SCNC: 4.6 MMOL/L (ref 3.5–5.1)
PROT SERPL-MCNC: 6.6 G/DL (ref 6–8.4)
RBC # BLD AUTO: 4.22 M/UL (ref 4–5.4)
SODIUM SERPL-SCNC: 138 MMOL/L (ref 136–145)
TROPONIN I SERPL HS-MCNC: 3.2 PG/ML (ref 0–14.9)
WBC # BLD AUTO: 15.75 K/UL (ref 3.9–12.7)

## 2023-08-06 PROCEDURE — 63600175 PHARM REV CODE 636 W HCPCS: Performed by: INTERNAL MEDICINE

## 2023-08-06 PROCEDURE — 85025 COMPLETE CBC W/AUTO DIFF WBC: CPT

## 2023-08-06 PROCEDURE — 83880 ASSAY OF NATRIURETIC PEPTIDE: CPT

## 2023-08-06 PROCEDURE — 83690 ASSAY OF LIPASE: CPT

## 2023-08-06 PROCEDURE — 96365 THER/PROPH/DIAG IV INF INIT: CPT

## 2023-08-06 PROCEDURE — 96375 TX/PRO/DX INJ NEW DRUG ADDON: CPT

## 2023-08-06 PROCEDURE — 80053 COMPREHEN METABOLIC PANEL: CPT

## 2023-08-06 PROCEDURE — 96361 HYDRATE IV INFUSION ADD-ON: CPT

## 2023-08-06 PROCEDURE — 96376 TX/PRO/DX INJ SAME DRUG ADON: CPT

## 2023-08-06 PROCEDURE — 99285 EMERGENCY DEPT VISIT HI MDM: CPT | Mod: 25

## 2023-08-06 PROCEDURE — 25000003 PHARM REV CODE 250: Performed by: STUDENT IN AN ORGANIZED HEALTH CARE EDUCATION/TRAINING PROGRAM

## 2023-08-06 PROCEDURE — G0378 HOSPITAL OBSERVATION PER HR: HCPCS

## 2023-08-06 PROCEDURE — 63600175 PHARM REV CODE 636 W HCPCS: Performed by: STUDENT IN AN ORGANIZED HEALTH CARE EDUCATION/TRAINING PROGRAM

## 2023-08-06 PROCEDURE — 93005 ELECTROCARDIOGRAM TRACING: CPT | Performed by: INTERNAL MEDICINE

## 2023-08-06 PROCEDURE — 25000003 PHARM REV CODE 250: Performed by: NURSE PRACTITIONER

## 2023-08-06 PROCEDURE — 36415 COLL VENOUS BLD VENIPUNCTURE: CPT | Performed by: NURSE PRACTITIONER

## 2023-08-06 PROCEDURE — 63600175 PHARM REV CODE 636 W HCPCS: Performed by: NURSE PRACTITIONER

## 2023-08-06 PROCEDURE — 84484 ASSAY OF TROPONIN QUANT: CPT

## 2023-08-06 PROCEDURE — 93010 EKG 12-LEAD: ICD-10-PCS | Mod: ,,, | Performed by: INTERNAL MEDICINE

## 2023-08-06 PROCEDURE — 96367 TX/PROPH/DG ADDL SEQ IV INF: CPT

## 2023-08-06 PROCEDURE — 83036 HEMOGLOBIN GLYCOSYLATED A1C: CPT | Performed by: NURSE PRACTITIONER

## 2023-08-06 PROCEDURE — 93010 ELECTROCARDIOGRAM REPORT: CPT | Mod: ,,, | Performed by: INTERNAL MEDICINE

## 2023-08-06 PROCEDURE — 83735 ASSAY OF MAGNESIUM: CPT

## 2023-08-06 RX ORDER — FAMOTIDINE 20 MG/1
20 TABLET, FILM COATED ORAL 2 TIMES DAILY
Status: DISCONTINUED | OUTPATIENT
Start: 2023-08-06 | End: 2023-08-10

## 2023-08-06 RX ORDER — METRONIDAZOLE 500 MG/100ML
500 INJECTION, SOLUTION INTRAVENOUS
Status: DISCONTINUED | OUTPATIENT
Start: 2023-08-07 | End: 2023-08-08

## 2023-08-06 RX ORDER — CIPROFLOXACIN 2 MG/ML
400 INJECTION, SOLUTION INTRAVENOUS
Status: DISCONTINUED | OUTPATIENT
Start: 2023-08-07 | End: 2023-08-06

## 2023-08-06 RX ORDER — TALC
6 POWDER (GRAM) TOPICAL NIGHTLY PRN
Status: DISCONTINUED | OUTPATIENT
Start: 2023-08-06 | End: 2023-08-11 | Stop reason: HOSPADM

## 2023-08-06 RX ORDER — GLUCAGON 1 MG
1 KIT INJECTION
Status: DISCONTINUED | OUTPATIENT
Start: 2023-08-06 | End: 2023-08-06

## 2023-08-06 RX ORDER — GABAPENTIN 600 MG/1
600 TABLET ORAL 3 TIMES DAILY
COMMUNITY
End: 2023-08-06 | Stop reason: SDUPTHER

## 2023-08-06 RX ORDER — LISINOPRIL 40 MG/1
40 TABLET ORAL DAILY
Status: ON HOLD | COMMUNITY
End: 2023-08-11 | Stop reason: CLARIF

## 2023-08-06 RX ORDER — LISINOPRIL 20 MG/1
40 TABLET ORAL DAILY
Status: DISCONTINUED | OUTPATIENT
Start: 2023-08-07 | End: 2023-08-11 | Stop reason: HOSPADM

## 2023-08-06 RX ORDER — HYDROMORPHONE HYDROCHLORIDE 1 MG/ML
0.5 INJECTION, SOLUTION INTRAMUSCULAR; INTRAVENOUS; SUBCUTANEOUS
Status: COMPLETED | OUTPATIENT
Start: 2023-08-06 | End: 2023-08-06

## 2023-08-06 RX ORDER — VERAPAMIL HYDROCHLORIDE 240 MG/1
240 TABLET, FILM COATED, EXTENDED RELEASE ORAL NIGHTLY
Status: DISCONTINUED | OUTPATIENT
Start: 2023-08-06 | End: 2023-08-11 | Stop reason: HOSPADM

## 2023-08-06 RX ORDER — METRONIDAZOLE 500 MG/100ML
500 INJECTION, SOLUTION INTRAVENOUS
Status: COMPLETED | OUTPATIENT
Start: 2023-08-06 | End: 2023-08-06

## 2023-08-06 RX ORDER — SODIUM CHLORIDE, SODIUM LACTATE, POTASSIUM CHLORIDE, CALCIUM CHLORIDE 600; 310; 30; 20 MG/100ML; MG/100ML; MG/100ML; MG/100ML
INJECTION, SOLUTION INTRAVENOUS CONTINUOUS
Status: DISCONTINUED | OUTPATIENT
Start: 2023-08-06 | End: 2023-08-10

## 2023-08-06 RX ORDER — MORPHINE SULFATE 2 MG/ML
2 INJECTION, SOLUTION INTRAMUSCULAR; INTRAVENOUS EVERY 4 HOURS PRN
Status: DISCONTINUED | OUTPATIENT
Start: 2023-08-06 | End: 2023-08-10

## 2023-08-06 RX ORDER — FAMOTIDINE 10 MG/ML
20 INJECTION INTRAVENOUS
Status: COMPLETED | OUTPATIENT
Start: 2023-08-06 | End: 2023-08-06

## 2023-08-06 RX ORDER — ONDANSETRON 2 MG/ML
4 INJECTION INTRAMUSCULAR; INTRAVENOUS EVERY 6 HOURS PRN
Status: DISCONTINUED | OUTPATIENT
Start: 2023-08-06 | End: 2023-08-11 | Stop reason: HOSPADM

## 2023-08-06 RX ORDER — PANTOPRAZOLE SODIUM 40 MG/1
40 TABLET, DELAYED RELEASE ORAL DAILY
COMMUNITY

## 2023-08-06 RX ORDER — SODIUM CHLORIDE 0.9 % (FLUSH) 0.9 %
10 SYRINGE (ML) INJECTION
Status: DISCONTINUED | OUTPATIENT
Start: 2023-08-06 | End: 2023-08-11 | Stop reason: HOSPADM

## 2023-08-06 RX ORDER — PANTOPRAZOLE SODIUM 40 MG/10ML
40 INJECTION, POWDER, LYOPHILIZED, FOR SOLUTION INTRAVENOUS DAILY
Status: DISCONTINUED | OUTPATIENT
Start: 2023-08-07 | End: 2023-08-06

## 2023-08-06 RX ORDER — ONDANSETRON 2 MG/ML
4 INJECTION INTRAMUSCULAR; INTRAVENOUS
Status: COMPLETED | OUTPATIENT
Start: 2023-08-06 | End: 2023-08-06

## 2023-08-06 RX ORDER — TIMOLOL MALEATE 5 MG/ML
1 SOLUTION/ DROPS OPHTHALMIC 2 TIMES DAILY
Status: DISCONTINUED | OUTPATIENT
Start: 2023-08-06 | End: 2023-08-11 | Stop reason: HOSPADM

## 2023-08-06 RX ORDER — SEMAGLUTIDE 0.68 MG/ML
0.25 INJECTION, SOLUTION SUBCUTANEOUS
COMMUNITY
Start: 2023-06-23

## 2023-08-06 RX ORDER — LEVOFLOXACIN 5 MG/ML
750 INJECTION, SOLUTION INTRAVENOUS ONCE
Status: COMPLETED | OUTPATIENT
Start: 2023-08-06 | End: 2023-08-06

## 2023-08-06 RX ORDER — VERAPAMIL HYDROCHLORIDE 240 MG/1
240 CAPSULE, EXTENDED RELEASE ORAL NIGHTLY
COMMUNITY
End: 2023-08-06

## 2023-08-06 RX ORDER — CIPROFLOXACIN 2 MG/ML
400 INJECTION, SOLUTION INTRAVENOUS
Status: DISCONTINUED | OUTPATIENT
Start: 2023-08-07 | End: 2023-08-08

## 2023-08-06 RX ADMIN — ONDANSETRON 4 MG: 2 INJECTION INTRAMUSCULAR; INTRAVENOUS at 04:08

## 2023-08-06 RX ADMIN — TIMOLOL MALEATE 1 DROP: 5 SOLUTION OPHTHALMIC at 08:08

## 2023-08-06 RX ADMIN — MORPHINE SULFATE 2 MG: 2 INJECTION, SOLUTION INTRAMUSCULAR; INTRAVENOUS at 08:08

## 2023-08-06 RX ADMIN — LEVOFLOXACIN 750 MG: 5 INJECTION, SOLUTION INTRAVENOUS at 08:08

## 2023-08-06 RX ADMIN — ONDANSETRON 4 MG: 2 INJECTION INTRAMUSCULAR; INTRAVENOUS at 03:08

## 2023-08-06 RX ADMIN — ONDANSETRON 4 MG: 2 INJECTION INTRAMUSCULAR; INTRAVENOUS at 08:08

## 2023-08-06 RX ADMIN — HYDROMORPHONE HYDROCHLORIDE 0.5 MG: 0.5 INJECTION, SOLUTION INTRAMUSCULAR; INTRAVENOUS; SUBCUTANEOUS at 04:08

## 2023-08-06 RX ADMIN — METRONIDAZOLE 500 MG: 5 INJECTION, SOLUTION INTRAVENOUS at 05:08

## 2023-08-06 RX ADMIN — SODIUM CHLORIDE, SODIUM LACTATE, POTASSIUM CHLORIDE, AND CALCIUM CHLORIDE 1000 ML: .6; .31; .03; .02 INJECTION, SOLUTION INTRAVENOUS at 04:08

## 2023-08-06 RX ADMIN — FAMOTIDINE 20 MG: 10 INJECTION INTRAVENOUS at 03:08

## 2023-08-06 RX ADMIN — SODIUM CHLORIDE, SODIUM LACTATE, POTASSIUM CHLORIDE, AND CALCIUM CHLORIDE: .6; .31; .03; .02 INJECTION, SOLUTION INTRAVENOUS at 08:08

## 2023-08-06 RX ADMIN — VERAPAMIL HYDROCHLORIDE 240 MG: 240 TABLET, FILM COATED, EXTENDED RELEASE ORAL at 08:08

## 2023-08-06 NOTE — ED PROVIDER NOTES
Encounter Date: 2023       History     Chief Complaint   Patient presents with    Abdominal Pain     THIS AM    Nausea    Vomiting    Chest Pain     HPI    Jonna Pearl is a 63 year old female with a PMH 0f diverticulosis and peptic ulcer disease presenting to the ED with a chief concern of abdominal pain starting today. Pt reports epigastric abdominal pain that is mostly generalized to that area and non-radiating. She has had about two episodes of associated non-bloody, non-bilious vomiting. She reports normal bowel movements with scant blood secondary to her hemorrhoids. Also reports some fevers and chills.      Review of patient's allergies indicates:   Allergen Reactions    Neomycin-polymyxin Swelling    Cephalexin      Other reaction(s): Unknown    Doxycycline      Other reaction(s): Unknown    Iodinated contrast media      Other reaction(s): Unknown    Penicillins      Other reaction(s): Unknown    Valsartan      Other reaction(s): Unknown     Past Medical History:   Diagnosis Date    Allergy     Arthritis     Atrophic kidney     left. probably childhood illness or disease    Chronic kidney disease     Chronic rhinitis     Colon polyps     Depression     HEARING LOSS     Hiatal hernia     Hypertension     Insomnia     Migraines     Otitis media     Sleep apnea     mild    TIA (transient ischemic attack)      Past Surgical History:   Procedure Laterality Date     SECTION      COLONOSCOPY  2014    Dr Escobar 5 year yossi    COLONOSCOPY N/A 4/10/2019    Procedure: COLONOSCOPY;  Surgeon: Matt Orozco MD;  Location: [facility];  Service: Endoscopy;  Laterality: N/A;    ESOPHAGOGASTRODUODENOSCOPY N/A 2023    Procedure: EGD (ESOPHAGOGASTRODUODENOSCOPY);  Surgeon: Merrill Vela MD;  Location: [facility];  Service: Endoscopy;  Laterality: N/A;    HEMORRHOID SURGERY      HYSTERECTOMY      left mandibular node resection      OOPHORECTOMY       Family History   Problem Relation Age of Onset     Cancer Sister         breast    Diabetes Mother     Early death Father     Diabetes Paternal Grandmother     Breast cancer Maternal Aunt     Breast cancer Paternal Aunt      Social History     Tobacco Use    Smoking status: Former     Current packs/day: 0.00     Average packs/day: 1 pack/day for 18.0 years (18.0 ttl pk-yrs)     Types: Cigarettes     Start date: 2000     Quit date: 2018     Years since quittin.3    Smokeless tobacco: Never   Substance Use Topics    Alcohol use: No    Drug use: No     Review of Systems   Constitutional:  Positive for chills. Negative for fever.   Respiratory:  Positive for shortness of breath.    Cardiovascular:  Positive for chest pain.   Gastrointestinal:  Positive for abdominal pain, nausea and vomiting.   Genitourinary:  Negative for difficulty urinating.   Musculoskeletal:  Negative for back pain.   Skin:  Negative for color change.       Physical Exam     Initial Vitals   BP Pulse Resp Temp SpO2   23 1505 23 1505 23 1505 23 1516 23 1505   110/69 (!) 43 18 97.7 °F (36.5 °C) 99 %      MAP       --                Physical Exam    Nursing note and vitals reviewed.  Constitutional: She appears well-developed and well-nourished.   HENT:   Head: Normocephalic and atraumatic.   Eyes: Conjunctivae and EOM are normal.   Neck: Neck supple.   Normal range of motion.  Cardiovascular:  Regular rhythm.   Bradycardia present.         Pulmonary/Chest: Breath sounds normal.   Abdominal: Abdomen is soft and protuberant. There is abdominal tenderness in the epigastric area.   Musculoskeletal:         General: Normal range of motion.      Cervical back: Normal range of motion and neck supple.     Neurological: She is alert and oriented to person, place, and time. GCS score is 15. GCS eye subscore is 4. GCS verbal subscore is 5. GCS motor subscore is 6.   Skin: Skin is warm and dry. Capillary refill takes less than 2 seconds.         ED Course    Procedures  Labs Reviewed   CBC W/ AUTO DIFFERENTIAL - Abnormal; Notable for the following components:       Result Value    WBC 15.75 (*)     Hemoglobin 11.8 (*)     Hematocrit 36.8 (*)     RDW 17.8 (*)     Gran # (ANC) 13.5 (*)     Immature Grans (Abs) 0.05 (*)     Gran % 85.8 (*)     Lymph % 9.7 (*)     Mono % 3.6 (*)     All other components within normal limits   TSH   MAGNESIUM   COMPREHENSIVE METABOLIC PANEL   TROPONIN I HIGH SENSITIVITY   B-TYPE NATRIURETIC PEPTIDE   LIPASE   URINALYSIS, REFLEX TO URINE CULTURE        ECG Results              EKG 12-lead (In process)  Result time 08/06/23 15:16:32      In process by Interface, Lab In [facility] (08/06/23 15:16:32)                   Narrative:    Test Reason : R07.9,    Vent. Rate : 044 BPM     Atrial Rate : 044 BPM     P-R Int : 162 ms          QRS Dur : 082 ms      QT Int : 524 ms       P-R-T Axes : 043 050 071 degrees     QTc Int : 448 ms    Marked sinus bradycardia with Premature atrial complexes  Abnormal ECG  When compared with ECG of 10-OCT-2022 15:34,  No significant change was found    Referred By: [email or web address]   SELF           Confirmed By:                                   Imaging Results              X-Ray Chest PA And Lateral (In process)                      Medications   ondansetron injection 4 mg (has no administration in time range)   famotidine (PF) injection 20 mg (has no administration in time range)     Medical Decision Making:   History:   Old Medical Records: I decided to obtain old medical records.  Old Records Summarized: records from clinic visits.       <> Summary of Records: Obtained medical history from previous notes.  Initial Assessment:   63-year-old female with a past medical history of peptic ulcer disease and diverticulosis presents to the ED with a chief concern of epigastric abdominal pain.  On exam, she is bradycardic to the 40s, patient states that she has never had this before and old records concur.  Differential  Diagnosis:   Symptomatic bradycardia, ACS, calcium channel blocker toxicity, hemoperitoneum, intra-abdominal abscess, UTI, dehydration, electrolyte derangement  Independently Interpreted Test(s):   I have ordered and independently interpreted X-rays - see summary below.       <> Summary of X-Ray Reading(s): On my interpretations, EKG is sinus bradycardia. RR interval normal, QT interval within normal limits. No ST elevation or depression. Not concerning for active STEMI.  Clinical Tests:   Lab Tests: Ordered and Reviewed  The following lab test(s) were unremarkable: CBC and CMP       <> Summary of Lab: CBC with WBC of 15.75K indicative of possible infection.  Chloride 19, glucose 133.  No anion gap.   have no baseline to compare this to.  Patient does not have any leg swelling.  Troponin 3.2.  Radiological Study: Ordered and Reviewed  Medical Tests: Ordered and Reviewed  ED Management:  On evaluation, patient was nauseous with vomiting. She was given one round of Zofran with pepcid with minimal improvement of her symptoms.  Given her symptoms and medical history CT abdomen/pelvis ordered without contrast. Pt has a contrast allergy. CT showed diverticulitis.  She required an additional 4 of Zofran and 0.5 mg of Dilaudid for pain control.  Bradycardia improved with symptoms control. Given her frequent nausea and vomiting in addition to pain, we will consult internal medicine for admission.    Ansley Smith DO  [facility] PGY-3               ED Course as of 08/06/23 1714   Sun Aug 06, 2023   1540 WBC(!): 15.75 [KB]   1557 X-Ray Chest PA And Lateral  No acute cardiac or pulmonary process. [KB]   1603 BNP(!): 167 [KB]   1606 Troponin I High Sensitivity: 3.2 [KB]   1654 CT Abdomen Pelvis  Without Contrast  1.  Sigmoid diverticulitis.     2.  Gallstones in the gallbladder. [KB]      ED Course User Index  [KB] Ansley Smith DO                 Clinical Impression:   Final diagnoses:  [R07.9] Chest  pain  [R00.1, T50.905A] Bradycardia, drug induced               Ansley Smith DO  Resident  08/06/23 [id number]

## 2023-08-06 NOTE — ASSESSMENT & PLAN NOTE
Body mass index is 50.45 kg/m². Morbid obesity complicates all aspects of disease management from diagnostic modalities to treatment. Weight loss encouraged and health benefits explained to patient.     Dietitian consult  Prediabetic we will check A1c

## 2023-08-06 NOTE — PHARMACY MED REC
"              .    Admission Medication History     The home medication history was taken by Rossy Scott.    You may go to "Admission" then "Reconcile Home Medications" tabs to review and/or act upon these items.     The home medication list has been updated by the Pharmacy department.   Please read ALL comments highlighted in yellow.   Please address this information as you see fit.    Feel free to contact us if you have any questions or require assistance.      Medications listed below were obtained from: Patient/family and Analytic software- "[employer]"  No current facility-administered medications on file prior to encounter.     Current Outpatient Medications on File Prior to Encounter   Medication Sig Dispense Refill    gabapentin (NEURONTIN) 600 MG tablet Take 600 mg by mouth 3 (three) times daily.  9    lisinopriL (PRINIVIL,ZESTRIL) 40 MG tablet Take 40 mg by mouth once daily.      OZEMPIC 0.25 mg or 0.5 mg (2 mg/3 mL) pen injector Inject 0.25 mg into the skin every 7 days.      timolol maleate 0.5% (TIMOPTIC) 0.5 % Drop Place 1 drop into both eyes 2 (two) times daily.      ergocalciferol (ERGOCALCIFEROL) 50,000 unit Cap Take 50,000 Units by mouth every 7 days.      famotidine (PEPCID) 20 MG tablet Take 1 tablet (20 mg total) by mouth 2 (two) times daily. 60 tablet 0    ketoconazole (NIZORAL) 2 % shampoo APPLY ONE APPLICATION TO WET HAIR EVERY 3 TO 4 DAYS AS NEEDED      latanoprost 0.005 % ophthalmic solution INSTILL 1 DROP INTO AFFECTED EYE(S) ONCE DAILY AT BEDTIME  6    lisinopriL (PRINIVIL,ZESTRIL) 40 MG tablet Take 40 mg by mouth once daily.      NYSTOP powder Apply 1 g topically as needed.      pantoprazole (PROTONIX) 40 MG tablet Take 40 mg by mouth once daily.      topiramate (TOPAMAX) 50 MG tablet Take 1 tablet (50 mg total) by mouth once daily. 30 tablet 11    verapamil (CALAN-SR) 240 MG CR tablet Take 240 mg by mouth every evening.  2    [DISCONTINUED] gabapentin (NEURONTIN) 600 MG tablet Take 600 " mg by mouth 3 (three) times daily.      [DISCONTINUED] verapamiL (VERELAN) 240 MG C24P Take 240 mg by mouth nightly.           Rossy Scott  [id number]

## 2023-08-06 NOTE — HPI
Patient is a 63-year-old female with history of hypertension, prediabetes, TIA, morbid obesity, ulcers, hysterectomy, chronic right knee pain uses a cane.  He presents to the ED with complaints of epigastric burning pain 7/10, nausea and nonbilious vomiting and chills.  She was evaluated in ED with WBC 15.75, hbg 11.8, HCT 36.8, CTA abdomen showed sigmoid diverticulitis and gallstones in the gallbladder.  IV Levaquin, IV Flagyl, IV Zofran, IV Dilaudid, IV L lactate Ringer ordered in ED.    On assessment patient is alert oriented x 4 with complaints of nausea vomiting x1 in ED but reports several times at home.  She complained of epigastric discomfort in her lower abdomen moved up to epigastric and chest area, blood in stool started today. She reports pain is 7/10 even after pain medication, and prior to arrival to ED felt dizzy.  She denies any history of diverticulitis in the past.  She denies alcohol, illicit drugs, smoking.  She denies chest pain, lightheaded, dysuria, hematuria, fever.    Hospitalist asked to admit for acute diverticulitis        IMPRESSION: CT abd and pelvis 8/6/23  1.  Sigmoid diverticulitis.  2.  Gallstones in the gallbladder.

## 2023-08-06 NOTE — ASSESSMENT & PLAN NOTE
IV Levaquin, IV Flagyl ordered in ED we will continue with IV Cipro, IV Flagyl due to patient's allergies to penicillin to pcn  IV pain medication p.r.n. control pain  NPO   Occult stool  IV antiemetics p.r.n.  IV lactate Ringer 100 mL/HR    IMPRESSION CT abdomen pelvis 08/06/23  1.  Sigmoid diverticulitis.  2.  Gallstones in the gallbladder.

## 2023-08-06 NOTE — SUBJECTIVE & OBJECTIVE
Past Medical History:   Diagnosis Date    Allergy     Arthritis     Atrophic kidney     left. probably childhood illness or disease    Chronic kidney disease     Chronic rhinitis     Colon polyps     Depression     HEARING LOSS     Hiatal hernia     Hypertension     Insomnia     Migraines     Otitis media     Sleep apnea     mild    TIA (transient ischemic attack)        Past Surgical History:   Procedure Laterality Date     SECTION      COLONOSCOPY  2014    Dr Escobar 5 year yossi    COLONOSCOPY N/A 4/10/2019    Procedure: COLONOSCOPY;  Surgeon: Matt Orozco MD;  Location: [facility];  Service: Endoscopy;  Laterality: N/A;    ESOPHAGOGASTRODUODENOSCOPY N/A 2023    Procedure: EGD (ESOPHAGOGASTRODUODENOSCOPY);  Surgeon: Merrill Vela MD;  Location: [facility] END[facility];  Service: Endoscopy;  Laterality: N/A;    HEMORRHOID SURGERY      HYSTERECTOMY      left mandibular node resection      OOPHORECTOMY         Review of patient's allergies indicates:   Allergen Reactions    Neomycin-polymyxin Swelling    Cephalexin      Other reaction(s): Unknown    Doxycycline      Other reaction(s): Unknown    Iodinated contrast media      Other reaction(s): Unknown    Penicillins      Other reaction(s): Unknown    Valsartan      Other reaction(s): Unknown       No current facility-administered medications on file prior to encounter.     Current Outpatient Medications on File Prior to Encounter   Medication Sig    gabapentin (NEURONTIN) 600 MG tablet Take 600 mg by mouth 3 (three) times daily.    lisinopriL (PRINIVIL,ZESTRIL) 40 MG tablet Take 40 mg by mouth once daily.    OZEMPIC 0.25 mg or 0.5 mg (2 mg/3 mL) pen injector Inject 0.25 mg into the skin every 7 days.    timolol maleate 0.5% (TIMOPTIC) 0.5 % Drop Place 1 drop into both eyes 2 (two) times daily.    ergocalciferol (ERGOCALCIFEROL) 50,000 unit Cap Take 50,000 Units by mouth every 7 days.    famotidine (PEPCID) 20 MG tablet Take 1 tablet (20 mg total) by mouth 2  (two) times daily.    ketoconazole (NIZORAL) 2 % shampoo APPLY ONE APPLICATION TO WET HAIR EVERY 3 TO 4 DAYS AS NEEDED    latanoprost 0.005 % ophthalmic solution INSTILL 1 DROP INTO AFFECTED EYE(S) ONCE DAILY AT BEDTIME    lisinopriL (PRINIVIL,ZESTRIL) 40 MG tablet Take 40 mg by mouth once daily.    NYSTOP powder Apply 1 g topically as needed.    pantoprazole (PROTONIX) 40 MG tablet Take 40 mg by mouth once daily.    topiramate (TOPAMAX) 50 MG tablet Take 1 tablet (50 mg total) by mouth once daily.    verapamil (CALAN-SR) 240 MG CR tablet Take 240 mg by mouth every evening.    [DISCONTINUED] gabapentin (NEURONTIN) 600 MG tablet Take 600 mg by mouth 3 (three) times daily.    [DISCONTINUED] verapamiL (VERELAN) 240 MG C24P Take 240 mg by mouth nightly.     Family History       Problem Relation (Age of Onset)    Breast cancer Maternal Aunt, Paternal Aunt    Cancer Sister    Diabetes Mother, Paternal Grandmother    Early death Father          Tobacco Use    Smoking status: Former     Current packs/day: 0.00     Average packs/day: 1 pack/day for 18.0 years (18.0 ttl pk-yrs)     Types: Cigarettes     Start date: 2000     Quit date: 2018     Years since quittin.3    Smokeless tobacco: Never   Substance and Sexual Activity    Alcohol use: No    Drug use: No    Sexual activity: Not on file     Review of Systems   Constitutional:  Positive for appetite change and chills. Negative for activity change, diaphoresis, fatigue, fever and unexpected weight change.   HENT:  Negative for congestion, dental problem, facial swelling, nosebleeds, sinus pressure, sinus pain, sore throat and trouble swallowing.    Eyes:  Negative for pain and redness.   Respiratory:  Negative for apnea, cough, chest tightness, shortness of breath and wheezing.    Cardiovascular:  Negative for chest pain, palpitations and leg swelling.   Gastrointestinal:  Positive for abdominal pain, blood in stool, nausea and vomiting. Negative for abdominal  distention, anal bleeding, constipation and diarrhea.   Endocrine: Negative for polyphagia and polyuria.   Genitourinary:  Negative for decreased urine volume, difficulty urinating, dysuria, frequency, hematuria and urgency.   Musculoskeletal:  Negative for back pain, gait problem, joint swelling, myalgias, neck pain and neck stiffness.   Skin:  Negative for color change, pallor, rash and wound.   Neurological:  Negative for dizziness, seizures, syncope, facial asymmetry, speech difficulty, weakness, light-headedness, numbness and headaches.   Psychiatric/Behavioral:  Negative for agitation, behavioral problems, confusion, hallucinations, sleep disturbance and suicidal ideas.      Objective:     Vital Signs (Most Recent):  Temp: 97.7 °F (36.5 °C) (08/06/23 1516)  Pulse: 61 (08/06/23 1755)  Resp: 18 (08/06/23 1646)  BP: (!) 159/69 (08/06/23 1755)  SpO2: 97 % (08/06/23 1755) Vital Signs (24h Range):  Temp:  [97.7 °F (36.5 °C)] 97.7 °F (36.5 °C)  Pulse:  [43-61] 61  Resp:  [18] 18  SpO2:  [97 %-100 %] 97 %  BP: (110-159)/(61-69) 159/69     Weight: 121.1 kg (267 lb)  Body mass index is 50.45 kg/m².     Physical Exam  Vitals reviewed.   Constitutional:       General: She is not in acute distress.     Appearance: Normal appearance. She is not ill-appearing, toxic-appearing or diaphoretic.   HENT:      Head: Normocephalic.      Right Ear: External ear normal.      Left Ear: External ear normal.      Nose: No congestion or rhinorrhea.      Mouth/Throat:      Mouth: Mucous membranes are moist.      Pharynx: Oropharynx is clear. No oropharyngeal exudate or posterior oropharyngeal erythema.   Eyes:      Extraocular Movements: Extraocular movements intact.      Conjunctiva/sclera: Conjunctivae normal.      Pupils: Pupils are equal, round, and reactive to light.   Cardiovascular:      Rate and Rhythm: Normal rate and regular rhythm.      Pulses: Normal pulses.      Heart sounds: Normal heart sounds.   Pulmonary:      Effort:  Pulmonary effort is normal.      Breath sounds: Normal breath sounds.   Abdominal:      General: Abdomen is flat. Bowel sounds are normal.      Palpations: Abdomen is soft.   Genitourinary:     Rectum: Normal.   Musculoskeletal:         General: Tenderness (Right knee) present. Normal range of motion.      Cervical back: Normal range of motion and neck supple.   Skin:     General: Skin is warm and dry.      Capillary Refill: Capillary refill takes less than 2 seconds.   Neurological:      Mental Status: She is alert and oriented to person, place, and time.   Psychiatric:         Mood and Affect: Mood normal.              CRANIAL NERVES     CN III, IV, VI   Pupils are equal, round, and reactive to light.       Significant Labs: All pertinent labs within the past 24 hours have been reviewed.  Recent Lab Results         08/06/23  1519        Albumin 3.5       Alkaline Phosphatase 88       ALT 18       Anion Gap 9       AST 24       Baso # 0.03       Basophil % 0.2       BILIRUBIN TOTAL 0.8  Comment: For infants and newborns, interpretation of results should be based  on gestational age, weight and in agreement with clinical  observations.    Premature Infant recommended reference ranges:  Up to 24 hours.............<8.0 mg/dL  Up to 48 hours............<12.0 mg/dL  3-5 days..................<15.0 mg/dL  6-29 days.................<15.0 mg/dL           Comment: Values of less than 100 pg/ml are consistent with non-CHF populations.       BUN 21       Calcium 8.6       Chloride 110       CO2 19       Creatinine 0.9       Differential Method Automated       eGFR >60.0       Eos # 0.1       Eosinophil % 0.4       Glucose 133       Gran # (ANC) 13.5       Gran % 85.8       Hematocrit 36.8       Hemoglobin 11.8       Immature Grans (Abs) 0.05  Comment: Mild elevation in immature granulocytes is non specific and   can be seen in a variety of conditions including stress response,   acute inflammation, trauma and  pregnancy. Correlation with other   laboratory and clinical findings is essential.         Immature Granulocytes 0.3       Lipase 29       Lymph # 1.5       Lymph % 9.7       Magnesium 2.0       MCH 28.0       MCHC 32.1       MCV 87       Mono # 0.6       Mono % 3.6       MPV 10.3       nRBC 0       Platelets 439       Potassium 4.6       PROTEIN TOTAL 6.6       RBC 4.22       RDW 17.8       Sodium 138       Troponin I High Sensitivity 3.2  Comment: Troponin results differ between methods. Do not use   results between Troponin methods interchangeably.    Alkaline Phospatase levels above 400 U/L may   cause false positive results.    Access hsTnI should not be used for patients taking   Asfotase diamond (Strensiq).         WBC 15.75               Significant Imaging: I have reviewed all pertinent imaging results/findings within the past 24 hours.

## 2023-08-07 LAB
ALBUMIN SERPL BCP-MCNC: 3.4 G/DL (ref 3.5–5.2)
ALP SERPL-CCNC: 83 U/L (ref 55–135)
ALT SERPL W/O P-5'-P-CCNC: 18 U/L (ref 10–44)
ANION GAP SERPL CALC-SCNC: 6 MMOL/L (ref 8–16)
AST SERPL-CCNC: 16 U/L (ref 10–40)
BILIRUB SERPL-MCNC: 0.8 MG/DL (ref 0.1–1)
BILIRUB UR QL STRIP: NEGATIVE
BUN SERPL-MCNC: 16 MG/DL (ref 8–23)
CALCIUM SERPL-MCNC: 8.7 MG/DL (ref 8.7–10.5)
CHLORIDE SERPL-SCNC: 108 MMOL/L (ref 95–110)
CLARITY UR: CLEAR
CO2 SERPL-SCNC: 21 MMOL/L (ref 23–29)
COLOR UR: YELLOW
CREAT SERPL-MCNC: 0.8 MG/DL (ref 0.5–1.4)
ERYTHROCYTE [DISTWIDTH] IN BLOOD BY AUTOMATED COUNT: 17.3 % (ref 11.5–14.5)
EST. GFR  (NO RACE VARIABLE): >60 ML/MIN/1.73 M^2
GLUCOSE SERPL-MCNC: 121 MG/DL (ref 70–110)
GLUCOSE UR QL STRIP: ABNORMAL
HCT VFR BLD AUTO: 37.2 % (ref 37–48.5)
HGB BLD-MCNC: 11.9 G/DL (ref 12–16)
HGB UR QL STRIP: NEGATIVE
KETONES UR QL STRIP: ABNORMAL
LEUKOCYTE ESTERASE UR QL STRIP: NEGATIVE
MCH RBC QN AUTO: 27.6 PG (ref 27–31)
MCHC RBC AUTO-ENTMCNC: 32 G/DL (ref 32–36)
MCV RBC AUTO: 86 FL (ref 82–98)
NITRITE UR QL STRIP: NEGATIVE
PH UR STRIP: 8 [PH] (ref 5–8)
PLATELET # BLD AUTO: 386 K/UL (ref 150–450)
PMV BLD AUTO: 10 FL (ref 9.2–12.9)
POTASSIUM SERPL-SCNC: 3.8 MMOL/L (ref 3.5–5.1)
PROT SERPL-MCNC: 6.5 G/DL (ref 6–8.4)
PROT UR QL STRIP: NEGATIVE
RBC # BLD AUTO: 4.31 M/UL (ref 4–5.4)
SODIUM SERPL-SCNC: 135 MMOL/L (ref 136–145)
SP GR UR STRIP: 1.01 (ref 1–1.03)
URN SPEC COLLECT METH UR: ABNORMAL
UROBILINOGEN UR STRIP-ACNC: NEGATIVE EU/DL
WBC # BLD AUTO: 21.78 K/UL (ref 3.9–12.7)

## 2023-08-07 PROCEDURE — 85027 COMPLETE CBC AUTOMATED: CPT | Performed by: INTERNAL MEDICINE

## 2023-08-07 PROCEDURE — 80053 COMPREHEN METABOLIC PANEL: CPT | Performed by: INTERNAL MEDICINE

## 2023-08-07 PROCEDURE — 25000003 PHARM REV CODE 250: Performed by: STUDENT IN AN ORGANIZED HEALTH CARE EDUCATION/TRAINING PROGRAM

## 2023-08-07 PROCEDURE — 96375 TX/PRO/DX INJ NEW DRUG ADDON: CPT

## 2023-08-07 PROCEDURE — C9113 INJ PANTOPRAZOLE SODIUM, VIA: HCPCS | Performed by: STUDENT IN AN ORGANIZED HEALTH CARE EDUCATION/TRAINING PROGRAM

## 2023-08-07 PROCEDURE — 96366 THER/PROPH/DIAG IV INF ADDON: CPT

## 2023-08-07 PROCEDURE — 36415 COLL VENOUS BLD VENIPUNCTURE: CPT | Performed by: INTERNAL MEDICINE

## 2023-08-07 PROCEDURE — 25000003 PHARM REV CODE 250: Performed by: INTERNAL MEDICINE

## 2023-08-07 PROCEDURE — 25000003 PHARM REV CODE 250: Performed by: NURSE PRACTITIONER

## 2023-08-07 PROCEDURE — 63600175 PHARM REV CODE 636 W HCPCS: Performed by: INTERNAL MEDICINE

## 2023-08-07 PROCEDURE — 63600175 PHARM REV CODE 636 W HCPCS: Performed by: STUDENT IN AN ORGANIZED HEALTH CARE EDUCATION/TRAINING PROGRAM

## 2023-08-07 PROCEDURE — 81003 URINALYSIS AUTO W/O SCOPE: CPT | Performed by: STUDENT IN AN ORGANIZED HEALTH CARE EDUCATION/TRAINING PROGRAM

## 2023-08-07 PROCEDURE — 94761 N-INVAS EAR/PLS OXIMETRY MLT: CPT

## 2023-08-07 PROCEDURE — 96376 TX/PRO/DX INJ SAME DRUG ADON: CPT

## 2023-08-07 PROCEDURE — 21400001 HC TELEMETRY ROOM

## 2023-08-07 PROCEDURE — 63600175 PHARM REV CODE 636 W HCPCS: Performed by: NURSE PRACTITIONER

## 2023-08-07 RX ORDER — POTASSIUM CHLORIDE 7.45 MG/ML
80 INJECTION INTRAVENOUS
Status: DISCONTINUED | OUTPATIENT
Start: 2023-08-07 | End: 2023-08-11 | Stop reason: HOSPADM

## 2023-08-07 RX ORDER — POTASSIUM CHLORIDE 7.45 MG/ML
60 INJECTION INTRAVENOUS
Status: DISCONTINUED | OUTPATIENT
Start: 2023-08-07 | End: 2023-08-11 | Stop reason: HOSPADM

## 2023-08-07 RX ORDER — MAGNESIUM SULFATE HEPTAHYDRATE 40 MG/ML
4 INJECTION, SOLUTION INTRAVENOUS
Status: DISCONTINUED | OUTPATIENT
Start: 2023-08-07 | End: 2023-08-11 | Stop reason: HOSPADM

## 2023-08-07 RX ORDER — CALCIUM GLUCONATE 20 MG/ML
1 INJECTION, SOLUTION INTRAVENOUS
Status: DISCONTINUED | OUTPATIENT
Start: 2023-08-07 | End: 2023-08-11 | Stop reason: HOSPADM

## 2023-08-07 RX ORDER — MAGNESIUM SULFATE HEPTAHYDRATE 40 MG/ML
2 INJECTION, SOLUTION INTRAVENOUS
Status: DISCONTINUED | OUTPATIENT
Start: 2023-08-07 | End: 2023-08-11 | Stop reason: HOSPADM

## 2023-08-07 RX ORDER — CALCIUM GLUCONATE 20 MG/ML
3 INJECTION, SOLUTION INTRAVENOUS
Status: DISCONTINUED | OUTPATIENT
Start: 2023-08-07 | End: 2023-08-11 | Stop reason: HOSPADM

## 2023-08-07 RX ORDER — PANTOPRAZOLE SODIUM 40 MG/10ML
40 INJECTION, POWDER, LYOPHILIZED, FOR SOLUTION INTRAVENOUS DAILY
Status: DISCONTINUED | OUTPATIENT
Start: 2023-08-07 | End: 2023-08-10

## 2023-08-07 RX ORDER — CALCIUM GLUCONATE 20 MG/ML
2 INJECTION, SOLUTION INTRAVENOUS
Status: DISCONTINUED | OUTPATIENT
Start: 2023-08-07 | End: 2023-08-11 | Stop reason: HOSPADM

## 2023-08-07 RX ORDER — POTASSIUM CHLORIDE 7.45 MG/ML
40 INJECTION INTRAVENOUS
Status: DISCONTINUED | OUTPATIENT
Start: 2023-08-07 | End: 2023-08-11 | Stop reason: HOSPADM

## 2023-08-07 RX ADMIN — PROMETHAZINE HYDROCHLORIDE 12.5 MG: 25 INJECTION INTRAMUSCULAR; INTRAVENOUS at 03:08

## 2023-08-07 RX ADMIN — VERAPAMIL HYDROCHLORIDE 240 MG: 240 TABLET, FILM COATED, EXTENDED RELEASE ORAL at 09:08

## 2023-08-07 RX ADMIN — POTASSIUM CHLORIDE 40 MEQ: 7.46 INJECTION, SOLUTION INTRAVENOUS at 05:08

## 2023-08-07 RX ADMIN — METRONIDAZOLE 500 MG: 500 INJECTION, SOLUTION INTRAVENOUS at 05:08

## 2023-08-07 RX ADMIN — ONDANSETRON 4 MG: 2 INJECTION INTRAMUSCULAR; INTRAVENOUS at 11:08

## 2023-08-07 RX ADMIN — FAMOTIDINE 20 MG: 20 TABLET ORAL at 09:08

## 2023-08-07 RX ADMIN — ONDANSETRON 4 MG: 2 INJECTION INTRAMUSCULAR; INTRAVENOUS at 04:08

## 2023-08-07 RX ADMIN — MORPHINE SULFATE 2 MG: 2 INJECTION, SOLUTION INTRAMUSCULAR; INTRAVENOUS at 09:08

## 2023-08-07 RX ADMIN — MORPHINE SULFATE 2 MG: 2 INJECTION, SOLUTION INTRAMUSCULAR; INTRAVENOUS at 04:08

## 2023-08-07 RX ADMIN — FAMOTIDINE 20 MG: 20 TABLET ORAL at 12:08

## 2023-08-07 RX ADMIN — METRONIDAZOLE 500 MG: 500 INJECTION, SOLUTION INTRAVENOUS at 12:08

## 2023-08-07 RX ADMIN — METRONIDAZOLE 500 MG: 500 INJECTION, SOLUTION INTRAVENOUS at 09:08

## 2023-08-07 RX ADMIN — LISINOPRIL 40 MG: 20 TABLET ORAL at 09:08

## 2023-08-07 RX ADMIN — CIPROFLOXACIN 400 MG: 2 INJECTION, SOLUTION INTRAVENOUS at 09:08

## 2023-08-07 RX ADMIN — TIMOLOL MALEATE 1 DROP: 5 SOLUTION OPHTHALMIC at 09:08

## 2023-08-07 RX ADMIN — MORPHINE SULFATE 2 MG: 2 INJECTION, SOLUTION INTRAMUSCULAR; INTRAVENOUS at 12:08

## 2023-08-07 RX ADMIN — PANTOPRAZOLE SODIUM 40 MG: 40 INJECTION, POWDER, FOR SOLUTION INTRAVENOUS at 03:08

## 2023-08-07 NOTE — CARE UPDATE
08/07/23 0736   Patient Assessment/Suction   Level of Consciousness (AVPU) alert   PRE-TX-O2   Device (Oxygen Therapy) room air   SpO2 98 %   Pulse Oximetry Type Intermittent   $ Pulse Oximetry - Multiple Charge Pulse Oximetry - Multiple

## 2023-08-07 NOTE — CONSULTS
Pt with nausea and vomiting from Diverticulosis. State she does not follow any special diet at home.  Patient should Eat about 5 to 6 small meals daily or about every 3 to 4 hours. Do not skip meals. Every time you eat, include a small amount of protein (1 to 2 ounces) plus an additional food. Low fiber starch foods are the best choice to eat with protein. Limit acidic, spicy and high-fat or fried and greasy foods to reduce GI symptoms. Do not eat raw fruits and vegetables while on this diet. All fruits and vegetables need to be cooked and without peels or skins. Drink a lot of fluids, at least 8 cups of fluid each day. Limit drinks with caffeine, sugar, and sugar substitutes. Plain water is the best choice. Avoid mixing drink packets or flavor drops into water.   Recommend increasing fiber after a few weeks. Fiber is part of whole grains, fruits and vegetables (foods from plants) and needs to be slowly added back into your diet when your body is healed.

## 2023-08-07 NOTE — PLAN OF CARE
Patient transfer via stretcher from ED to  room 3005 with transporter. Initial assessment completed and documented. Patient ambulate with one person assist. NPO at this time. Tele in place. Bed alarm in place for safety. Condition stable. Patient rec'd IV pain medication prn. Continue with POC.

## 2023-08-07 NOTE — PLAN OF CARE
Met with patient at bedside, explained Medicare Outpatient Observation Notice (MOON), and left Q&A information sheet at bedside. MOON form scanned into media manager.       08/07/23 1514   ZIMMERMAN Message   Medicare Outpatient and Observation Notification regarding financial responsibility Given to patient/caregiver;Explained to patient/caregiver;Signed/date by patient/caregiver   Date ZIMMERMAN was signed 08/07/23   Time ZIMMERMAN was signed 1300

## 2023-08-07 NOTE — PROGRESS NOTES
[facility] Medicine  Progress Note    Patient name: Jonna Pearl  MRN: [id number]  Admit Date: 8/6/2023   LOS: 0 days     SUBJECTIVE:     Principal problem: Acute diverticulitis    Interval History:      8/7- patient still feels some abd pain and nausea.  Wbc up to 21, afebrile.  Will keep overnight and continue IV abx.  Holding off gi or surgery consult for now.  Suspect she can go home tomorrow with PO abx and follow up with GI for diverticulitis.     Scheduled Meds:   ciprofloxacin  400 mg Intravenous Q12H    famotidine  20 mg Oral BID    lisinopriL  40 mg Oral Daily    metronidazole  500 mg Intravenous Q8H    pantoprazole  40 mg Intravenous Daily    timolol maleate 0.5%  1 drop Both Eyes BID    verapamiL  240 mg Oral QHS     Continuous Infusions:   lactated ringers 100 mL/hr at 08/06/23 2059     PRN Meds:calcium gluconate IVPB, calcium gluconate IVPB, calcium gluconate IVPB, magnesium sulfate IVPB, magnesium sulfate IVPB, melatonin, morphine, ondansetron, potassium chloride **AND** potassium chloride **AND** potassium chloride, promethazine (PHENERGAN) 12.5 mg in dextrose 5 % (D5W) 50 mL IVPB, sodium chloride 0.9%, sodium phosphate 15 mmol in dextrose 5 % (D5W) 250 mL IVPB, sodium phosphate 20.01 mmol in dextrose 5 % (D5W) 250 mL IVPB, sodium phosphate 30 mmol in dextrose 5 % (D5W) 250 mL IVPB    Review of patient's allergies indicates:   Allergen Reactions    Neomycin-polymyxin Swelling    Cephalexin      Other reaction(s): Unknown    Doxycycline      Other reaction(s): Unknown    Iodinated contrast media      Other reaction(s): Unknown    Penicillins      Other reaction(s): Unknown    Valsartan      Other reaction(s): Unknown       Review of Systems: As per interval history    OBJECTIVE:     Vital Signs (Most Recent)  Temp: 98.3 °F (36.8 °C) (08/07/23 1110)  Pulse: 62 (08/07/23 1154)  Resp: 20 (08/07/23 1110)  BP: (!) 179/78 (08/07/23 1110)  SpO2: 99 % (08/07/23 1110)    Vital Signs  Range (Last 24H):  Temp:  [97.7 °F (36.5 °C)-98.3 °F (36.8 °C)]   Pulse:  [57-65]   Resp:  [18-20]   BP: (136-179)/(64-78)   SpO2:  [97 %-99 %]     I & O (Last 24H):  Intake/Output Summary (Last 24 hours) at 8/7/2023 1550  Last data filed at 8/7/2023 1452  Gross per 24 hour   Intake 1130 ml   Output 700 ml   Net 430 ml       Physical Exam:  General: appears comfortable, c/o nausea  Eyes: No conjunctival injection. No scleral icterus.  ENT: Hearing grossly intact. No discharge from ears. No nasal discharge.   Neck: Supple, trachea midline. No JVD  CVS: RRR. No LE edema BL  Lungs:  No tachypnea or accessory muscle use.  Clear to auscultation bilaterally  Abdomen:  obese. Soft, nontender and nondistended. Neuro: AOx3. Moves all extremities. Follows commands. Responds appropriately   Skin:  No rash or erythema noted    Laboratory:  I have reviewed all pertinent lab results within the past 24 hours.      ASSESSMENT/PLAN:     62 yo female here for abdominal pain, nausea and elevated wbc.  CT imaging suggestive of fairly mild diverticulitis.  Will monitor overnight, continue IVF and IV abx, prn nausea and pain control.      Active Hospital Problems    Diagnosis  POA    *Acute diverticulitis [K57.92]  Yes    Morbid obesity with BMI of 40.0-44.9, adult [E66.01, Z68.41]  Not Applicable     Chronic    Hypertension, essential [I10]  Yes     Chronic      Resolved Hospital Problems   No resolved problems to display.         Plan:     abdominal pain, nausea and elevated wbc.    CT imaging suggestive of fairly mild diverticulitis.    -Will monitor overnight on obs unit  -continue IVF   -IV abx  -prn nausea and pain control.   -defer surgical or GI consult for now      VTE Risk Mitigation (From admission, onward)           Ordered     IP VTE HIGH RISK PATIENT  Once         08/06/23 1805     Place sequential compression device  Until discontinued         08/06/23 1805                        Department Hospital Medicine  [place]  [facility]  Jose Ulrich MD  Date of service: 08/07/2023

## 2023-08-07 NOTE — PLAN OF CARE
Met with patient at bedside to complete initial assessment. Patient / family reports patient DOES NOT have a living will and NO ONE is medical POA.     [facility]  Initial Discharge Assessment       Primary Care Provider: Padmini Lackey FNP    Admission Diagnosis: Diverticulitis [K57.92]    Admission Date: 8/6/2023  Expected Discharge Date: 8/7/2023    Transition of Care Barriers: (P) None    Payor: MEDICARE / Plan: MEDICARE PART A & B / Product Type: Government /     Extended Emergency Contact Information  Primary Emergency Contact: Donovan,Devi  Mobile Phone: [phone]  Relation: Mother  Secondary Emergency Contact: donovan pelayo  Mobile Phone: [phone]  Relation: Relative  Preferred language: English   needed? No    Discharge Plan A: (P) Home with family  Discharge Plan B: (P) Home with family      [facility] [place] - [address]  [address] [address]  Phone: [phone] Fax: [phone]      Initial Assessment (most recent)       Adult Discharge Assessment - 08/07/23 1514          Discharge Assessment    Assessment Type Discharge Planning Assessment (P)      Confirmed/corrected address, phone number and insurance Yes (P)      Confirmed Demographics Correct on Facesheet (P)      Source of Information patient (P)      Does patient/caregiver understand observation status Yes (P)      Communicated HELENA with patient/caregiver No (P)      Reason For Admission acute diverticulitis (P)      People in Home sibling(s);parent(s) (P)      Facility Arrived From: home (P)      Do you expect to return to your current living situation? Yes (P)      Do you have help at home or someone to help you manage your care at home? Yes (P)      Who are your caregiver(s) and their phone number(s)? Devi Pearl (Mother)   [phone] (Mobile) (P)      Current cognitive status: Alert/Oriented (P)      Walking or Climbing Stairs ambulation difficulty, requires  equipment (P)      Mobility Management cane (P)      Equipment Currently Used at Home cane, straight;CPAP (P)      Readmission within 30 days? No (P)      Patient currently being followed by outpatient case management? No (P)      Do you currently have service(s) that help you manage your care at home? No (P)      Do you take prescription medications? Yes (P)      Do you have prescription coverage? No (P)      Coverage Medicare A and B (P)      Who is going to help you get home at discharge? Friend Angi or Sangita (P)      How do you get to doctors appointments? car, drives self (P)      Are you on dialysis? No (P)      Do you take coumadin? No (P)      Discharge Plan A Home with family (P)      Discharge Plan B Home with family (P)      DME Needed Upon Discharge  none (P)      Discharge Plan discussed with: Patient (P)      Transition of Care Barriers None (P)         OTHER    Name(s) of People in Home Devi Pearl (Mother)   [phone] (Mobile) (P)

## 2023-08-07 NOTE — H&P
[facility] Emergency Dept  Hospital Medicine  History & Physical    Patient Name: Jonna Pearl  MRN: [id number]  Patient Class: OP- Observation  Admission Date: 8/6/2023  Attending Physician: Johnny Manning MD   Primary Care Provider: Padmini Lackey FNP         Patient information was obtained from patient and ER records.     Subjective:     Principal Problem:Acute diverticulitis    Chief Complaint:   Chief Complaint   Patient presents with    Abdominal Pain     THIS AM    Nausea    Vomiting    Chest Pain        HPI: Patient is a 63-year-old female with history of hypertension, prediabetes, TIA, morbid obesity, ulcers, hysterectomy, chronic right knee pain uses a cane.  He presents to the ED with complaints of epigastric burning pain 7/10, nausea and nonbilious vomiting and chills.  She was evaluated in ED with WBC 15.75, hbg 11.8, HCT 36.8, CTA abdomen showed sigmoid diverticulitis and gallstones in the gallbladder.  IV Levaquin, IV Flagyl, IV Zofran, IV Dilaudid, IV L lactate Ringer ordered in ED.    On assessment patient is alert oriented x 4 with complaints of nausea vomiting x1 in ED but reports several times at home.  She complained of epigastric discomfort in her lower abdomen moved up to epigastric and chest area, blood in stool started today. She reports pain is 7/10 even after pain medication, and prior to arrival to ED felt dizzy.  She denies any history of diverticulitis in the past.  She denies alcohol, illicit drugs, smoking.  She denies chest pain, lightheaded, dysuria, hematuria, fever.    Hospitalist asked to admit for acute diverticulitis        IMPRESSION: CT abd and pelvis 8/6/23  1.  Sigmoid diverticulitis.  2.  Gallstones in the gallbladder.         Past Medical History:   Diagnosis Date    Allergy     Arthritis     Atrophic kidney     left. probably childhood illness or disease    Chronic kidney disease     Chronic rhinitis     Colon polyps     Depression     HEARING  LOSS     Hiatal hernia     Hypertension     Insomnia     Migraines     Otitis media     Sleep apnea     mild    TIA (transient ischemic attack)        Past Surgical History:   Procedure Laterality Date     SECTION      COLONOSCOPY  2014    Dr Escobar 5 year yossi    COLONOSCOPY N/A 4/10/2019    Procedure: COLONOSCOPY;  Surgeon: Matt Orozco MD;  Location: [facility];  Service: Endoscopy;  Laterality: N/A;    ESOPHAGOGASTRODUODENOSCOPY N/A 2023    Procedure: EGD (ESOPHAGOGASTRODUODENOSCOPY);  Surgeon: Merrill Vela MD;  Location: [facility] ENDO;  Service: Endoscopy;  Laterality: N/A;    HEMORRHOID SURGERY      HYSTERECTOMY      left mandibular node resection      OOPHORECTOMY         Review of patient's allergies indicates:   Allergen Reactions    Neomycin-polymyxin Swelling    Cephalexin      Other reaction(s): Unknown    Doxycycline      Other reaction(s): Unknown    Iodinated contrast media      Other reaction(s): Unknown    Penicillins      Other reaction(s): Unknown    Valsartan      Other reaction(s): Unknown       No current facility-administered medications on file prior to encounter.     Current Outpatient Medications on File Prior to Encounter   Medication Sig    gabapentin (NEURONTIN) 600 MG tablet Take 600 mg by mouth 3 (three) times daily.    lisinopriL (PRINIVIL,ZESTRIL) 40 MG tablet Take 40 mg by mouth once daily.    OZEMPIC 0.25 mg or 0.5 mg (2 mg/3 mL) pen injector Inject 0.25 mg into the skin every 7 days.    timolol maleate 0.5% (TIMOPTIC) 0.5 % Drop Place 1 drop into both eyes 2 (two) times daily.    ergocalciferol (ERGOCALCIFEROL) 50,000 unit Cap Take 50,000 Units by mouth every 7 days.    famotidine (PEPCID) 20 MG tablet Take 1 tablet (20 mg total) by mouth 2 (two) times daily.    ketoconazole (NIZORAL) 2 % shampoo APPLY ONE APPLICATION TO WET HAIR EVERY 3 TO 4 DAYS AS NEEDED    latanoprost 0.005 % ophthalmic solution INSTILL 1 DROP INTO AFFECTED  EYE(S) ONCE DAILY AT BEDTIME    lisinopriL (PRINIVIL,ZESTRIL) 40 MG tablet Take 40 mg by mouth once daily.    NYSTOP powder Apply 1 g topically as needed.    pantoprazole (PROTONIX) 40 MG tablet Take 40 mg by mouth once daily.    topiramate (TOPAMAX) 50 MG tablet Take 1 tablet (50 mg total) by mouth once daily.    verapamil (CALAN-SR) 240 MG CR tablet Take 240 mg by mouth every evening.    [DISCONTINUED] gabapentin (NEURONTIN) 600 MG tablet Take 600 mg by mouth 3 (three) times daily.    [DISCONTINUED] verapamiL (VERELAN) 240 MG C24P Take 240 mg by mouth nightly.     Family History       Problem Relation (Age of Onset)    Breast cancer Maternal Aunt, Paternal Aunt    Cancer Sister    Diabetes Mother, Paternal Grandmother    Early death Father          Tobacco Use    Smoking status: Former     Current packs/day: 0.00     Average packs/day: 1 pack/day for 18.0 years (18.0 ttl pk-yrs)     Types: Cigarettes     Start date: 2000     Quit date: 2018     Years since quittin.3    Smokeless tobacco: Never   Substance and Sexual Activity    Alcohol use: No    Drug use: No    Sexual activity: Not on file     Review of Systems   Constitutional:  Positive for appetite change and chills. Negative for activity change, diaphoresis, fatigue, fever and unexpected weight change.   HENT:  Negative for congestion, dental problem, facial swelling, nosebleeds, sinus pressure, sinus pain, sore throat and trouble swallowing.    Eyes:  Negative for pain and redness.   Respiratory:  Negative for apnea, cough, chest tightness, shortness of breath and wheezing.    Cardiovascular:  Negative for chest pain, palpitations and leg swelling.   Gastrointestinal:  Positive for abdominal pain, blood in stool, nausea and vomiting. Negative for abdominal distention, anal bleeding, constipation and diarrhea.   Endocrine: Negative for polyphagia and polyuria.   Genitourinary:  Negative for decreased urine volume, difficulty urinating,  dysuria, frequency, hematuria and urgency.   Musculoskeletal:  Negative for back pain, gait problem, joint swelling, myalgias, neck pain and neck stiffness.   Skin:  Negative for color change, pallor, rash and wound.   Neurological:  Negative for dizziness, seizures, syncope, facial asymmetry, speech difficulty, weakness, light-headedness, numbness and headaches.   Psychiatric/Behavioral:  Negative for agitation, behavioral problems, confusion, hallucinations, sleep disturbance and suicidal ideas.      Objective:     Vital Signs (Most Recent):  Temp: 97.7 °F (36.5 °C) (08/06/23 1516)  Pulse: 61 (08/06/23 1755)  Resp: 18 (08/06/23 1646)  BP: (!) 159/69 (08/06/23 1755)  SpO2: 97 % (08/06/23 1755) Vital Signs (24h Range):  Temp:  [97.7 °F (36.5 °C)] 97.7 °F (36.5 °C)  Pulse:  [43-61] 61  Resp:  [18] 18  SpO2:  [97 %-100 %] 97 %  BP: (110-159)/(61-69) 159/69     Weight: 121.1 kg (267 lb)  Body mass index is 50.45 kg/m².     Physical Exam  Vitals reviewed.   Constitutional:       General: She is not in acute distress.     Appearance: Normal appearance. She is not ill-appearing, toxic-appearing or diaphoretic.   HENT:      Head: Normocephalic.      Right Ear: External ear normal.      Left Ear: External ear normal.      Nose: No congestion or rhinorrhea.      Mouth/Throat:      Mouth: Mucous membranes are moist.      Pharynx: Oropharynx is clear. No oropharyngeal exudate or posterior oropharyngeal erythema.   Eyes:      Extraocular Movements: Extraocular movements intact.      Conjunctiva/sclera: Conjunctivae normal.      Pupils: Pupils are equal, round, and reactive to light.   Cardiovascular:      Rate and Rhythm: Normal rate and regular rhythm.      Pulses: Normal pulses.      Heart sounds: Normal heart sounds.   Pulmonary:      Effort: Pulmonary effort is normal.      Breath sounds: Normal breath sounds.   Abdominal:      General: Abdomen is flat. Bowel sounds are normal.      Palpations: Abdomen is soft.    Genitourinary:     Rectum: Normal.   Musculoskeletal:         General: Tenderness (Right knee) present. Normal range of motion.      Cervical back: Normal range of motion and neck supple.   Skin:     General: Skin is warm and dry.      Capillary Refill: Capillary refill takes less than 2 seconds.   Neurological:      Mental Status: She is alert and oriented to person, place, and time.   Psychiatric:         Mood and Affect: Mood normal.              CRANIAL NERVES     CN III, IV, VI   Pupils are equal, round, and reactive to light.       Significant Labs: All pertinent labs within the past 24 hours have been reviewed.  Recent Lab Results         08/06/23  1519        Albumin 3.5       Alkaline Phosphatase 88       ALT 18       Anion Gap 9       AST 24       Baso # 0.03       Basophil % 0.2       BILIRUBIN TOTAL 0.8  Comment: For infants and newborns, interpretation of results should be based  on gestational age, weight and in agreement with clinical  observations.    Premature Infant recommended reference ranges:  Up to 24 hours.............<8.0 mg/dL  Up to 48 hours............<12.0 mg/dL  3-5 days..................<15.0 mg/dL  6-29 days.................<15.0 mg/dL           Comment: Values of less than 100 pg/ml are consistent with non-CHF populations.       BUN 21       Calcium 8.6       Chloride 110       CO2 19       Creatinine 0.9       Differential Method Automated       eGFR >60.0       Eos # 0.1       Eosinophil % 0.4       Glucose 133       Gran # (ANC) 13.5       Gran % 85.8       Hematocrit 36.8       Hemoglobin 11.8       Immature Grans (Abs) 0.05  Comment: Mild elevation in immature granulocytes is non specific and   can be seen in a variety of conditions including stress response,   acute inflammation, trauma and pregnancy. Correlation with other   laboratory and clinical findings is essential.         Immature Granulocytes 0.3       Lipase 29       Lymph # 1.5       Lymph % 9.7        Magnesium 2.0       MCH 28.0       MCHC 32.1       MCV 87       Mono # 0.6       Mono % 3.6       MPV 10.3       nRBC 0       Platelets 439       Potassium 4.6       PROTEIN TOTAL 6.6       RBC 4.22       RDW 17.8       Sodium 138       Troponin I High Sensitivity 3.2  Comment: Troponin results differ between methods. Do not use   results between Troponin methods interchangeably.    Alkaline Phospatase levels above 400 U/L may   cause false positive results.    Access hsTnI should not be used for patients taking   Asfotase diamond (Strensiq).         WBC 15.75               Significant Imaging: I have reviewed all pertinent imaging results/findings within the past 24 hours.    Assessment/Plan:     * Acute diverticulitis  IV Levaquin, IV Flagyl ordered in ED we will continue with IV Cipro, IV Flagyl due to patient's allergies to penicillin to pcn  IV pain medication p.r.n. control pain  NPO   Occult stool  IV antiemetics p.r.n.  IV lactate Ringer 100 mL/HR    IMPRESSION CT abdomen pelvis 08/06/23  1.  Sigmoid diverticulitis.  2.  Gallstones in the gallbladder.    Morbid obesity with BMI of 40.0-44.9, adult  Body mass index is 50.45 kg/m². Morbid obesity complicates all aspects of disease management from diagnostic modalities to treatment. Weight loss encouraged and health benefits explained to patient.     Dietitian consult  Prediabetic we will check A1c    Hypertension, essential  Controlled/chronic  Continue home medication  Vital signs        VTE Risk Mitigation (From admission, onward)         Ordered     IP VTE HIGH RISK PATIENT  Once         08/06/23 1805     Place sequential compression device  Until discontinued         08/06/23 1805                     On 08/06/2023, patient should be placed in hospital observation services under my care in collaboration with Dr. Nigel Germain, JAS  Department of Hospital Medicine  [facility] - Emergency Dept

## 2023-08-08 LAB
ALBUMIN SERPL BCP-MCNC: 2.9 G/DL (ref 3.5–5.2)
ALP SERPL-CCNC: 79 U/L (ref 55–135)
ALT SERPL W/O P-5'-P-CCNC: 14 U/L (ref 10–44)
ANION GAP SERPL CALC-SCNC: 6 MMOL/L (ref 8–16)
AST SERPL-CCNC: 13 U/L (ref 10–40)
BASOPHILS # BLD AUTO: 0.02 K/UL (ref 0–0.2)
BASOPHILS NFR BLD: 0.1 % (ref 0–1.9)
BILIRUB SERPL-MCNC: 0.7 MG/DL (ref 0.1–1)
BUN SERPL-MCNC: 10 MG/DL (ref 8–23)
CALCIUM SERPL-MCNC: 8.4 MG/DL (ref 8.7–10.5)
CHLORIDE SERPL-SCNC: 108 MMOL/L (ref 95–110)
CO2 SERPL-SCNC: 20 MMOL/L (ref 23–29)
CREAT SERPL-MCNC: 0.7 MG/DL (ref 0.5–1.4)
DIFFERENTIAL METHOD: ABNORMAL
EOSINOPHIL # BLD AUTO: 0 K/UL (ref 0–0.5)
EOSINOPHIL NFR BLD: 0 % (ref 0–8)
ERYTHROCYTE [DISTWIDTH] IN BLOOD BY AUTOMATED COUNT: 17.2 % (ref 11.5–14.5)
ERYTHROCYTE [DISTWIDTH] IN BLOOD BY AUTOMATED COUNT: 17.2 % (ref 11.5–14.5)
EST. GFR  (NO RACE VARIABLE): >60 ML/MIN/1.73 M^2
ESTIMATED AVG GLUCOSE: 123 MG/DL (ref 68–131)
GIANT PLATELETS BLD QL SMEAR: PRESENT
GLUCOSE SERPL-MCNC: 131 MG/DL (ref 70–110)
HBA1C MFR BLD: 5.9 % (ref 4.5–6.2)
HCT VFR BLD AUTO: 35.7 % (ref 37–48.5)
HCT VFR BLD AUTO: 35.7 % (ref 37–48.5)
HGB BLD-MCNC: 11.8 G/DL (ref 12–16)
HGB BLD-MCNC: 11.8 G/DL (ref 12–16)
IMM GRANULOCYTES # BLD AUTO: 0.16 K/UL (ref 0–0.04)
IMM GRANULOCYTES NFR BLD AUTO: 0.7 % (ref 0–0.5)
LACTATE SERPL-SCNC: 1.1 MMOL/L (ref 0.5–1.9)
LYMPHOCYTES # BLD AUTO: 1.6 K/UL (ref 1–4.8)
LYMPHOCYTES NFR BLD: 6.4 % (ref 18–48)
MCH RBC QN AUTO: 27.9 PG (ref 27–31)
MCH RBC QN AUTO: 27.9 PG (ref 27–31)
MCHC RBC AUTO-ENTMCNC: 33.1 G/DL (ref 32–36)
MCHC RBC AUTO-ENTMCNC: 33.1 G/DL (ref 32–36)
MCV RBC AUTO: 84 FL (ref 82–98)
MCV RBC AUTO: 84 FL (ref 82–98)
MONOCYTES # BLD AUTO: 2.3 K/UL (ref 0.3–1)
MONOCYTES NFR BLD: 9.4 % (ref 4–15)
NEUTROPHILS # BLD AUTO: 20.3 K/UL (ref 1.8–7.7)
NEUTROPHILS NFR BLD: 83.4 % (ref 38–73)
NRBC BLD-RTO: 0 /100 WBC
PLATELET # BLD AUTO: 335 K/UL (ref 150–450)
PLATELET # BLD AUTO: 335 K/UL (ref 150–450)
PMV BLD AUTO: 9.9 FL (ref 9.2–12.9)
PMV BLD AUTO: 9.9 FL (ref 9.2–12.9)
POTASSIUM SERPL-SCNC: 3.6 MMOL/L (ref 3.5–5.1)
PROT SERPL-MCNC: 5.9 G/DL (ref 6–8.4)
RBC # BLD AUTO: 4.23 M/UL (ref 4–5.4)
RBC # BLD AUTO: 4.23 M/UL (ref 4–5.4)
SODIUM SERPL-SCNC: 134 MMOL/L (ref 136–145)
WBC # BLD AUTO: 24.36 K/UL (ref 3.9–12.7)
WBC # BLD AUTO: 24.36 K/UL (ref 3.9–12.7)

## 2023-08-08 PROCEDURE — 63600175 PHARM REV CODE 636 W HCPCS: Performed by: STUDENT IN AN ORGANIZED HEALTH CARE EDUCATION/TRAINING PROGRAM

## 2023-08-08 PROCEDURE — 25000003 PHARM REV CODE 250: Performed by: STUDENT IN AN ORGANIZED HEALTH CARE EDUCATION/TRAINING PROGRAM

## 2023-08-08 PROCEDURE — 21400001 HC TELEMETRY ROOM

## 2023-08-08 PROCEDURE — 63600175 PHARM REV CODE 636 W HCPCS: Performed by: NURSE PRACTITIONER

## 2023-08-08 PROCEDURE — 80053 COMPREHEN METABOLIC PANEL: CPT | Performed by: INTERNAL MEDICINE

## 2023-08-08 PROCEDURE — 25000003 PHARM REV CODE 250: Performed by: NURSE PRACTITIONER

## 2023-08-08 PROCEDURE — C9113 INJ PANTOPRAZOLE SODIUM, VIA: HCPCS | Performed by: STUDENT IN AN ORGANIZED HEALTH CARE EDUCATION/TRAINING PROGRAM

## 2023-08-08 PROCEDURE — 83605 ASSAY OF LACTIC ACID: CPT | Performed by: STUDENT IN AN ORGANIZED HEALTH CARE EDUCATION/TRAINING PROGRAM

## 2023-08-08 PROCEDURE — 25000003 PHARM REV CODE 250: Performed by: INTERNAL MEDICINE

## 2023-08-08 PROCEDURE — 85025 COMPLETE CBC W/AUTO DIFF WBC: CPT | Performed by: STUDENT IN AN ORGANIZED HEALTH CARE EDUCATION/TRAINING PROGRAM

## 2023-08-08 PROCEDURE — 36415 COLL VENOUS BLD VENIPUNCTURE: CPT | Performed by: STUDENT IN AN ORGANIZED HEALTH CARE EDUCATION/TRAINING PROGRAM

## 2023-08-08 RX ORDER — ACETAMINOPHEN 325 MG/1
650 TABLET ORAL EVERY 6 HOURS PRN
Status: DISCONTINUED | OUTPATIENT
Start: 2023-08-08 | End: 2023-08-11 | Stop reason: HOSPADM

## 2023-08-08 RX ADMIN — PANTOPRAZOLE SODIUM 40 MG: 40 INJECTION, POWDER, FOR SOLUTION INTRAVENOUS at 08:08

## 2023-08-08 RX ADMIN — MORPHINE SULFATE 2 MG: 2 INJECTION, SOLUTION INTRAMUSCULAR; INTRAVENOUS at 02:08

## 2023-08-08 RX ADMIN — TIMOLOL MALEATE 1 DROP: 5 SOLUTION OPHTHALMIC at 01:08

## 2023-08-08 RX ADMIN — FAMOTIDINE 20 MG: 20 TABLET ORAL at 08:08

## 2023-08-08 RX ADMIN — SODIUM CHLORIDE, SODIUM LACTATE, POTASSIUM CHLORIDE, AND CALCIUM CHLORIDE: .6; .31; .03; .02 INJECTION, SOLUTION INTRAVENOUS at 05:08

## 2023-08-08 RX ADMIN — TIMOLOL MALEATE 1 DROP: 5 SOLUTION OPHTHALMIC at 08:08

## 2023-08-08 RX ADMIN — PIPERACILLIN SODIUM AND TAZOBACTAM SODIUM 3.38 G: 3; .375 INJECTION, POWDER, LYOPHILIZED, FOR SOLUTION INTRAVENOUS at 08:08

## 2023-08-08 RX ADMIN — METRONIDAZOLE 500 MG: 500 INJECTION, SOLUTION INTRAVENOUS at 04:08

## 2023-08-08 RX ADMIN — PROMETHAZINE HYDROCHLORIDE 12.5 MG: 25 INJECTION INTRAMUSCULAR; INTRAVENOUS at 06:08

## 2023-08-08 RX ADMIN — PROMETHAZINE HYDROCHLORIDE 12.5 MG: 25 INJECTION INTRAMUSCULAR; INTRAVENOUS at 05:08

## 2023-08-08 RX ADMIN — METRONIDAZOLE 500 MG: 500 INJECTION, SOLUTION INTRAVENOUS at 01:08

## 2023-08-08 RX ADMIN — POTASSIUM CHLORIDE 40 MEQ: 7.46 INJECTION, SOLUTION INTRAVENOUS at 08:08

## 2023-08-08 RX ADMIN — METRONIDAZOLE 500 MG: 500 INJECTION, SOLUTION INTRAVENOUS at 08:08

## 2023-08-08 RX ADMIN — CIPROFLOXACIN 400 MG: 2 INJECTION, SOLUTION INTRAVENOUS at 09:08

## 2023-08-08 RX ADMIN — ACETAMINOPHEN 650 MG: 325 TABLET ORAL at 08:08

## 2023-08-08 RX ADMIN — VERAPAMIL HYDROCHLORIDE 240 MG: 240 TABLET, FILM COATED, EXTENDED RELEASE ORAL at 08:08

## 2023-08-08 NOTE — PLAN OF CARE
Patient remains injury free. Shift assessment completed and documented. Ambulate to bathroom with standby assist. She tolerated PO well. Tele in place. No pain medication or nausea administered on this shift. Condition stable.

## 2023-08-09 LAB
ALBUMIN SERPL BCP-MCNC: 2.7 G/DL (ref 3.5–5.2)
ALP SERPL-CCNC: 76 U/L (ref 55–135)
ALT SERPL W/O P-5'-P-CCNC: 13 U/L (ref 10–44)
ANION GAP SERPL CALC-SCNC: 5 MMOL/L (ref 8–16)
AST SERPL-CCNC: 8 U/L (ref 10–40)
BASOPHILS # BLD AUTO: 0.03 K/UL (ref 0–0.2)
BASOPHILS NFR BLD: 0.1 % (ref 0–1.9)
BILIRUB SERPL-MCNC: 0.7 MG/DL (ref 0.1–1)
BUN SERPL-MCNC: 10 MG/DL (ref 8–23)
CALCIUM SERPL-MCNC: 8.3 MG/DL (ref 8.7–10.5)
CHLORIDE SERPL-SCNC: 111 MMOL/L (ref 95–110)
CO2 SERPL-SCNC: 22 MMOL/L (ref 23–29)
CREAT SERPL-MCNC: 0.7 MG/DL (ref 0.5–1.4)
DIFFERENTIAL METHOD: ABNORMAL
EOSINOPHIL # BLD AUTO: 0 K/UL (ref 0–0.5)
EOSINOPHIL NFR BLD: 0.1 % (ref 0–8)
ERYTHROCYTE [DISTWIDTH] IN BLOOD BY AUTOMATED COUNT: 17.9 % (ref 11.5–14.5)
ERYTHROCYTE [DISTWIDTH] IN BLOOD BY AUTOMATED COUNT: 17.9 % (ref 11.5–14.5)
EST. GFR  (NO RACE VARIABLE): >60 ML/MIN/1.73 M^2
GLUCOSE SERPL-MCNC: 109 MG/DL (ref 70–110)
HCT VFR BLD AUTO: 34.1 % (ref 37–48.5)
HCT VFR BLD AUTO: 34.1 % (ref 37–48.5)
HGB BLD-MCNC: 11.2 G/DL (ref 12–16)
HGB BLD-MCNC: 11.2 G/DL (ref 12–16)
IMM GRANULOCYTES # BLD AUTO: 0.14 K/UL (ref 0–0.04)
IMM GRANULOCYTES NFR BLD AUTO: 0.7 % (ref 0–0.5)
LYMPHOCYTES # BLD AUTO: 2 K/UL (ref 1–4.8)
LYMPHOCYTES NFR BLD: 10 % (ref 18–48)
MCH RBC QN AUTO: 28.1 PG (ref 27–31)
MCH RBC QN AUTO: 28.1 PG (ref 27–31)
MCHC RBC AUTO-ENTMCNC: 32.8 G/DL (ref 32–36)
MCHC RBC AUTO-ENTMCNC: 32.8 G/DL (ref 32–36)
MCV RBC AUTO: 86 FL (ref 82–98)
MCV RBC AUTO: 86 FL (ref 82–98)
MONOCYTES # BLD AUTO: 2 K/UL (ref 0.3–1)
MONOCYTES NFR BLD: 10.1 % (ref 4–15)
NEUTROPHILS # BLD AUTO: 15.8 K/UL (ref 1.8–7.7)
NEUTROPHILS NFR BLD: 79 % (ref 38–73)
NRBC BLD-RTO: 0 /100 WBC
PLATELET # BLD AUTO: 281 K/UL (ref 150–450)
PLATELET # BLD AUTO: 281 K/UL (ref 150–450)
PMV BLD AUTO: 10 FL (ref 9.2–12.9)
PMV BLD AUTO: 10 FL (ref 9.2–12.9)
POTASSIUM SERPL-SCNC: 3.7 MMOL/L (ref 3.5–5.1)
PROT SERPL-MCNC: 5.7 G/DL (ref 6–8.4)
RBC # BLD AUTO: 3.99 M/UL (ref 4–5.4)
RBC # BLD AUTO: 3.99 M/UL (ref 4–5.4)
SODIUM SERPL-SCNC: 138 MMOL/L (ref 136–145)
WBC # BLD AUTO: 20.07 K/UL (ref 3.9–12.7)
WBC # BLD AUTO: 20.07 K/UL (ref 3.9–12.7)

## 2023-08-09 PROCEDURE — 63600175 PHARM REV CODE 636 W HCPCS: Performed by: NURSE PRACTITIONER

## 2023-08-09 PROCEDURE — C9113 INJ PANTOPRAZOLE SODIUM, VIA: HCPCS | Performed by: STUDENT IN AN ORGANIZED HEALTH CARE EDUCATION/TRAINING PROGRAM

## 2023-08-09 PROCEDURE — 25000003 PHARM REV CODE 250: Performed by: NURSE PRACTITIONER

## 2023-08-09 PROCEDURE — 36415 COLL VENOUS BLD VENIPUNCTURE: CPT | Performed by: INTERNAL MEDICINE

## 2023-08-09 PROCEDURE — 85025 COMPLETE CBC W/AUTO DIFF WBC: CPT | Performed by: STUDENT IN AN ORGANIZED HEALTH CARE EDUCATION/TRAINING PROGRAM

## 2023-08-09 PROCEDURE — 25000003 PHARM REV CODE 250: Performed by: INTERNAL MEDICINE

## 2023-08-09 PROCEDURE — 80053 COMPREHEN METABOLIC PANEL: CPT | Performed by: INTERNAL MEDICINE

## 2023-08-09 PROCEDURE — 21400001 HC TELEMETRY ROOM

## 2023-08-09 PROCEDURE — 25000003 PHARM REV CODE 250: Performed by: STUDENT IN AN ORGANIZED HEALTH CARE EDUCATION/TRAINING PROGRAM

## 2023-08-09 PROCEDURE — 63600175 PHARM REV CODE 636 W HCPCS: Performed by: STUDENT IN AN ORGANIZED HEALTH CARE EDUCATION/TRAINING PROGRAM

## 2023-08-09 RX ADMIN — TIMOLOL MALEATE 1 DROP: 5 SOLUTION OPHTHALMIC at 08:08

## 2023-08-09 RX ADMIN — PROMETHAZINE HYDROCHLORIDE 12.5 MG: 25 INJECTION INTRAMUSCULAR; INTRAVENOUS at 11:08

## 2023-08-09 RX ADMIN — PANTOPRAZOLE SODIUM 40 MG: 40 INJECTION, POWDER, FOR SOLUTION INTRAVENOUS at 08:08

## 2023-08-09 RX ADMIN — PIPERACILLIN SODIUM AND TAZOBACTAM SODIUM 3.38 G: 3; .375 INJECTION, POWDER, LYOPHILIZED, FOR SOLUTION INTRAVENOUS at 01:08

## 2023-08-09 RX ADMIN — FAMOTIDINE 20 MG: 20 TABLET ORAL at 08:08

## 2023-08-09 RX ADMIN — VERAPAMIL HYDROCHLORIDE 240 MG: 240 TABLET, FILM COATED, EXTENDED RELEASE ORAL at 08:08

## 2023-08-09 RX ADMIN — ACETAMINOPHEN 650 MG: 325 TABLET ORAL at 08:08

## 2023-08-09 RX ADMIN — PIPERACILLIN SODIUM AND TAZOBACTAM SODIUM 3.38 G: 3; .375 INJECTION, POWDER, LYOPHILIZED, FOR SOLUTION INTRAVENOUS at 05:08

## 2023-08-09 RX ADMIN — MORPHINE SULFATE 2 MG: 2 INJECTION, SOLUTION INTRAMUSCULAR; INTRAVENOUS at 05:08

## 2023-08-09 RX ADMIN — PROMETHAZINE HYDROCHLORIDE 12.5 MG: 25 INJECTION INTRAMUSCULAR; INTRAVENOUS at 10:08

## 2023-08-09 RX ADMIN — MORPHINE SULFATE 2 MG: 2 INJECTION, SOLUTION INTRAMUSCULAR; INTRAVENOUS at 01:08

## 2023-08-09 RX ADMIN — MORPHINE SULFATE 2 MG: 2 INJECTION, SOLUTION INTRAMUSCULAR; INTRAVENOUS at 10:08

## 2023-08-09 RX ADMIN — PIPERACILLIN SODIUM AND TAZOBACTAM SODIUM 3.38 G: 3; .375 INJECTION, POWDER, LYOPHILIZED, FOR SOLUTION INTRAVENOUS at 11:08

## 2023-08-09 RX ADMIN — LISINOPRIL 40 MG: 20 TABLET ORAL at 08:08

## 2023-08-09 NOTE — PLAN OF CARE
Patient lying in be resting quietly. RR even and unlabored. Shift assessment completed and documented. Patient tolerated IV Zosyn well. Tele in place. Condition stable. No Iv medication administered at this time. Phenergan IV administered x1.

## 2023-08-09 NOTE — PROGRESS NOTES
[facility] Medicine  Progress Note    Patient name: Jonna Pearl  MRN: [id number]  Admit Date: 8/6/2023   LOS: 1 day     SUBJECTIVE:     Principal problem: Acute diverticulitis    Interval History:      8/8- states she feels better.  No fever recorded but she felt chills at some point.  Her wbc is increasing despite cipro flagyl.  Will continue this regimen one more night as she says she feels better and she does have multiple abx allergies limiting selection.      8/7- patient still feels some abd pain and nausea.  Wbc up to 21, afebrile.  Will keep overnight and continue IV abx.  Holding off gi or surgery consult for now.  Suspect she can go home tomorrow with PO abx and follow up with GI for diverticulitis.     Scheduled Meds:   ciprofloxacin  400 mg Intravenous Q12H    famotidine  20 mg Oral BID    lisinopriL  40 mg Oral Daily    metronidazole  500 mg Intravenous Q8H    pantoprazole  40 mg Intravenous Daily    timolol maleate 0.5%  1 drop Both Eyes BID    verapamiL  240 mg Oral QHS     Continuous Infusions:   lactated ringers 100 mL/hr at 08/08/23 0549     PRN Meds:acetaminophen, calcium gluconate IVPB, calcium gluconate IVPB, calcium gluconate IVPB, magnesium sulfate IVPB, magnesium sulfate IVPB, melatonin, morphine, ondansetron, potassium chloride **AND** potassium chloride **AND** potassium chloride, promethazine (PHENERGAN) 12.5 mg in dextrose 5 % (D5W) 50 mL IVPB, sodium chloride 0.9%, sodium phosphate 15 mmol in dextrose 5 % (D5W) 250 mL IVPB, sodium phosphate 20.01 mmol in dextrose 5 % (D5W) 250 mL IVPB, sodium phosphate 30 mmol in dextrose 5 % (D5W) 250 mL IVPB    Review of patient's allergies indicates:   Allergen Reactions    Neomycin-polymyxin Swelling    Cephalexin      Other reaction(s): Unknown    Doxycycline      Other reaction(s): Unknown    Iodinated contrast media      Other reaction(s): Unknown    Penicillins      Other reaction(s): Unknown    Valsartan      Other  reaction(s): Unknown       Review of Systems: As per interval history    OBJECTIVE:     Vital Signs (Most Recent)  Temp: 98.3 °F (36.8 °C) (08/08/23 1542)  Pulse: 73 (08/08/23 1542)  Resp: 18 (08/08/23 1542)  BP: (!) 156/83 (08/08/23 1542)  SpO2: 98 % (08/08/23 1542)    Vital Signs Range (Last 24H):  Temp:  [98.3 °F (36.8 °C)-98.6 °F (37 °C)]   Pulse:  [65-81]   Resp:  [18-20]   BP: ()/(57-83)   SpO2:  [98 %-99 %]     I & O (Last 24H):  Intake/Output Summary (Last 24 hours) at 8/8/2023 1939  Last data filed at 8/8/2023 1200  Gross per 24 hour   Intake 1659 ml   Output 1600 ml   Net 59 ml         Physical Exam:  General: appears comfortable, c/o nausea  Eyes: No conjunctival injection. No scleral icterus.  ENT: Hearing grossly intact. No discharge from ears. No nasal discharge.   Neck: Supple, trachea midline. No JVD  CVS: RRR. No LE edema BL  Lungs:  No tachypnea or accessory muscle use.  Clear to auscultation bilaterally  Abdomen:  obese. Soft, nontender and nondistended. Neuro: AOx3. Moves all extremities. Follows commands. Responds appropriately   Skin:  No rash or erythema noted    Laboratory:  I have reviewed all pertinent lab results within the past 24 hours.      ASSESSMENT/PLAN:     62 yo female here for abdominal pain, nausea and elevated wbc.  CT imaging suggestive of fairly mild diverticulitis.  Will monitor overnight, continue IVF and IV abx, prn nausea and pain control.      Active Hospital Problems    Diagnosis  POA    *Acute diverticulitis [K57.92]  Yes    Morbid obesity with BMI of 40.0-44.9, adult [E66.01, Z68.41]  Not Applicable     Chronic    Hypertension, essential [I10]  Yes     Chronic      Resolved Hospital Problems   No resolved problems to display.         Plan:     abdominal pain, nausea and elevated wbc.    CT imaging suggestive of fairly mild diverticulitis.    -Will monitor overnight on obs unit  -continue IVF   -continue IV abx cipro/flagyl  -prn nausea and pain control.   -defer  surgical or GI consult for now      VTE Risk Mitigation (From admission, onward)           Ordered     IP VTE HIGH RISK PATIENT  Once         08/06/23 1805     Place sequential compression device  Until discontinued         08/06/23 1805                        Department Hospital Medicine  [facility] access hospital  Jose Ulrich MD  Date of service: 08/08/2023

## 2023-08-09 NOTE — PROGRESS NOTES
[facility] Medicine  Progress Note    Patient name: Jonna Pearl  MRN: [id number]  Admit Date: 8/6/2023   LOS: 2 days     SUBJECTIVE:     Principal problem: Acute diverticulitis    Interval History:      8/9- vitals stable, afebrile.  Changed to zosyn IV yesterday and wbc is now improving.  Will continue IV zosyn for one more night and likely dc in am.  Symptoms are improving.  She was able to eat solid food today without n/v worsening abd pain.     8/8- states she feels better.  No fever recorded but she felt chills at some point.  Her wbc is increasing despite cipro flagyl.  Will continue this regimen one more night as she says she feels better and she does have multiple abx allergies limiting selection.      8/7- patient still feels some abd pain and nausea.  Wbc up to 21, afebrile.  Will keep overnight and continue IV abx.  Holding off gi or surgery consult for now.  Suspect she can go home tomorrow with PO abx and follow up with GI for diverticulitis.     Scheduled Meds:   famotidine  20 mg Oral BID    lisinopriL  40 mg Oral Daily    pantoprazole  40 mg Intravenous Daily    piperacillin-tazobactam (Zosyn) IV (PEDS and ADULTS) (extended infusion is not appropriate)  3.375 g Intravenous Q8H    timolol maleate 0.5%  1 drop Both Eyes BID    verapamiL  240 mg Oral QHS     Continuous Infusions:   lactated ringers 100 mL/hr at 08/08/23 0549     PRN Meds:acetaminophen, calcium gluconate IVPB, calcium gluconate IVPB, calcium gluconate IVPB, magnesium sulfate IVPB, magnesium sulfate IVPB, melatonin, morphine, ondansetron, potassium chloride **AND** potassium chloride **AND** potassium chloride, promethazine (PHENERGAN) 12.5 mg in dextrose 5 % (D5W) 50 mL IVPB, sodium chloride 0.9%, sodium phosphate 15 mmol in dextrose 5 % (D5W) 250 mL IVPB, sodium phosphate 20.01 mmol in dextrose 5 % (D5W) 250 mL IVPB, sodium phosphate 30 mmol in dextrose 5 % (D5W) 250 mL IVPB    Review of patient's allergies  indicates:   Allergen Reactions    Neomycin-polymyxin Swelling    Cephalexin      Other reaction(s): Unknown    Doxycycline      Other reaction(s): Unknown    Iodinated contrast media      Other reaction(s): Unknown    Penicillins      Other reaction(s): Unknown    Valsartan      Other reaction(s): Unknown       Review of Systems: As per interval history    OBJECTIVE:     Vital Signs (Most Recent)  Temp: 98.5 °F (36.9 °C) (08/09/23 1602)  Pulse: 70 (08/09/23 1602)  Resp: 18 (08/09/23 1602)  BP: 137/80 (08/09/23 1602)  SpO2: 100 % (08/09/23 1602)    Vital Signs Range (Last 24H):  Temp:  [98.5 °F (36.9 °C)-99.1 °F (37.3 °C)]   Pulse:  [53-77]   Resp:  [17-18]   BP: (123-143)/(58-80)   SpO2:  [97 %-100 %]     I & O (Last 24H):  Intake/Output Summary (Last 24 hours) at 8/9/2023 1608  Last data filed at 8/9/2023 1316  Gross per 24 hour   Intake 1622 ml   Output 3 ml   Net 1619 ml         Physical Exam:  General: appears comfortable, c/o nausea  Eyes: No conjunctival injection. No scleral icterus.  ENT: Hearing grossly intact. No discharge from ears. No nasal discharge.   Neck: Supple, trachea midline. No JVD  CVS: RRR. No LE edema BL  Lungs:  No tachypnea or accessory muscle use.  Clear to auscultation bilaterally  Abdomen:  obese. Soft, nontender and nondistended. Neuro: AOx3. Moves all extremities. Follows commands. Responds appropriately   Skin:  No rash or erythema noted    Laboratory:  I have reviewed all pertinent lab results within the past 24 hours.      ASSESSMENT/PLAN:     62 yo female here for abdominal pain, nausea and elevated wbc.  CT imaging suggestive of fairly mild diverticulitis.  Will monitor overnight, continue IVF and IV abx, prn nausea and pain control.      Active Hospital Problems    Diagnosis  POA    *Acute diverticulitis [K57.92]  Yes    Morbid obesity with BMI of 40.0-44.9, adult [E66.01, Z68.41]  Not Applicable     Chronic    Hypertension, essential [I10]  Yes     Chronic      Resolved  Hospital Problems   No resolved problems to display.         Plan:     abdominal pain, nausea and elevated wbc.    CT imaging suggestive of fairly mild diverticulitis.    -Will monitor overnight on obs unit  -continue IVF   -changed IV abx cipro/flagyl to zosyn, wbc and symptoms improving  -prn nausea and pain control.   -defer surgical or GI consult for now  -can likely dc in the morning with GI referral       VTE Risk Mitigation (From admission, onward)           Ordered     IP VTE HIGH RISK PATIENT  Once         08/06/23 1805     Place sequential compression device  Until discontinued         08/06/23 1805                        Department Hospital Medicine  [facility]  Jose Ulrich MD  Date of service: 08/09/2023

## 2023-08-10 PROBLEM — D72.829 LEUCOCYTOSIS: Status: ACTIVE | Noted: 2023-08-10

## 2023-08-10 PROBLEM — D64.9 ANEMIA: Chronic | Status: ACTIVE | Noted: 2023-08-10

## 2023-08-10 LAB
ALBUMIN SERPL BCP-MCNC: 2.4 G/DL (ref 3.5–5.2)
ALP SERPL-CCNC: 82 U/L (ref 55–135)
ALT SERPL W/O P-5'-P-CCNC: 13 U/L (ref 10–44)
ANION GAP SERPL CALC-SCNC: 8 MMOL/L (ref 8–16)
AST SERPL-CCNC: 10 U/L (ref 10–40)
BASOPHILS # BLD AUTO: 0.03 K/UL (ref 0–0.2)
BASOPHILS NFR BLD: 0.2 % (ref 0–1.9)
BILIRUB SERPL-MCNC: 0.5 MG/DL (ref 0.1–1)
BUN SERPL-MCNC: 17 MG/DL (ref 8–23)
CALCIUM SERPL-MCNC: 7.9 MG/DL (ref 8.7–10.5)
CHLORIDE SERPL-SCNC: 109 MMOL/L (ref 95–110)
CO2 SERPL-SCNC: 22 MMOL/L (ref 23–29)
CREAT SERPL-MCNC: 1 MG/DL (ref 0.5–1.4)
DIFFERENTIAL METHOD: ABNORMAL
EOSINOPHIL # BLD AUTO: 0.2 K/UL (ref 0–0.5)
EOSINOPHIL NFR BLD: 1 % (ref 0–8)
ERYTHROCYTE [DISTWIDTH] IN BLOOD BY AUTOMATED COUNT: 17.9 % (ref 11.5–14.5)
ERYTHROCYTE [DISTWIDTH] IN BLOOD BY AUTOMATED COUNT: 17.9 % (ref 11.5–14.5)
EST. GFR  (NO RACE VARIABLE): >60 ML/MIN/1.73 M^2
GLUCOSE SERPL-MCNC: 110 MG/DL (ref 70–110)
HCT VFR BLD AUTO: 32.6 % (ref 37–48.5)
HCT VFR BLD AUTO: 32.6 % (ref 37–48.5)
HGB BLD-MCNC: 10.3 G/DL (ref 12–16)
HGB BLD-MCNC: 10.3 G/DL (ref 12–16)
IMM GRANULOCYTES # BLD AUTO: 0.1 K/UL (ref 0–0.04)
IMM GRANULOCYTES NFR BLD AUTO: 0.6 % (ref 0–0.5)
LYMPHOCYTES # BLD AUTO: 3.7 K/UL (ref 1–4.8)
LYMPHOCYTES NFR BLD: 21.6 % (ref 18–48)
MCH RBC QN AUTO: 27.5 PG (ref 27–31)
MCH RBC QN AUTO: 27.5 PG (ref 27–31)
MCHC RBC AUTO-ENTMCNC: 31.6 G/DL (ref 32–36)
MCHC RBC AUTO-ENTMCNC: 31.6 G/DL (ref 32–36)
MCV RBC AUTO: 87 FL (ref 82–98)
MCV RBC AUTO: 87 FL (ref 82–98)
MONOCYTES # BLD AUTO: 1.4 K/UL (ref 0.3–1)
MONOCYTES NFR BLD: 8.4 % (ref 4–15)
NEUTROPHILS # BLD AUTO: 11.8 K/UL (ref 1.8–7.7)
NEUTROPHILS NFR BLD: 68.2 % (ref 38–73)
NRBC BLD-RTO: 0 /100 WBC
PLATELET # BLD AUTO: 322 K/UL (ref 150–450)
PLATELET # BLD AUTO: 322 K/UL (ref 150–450)
PMV BLD AUTO: 10.3 FL (ref 9.2–12.9)
PMV BLD AUTO: 10.3 FL (ref 9.2–12.9)
POTASSIUM SERPL-SCNC: 3.5 MMOL/L (ref 3.5–5.1)
PROT SERPL-MCNC: 5.4 G/DL (ref 6–8.4)
RBC # BLD AUTO: 3.74 M/UL (ref 4–5.4)
RBC # BLD AUTO: 3.74 M/UL (ref 4–5.4)
SODIUM SERPL-SCNC: 139 MMOL/L (ref 136–145)
WBC # BLD AUTO: 17.21 K/UL (ref 3.9–12.7)
WBC # BLD AUTO: 17.21 K/UL (ref 3.9–12.7)

## 2023-08-10 PROCEDURE — 85025 COMPLETE CBC W/AUTO DIFF WBC: CPT | Performed by: STUDENT IN AN ORGANIZED HEALTH CARE EDUCATION/TRAINING PROGRAM

## 2023-08-10 PROCEDURE — 63600175 PHARM REV CODE 636 W HCPCS: Performed by: INTERNAL MEDICINE

## 2023-08-10 PROCEDURE — C9113 INJ PANTOPRAZOLE SODIUM, VIA: HCPCS | Performed by: STUDENT IN AN ORGANIZED HEALTH CARE EDUCATION/TRAINING PROGRAM

## 2023-08-10 PROCEDURE — 93005 ELECTROCARDIOGRAM TRACING: CPT | Performed by: INTERNAL MEDICINE

## 2023-08-10 PROCEDURE — 21400001 HC TELEMETRY ROOM

## 2023-08-10 PROCEDURE — 93010 EKG 12-LEAD: ICD-10-PCS | Mod: ,,, | Performed by: INTERNAL MEDICINE

## 2023-08-10 PROCEDURE — 93010 ELECTROCARDIOGRAM REPORT: CPT | Mod: ,,, | Performed by: INTERNAL MEDICINE

## 2023-08-10 PROCEDURE — 25000003 PHARM REV CODE 250: Performed by: INTERNAL MEDICINE

## 2023-08-10 PROCEDURE — 80053 COMPREHEN METABOLIC PANEL: CPT | Performed by: INTERNAL MEDICINE

## 2023-08-10 PROCEDURE — 63600175 PHARM REV CODE 636 W HCPCS: Performed by: STUDENT IN AN ORGANIZED HEALTH CARE EDUCATION/TRAINING PROGRAM

## 2023-08-10 PROCEDURE — 36415 COLL VENOUS BLD VENIPUNCTURE: CPT | Performed by: INTERNAL MEDICINE

## 2023-08-10 PROCEDURE — 25000003 PHARM REV CODE 250: Performed by: NURSE PRACTITIONER

## 2023-08-10 PROCEDURE — 63600175 PHARM REV CODE 636 W HCPCS: Performed by: NURSE PRACTITIONER

## 2023-08-10 PROCEDURE — 25000003 PHARM REV CODE 250: Performed by: STUDENT IN AN ORGANIZED HEALTH CARE EDUCATION/TRAINING PROGRAM

## 2023-08-10 RX ORDER — PANTOPRAZOLE SODIUM 40 MG/1
40 TABLET, DELAYED RELEASE ORAL DAILY
Status: DISCONTINUED | OUTPATIENT
Start: 2023-08-10 | End: 2023-08-11 | Stop reason: HOSPADM

## 2023-08-10 RX ORDER — AMOXICILLIN 250 MG
2 CAPSULE ORAL 2 TIMES DAILY
Status: DISCONTINUED | OUTPATIENT
Start: 2023-08-10 | End: 2023-08-11 | Stop reason: HOSPADM

## 2023-08-10 RX ORDER — ENOXAPARIN SODIUM 100 MG/ML
40 INJECTION SUBCUTANEOUS
Status: DISCONTINUED | OUTPATIENT
Start: 2023-08-10 | End: 2023-08-11 | Stop reason: HOSPADM

## 2023-08-10 RX ORDER — HYDROCODONE BITARTRATE AND ACETAMINOPHEN 5; 325 MG/1; MG/1
1 TABLET ORAL EVERY 6 HOURS PRN
Status: DISCONTINUED | OUTPATIENT
Start: 2023-08-10 | End: 2023-08-11 | Stop reason: HOSPADM

## 2023-08-10 RX ORDER — LATANOPROST 50 UG/ML
1 SOLUTION/ DROPS OPHTHALMIC NIGHTLY
Status: DISCONTINUED | OUTPATIENT
Start: 2023-08-10 | End: 2023-08-11 | Stop reason: HOSPADM

## 2023-08-10 RX ORDER — HYOSCYAMINE SULFATE 0.12 MG/1
0.12 TABLET SUBLINGUAL EVERY 4 HOURS PRN
Status: DISCONTINUED | OUTPATIENT
Start: 2023-08-10 | End: 2023-08-11 | Stop reason: HOSPADM

## 2023-08-10 RX ORDER — AMOXICILLIN 250 MG
2 CAPSULE ORAL 2 TIMES DAILY
Status: DISCONTINUED | OUTPATIENT
Start: 2023-08-10 | End: 2023-08-10

## 2023-08-10 RX ADMIN — PROMETHAZINE HYDROCHLORIDE 12.5 MG: 25 INJECTION INTRAMUSCULAR; INTRAVENOUS at 06:08

## 2023-08-10 RX ADMIN — ENOXAPARIN SODIUM 40 MG: 40 INJECTION SUBCUTANEOUS at 05:08

## 2023-08-10 RX ADMIN — PANTOPRAZOLE SODIUM 40 MG: 40 TABLET, DELAYED RELEASE ORAL at 03:08

## 2023-08-10 RX ADMIN — HYDROCODONE BITARTRATE AND ACETAMINOPHEN 1 TABLET: 5; 325 TABLET ORAL at 03:08

## 2023-08-10 RX ADMIN — TIMOLOL MALEATE 1 DROP: 5 SOLUTION OPHTHALMIC at 08:08

## 2023-08-10 RX ADMIN — PANTOPRAZOLE SODIUM 40 MG: 40 INJECTION, POWDER, FOR SOLUTION INTRAVENOUS at 08:08

## 2023-08-10 RX ADMIN — MORPHINE SULFATE 2 MG: 2 INJECTION, SOLUTION INTRAMUSCULAR; INTRAVENOUS at 06:08

## 2023-08-10 RX ADMIN — TIMOLOL MALEATE 1 DROP: 5 SOLUTION OPHTHALMIC at 09:08

## 2023-08-10 RX ADMIN — VERAPAMIL HYDROCHLORIDE 120 MG: 240 TABLET, FILM COATED, EXTENDED RELEASE ORAL at 09:08

## 2023-08-10 RX ADMIN — FAMOTIDINE 20 MG: 20 TABLET ORAL at 08:08

## 2023-08-10 RX ADMIN — ONDANSETRON 4 MG: 2 INJECTION INTRAMUSCULAR; INTRAVENOUS at 03:08

## 2023-08-10 RX ADMIN — PIPERACILLIN SODIUM AND TAZOBACTAM SODIUM 3.38 G: 3; .375 INJECTION, POWDER, LYOPHILIZED, FOR SOLUTION INTRAVENOUS at 03:08

## 2023-08-10 RX ADMIN — SENNOSIDES AND DOCUSATE SODIUM 2 TABLET: 50; 8.6 TABLET ORAL at 05:08

## 2023-08-10 RX ADMIN — PIPERACILLIN SODIUM AND TAZOBACTAM SODIUM 3.38 G: 3; .375 INJECTION, POWDER, LYOPHILIZED, FOR SOLUTION INTRAVENOUS at 07:08

## 2023-08-10 RX ADMIN — LISINOPRIL 40 MG: 20 TABLET ORAL at 08:08

## 2023-08-10 RX ADMIN — LATANOPROST 1 DROP: 50 SOLUTION OPHTHALMIC at 11:08

## 2023-08-10 NOTE — PROGRESS NOTES
[facility] Medicine  Progress Note    Patient Name: Jonna Pearl  MRN: [id number]  Patient Class: IP- Inpatient   Admission Date: 8/6/2023  Length of Stay: 3 days  Attending Physician: Marylou Lyons MD  Primary Care Provider: Padmini Lackey FNP        Subjective:     Principal Problem:Acute diverticulitis        HPI:  Patient is a 63-year-old female with history of hypertension, prediabetes, TIA, morbid obesity, ulcers, hysterectomy, chronic right knee pain uses a cane.  He presents to the ED with complaints of epigastric burning pain 7/10, nausea and nonbilious vomiting and chills.  She was evaluated in ED with WBC 15.75, hbg 11.8, HCT 36.8, CTA abdomen showed sigmoid diverticulitis and gallstones in the gallbladder.  IV Levaquin, IV Flagyl, IV Zofran, IV Dilaudid, IV L lactate Ringer ordered in ED.    On assessment patient is alert oriented x 4 with complaints of nausea vomiting x1 in ED but reports several times at home.  She complained of epigastric discomfort in her lower abdomen moved up to epigastric and chest area, blood in stool started today. She reports pain is 7/10 even after pain medication, and prior to arrival to ED felt dizzy.  She denies any history of diverticulitis in the past.  She denies alcohol, illicit drugs, smoking.  She denies chest pain, lightheaded, dysuria, hematuria, fever.    Hospitalist asked to admit for acute diverticulitis        IMPRESSION: CT abd and pelvis 8/6/23  1.  Sigmoid diverticulitis.  2.  Gallstones in the gallbladder.         Overview/Hospital Course:  Assumed care of this patient on 8/10/2023.  Patient with a history of obesity with BMI 50, hypertension, GERD, prediabetes.  Presented with abdominal pain.  Workup with leukocytosis, CT with concern for cholelithiasis without evidence of cholecystitis, thickening of wall of sigmoid with mild inflammatory changes concerning for diverticulosis.  She was admitted, started on IV antibiotics  (Levaquin and Flagyl), IV fluid, diet regression.  Symptoms continued clinically with continued elevation of WBC count and on 08/09 antibiotics were escalated to IV Zosyn.  On 08/10, feels improved, WBC count down to 17, tolerating oral diet, will continue 24 more hours of IV antibiotics.  Chart review 2019 colonoscopy with nonbleeding internal hemorrhoids.  Will need outpatient GI follow-up.      Interval History:  See hospital course.  States overall she feels improved, mild nausea but no emesis, 2 abdominal pain, epigastric which she feels is related to her ulcer, left lower quadrant from her colon.  No bowel movement since Sunday.  Feels she is near discharge.  T-max 99.1°.  Labs with WBC 17, hemoglobin 10.3, BUN/creatinine 17/1.  Colonoscopy 2019 reviewed.  CT on presentation reviewed.  Discussed with patient.  Visitor at bedside.    Review of Systems   Constitutional:  Negative for fever.   Respiratory:  Negative for shortness of breath.    Cardiovascular:  Negative for chest pain.   Gastrointestinal:  Positive for abdominal pain and nausea.   Psychiatric/Behavioral:  Negative for confusion.      Objective:     Vital Signs (Most Recent):  Temp: 99.1 °F (37.3 °C) (08/10/23 1056)  Pulse: 63 (08/10/23 1056)  Resp: 17 (08/10/23 1056)  BP: 130/67 (map 88) (08/10/23 1056)  SpO2: 100 % (08/10/23 1056) Vital Signs (24h Range):  Temp:  [98.1 °F (36.7 °C)-99.1 °F (37.3 °C)] 99.1 °F (37.3 °C)  Pulse:  [50-76] 63  Resp:  [17-20] 17  SpO2:  [96 %-100 %] 100 %  BP: (110-137)/(45-80) 130/67     Weight: 119.7 kg (263 lb 14.4 oz)  Body mass index is 49.86 kg/m².    Intake/Output Summary (Last 24 hours) at 8/10/2023 1200  Last data filed at 8/10/2023 0941  Gross per 24 hour   Intake 902 ml   Output 100 ml   Net 802 ml         Physical Exam  Vitals and nursing note reviewed.   Constitutional:       General: She is not in acute distress.     Appearance: She is obese. She is not ill-appearing, toxic-appearing or diaphoretic.       "Comments: Lying in bed, cooperative   HENT:      Head: Normocephalic and atraumatic.      Mouth/Throat:      Mouth: Mucous membranes are moist.   Cardiovascular:      Rate and Rhythm: Normal rate and regular rhythm.      Comments: Warm peripheries, no lower extremity edema  Pulmonary:      Effort: No respiratory distress.      Breath sounds: No wheezing.      Comments: On room air  Abdominal:      Comments: Abdominal pannus, nondistended, soft, no significant tenderness   Skin:     General: Skin is warm.   Neurological:      Mental Status: She is alert and oriented to person, place, and time.   Psychiatric:         Mood and Affect: Mood normal.             Significant Labs: BMP:   Recent Labs   Lab 08/10/23  0346         K 3.5      CO2 22*   BUN 17   CREATININE 1.0   CALCIUM 7.9*     CBC:   Recent Labs   Lab 08/09/23  0346 08/10/23  0346   WBC 20.07*  20.07* 17.21*  17.21*   HGB 11.2*  11.2* 10.3*  10.3*   HCT 34.1*  34.1* 32.6*  32.6*     281 322  322     CMP:   Recent Labs   Lab 08/09/23 0346 08/10/23  0346    139   K 3.7 3.5   * 109   CO2 22* 22*    110   BUN 10 17   CREATININE 0.7 1.0   CALCIUM 8.3* 7.9*   PROT 5.7* 5.4*   ALBUMIN 2.7* 2.4*   BILITOT 0.7 0.5   ALKPHOS 76 82   AST 8* 10   ALT 13 13   ANIONGAP 5* 8     Lactic Acid: No results for input(s): "LACTATE" in the last 48 hours.  Magnesium: No results for input(s): "MG" in the last 48 hours.    Significant Imaging: I have reviewed all pertinent imaging results/findings within the past 24 hours.    CT Abdomen Pelvis  Without Contrast    Result Date: 8/6/2023  CLINICAL DATA: Possible abscess TECHNIQUE: Acquisition: Contiguous scans slices were obtained from the top of the hemidiaphragm through the pelvis. Reconstructions: Axial, coronal and sagittal reconstructions of the data set were obtained. Contrast: IV: No contrast was administered. Oral: No oral contrast was administered. Phases: Noncontrasted " imaging area Limitations: Evaluation of the gastrointestinal tract and abdominal viscera is limited by the lack of contrast. Estimated radiation dose: 34.4 mGy. All CT scans at this facility use dose modulation and/or weight-based dosing when appropriate to reduce radiation dose to as low as reasonably achievable. COMPARISON: No prior relevant studies are available. FINDINGS: LUNG BASES: Normal. LIVER: Normal. SPLEEN: Normal. GALLBLADDER AND BILIARY TREE: 1. Faint calcifications appear present in the gallbladder consistent with stones. PANCREAS: Normal. ADRENAL GLANDS: Normal. URINARY TRACT: KIDNEYS: 1. The left kidney is atrophic. There is a 4.3 cm benign-appearing cyst in the midportion of the left kidney. This has the appearance of a simple cyst and no further follow-up is required. PELVOCALYCEAL SYSTEM: Normal. URETERS: Normal. RETROPERITONEUM: Normal. MESENTERY AND GASTROINTESTINAL TRACT: 1. There is thickening of the wall of the patient's sigmoid colon with some mild pericolonic inflammatory changes and a few scattered diverticuli. This may consistent with some diverticulitis in this area. PELVIS: URINARY BLADDER: Normal. INGUINAL REGIONS: Normal. VASCULATURE: Normal. OSSEOUS STRUCTURES: 1. Degenerative changes noted in the spine. ABDOMINAL WALL: Normal. ADDITIONAL FINDINGS: None seen. IMPRESSION: 1.  Sigmoid diverticulitis. 2.  Gallstones in the gallbladder. This document was created using a voice recognition transcribing system. Incorrect words or phrases may have been missed during proof reading. Please interpret accordingly or contact the radiologist for clarification if necessary. Electronically signed by:  Jesse Bonilla MD  8/6/2023 4:46 PM CDT Workstation: [id number]    X-Ray Chest PA And Lateral    Result Date: 8/6/2023  CLINICAL HISTORY: 63 years (1960) Female Chest Pain Chest pain TECHNIQUE: PA and lateral radiograph of the chest. Two views. COMPARISON: None available. FINDINGS: The lungs  are clear. Costophrenic angles are seen without effusion. No pneumothorax is identified. The heart is top normal in size. The mediastinum is within normal limits. Osseous structures appear within normal limits. The visualized upper abdomen is unremarkable. IMPRESSION: No acute cardiac or pulmonary process. . Electronically signed by:  Pavan Pierson MD  8/6/2023 3:49 PM CDT Workstation: [id number]         Assessment/Plan:      Active Hospital Problems    Diagnosis    *Acute diverticulitis    Leucocytosis    Anemia    Morbid obesity with BMI of 40.0-44.9, adult    Asymptomatic gallstones    Hypertension, essential     Plan:  Continue care on medical floor  Discontinue IV fluids and continue bland diet  Continue IV antibiotics with piperacillin/tazobactam for 24 hours, likely transition to oral Flagyl/ciprofloxacin tomorrow   P.r.n. pain control as ordered  Colonoscopy 2019 with nonbleeding internal hemorrhoids, will need outpatient GI follow-up  P.r.n. antiemetic and analgesic as ordered  Needs lifestyle modification for weight loss   Serial abdominal examination   Electrolytes sliding scale repletion  A.m. labs ordered   Further plan as per hospital course    VTE Risk Mitigation (From admission, onward)         Ordered     enoxaparin injection 40 mg  Every 24 hours (non-standard times)         08/10/23 1158     IP VTE HIGH RISK PATIENT  Once         08/06/23 1805     Place sequential compression device  Until discontinued         08/06/23 1805                Discharge Planning   HELENA: 8/11/2023     Code Status: Full Code   Is the patient medically ready for discharge?:     Reason for patient still in hospital (select all that apply): Patient trending condition  Discharge Plan A: Home with family                  Marylou Lyons MD  Department of Hospital Medicine   [facility]

## 2023-08-10 NOTE — HOSPITAL COURSE
Assumed care of this patient on 8/10/2023.  Patient with a history of obesity with BMI 50, WES on CPAP, hypertension, GERD, prediabetes.  Presented with abdominal pain.  Workup with leukocytosis, CT with concern for cholelithiasis without evidence of cholecystitis, thickening of wall of sigmoid with mild inflammatory changes concerning for diverticulitis.  She was admitted, started on IV antibiotics (Levaquin and Flagyl), IV fluid, diet regression.  Symptoms continued clinically with continued elevation of WBC count and on 08/09 antibiotics were adjusted to IV piperacillin/tazobactam.  On 08/10, feels improved, WBC count down to 17, tolerating oral diet, will continue 24 more hours of IV antibiotics.  Chart review 2019 colonoscopy with nonbleeding internal hemorrhoids. Incidentally found to have intermittent bigeminy on telemetry monitor, asymptomatic.  While sleeping heart rate down to 40 but improved with stimulation.  Has been on verapamil chronically at home.  Transthoracic echocardiogram obtained, EF of 55-60%.  Outpatient referral to Cardiology placed and discussed with patient.  Tolerating oral diet.  Abdominal pain is improved, had bowel movement.  Appears medically stable for discharge with outpatient follow-up.  She will complete course of antibiotics and follow-up with outpatient Gastroenterology.  Educational material on diverticulitis provided.  Discharge plan including medication, follow-up as well as return precautions were reviewed.  Plan of care discussed with patient and nursing.    Discharge examination   Lying in bed, alert, oriented, regular heart rhythm, on room air, abdomen soft and nontender

## 2023-08-10 NOTE — SUBJECTIVE & OBJECTIVE
Interval History:  See hospital course.  States overall she feels improved, mild nausea but no emesis, 2 abdominal pain, epigastric which she feels is related to her ulcer, left lower quadrant from her colon.  No bowel movement since Sunday.  Feels she is near discharge.  T-max 99.1°.  Labs with WBC 17, hemoglobin 10.3, BUN/creatinine 17/1.  Colonoscopy 2019 reviewed.  CT on presentation reviewed.  Discussed with patient.  Visitor at bedside.    Review of Systems   Constitutional:  Negative for fever.   Respiratory:  Negative for shortness of breath.    Cardiovascular:  Negative for chest pain.   Gastrointestinal:  Positive for abdominal pain and nausea.   Psychiatric/Behavioral:  Negative for confusion.      Objective:     Vital Signs (Most Recent):  Temp: 99.1 °F (37.3 °C) (08/10/23 1056)  Pulse: 63 (08/10/23 1056)  Resp: 17 (08/10/23 1056)  BP: 130/67 (map 88) (08/10/23 1056)  SpO2: 100 % (08/10/23 1056) Vital Signs (24h Range):  Temp:  [98.1 °F (36.7 °C)-99.1 °F (37.3 °C)] 99.1 °F (37.3 °C)  Pulse:  [50-76] 63  Resp:  [17-20] 17  SpO2:  [96 %-100 %] 100 %  BP: (110-137)/(45-80) 130/67     Weight: 119.7 kg (263 lb 14.4 oz)  Body mass index is 49.86 kg/m².    Intake/Output Summary (Last 24 hours) at 8/10/2023 1200  Last data filed at 8/10/2023 0941  Gross per 24 hour   Intake 902 ml   Output 100 ml   Net 802 ml         Physical Exam  Vitals and nursing note reviewed.   Constitutional:       General: She is not in acute distress.     Appearance: She is obese. She is not ill-appearing, toxic-appearing or diaphoretic.      Comments: Lying in bed, cooperative   HENT:      Head: Normocephalic and atraumatic.      Mouth/Throat:      Mouth: Mucous membranes are moist.   Cardiovascular:      Rate and Rhythm: Normal rate and regular rhythm.      Comments: Warm peripheries, no lower extremity edema  Pulmonary:      Effort: No respiratory distress.      Breath sounds: No wheezing.      Comments: On room air  Abdominal:       "Comments: Abdominal pannus, nondistended, soft, no significant tenderness   Skin:     General: Skin is warm.   Neurological:      Mental Status: She is alert and oriented to person, place, and time.   Psychiatric:         Mood and Affect: Mood normal.             Significant Labs: BMP:   Recent Labs   Lab 08/10/23  0346         K 3.5      CO2 22*   BUN 17   CREATININE 1.0   CALCIUM 7.9*     CBC:   Recent Labs   Lab 08/09/23  0346 08/10/23  0346   WBC 20.07*  20.07* 17.21*  17.21*   HGB 11.2*  11.2* 10.3*  10.3*   HCT 34.1*  34.1* 32.6*  32.6*     281 322  322     CMP:   Recent Labs   Lab 08/09/23  0346 08/10/23  0346    139   K 3.7 3.5   * 109   CO2 22* 22*    110   BUN 10 17   CREATININE 0.7 1.0   CALCIUM 8.3* 7.9*   PROT 5.7* 5.4*   ALBUMIN 2.7* 2.4*   BILITOT 0.7 0.5   ALKPHOS 76 82   AST 8* 10   ALT 13 13   ANIONGAP 5* 8     Lactic Acid: No results for input(s): "LACTATE" in the last 48 hours.  Magnesium: No results for input(s): "MG" in the last 48 hours.    Significant Imaging: I have reviewed all pertinent imaging results/findings within the past 24 hours.    CT Abdomen Pelvis  Without Contrast    Result Date: 8/6/2023  CLINICAL DATA: Possible abscess TECHNIQUE: Acquisition: Contiguous scans slices were obtained from the top of the hemidiaphragm through the pelvis. Reconstructions: Axial, coronal and sagittal reconstructions of the data set were obtained. Contrast: IV: No contrast was administered. Oral: No oral contrast was administered. Phases: Noncontrasted imaging area Limitations: Evaluation of the gastrointestinal tract and abdominal viscera is limited by the lack of contrast. Estimated radiation dose: 34.4 mGy. All CT scans at this facility use dose modulation and/or weight-based dosing when appropriate to reduce radiation dose to as low as reasonably achievable. COMPARISON: No prior relevant studies are available. FINDINGS: LUNG BASES: Normal. " LIVER: Normal. SPLEEN: Normal. GALLBLADDER AND BILIARY TREE: 1. Faint calcifications appear present in the gallbladder consistent with stones. PANCREAS: Normal. ADRENAL GLANDS: Normal. URINARY TRACT: KIDNEYS: 1. The left kidney is atrophic. There is a 4.3 cm benign-appearing cyst in the midportion of the left kidney. This has the appearance of a simple cyst and no further follow-up is required. PELVOCALYCEAL SYSTEM: Normal. URETERS: Normal. RETROPERITONEUM: Normal. MESENTERY AND GASTROINTESTINAL TRACT: 1. There is thickening of the wall of the patient's sigmoid colon with some mild pericolonic inflammatory changes and a few scattered diverticuli. This may consistent with some diverticulitis in this area. PELVIS: URINARY BLADDER: Normal. INGUINAL REGIONS: Normal. VASCULATURE: Normal. OSSEOUS STRUCTURES: 1. Degenerative changes noted in the spine. ABDOMINAL WALL: Normal. ADDITIONAL FINDINGS: None seen. IMPRESSION: 1.  Sigmoid diverticulitis. 2.  Gallstones in the gallbladder. This document was created using a voice recognition transcribing system. Incorrect words or phrases may have been missed during proof reading. Please interpret accordingly or contact the radiologist for clarification if necessary. Electronically signed by:  Jesse Bonilla MD  8/6/2023 4:46 PM CDT Workstation: [id number]    X-Ray Chest PA And Lateral    Result Date: 8/6/2023  CLINICAL HISTORY: 63 years (1960) Female Chest Pain Chest pain TECHNIQUE: PA and lateral radiograph of the chest. Two views. COMPARISON: None available. FINDINGS: The lungs are clear. Costophrenic angles are seen without effusion. No pneumothorax is identified. The heart is top normal in size. The mediastinum is within normal limits. Osseous structures appear within normal limits. The visualized upper abdomen is unremarkable. IMPRESSION: No acute cardiac or pulmonary process. . Electronically signed by:  Pavan Pierson MD  8/6/2023 3:49 PM CDT Workstation:  [id number]

## 2023-08-11 ENCOUNTER — CLINICAL SUPPORT (OUTPATIENT)
Dept: CARDIOLOGY | Facility: HOSPITAL | Age: 63
DRG: 392 | End: 2023-08-11
Attending: INTERNAL MEDICINE
Payer: MEDICARE

## 2023-08-11 VITALS
TEMPERATURE: 98 F | DIASTOLIC BLOOD PRESSURE: 98 MMHG | RESPIRATION RATE: 18 BRPM | WEIGHT: 263.88 LBS | HEIGHT: 61 IN | OXYGEN SATURATION: 96 % | HEART RATE: 68 BPM | SYSTOLIC BLOOD PRESSURE: 151 MMHG | BODY MASS INDEX: 49.82 KG/M2

## 2023-08-11 VITALS — HEIGHT: 61 IN | WEIGHT: 263 LBS | BODY MASS INDEX: 49.65 KG/M2

## 2023-08-11 PROBLEM — G47.33 OSA ON CPAP: Chronic | Status: ACTIVE | Noted: 2023-08-11

## 2023-08-11 PROBLEM — I49.8 BIGEMINY: Status: ACTIVE | Noted: 2023-08-11

## 2023-08-11 LAB
ANION GAP SERPL CALC-SCNC: 6 MMOL/L (ref 8–16)
AORTIC ROOT ANNULUS: 2.7 CM
AORTIC VALVE CUSP SEPERATION: 1.3 CM
APICAL FOUR CHAMBER EJECTION FRACTION: 55 %
AV INDEX (PROSTH): 0.71
AV MEAN GRADIENT: 6 MMHG
AV PEAK GRADIENT: 11 MMHG
AV VALVE AREA BY VELOCITY RATIO: 2.34 CM²
AV VALVE AREA: 2.23 CM²
AV VELOCITY RATIO: 0.74
BASOPHILS # BLD AUTO: 0.03 K/UL (ref 0–0.2)
BASOPHILS NFR BLD: 0.2 % (ref 0–1.9)
BSA FOR ECHO PROCEDURE: 2.27 M2
BUN SERPL-MCNC: 13 MG/DL (ref 8–23)
CALCIUM SERPL-MCNC: 8.1 MG/DL (ref 8.7–10.5)
CHLORIDE SERPL-SCNC: 111 MMOL/L (ref 95–110)
CO2 SERPL-SCNC: 22 MMOL/L (ref 23–29)
CREAT SERPL-MCNC: 0.9 MG/DL (ref 0.5–1.4)
CV ECHO LV RWT: 0.48 CM
DIFFERENTIAL METHOD: ABNORMAL
DOP CALC AO PEAK VEL: 1.64 M/S
DOP CALC AO VTI: 39.1 CM
DOP CALC LVOT AREA: 3.1 CM2
DOP CALC LVOT DIAMETER: 2 CM
DOP CALC LVOT PEAK VEL: 1.22 M/S
DOP CALC LVOT STROKE VOLUME: 87.29 CM3
DOP CALC MV VTI: 52.9 CM
DOP CALCLVOT PEAK VEL VTI: 27.8 CM
E WAVE DECELERATION TIME: 187 MSEC
E/A RATIO: 1.15
E/E' RATIO: 13.29 M/S
ECHO LV POSTERIOR WALL: 1.26 CM (ref 0.6–1.1)
EOSINOPHIL # BLD AUTO: 0.2 K/UL (ref 0–0.5)
EOSINOPHIL NFR BLD: 1.2 % (ref 0–8)
ERYTHROCYTE [DISTWIDTH] IN BLOOD BY AUTOMATED COUNT: 17.7 % (ref 11.5–14.5)
EST. GFR  (NO RACE VARIABLE): >60 ML/MIN/1.73 M^2
FRACTIONAL SHORTENING: 38 % (ref 28–44)
GLUCOSE SERPL-MCNC: 96 MG/DL (ref 70–110)
HCT VFR BLD AUTO: 32.5 % (ref 37–48.5)
HGB BLD-MCNC: 10.5 G/DL (ref 12–16)
IMM GRANULOCYTES # BLD AUTO: 0.06 K/UL (ref 0–0.04)
IMM GRANULOCYTES NFR BLD AUTO: 0.5 % (ref 0–0.5)
INTERVENTRICULAR SEPTUM: 0.98 CM (ref 0.6–1.1)
LEFT INTERNAL DIMENSION IN SYSTOLE: 3.21 CM (ref 2.1–4)
LEFT VENTRICLE DIASTOLIC VOLUME INDEX: 61.32 ML/M2
LEFT VENTRICLE DIASTOLIC VOLUME: 130 ML
LEFT VENTRICLE MASS INDEX: 107 G/M2
LEFT VENTRICLE SYSTOLIC VOLUME INDEX: 19.5 ML/M2
LEFT VENTRICLE SYSTOLIC VOLUME: 41.3 ML
LEFT VENTRICULAR INTERNAL DIMENSION IN DIASTOLE: 5.21 CM (ref 3.5–6)
LEFT VENTRICULAR MASS: 226.96 G
LV LATERAL E/E' RATIO: 11.3 M/S
LV SEPTAL E/E' RATIO: 16.14 M/S
LVOT MG: 3 MMHG
LVOT MV: 0.8 CM/S
LYMPHOCYTES # BLD AUTO: 3.3 K/UL (ref 1–4.8)
LYMPHOCYTES NFR BLD: 25.2 % (ref 18–48)
Lab: 42 ML
MAGNESIUM SERPL-MCNC: 1.9 MG/DL (ref 1.6–2.6)
MCH RBC QN AUTO: 27.9 PG (ref 27–31)
MCHC RBC AUTO-ENTMCNC: 32.3 G/DL (ref 32–36)
MCV RBC AUTO: 86 FL (ref 82–98)
MONOCYTES # BLD AUTO: 1.3 K/UL (ref 0.3–1)
MONOCYTES NFR BLD: 9.6 % (ref 4–15)
MV MEAN GRADIENT: 3 MMHG
MV PEAK A VEL: 0.98 M/S
MV PEAK E VEL: 1.13 M/S
MV PEAK GRADIENT: 7 MMHG
MV STENOSIS PRESSURE HALF TIME: 138 MS
MV VALVE AREA BY CONTINUITY EQUATION: 1.65 CM2
MV VALVE AREA P 1/2 METHOD: 1.59 CM2
NEUTROPHILS # BLD AUTO: 8.2 K/UL (ref 1.8–7.7)
NEUTROPHILS NFR BLD: 63.3 % (ref 38–73)
NRBC BLD-RTO: 0 /100 WBC
PISA MRMAX VEL: 4.53 M/S
PISA TR MAX VEL: 2.6 M/S
PLATELET # BLD AUTO: 336 K/UL (ref 150–450)
PMV BLD AUTO: 10.2 FL (ref 9.2–12.9)
POTASSIUM SERPL-SCNC: 3.5 MMOL/L (ref 3.5–5.1)
PV MV: 0.73 M/S
PV PEAK GRADIENT: 4 MMHG
PV PEAK VELOCITY: 1.03 M/S
RA PRESSURE ESTIMATED: 3 MMHG
RBC # BLD AUTO: 3.77 M/UL (ref 4–5.4)
RV TB RVSP: 6 MMHG
RV TISSUE DOPPLER FREE WALL SYSTOLIC VELOCITY 1 (APICAL 4 CHAMBER VIEW): 16.9 CM/S
SINUS: 2.36 CM
SODIUM SERPL-SCNC: 139 MMOL/L (ref 136–145)
STJ: 2.53 CM
TDI LATERAL: 0.1 M/S
TDI SEPTAL: 0.07 M/S
TDI: 0.09 M/S
TR MAX PG: 27 MMHG
TRICUSPID ANNULAR PLANE SYSTOLIC EXCURSION: 2.65 CM
TV REST PULMONARY ARTERY PRESSURE: 30 MMHG
WBC # BLD AUTO: 12.97 K/UL (ref 3.9–12.7)
Z-SCORE OF LEFT VENTRICULAR DIMENSION IN END DIASTOLE: -2.51
Z-SCORE OF LEFT VENTRICULAR DIMENSION IN END SYSTOLE: -1.91

## 2023-08-11 PROCEDURE — 93306 TTE W/DOPPLER COMPLETE: CPT | Mod: 26,,, | Performed by: GENERAL PRACTICE

## 2023-08-11 PROCEDURE — 80048 BASIC METABOLIC PNL TOTAL CA: CPT | Performed by: INTERNAL MEDICINE

## 2023-08-11 PROCEDURE — 25000003 PHARM REV CODE 250: Performed by: INTERNAL MEDICINE

## 2023-08-11 PROCEDURE — 25000003 PHARM REV CODE 250: Performed by: STUDENT IN AN ORGANIZED HEALTH CARE EDUCATION/TRAINING PROGRAM

## 2023-08-11 PROCEDURE — 63600175 PHARM REV CODE 636 W HCPCS: Performed by: STUDENT IN AN ORGANIZED HEALTH CARE EDUCATION/TRAINING PROGRAM

## 2023-08-11 PROCEDURE — 93306 ECHO (CUPID ONLY): ICD-10-PCS | Mod: 26,,, | Performed by: GENERAL PRACTICE

## 2023-08-11 PROCEDURE — 83735 ASSAY OF MAGNESIUM: CPT | Performed by: INTERNAL MEDICINE

## 2023-08-11 PROCEDURE — 25000003 PHARM REV CODE 250: Performed by: NURSE PRACTITIONER

## 2023-08-11 PROCEDURE — 93306 TTE W/DOPPLER COMPLETE: CPT

## 2023-08-11 PROCEDURE — 85025 COMPLETE CBC W/AUTO DIFF WBC: CPT | Performed by: STUDENT IN AN ORGANIZED HEALTH CARE EDUCATION/TRAINING PROGRAM

## 2023-08-11 PROCEDURE — 63600175 PHARM REV CODE 636 W HCPCS: Performed by: INTERNAL MEDICINE

## 2023-08-11 PROCEDURE — 36415 COLL VENOUS BLD VENIPUNCTURE: CPT | Performed by: INTERNAL MEDICINE

## 2023-08-11 RX ORDER — SODIUM,POTASSIUM PHOSPHATES 280-250MG
2 POWDER IN PACKET (EA) ORAL
Status: DISCONTINUED | OUTPATIENT
Start: 2023-08-11 | End: 2023-08-11 | Stop reason: HOSPADM

## 2023-08-11 RX ORDER — LANOLIN ALCOHOL/MO/W.PET/CERES
800 CREAM (GRAM) TOPICAL
Status: DISCONTINUED | OUTPATIENT
Start: 2023-08-11 | End: 2023-08-11 | Stop reason: HOSPADM

## 2023-08-11 RX ORDER — METRONIDAZOLE 500 MG/1
500 TABLET ORAL 3 TIMES DAILY
Qty: 15 TABLET | Refills: 0 | Status: SHIPPED | OUTPATIENT
Start: 2023-08-11 | End: 2023-08-16

## 2023-08-11 RX ORDER — PROMETHAZINE HYDROCHLORIDE 25 MG/1
25 TABLET ORAL 3 TIMES DAILY PRN
Qty: 10 TABLET | Refills: 0 | Status: SHIPPED | OUTPATIENT
Start: 2023-08-11 | End: 2023-08-16

## 2023-08-11 RX ORDER — HYDROCODONE BITARTRATE AND ACETAMINOPHEN 5; 325 MG/1; MG/1
1 TABLET ORAL EVERY 12 HOURS PRN
Qty: 5 TABLET | Refills: 0 | Status: SHIPPED | OUTPATIENT
Start: 2023-08-11 | End: 2023-08-14

## 2023-08-11 RX ORDER — CIPROFLOXACIN 500 MG/1
500 TABLET ORAL 2 TIMES DAILY
Qty: 10 TABLET | Refills: 0 | Status: SHIPPED | OUTPATIENT
Start: 2023-08-11 | End: 2023-08-16

## 2023-08-11 RX ADMIN — HYDROCODONE BITARTRATE AND ACETAMINOPHEN 1 TABLET: 5; 325 TABLET ORAL at 08:08

## 2023-08-11 RX ADMIN — LISINOPRIL 40 MG: 20 TABLET ORAL at 08:08

## 2023-08-11 RX ADMIN — POTASSIUM BICARBONATE 50 MEQ: 977.5 TABLET, EFFERVESCENT ORAL at 06:08

## 2023-08-11 RX ADMIN — TIMOLOL MALEATE 1 DROP: 5 SOLUTION OPHTHALMIC at 08:08

## 2023-08-11 RX ADMIN — PIPERACILLIN SODIUM AND TAZOBACTAM SODIUM 3.38 G: 3; .375 INJECTION, POWDER, LYOPHILIZED, FOR SOLUTION INTRAVENOUS at 02:08

## 2023-08-11 RX ADMIN — PROMETHAZINE HYDROCHLORIDE 12.5 MG: 25 INJECTION INTRAMUSCULAR; INTRAVENOUS at 12:08

## 2023-08-11 RX ADMIN — ENOXAPARIN SODIUM 40 MG: 40 INJECTION SUBCUTANEOUS at 05:08

## 2023-08-11 RX ADMIN — PIPERACILLIN SODIUM AND TAZOBACTAM SODIUM 3.38 G: 3; .375 INJECTION, POWDER, LYOPHILIZED, FOR SOLUTION INTRAVENOUS at 08:08

## 2023-08-11 RX ADMIN — PANTOPRAZOLE SODIUM 40 MG: 40 TABLET, DELAYED RELEASE ORAL at 05:08

## 2023-08-11 NOTE — PLAN OF CARE
Patient cleared for discharge from case management standpoint.    Follow up appointments scheduled and added to AVS.    Chart and discharge orders reviewed.  Patient discharged home with no further case management needs.       08/11/23 1143   Final Note   Assessment Type Final Discharge Note   Anticipated Discharge Disposition Home   Hospital Resources/Appts/Education Provided Provided patient/caregiver with written discharge plan information;Appointments scheduled and added to AVS   Post-Acute Status   Discharge Delays None known at this time

## 2023-08-11 NOTE — NURSING
Dr. Lyons stated patient is ok to DC once echo images are taken. Patient does not have to wait on echo to be read.

## 2023-08-11 NOTE — NURSING
1219 discharge instructions given and explained to the patient.  She denies any question or concerns at this time. Patient states her ride will not be here until 3-4pm. Charge nurse informed.

## 2023-08-11 NOTE — DISCHARGE SUMMARY
[facility] Medicine  Discharge Summary      Patient Name: Jonna Pearl  MRN: [id number]  ADELSO: [id number]  Patient Class: IP- Inpatient  Admission Date: 8/6/2023  Hospital Length of Stay: 4 days  Discharge Date and Time: 8/11/2023  1:48 PM  Attending Physician: No att. providers found   Discharging Provider: Marylou Lyons MD  Primary Care Provider: Padmini Lackey FNP    Primary Care Team: Networked reference to record PCT     HPI:   Patient is a 63-year-old female with history of hypertension, prediabetes, TIA, morbid obesity, ulcers, hysterectomy, chronic right knee pain uses a cane.  He presents to the ED with complaints of epigastric burning pain 7/10, nausea and nonbilious vomiting and chills.  She was evaluated in ED with WBC 15.75, hbg 11.8, HCT 36.8, CTA abdomen showed sigmoid diverticulitis and gallstones in the gallbladder.  IV Levaquin, IV Flagyl, IV Zofran, IV Dilaudid, IV L lactate Ringer ordered in ED.    On assessment patient is alert oriented x 4 with complaints of nausea vomiting x1 in ED but reports several times at home.  She complained of epigastric discomfort in her lower abdomen moved up to epigastric and chest area, blood in stool started today. She reports pain is 7/10 even after pain medication, and prior to arrival to ED felt dizzy.  She denies any history of diverticulitis in the past.  She denies alcohol, illicit drugs, smoking.  She denies chest pain, lightheaded, dysuria, hematuria, fever.    Hospitalist asked to admit for acute diverticulitis        IMPRESSION: CT abd and pelvis 8/6/23  1.  Sigmoid diverticulitis.  2.  Gallstones in the gallbladder.         * No surgery found *      Hospital Course:   Assumed care of this patient on 8/10/2023.  Patient with a history of obesity with BMI 50, WES on CPAP, hypertension, GERD, prediabetes.  Presented with abdominal pain.  Workup with leukocytosis, CT with concern for cholelithiasis without evidence of  cholecystitis, thickening of wall of sigmoid with mild inflammatory changes concerning for diverticulitis.  She was admitted, started on IV antibiotics (Levaquin and Flagyl), IV fluid, diet regression.  Symptoms continued clinically with continued elevation of WBC count and on 08/09 antibiotics were adjusted to IV piperacillin/tazobactam.  On 08/10, feels improved, WBC count down to 17, tolerating oral diet, will continue 24 more hours of IV antibiotics.  Chart review 2019 colonoscopy with nonbleeding internal hemorrhoids. Incidentally found to have intermittent bigeminy on telemetry monitor, asymptomatic.  While sleeping heart rate down to 40 but improved with stimulation.  Has been on verapamil chronically at home.  Transthoracic echocardiogram obtained, EF of 55-60%.  Outpatient referral to Cardiology placed and discussed with patient.  Tolerating oral diet.  Abdominal pain is improved, had bowel movement.  Appears medically stable for discharge with outpatient follow-up.  She will complete course of antibiotics and follow-up with outpatient Gastroenterology.  Educational material on diverticulitis provided.  Discharge plan including medication, follow-up as well as return precautions were reviewed.  Plan of care discussed with patient and nursing.    Discharge examination   Lying in bed, alert, oriented, regular heart rhythm, on room air, abdomen soft and nontender       Goals of Care Treatment Preferences:  Code Status: Full Code      Consults:   Consults (From admission, onward)        Status Ordering Provider     Inpatient consult to Registered Dietitian/Nutritionist  Once        Provider:  (Not yet assigned)    CHASE Anguiano          No new Assessment & Plan notes have been filed under this hospital service since the last note was generated.  Service: Hospital Medicine    Final Active Diagnoses:    Diagnosis Date Noted POA    PRINCIPAL PROBLEM:  Acute diverticulitis [K57.92] 08/06/2023 Yes     Leucocytosis [D72.829] 08/10/2023 Yes    Bigeminy [I49.8] 08/11/2023 Clinically Undetermined    WES on CPAP [G47.33, Z99.89] 08/11/2023 Not Applicable     Chronic    Anemia [D64.9] 08/10/2023 Yes     Chronic    Morbid obesity with BMI of 40.0-44.9, adult [E66.01, Z68.41] 09/11/2019 Not Applicable     Chronic    Asymptomatic gallstones [K80.20] 09/11/2019 Yes    Hypertension, essential [I10] 05/18/2012 Yes     Chronic      Problems Resolved During this Admission:       Discharged Condition: good    Disposition: Home or Self Care    Follow Up:   Follow-up Information     Padmini Lackey FNP. Go on 8/18/2023.    Specialty: Family Medicine  Why: @12:30 pm  Contact information:  [address] [facility]  [address] [place] [id number]  [phone]             Merrill Vela MD. Go on 9/29/2023.    Specialty: Gastroenterology  Why: 2:30 pm  Contact information:  [address] [facility] [address]  [phone]                       Patient Instructions:      Ambulatory referral/consult to Cardiology   Standing Status: Future   Referral Priority: Routine Referral Type: Consultation   Referral Reason: Specialty Services Required   Requested Specialty: Cardiology   Number of Visits Requested: 1       Significant Diagnostic Studies: Labs:   BMP:   Recent Labs   Lab 08/10/23  0346 08/11/23  0357    96    139   K 3.5 3.5    111*   CO2 22* 22*   BUN 17 13   CREATININE 1.0 0.9   CALCIUM 7.9* 8.1*   MG  --  1.9   , CMP   Recent Labs   Lab 08/10/23  0346 08/11/23  0357    139   K 3.5 3.5    111*   CO2 22* 22*    96   BUN 17 13   CREATININE 1.0 0.9   CALCIUM 7.9* 8.1*   PROT 5.4*  --    ALBUMIN 2.4*  --    BILITOT 0.5  --    ALKPHOS 82  --    AST 10  --    ALT 13  --    ANIONGAP 8 6*   , CBC   Recent Labs   Lab 08/10/23  0346 08/11/23  0357   WBC 17.21*  17.21* 12.97*   HGB 10.3*  10.3* 10.5*   HCT 32.6*  32.6* 32.5*     322 336   , INR   Lab Results   Component Value Date    INR  "1.0 09/12/2019   , Lipid Panel   Lab Results   Component Value Date    CHOL 154 06/20/2022    HDL 44 06/20/2022    LDLCALC 91.4 06/20/2022    TRIG 93 06/20/2022    CHOLHDL 28.6 06/20/2022   , Troponin No results for input(s): "TROPONINI" in the last 168 hours. and A1C:   Recent Labs   Lab 08/06/23 2018   HGBA1C 5.9       Pending Diagnostic Studies:     None       Echo    Result Date: 8/11/2023    Left Ventricle: The left ventricle is normal in size. Normal wall thickness. Normal wall motion. There is normal systolic function with a visually estimated ejection fraction of 55 - 60%. There is normal diastolic function.   Left Atrium: Left atrium is mildly dilated.   Right Ventricle: Normal right ventricular cavity size. Wall thickness is normal. Right ventricle wall motion  is normal. Systolic function is normal.   Mitral Valve: There is mild regurgitation with a centrally directed jet.   Tricuspid Valve: There is mild regurgitation.   IVC/SVC: Normal venous pressure at 3 mmHg.     CT Abdomen Pelvis  Without Contrast    Result Date: 8/6/2023  CLINICAL DATA: Possible abscess TECHNIQUE: Acquisition: Contiguous scans slices were obtained from the top of the hemidiaphragm through the pelvis. Reconstructions: Axial, coronal and sagittal reconstructions of the data set were obtained. Contrast: IV: No contrast was administered. Oral: No oral contrast was administered. Phases: Noncontrasted imaging area Limitations: Evaluation of the gastrointestinal tract and abdominal viscera is limited by the lack of contrast. Estimated radiation dose: 34.4 mGy. All CT scans at this facility use dose modulation and/or weight-based dosing when appropriate to reduce radiation dose to as low as reasonably achievable. COMPARISON: No prior relevant studies are available. FINDINGS: LUNG BASES: Normal. LIVER: Normal. SPLEEN: Normal. GALLBLADDER AND BILIARY TREE: 1. Faint calcifications appear present in the gallbladder consistent with " stones. PANCREAS: Normal. ADRENAL GLANDS: Normal. URINARY TRACT: KIDNEYS: 1. The left kidney is atrophic. There is a 4.3 cm benign-appearing cyst in the midportion of the left kidney. This has the appearance of a simple cyst and no further follow-up is required. PELVOCALYCEAL SYSTEM: Normal. URETERS: Normal. RETROPERITONEUM: Normal. MESENTERY AND GASTROINTESTINAL TRACT: 1. There is thickening of the wall of the patient's sigmoid colon with some mild pericolonic inflammatory changes and a few scattered diverticuli. This may consistent with some diverticulitis in this area. PELVIS: URINARY BLADDER: Normal. INGUINAL REGIONS: Normal. VASCULATURE: Normal. OSSEOUS STRUCTURES: 1. Degenerative changes noted in the spine. ABDOMINAL WALL: Normal. ADDITIONAL FINDINGS: None seen. IMPRESSION: 1.  Sigmoid diverticulitis. 2.  Gallstones in the gallbladder. This document was created using a voice recognition transcribing system. Incorrect words or phrases may have been missed during proof reading. Please interpret accordingly or contact the radiologist for clarification if necessary. Electronically signed by:  Jesse Bonilla MD  8/6/2023 4:46 PM CDT Workstation: [id number]    X-Ray Chest PA And Lateral    Result Date: 8/6/2023  CLINICAL HISTORY: 63 years (1960) Female Chest Pain Chest pain TECHNIQUE: PA and lateral radiograph of the chest. Two views. COMPARISON: None available. FINDINGS: The lungs are clear. Costophrenic angles are seen without effusion. No pneumothorax is identified. The heart is top normal in size. The mediastinum is within normal limits. Osseous structures appear within normal limits. The visualized upper abdomen is unremarkable. IMPRESSION: No acute cardiac or pulmonary process. . Electronically signed by:  Pavan Pierson MD  8/6/2023 3:49 PM CDT Workstation: [id number]    Medications:  Reconciled Home Medications:      Medication List      START taking these medications    ciprofloxacin HCl 500 MG  tablet  Commonly known as: CIPRO  Take 1 tablet (500 mg total) by mouth 2 (two) times a day. for 5 days     HYDROcodone-acetaminophen 5-325 mg per tablet  Commonly known as: NORCO  Take 1 tablet by mouth every 12 (twelve) hours as needed for Pain.     metroNIDAZOLE 500 MG tablet  Commonly known as: FLAGYL  Take 1 tablet (500 mg total) by mouth 3 (three) times daily. for 5 days     promethazine 25 MG tablet  Commonly known as: PHENERGAN  Take 1 tablet (25 mg total) by mouth 3 (three) times daily as needed for Nausea.        CONTINUE taking these medications    gabapentin 600 MG tablet  Commonly known as: NEURONTIN  Take 600 mg by mouth 3 (three) times daily.     ketoconazole 2 % shampoo  Commonly known as: NIZORAL  APPLY ONE APPLICATION TO WET HAIR EVERY 3 TO 4 DAYS AS NEEDED     latanoprost 0.005 % ophthalmic solution  INSTILL 1 DROP INTO AFFECTED EYE(S) ONCE DAILY AT BEDTIME     lisinopriL 40 MG tablet  Commonly known as: PRINIVIL,ZESTRIL  Take 40 mg by mouth once daily.     NYSTOP powder  Generic drug: nystatin  Apply 1 g topically as needed.     OZEMPIC 0.25 mg or 0.5 mg (2 mg/3 mL) pen injector  Generic drug: semaglutide  Inject 0.25 mg into the skin every 7 days.     pantoprazole 40 MG tablet  Commonly known as: PROTONIX  Take 40 mg by mouth once daily.     timolol maleate 0.5% 0.5 % Drop  Commonly known as: TIMOPTIC  Place 1 drop into both eyes 2 (two) times daily.     topiramate 50 MG tablet  Commonly known as: TOPAMAX  Take 1 tablet (50 mg total) by mouth once daily.     verapamiL 240 MG CR tablet  Commonly known as: CALAN-SR  Take 240 mg by mouth every evening.        STOP taking these medications    famotidine 20 MG tablet  Commonly known as: PEPCID            Indwelling Lines/Drains at time of discharge:   Lines/Drains/Airways     None                 Time spent on the discharge of patient: 33 minutes         Marylou Lyons MD  Department of Hospital Medicine  [facility]

## 2023-08-11 NOTE — PLAN OF CARE
Pt is having Echo now. CM notified Cardiology that pt is discharged pending Echo reading.CM request Echo read as soon as possible once completed.

## 2023-08-11 NOTE — PLAN OF CARE
Problem: Adult Inpatient Plan of Care  Goal: Plan of Care Review  Outcome: Met  Goal: Patient-Specific Goal (Individualized)  Outcome: Met  Goal: Absence of Hospital-Acquired Illness or Injury  Outcome: Met  Goal: Optimal Comfort and Wellbeing  Outcome: Met  Goal: Readiness for Transition of Care  Outcome: Met     Problem: Bariatric Environmental Safety  Goal: Safety Maintained with Care  Outcome: Met     Problem: Skin Injury Risk Increased  Goal: Skin Health and Integrity  Outcome: Met

## 2023-08-11 NOTE — NURSING
Pt has been flipping in and out of bigeminy most of the shift. Pt denies pain. Pt denies SOB . Reported to pm hospitalist Dr. Resendez , correcting electrolytes and continuing to monitor

## 2023-08-20 ENCOUNTER — PATIENT OUTREACH (OUTPATIENT)
Dept: ADMINISTRATIVE | Facility: OTHER | Age: 63
End: 2023-08-20
Payer: MEDICARE

## 2023-08-20 ENCOUNTER — TELEPHONE (OUTPATIENT)
Dept: ADMINISTRATIVE | Facility: CLINIC | Age: 63
End: 2023-08-20
Payer: MEDICARE

## 2023-08-20 NOTE — PROGRESS NOTES
CHW - Case Closure    This Community Health Worker spoke to patient via telephone today.   Pt reported: Patient declined because of income.   Pt denied any additional needs at this time and agrees with episode closure at this time.  Provided patient with Community Health Worker's contact information and encouraged her to contact this Community Health Worker if additional needs arise.

## 2023-08-20 NOTE — PROGRESS NOTES
CHW - Outreach Attempt    Community Health Worker left a voicemail message for 2nd attempt to contact patient regardinnd missed initial outreach attempt call.

## 2023-08-20 NOTE — PROGRESS NOTES
CHW - Outreach Attempt    Community Health Worker left a voicemail message for 1st attempt to contact patient regardinst missed initial outreach attempt call.

## 2023-08-24 ENCOUNTER — CLINICAL SUPPORT (OUTPATIENT)
Dept: REHABILITATION | Facility: HOSPITAL | Age: 63
End: 2023-08-24
Payer: MEDICARE

## 2023-08-24 DIAGNOSIS — M54.50 CHRONIC BILATERAL LOW BACK PAIN WITHOUT SCIATICA: Primary | ICD-10-CM

## 2023-08-24 DIAGNOSIS — S52.124A NONDISPLACED FRACTURE OF HEAD OF RIGHT RADIUS, INITIAL ENCOUNTER FOR CLOSED FRACTURE: ICD-10-CM

## 2023-08-24 DIAGNOSIS — G89.29 CHRONIC BILATERAL LOW BACK PAIN WITHOUT SCIATICA: Primary | ICD-10-CM

## 2023-08-24 PROCEDURE — 97810 ACUP 1/> WO ESTIM 1ST 15 MIN: CPT | Mod: PN

## 2023-08-24 PROCEDURE — 97811 ACUP 1/> W/O ESTIM EA ADD 15: CPT | Mod: PN

## 2023-08-27 ENCOUNTER — HOSPITAL ENCOUNTER (OUTPATIENT)
Facility: HOSPITAL | Age: 63
Discharge: HOME | End: 2023-08-28
Attending: EMERGENCY MEDICINE | Admitting: INTERNAL MEDICINE
Payer: MEDICARE

## 2023-08-27 DIAGNOSIS — K64.9 HEMORRHOIDS, UNSPECIFIED HEMORRHOID TYPE: ICD-10-CM

## 2023-08-27 DIAGNOSIS — K57.20 DIVERTICULITIS OF LARGE INTESTINE WITH PERFORATION AND ABSCESS WITHOUT BLEEDING: ICD-10-CM

## 2023-08-27 DIAGNOSIS — K57.92 ACUTE DIVERTICULITIS: Primary | ICD-10-CM

## 2023-08-27 DIAGNOSIS — K62.5 RECTAL BLEEDING: ICD-10-CM

## 2023-08-27 PROBLEM — E11.9 TYPE 2 DIABETES MELLITUS, WITHOUT LONG-TERM CURRENT USE OF INSULIN: Status: ACTIVE | Noted: 2023-08-27

## 2023-08-27 LAB
ABO + RH BLD: NORMAL
ALBUMIN SERPL BCP-MCNC: 3.3 G/DL (ref 3.5–5.2)
ALP SERPL-CCNC: 87 U/L (ref 55–135)
ALT SERPL W/O P-5'-P-CCNC: 17 U/L (ref 10–44)
ANION GAP SERPL CALC-SCNC: 5 MMOL/L (ref 8–16)
AST SERPL-CCNC: 22 U/L (ref 10–40)
BACTERIA #/AREA URNS HPF: NEGATIVE /HPF
BASOPHILS # BLD AUTO: 0.03 K/UL (ref 0–0.2)
BASOPHILS NFR BLD: 0.3 % (ref 0–1.9)
BILIRUB SERPL-MCNC: 0.7 MG/DL (ref 0.1–1)
BILIRUB UR QL STRIP: NEGATIVE
BILIRUB UR QL STRIP: NEGATIVE
BLD GP AB SCN CELLS X3 SERPL QL: NORMAL
BUN SERPL-MCNC: 10 MG/DL (ref 8–23)
CALCIUM SERPL-MCNC: 8.3 MG/DL (ref 8.7–10.5)
CHLORIDE SERPL-SCNC: 109 MMOL/L (ref 95–110)
CLARITY UR: CLEAR
CLARITY UR: CLEAR
CO2 SERPL-SCNC: 20 MMOL/L (ref 23–29)
COLOR UR: YELLOW
COLOR UR: YELLOW
CREAT SERPL-MCNC: 0.9 MG/DL (ref 0.5–1.4)
CREAT SERPL-MCNC: 0.9 MG/DL (ref 0.5–1.4)
DIFFERENTIAL METHOD: ABNORMAL
EOSINOPHIL # BLD AUTO: 0.1 K/UL (ref 0–0.5)
EOSINOPHIL NFR BLD: 0.9 % (ref 0–8)
ERYTHROCYTE [DISTWIDTH] IN BLOOD BY AUTOMATED COUNT: 17.2 % (ref 11.5–14.5)
EST. GFR  (NO RACE VARIABLE): >60 ML/MIN/1.73 M^2
GLUCOSE SERPL-MCNC: 115 MG/DL (ref 70–110)
GLUCOSE SERPL-MCNC: 72 MG/DL (ref 70–110)
GLUCOSE UR QL STRIP: NEGATIVE
GLUCOSE UR QL STRIP: NEGATIVE
HCT VFR BLD AUTO: 36.9 % (ref 37–48.5)
HGB BLD-MCNC: 11.7 G/DL (ref 12–16)
HGB UR QL STRIP: NEGATIVE
HGB UR QL STRIP: NEGATIVE
HYALINE CASTS #/AREA URNS LPF: 3 /LPF
IMM GRANULOCYTES # BLD AUTO: 0.03 K/UL (ref 0–0.04)
IMM GRANULOCYTES NFR BLD AUTO: 0.3 % (ref 0–0.5)
INR PPP: 1 (ref 0.8–1.2)
KETONES UR QL STRIP: NEGATIVE
KETONES UR QL STRIP: NEGATIVE
LACTATE SERPL-SCNC: 1 MMOL/L (ref 0.5–1.9)
LACTATE SERPL-SCNC: 1.2 MMOL/L (ref 0.5–1.9)
LEUKOCYTE ESTERASE UR QL STRIP: ABNORMAL
LEUKOCYTE ESTERASE UR QL STRIP: ABNORMAL
LIPASE SERPL-CCNC: 33 U/L (ref 4–60)
LYMPHOCYTES # BLD AUTO: 2 K/UL (ref 1–4.8)
LYMPHOCYTES NFR BLD: 21 % (ref 18–48)
MCH RBC QN AUTO: 27.7 PG (ref 27–31)
MCHC RBC AUTO-ENTMCNC: 31.7 G/DL (ref 32–36)
MCV RBC AUTO: 87 FL (ref 82–98)
MICROSCOPIC COMMENT: ABNORMAL
MONOCYTES # BLD AUTO: 0.8 K/UL (ref 0.3–1)
MONOCYTES NFR BLD: 7.9 % (ref 4–15)
NEUTROPHILS # BLD AUTO: 6.7 K/UL (ref 1.8–7.7)
NEUTROPHILS NFR BLD: 69.6 % (ref 38–73)
NITRITE UR QL STRIP: NEGATIVE
NITRITE UR QL STRIP: NEGATIVE
NRBC BLD-RTO: 0 /100 WBC
PH UR STRIP: 6 [PH] (ref 5–8)
PH UR STRIP: 6 [PH] (ref 5–8)
PLATELET # BLD AUTO: 570 K/UL (ref 150–450)
PMV BLD AUTO: 9.6 FL (ref 9.2–12.9)
POTASSIUM SERPL-SCNC: 3.5 MMOL/L (ref 3.5–5.1)
PROCALCITONIN SERPL IA-MCNC: <0.05 NG/ML (ref 0–0.5)
PROT SERPL-MCNC: 6.9 G/DL (ref 6–8.4)
PROT UR QL STRIP: NEGATIVE
PROT UR QL STRIP: NEGATIVE
PROTHROMBIN TIME: 11.4 SEC (ref 9–12.5)
RBC # BLD AUTO: 4.23 M/UL (ref 4–5.4)
RBC #/AREA URNS HPF: 1 /HPF (ref 0–4)
SAMPLE: NORMAL
SODIUM SERPL-SCNC: 134 MMOL/L (ref 136–145)
SP GR UR STRIP: 1.01 (ref 1–1.03)
SP GR UR STRIP: 1.01 (ref 1–1.03)
SPECIMEN OUTDATE: NORMAL
SQUAMOUS #/AREA URNS HPF: 5 /HPF
URN SPEC COLLECT METH UR: ABNORMAL
URN SPEC COLLECT METH UR: ABNORMAL
UROBILINOGEN UR STRIP-ACNC: NEGATIVE EU/DL
UROBILINOGEN UR STRIP-ACNC: NEGATIVE EU/DL
WBC # BLD AUTO: 9.6 K/UL (ref 3.9–12.7)
WBC #/AREA URNS HPF: 2 /HPF (ref 0–5)

## 2023-08-27 PROCEDURE — 85610 PROTHROMBIN TIME: CPT | Performed by: EMERGENCY MEDICINE

## 2023-08-27 PROCEDURE — 99204 PR OFFICE/OUTPT VISIT, NEW, LEVL IV, 45-59 MIN: ICD-10-PCS | Mod: ,,, | Performed by: SURGERY

## 2023-08-27 PROCEDURE — 96365 THER/PROPH/DIAG IV INF INIT: CPT | Mod: 59

## 2023-08-27 PROCEDURE — 87045 FECES CULTURE AEROBIC BACT: CPT | Performed by: NURSE PRACTITIONER

## 2023-08-27 PROCEDURE — A4216 STERILE WATER/SALINE, 10 ML: HCPCS | Performed by: NURSE PRACTITIONER

## 2023-08-27 PROCEDURE — 96367 TX/PROPH/DG ADDL SEQ IV INF: CPT

## 2023-08-27 PROCEDURE — 81001 URINALYSIS AUTO W/SCOPE: CPT | Performed by: EMERGENCY MEDICINE

## 2023-08-27 PROCEDURE — 87046 STOOL CULTR AEROBIC BACT EA: CPT | Performed by: NURSE PRACTITIONER

## 2023-08-27 PROCEDURE — 82962 GLUCOSE BLOOD TEST: CPT

## 2023-08-27 PROCEDURE — 87209 SMEAR COMPLEX STAIN: CPT | Performed by: NURSE PRACTITIONER

## 2023-08-27 PROCEDURE — 96366 THER/PROPH/DIAG IV INF ADDON: CPT

## 2023-08-27 PROCEDURE — 25000003 PHARM REV CODE 250: Performed by: NURSE PRACTITIONER

## 2023-08-27 PROCEDURE — 99204 OFFICE O/P NEW MOD 45 MIN: CPT | Mod: ,,, | Performed by: SURGERY

## 2023-08-27 PROCEDURE — 87040 BLOOD CULTURE FOR BACTERIA: CPT | Mod: 59 | Performed by: EMERGENCY MEDICINE

## 2023-08-27 PROCEDURE — 63600175 PHARM REV CODE 636 W HCPCS: Performed by: EMERGENCY MEDICINE

## 2023-08-27 PROCEDURE — 86900 BLOOD TYPING SEROLOGIC ABO: CPT | Performed by: EMERGENCY MEDICINE

## 2023-08-27 PROCEDURE — G0378 HOSPITAL OBSERVATION PER HR: HCPCS

## 2023-08-27 PROCEDURE — 83605 ASSAY OF LACTIC ACID: CPT | Mod: 91 | Performed by: EMERGENCY MEDICINE

## 2023-08-27 PROCEDURE — 87449 NOS EACH ORGANISM AG IA: CPT | Performed by: NURSE PRACTITIONER

## 2023-08-27 PROCEDURE — 85025 COMPLETE CBC W/AUTO DIFF WBC: CPT | Performed by: EMERGENCY MEDICINE

## 2023-08-27 PROCEDURE — 63600175 PHARM REV CODE 636 W HCPCS: Performed by: NURSE PRACTITIONER

## 2023-08-27 PROCEDURE — 84145 PROCALCITONIN (PCT): CPT | Performed by: NURSE PRACTITIONER

## 2023-08-27 PROCEDURE — 25000003 PHARM REV CODE 250: Performed by: INTERNAL MEDICINE

## 2023-08-27 PROCEDURE — 80053 COMPREHEN METABOLIC PANEL: CPT | Performed by: EMERGENCY MEDICINE

## 2023-08-27 PROCEDURE — 99285 EMERGENCY DEPT VISIT HI MDM: CPT | Mod: 25

## 2023-08-27 PROCEDURE — 83690 ASSAY OF LIPASE: CPT | Performed by: EMERGENCY MEDICINE

## 2023-08-27 PROCEDURE — 36415 COLL VENOUS BLD VENIPUNCTURE: CPT | Performed by: EMERGENCY MEDICINE

## 2023-08-27 PROCEDURE — 82565 ASSAY OF CREATININE: CPT

## 2023-08-27 PROCEDURE — 82272 OCCULT BLD FECES 1-3 TESTS: CPT | Performed by: NURSE PRACTITIONER

## 2023-08-27 RX ORDER — POLYETHYLENE GLYCOL 3350 17 G/17G
17 POWDER, FOR SOLUTION ORAL DAILY
Status: CANCELLED | OUTPATIENT
Start: 2023-08-27

## 2023-08-27 RX ORDER — ACETAMINOPHEN 325 MG/1
650 TABLET ORAL EVERY 8 HOURS PRN
Status: DISCONTINUED | OUTPATIENT
Start: 2023-08-27 | End: 2023-08-28 | Stop reason: HOSPADM

## 2023-08-27 RX ORDER — LISINOPRIL 40 MG/1
1 TABLET ORAL DAILY
COMMUNITY

## 2023-08-27 RX ORDER — SULFAMETHOXAZOLE AND TRIMETHOPRIM 800; 160 MG/1; MG/1
1 TABLET ORAL 2 TIMES DAILY
Status: DISCONTINUED | OUTPATIENT
Start: 2023-08-28 | End: 2023-08-28 | Stop reason: HOSPADM

## 2023-08-27 RX ORDER — ERGOCALCIFEROL 1.25 MG/1
1 CAPSULE ORAL
COMMUNITY
End: 2024-02-15 | Stop reason: ALTCHOICE

## 2023-08-27 RX ORDER — VERAPAMIL HYDROCHLORIDE 240 MG/1
240 TABLET, FILM COATED, EXTENDED RELEASE ORAL NIGHTLY
Status: CANCELLED | OUTPATIENT
Start: 2023-08-27

## 2023-08-27 RX ORDER — METRONIDAZOLE 500 MG/1
1 TABLET ORAL 3 TIMES DAILY
Status: ON HOLD | COMMUNITY
Start: 2023-08-21 | End: 2023-08-28 | Stop reason: HOSPADM

## 2023-08-27 RX ORDER — CIPROFLOXACIN 500 MG/1
1 TABLET ORAL EVERY 12 HOURS
Status: ON HOLD | COMMUNITY
End: 2023-08-28 | Stop reason: HOSPADM

## 2023-08-27 RX ORDER — GLUCAGON 1 MG
1 KIT INJECTION
Status: DISCONTINUED | OUTPATIENT
Start: 2023-08-27 | End: 2023-08-28 | Stop reason: HOSPADM

## 2023-08-27 RX ORDER — SODIUM CHLORIDE 0.9 % (FLUSH) 0.9 %
10 SYRINGE (ML) INJECTION EVERY 12 HOURS
Status: DISCONTINUED | OUTPATIENT
Start: 2023-08-27 | End: 2023-08-28 | Stop reason: HOSPADM

## 2023-08-27 RX ORDER — ONDANSETRON 4 MG/1
8 TABLET, ORALLY DISINTEGRATING ORAL EVERY 8 HOURS PRN
Status: CANCELLED | OUTPATIENT
Start: 2023-08-27

## 2023-08-27 RX ORDER — LEVOFLOXACIN 5 MG/ML
750 INJECTION, SOLUTION INTRAVENOUS
Status: COMPLETED | OUTPATIENT
Start: 2023-08-27 | End: 2023-08-27

## 2023-08-27 RX ORDER — LISINOPRIL 20 MG/1
40 TABLET ORAL DAILY
Status: CANCELLED | OUTPATIENT
Start: 2023-08-27

## 2023-08-27 RX ORDER — ONDANSETRON 4 MG/1
8 TABLET, ORALLY DISINTEGRATING ORAL EVERY 8 HOURS PRN
Status: DISCONTINUED | OUTPATIENT
Start: 2023-08-27 | End: 2023-08-28 | Stop reason: HOSPADM

## 2023-08-27 RX ORDER — NYSTATIN 100000 U/G
1 CREAM TOPICAL 2 TIMES DAILY
COMMUNITY

## 2023-08-27 RX ORDER — PANTOPRAZOLE SODIUM 40 MG/1
40 TABLET, DELAYED RELEASE ORAL DAILY
Status: CANCELLED | OUTPATIENT
Start: 2023-08-27

## 2023-08-27 RX ORDER — TIMOLOL MALEATE 5 MG/ML
1 SOLUTION/ DROPS OPHTHALMIC 2 TIMES DAILY
Status: DISCONTINUED | OUTPATIENT
Start: 2023-08-27 | End: 2023-08-28 | Stop reason: HOSPADM

## 2023-08-27 RX ORDER — VERAPAMIL HYDROCHLORIDE 240 MG/1
1 TABLET, FILM COATED, EXTENDED RELEASE ORAL DAILY
COMMUNITY

## 2023-08-27 RX ORDER — FAMOTIDINE 20 MG/1
20 TABLET, FILM COATED ORAL 2 TIMES DAILY
Status: DISCONTINUED | OUTPATIENT
Start: 2023-08-27 | End: 2023-08-28 | Stop reason: HOSPADM

## 2023-08-27 RX ORDER — METRONIDAZOLE 500 MG/100ML
500 INJECTION, SOLUTION INTRAVENOUS
Status: DISCONTINUED | OUTPATIENT
Start: 2023-08-27 | End: 2023-08-27

## 2023-08-27 RX ORDER — VERAPAMIL HYDROCHLORIDE 240 MG/1
240 TABLET, FILM COATED, EXTENDED RELEASE ORAL DAILY
Status: DISCONTINUED | OUTPATIENT
Start: 2023-08-28 | End: 2023-08-28 | Stop reason: HOSPADM

## 2023-08-27 RX ORDER — LATANOPROST 50 UG/ML
1 SOLUTION/ DROPS OPHTHALMIC NIGHTLY
Status: DISCONTINUED | OUTPATIENT
Start: 2023-08-27 | End: 2023-08-28 | Stop reason: HOSPADM

## 2023-08-27 RX ORDER — DEXTROMETHORPHAN POLISTIREX 30 MG/5 ML
1 SUSPENSION, EXTENDED RELEASE 12 HR ORAL DAILY PRN
Status: CANCELLED | OUTPATIENT
Start: 2023-08-27

## 2023-08-27 RX ORDER — GABAPENTIN 300 MG/1
600 CAPSULE ORAL 3 TIMES DAILY
Status: DISCONTINUED | OUTPATIENT
Start: 2023-08-28 | End: 2023-08-28 | Stop reason: HOSPADM

## 2023-08-27 RX ORDER — LISINOPRIL 20 MG/1
40 TABLET ORAL DAILY
Status: DISCONTINUED | OUTPATIENT
Start: 2023-08-28 | End: 2023-08-28 | Stop reason: HOSPADM

## 2023-08-27 RX ORDER — HYDROCODONE BITARTRATE AND ACETAMINOPHEN 5; 325 MG/1; MG/1
1 TABLET ORAL EVERY 4 HOURS PRN
Status: DISCONTINUED | OUTPATIENT
Start: 2023-08-27 | End: 2023-08-28 | Stop reason: HOSPADM

## 2023-08-27 RX ORDER — TRIAMCINOLONE ACETONIDE 1 MG/G
1 CREAM TOPICAL 2 TIMES DAILY
COMMUNITY

## 2023-08-27 RX ORDER — TALC
6 POWDER (GRAM) TOPICAL NIGHTLY PRN
Status: CANCELLED | OUTPATIENT
Start: 2023-08-27

## 2023-08-27 RX ORDER — METRONIDAZOLE 500 MG/1
1 TABLET ORAL 3 TIMES DAILY
COMMUNITY
End: 2023-08-27 | Stop reason: SDUPTHER

## 2023-08-27 RX ORDER — DIPHENHYDRAMINE HYDROCHLORIDE 50 MG/ML
12.5 INJECTION INTRAMUSCULAR; INTRAVENOUS EVERY 12 HOURS PRN
Status: DISCONTINUED | OUTPATIENT
Start: 2023-08-27 | End: 2023-08-28 | Stop reason: HOSPADM

## 2023-08-27 RX ORDER — SODIUM CHLORIDE 0.9 % (FLUSH) 0.9 %
10 SYRINGE (ML) INJECTION EVERY 12 HOURS
Status: CANCELLED | OUTPATIENT
Start: 2023-08-27

## 2023-08-27 RX ORDER — TOPIRAMATE 25 MG/1
50 TABLET ORAL DAILY
Status: CANCELLED | OUTPATIENT
Start: 2023-08-27

## 2023-08-27 RX ORDER — PROMETHAZINE HYDROCHLORIDE 25 MG/1
25 TABLET ORAL
COMMUNITY

## 2023-08-27 RX ORDER — KETOCONAZOLE 20 MG/ML
1 SHAMPOO, SUSPENSION TOPICAL
COMMUNITY

## 2023-08-27 RX ORDER — SODIUM CHLORIDE 9 MG/ML
INJECTION, SOLUTION INTRAVENOUS CONTINUOUS
Status: DISCONTINUED | OUTPATIENT
Start: 2023-08-27 | End: 2023-08-28 | Stop reason: HOSPADM

## 2023-08-27 RX ORDER — ONDANSETRON 2 MG/ML
4 INJECTION INTRAMUSCULAR; INTRAVENOUS EVERY 8 HOURS PRN
Status: CANCELLED | OUTPATIENT
Start: 2023-08-27

## 2023-08-27 RX ORDER — ENOXAPARIN SODIUM 100 MG/ML
40 INJECTION SUBCUTANEOUS EVERY 24 HOURS
Status: CANCELLED | OUTPATIENT
Start: 2023-08-27

## 2023-08-27 RX ORDER — METRONIDAZOLE 500 MG/100ML
500 INJECTION, SOLUTION INTRAVENOUS
Status: COMPLETED | OUTPATIENT
Start: 2023-08-27 | End: 2023-08-27

## 2023-08-27 RX ADMIN — SODIUM CHLORIDE: 0.9 INJECTION, SOLUTION INTRAVENOUS at 02:08

## 2023-08-27 RX ADMIN — LEVOFLOXACIN 750 MG: 5 INJECTION, SOLUTION INTRAVENOUS at 02:08

## 2023-08-27 RX ADMIN — LATANOPROST 1 DROP: 50 SOLUTION OPHTHALMIC at 11:08

## 2023-08-27 RX ADMIN — PIPERACILLIN AND TAZOBACTAM 3.38 G: 3; .375 INJECTION, POWDER, LYOPHILIZED, FOR SOLUTION INTRAVENOUS; PARENTERAL at 06:08

## 2023-08-27 RX ADMIN — METRONIDAZOLE 500 MG: 5 INJECTION, SOLUTION INTRAVENOUS at 01:08

## 2023-08-27 RX ADMIN — TIMOLOL MALEATE 1 DROP: 5 SOLUTION OPHTHALMIC at 11:08

## 2023-08-27 RX ADMIN — METRONIDAZOLE 500 MG: 5 INJECTION, SOLUTION INTRAVENOUS at 10:08

## 2023-08-27 RX ADMIN — FAMOTIDINE 20 MG: 20 TABLET ORAL at 10:08

## 2023-08-27 RX ADMIN — SODIUM CHLORIDE 10 ML: 9 INJECTION INTRAMUSCULAR; INTRAVENOUS; SUBCUTANEOUS at 09:08

## 2023-08-27 NOTE — PHARMACY MED REC
"              .    Admission Medication History     The home medication history was taken by Rossy Scott.    You may go to "Admission" then "Reconcile Home Medications" tabs to review and/or act upon these items.     The home medication list has been updated by the Pharmacy department.   Please read ALL comments highlighted in yellow.   Please address this information as you see fit.    Feel free to contact us if you have any questions or require assistance.            Medications listed below were obtained from: Patient/family  No current facility-administered medications on file prior to encounter.     Current Outpatient Medications on File Prior to Encounter   Medication Sig Dispense Refill    ciprofloxacin HCl (CIPRO) 500 MG tablet Take 1 tablet by mouth every 12 (twelve) hours.      gabapentin (NEURONTIN) 600 MG tablet Take 600 mg by mouth 3 (three) times daily.  9    latanoprost 0.005 % ophthalmic solution Place 1 drop into both eyes every evening.  6    lisinopriL (PRINIVIL,ZESTRIL) 40 MG tablet Take 1 tablet by mouth once daily.      metroNIDAZOLE (FLAGYL) 500 MG tablet Take 1 tablet by mouth 3 (three) times daily.      promethazine (PHENERGAN) 25 MG tablet Take 25 mg by mouth as needed for Nausea.      timolol maleate 0.5% (TIMOPTIC) 0.5 % Drop Place 1 drop into both eyes 2 (two) times daily.      verapamiL (CALAN-SR) 240 MG CR tablet Take 1 tablet by mouth once daily.      acetaminophen (TYLENOL ORAL) Take 1 tablet by mouth as needed.      ergocalciferol (ERGOCALCIFEROL) 50,000 unit Cap Take 1 capsule by mouth every 7 days.      ketoconazole (NIZORAL) 2 % shampoo Apply 1 Application topically Every 3 (three) days. 1 application as needed to wet hair every 3 to 4 days external once a day      nystatin (MYCOSTATIN) cream Apply 1 Application topically 2 (two) times a day.      NYSTOP powder Apply 1 g topically as needed.      OZEMPIC 0.25 mg or 0.5 mg (2 mg/3 mL) pen injector Inject 0.25 mg into the skin " every 7 days.      pantoprazole (PROTONIX) 40 MG tablet Take 40 mg by mouth once daily.      topiramate (TOPAMAX) 50 MG tablet Take 1 tablet (50 mg total) by mouth once daily. 30 tablet 11    triamcinolone acetonide 0.1% (KENALOG) 0.1 % cream Apply 1 g topically 2 (two) times daily.      [DISCONTINUED] ketoconazole (NIZORAL) 2 % shampoo APPLY ONE APPLICATION TO WET HAIR EVERY 3 TO 4 DAYS AS NEEDED      [DISCONTINUED] lisinopriL (PRINIVIL,ZESTRIL) 40 MG tablet Take 40 mg by mouth once daily.      [DISCONTINUED] metroNIDAZOLE (FLAGYL) 500 MG tablet Take 1 tablet by mouth 3 (three) times daily.      [DISCONTINUED] verapamil (CALAN-SR) 240 MG CR tablet Take 240 mg by mouth every evening.  2         Rossy Scott  [id number]

## 2023-08-27 NOTE — HPI
"Jonna Pearl is a 63 y.o. female with a history as  has a past medical history of Allergy, Arthritis, Atrophic kidney, Chronic kidney disease, Chronic rhinitis, Colon polyps, Depression, HEARING LOSS, Hiatal hernia, Hypertension, Insomnia, Migraines, Otitis media, Sleep apnea, and TIA (transient ischemic attack). who presented to the ED with a Rectal Bleeding and abdominal pain (bright red). Pain is lower left quad - cramping and deep aching, uncomfortable to lay down or sleep with associated sweating, chills and nausea. Per patient she was discharged on oral abx X5days with minimal improvement after discharge. States she saw her primary care provider on the September 18, 2023 who RX'ed 5 more days and told her if she didn't feel better to come back to the hospital. Patient endorses 1-2 small BM's qd they have been loose and mucusy. She reports seeing both dark and bright red blood over recently, stating, "I know I have hemorrhoids but his is different". Eleuterio upper respiratory symptoms (cough/post nasal drip), CP, dysuria, HA, CP, numbness or tingling in any extremity - denies recent trauma. At the time of assessment she reports resolution of her abdominal pain after getting IV abx in the ED (no pain meds administered)    Review of records reveals that she was recently discharged on 8/11/2023 after similar presentation, although she had associated leucocytosis. She received IV abx, discharged on oral abx after feeling better with referral to GI in clinic. Last upper GI was Jan 2023, and last colonoscopy in [facility] was 2019.    In ED: no leukocytosis, afebrile, lactic normal = sodium 134, glucose. CT abdomen Pelvis demonstrated "persistent mild inflammatory fat stranding about segment of proximal sigmoid colon" - "appearance suggests persistent acute diverticulitis without perforation" - "follow-up CT abdomen and pelvis with IV contrast for endoscopy is recommended to exclude possibility of colon carcinoma" as well, " ""gallbladder findings reflect cholelithiasis" again "if further imaging evaluation is desired upper quadrant US can be considered". No hemoccult. Hgb 11.7 stable since prior hospitalization    PLAN: Restart IV ABX, consult to GI, bowel rest advance diet as tolerated, IV fluids - Consult Surgery  -Awaiting, BC, stool cultures, stop ozempic for now  "

## 2023-08-27 NOTE — ASSESSMENT & PLAN NOTE
Patient's sBGL 115  Last A1c reviewed-   Lab Results   Component Value Date    HGBA1C 5.9 08/06/2023     Current correctional scale  Low   Recommendation to discontinue ozempic could be contributory/exaerbating GI complications

## 2023-08-27 NOTE — PROGRESS NOTES
Acupuncture Evaluation Note     Name: Jonna Pearl  [facility] Number: [id number]    Traditional Chinese Medicine (TCM) Diagnosis: Qi Stagnation  Medical Diagnosis:   Encounter Diagnoses   Name Primary?    Chronic bilateral low back pain without sciatica Yes    Nondisplaced fracture of head of right radius, initial encounter for closed fracture         Evaluation Date: 8/24/23    Visit #/Visits authorized: 4/12     Precautions: Standard    Subjective     Chief Concern: Closed nondisplaced fracture of head of right radius. Chronic low back pain. Skin irritation on elbows.    Medical necessity is demonstrated by the following IMPAIRMENTS: Medical Necessity: Decreased mobility limits day to day activities, social, and emergent situations              Aggravating Factors:  changing positions and pressure   Relieving Factors:  rest    Symptom Description:     Quality:  Aching  Severity:  Varies, but could be 5/10  Frequency:  when active      Treatment       Acupuncture points used:  K3, UB 62, SI 4, LI 5, GB 20  Needles In: 10  Needles Out: 10  Needles W/O STIM placed: 1:45pm  [place] W/O STIM removed: 2:30pm       Assessment     After treatment, patient felt relaxed and lessened stiffness/pain    Patient prognosis is Fair.     Patient will continue to benefit from acupuncture treatment to address the deficits listed in the problem list box on initial evaluation, provide patient family education and to maximize pt's level of independence in the home and community environment.     Patient's spiritual, cultural and educational needs considered and pt agreeable to plan of care and goals.     Anticipated barriers to treatment: none    Plan     Recommend every 2 weeks for 12 sessions with midway re-assess.      Education:  Patient is aware of cumulative benefit of acupuncture

## 2023-08-27 NOTE — ASSESSMENT & PLAN NOTE
-Failed outpatient therapy  -Last upper GI was Jan 2023, and last colonoscopy in epic was 2019.  -During last hospitalization antibiotics were adjusted to IV piperacillin/tazobactam. Discharged on flagyl + cipro oral  -Recurrent issue with minimal intermittent relief from abdominal pain  -Continues to see dark and bright red blood - although Hgb 11.7 consistent with prior  -Discontinue Ozempic - could be contributory/exaerbating GI complications  -Restart IV antibiotics  -Bowel rest - advance diet as tolerated  -Stool studies including culture, OB, and ova parasites  -Consulted Surgery -

## 2023-08-27 NOTE — ED PROVIDER NOTES
Encounter Date: 2023       History     Chief Complaint   Patient presents with    Rectal Bleeding     Pt had diverticulitis was on two rounds of abt, no feeling better.      Patient presents complaining of abdominal pain and rectal bleeding.  Patient has been on 2 rounds of antibiotics for acute diverticulitis.  Patient has also noticed some mucousy stool and some bright red blood per rectum.  At the worst symptoms are mild-to-moderate.      Review of patient's allergies indicates:   Allergen Reactions    Neomycin-polymyxin Swelling    Cephalexin      Other reaction(s): Unknown    Doxycycline      Other reaction(s): Unknown    Iodinated contrast media      Other reaction(s): Unknown    Penicillins      Other reaction(s): Unknown    Valsartan      Other reaction(s): Unknown     Past Medical History:   Diagnosis Date    Allergy     Arthritis     Atrophic kidney     left. probably childhood illness or disease    Chronic kidney disease     Chronic rhinitis     Colon polyps     Depression     HEARING LOSS     Hiatal hernia     Hypertension     Insomnia     Migraines     Otitis media     Sleep apnea     mild    TIA (transient ischemic attack)      Past Surgical History:   Procedure Laterality Date     SECTION      COLONOSCOPY  2014    Dr Escobar 5 year yossi    COLONOSCOPY N/A 4/10/2019    Procedure: COLONOSCOPY;  Surgeon: Matt Orozco MD;  Location: [facility];  Service: Endoscopy;  Laterality: N/A;    ESOPHAGOGASTRODUODENOSCOPY N/A 2023    Procedure: EGD (ESOPHAGOGASTRODUODENOSCOPY);  Surgeon: Merrill Vela MD;  Location: [facility];  Service: Endoscopy;  Laterality: N/A;    HEMORRHOID SURGERY      HYSTERECTOMY      left mandibular node resection      OOPHORECTOMY       Family History   Problem Relation Age of Onset    Cancer Sister         breast    Diabetes Mother     Early death Father     Diabetes Paternal Grandmother     Breast cancer Maternal Aunt     Breast cancer Paternal Aunt       Social History     Tobacco Use    Smoking status: Former     Current packs/day: 0.00     Average packs/day: 1 pack/day for 18.0 years (18.0 ttl pk-yrs)     Types: Cigarettes     Start date: 2000     Quit date: 2018     Years since quittin.4    Smokeless tobacco: Never   Substance Use Topics    Alcohol use: No    Drug use: No     Review of Systems   All other systems reviewed and are negative.      Physical Exam     Initial Vitals [23 1000]   BP Pulse Resp Temp SpO2   134/65 74 (!) 22 97.9 °F (36.6 °C) 95 %      MAP       --         Physical Exam    Nursing note and vitals reviewed.  Constitutional: She appears well-developed and well-nourished.   Pleasant, polite   HENT:   Head: Normocephalic and atraumatic.   Eyes: EOM are normal.   Neck: Neck supple.   Normal range of motion.  Cardiovascular:  Normal rate, regular rhythm, normal heart sounds and intact distal pulses.           Pulmonary/Chest: No respiratory distress.   Abdominal: Abdomen is soft. There is abdominal tenderness.   Genitourinary:    Genitourinary Comments: Rectal exam has external hemorrhoids with bright red blood.  There is no significant oozing or clots     Musculoskeletal:      Cervical back: Normal range of motion and neck supple.     Neurological: She is alert and oriented to person, place, and time.   Skin: Skin is warm and dry. Capillary refill takes less than 2 seconds.   Psychiatric: She has a normal mood and affect. Her behavior is normal. Judgment and thought content normal.         ED Course   Procedures  Labs Reviewed   CBC W/ AUTO DIFFERENTIAL - Abnormal; Notable for the following components:       Result Value    Hemoglobin 11.7 (*)     Hematocrit 36.9 (*)     MCHC 31.7 (*)     RDW 17.2 (*)     Platelets 570 (*)     All other components within normal limits    Narrative:     For upper or mid abdominal pain.   COMPREHENSIVE METABOLIC PANEL - Abnormal; Notable for the following components:    Sodium 134 (*)     CO2  20 (*)     Glucose 115 (*)     Calcium 8.3 (*)     Albumin 3.3 (*)     Anion Gap 5 (*)     All other components within normal limits    Narrative:     For upper or mid abdominal pain.   CULTURE, BLOOD   CULTURE, BLOOD   LIPASE    Narrative:     For upper or mid abdominal pain.   PROTIME-INR   LACTIC ACID, PLASMA   URINALYSIS, REFLEX TO URINE CULTURE   LACTIC ACID, PLASMA   URINALYSIS, REFLEX TO URINE CULTURE   TYPE & SCREEN   ISTAT CREATININE   POCT CREATININE          Imaging Results              CT Abdomen Pelvis  Without Contrast (Final result)  Result time 08/27/23 12:11:56   Procedure changed from CT Abdomen Pelvis With Contrast     Final result by Nick Velazquez MD (08/27/23 12:11:56)                   Narrative:    CMS MANDATED QUALITY DATA - CT RADIATION - 436    All CT scans at this facility utilize dose modulation, iterative reconstruction, and/or weight based dosing when appropriate to reduce radiation dose to as low as reasonably achievable.        Reason: GI bleed; Abdominal abscess/infection suspected; LLQ abdominal pain; divet    TECHNIQUE: CT abdomen and pelvis without IV or oral contrast. Study is tailored for detection of urinary tract calculi and evaluation of solid organs, hollow viscera, and vascular structures is limited.    COMPARISON: CT 8/6/2023    CT ABDOMEN:  Partially visualized lung bases are clear. Moderate coronary artery calcification are present.    Noncontrast liver, pancreas, spleen, and adrenals are normal.    Hyperdensity in gallbladder unchanged with slight haziness about the gallbladder wall, nonspecific.    Left renal cyst unchanged. Severe cortical atrophy of left kidney is unchanged. Superior right renal hypodensity is unchanged. Negative for hydronephrosis or urolithiasis. Minor aortoiliac calcifications unchanged.    Focal mild inflammatory fat stranding affects segment of proximal sigmoid colon where colonic wall thickening and colonic diverticula coexists, similar to the  prior exam. Large and small intestines are otherwise unremarkable. A normal appendix is present. No free intraperitoneal gas.    Degenerative changes affect the spine.    CT PELVIS:  Bladder is normal. Uterus has been removed. No free pelvic fluid or adnexal mass.    IMPRESSION:    1. Persistent mild inflammatory fat stranding about segment of proximal sigmoid colon where wall thickening and colonic diverticula coexist, unchanged. The appearance suggests persistent acute diverticulitis without perforation or other complication. As colon carcinoma can occasionally no acute acute diverticulitis on imaging alone, a follow-up CT abdomen and pelvis with IV contrast for endoscopy is recommended to exclude possibility of colon carcinoma.  2. Hyperdensity gallbladder could reflect cholelithiasis with slight haziness about the gallbladder wall, nonspecific. Clinical and laboratory correlation for acute cholecystitis is requested. If further imaging evaluation is desired, upper quadrant ultrasound can be considered.  3. Coronary artery calcifications.    Electronically signed by:  Nick Velazquez MD  8/27/2023 12:11 PM CDT Workstation: [id number]                                     Medications   levoFLOXacin 750 mg/150 mL IVPB 750 mg (has no administration in time range)   metronidazole IVPB 500 mg (has no administration in time range)     Medical Decision Making  Patient is in no acute distress,     Considerations include acute diverticulitis, abscess, acute kidney injury, dehydration, electrolyte abnormalities, lower GI bleed    MDM    Patient presents for emergent evaluation of acute abdominal pain and lower GI bleeding that poses a possible threat to life and/or bodily function.    In the ED patient found to have acute external hemorrhoids, acute diverticulitis.   I ordered labs and personally reviewed them.  Labs significant for normal hemoglobin.      I ordered CT scan and personally reviewed it and reviewed the  radiologist interpretation.  CT significant for incidental findings and acute diverticulitis.      Admission MDM  I discussed the patient presentation labs, ekg, X-rays, CT findings with the consultant(s) hospitalist   Patient was managed in the ED with IV Levaquin, fluids.    The response to treatment was improved.    Patient required emergent consultation to hospitalist for admission.    Patient requires admission to the hospital secondary to failed outpatient therapy for acute diverticulitis.  Patient additionally has external hemorrhoids that are bleeding.    Amount and/or Complexity of Data Reviewed  Labs: ordered. Decision-making details documented in ED Course.  Radiology: ordered.    Risk  Prescription drug management.  Decision regarding hospitalization.               ED Course as of 08/27/23 1314   Sun Aug 27, 2023   1222 Sodium(!): 134 [AP]   1223 Potassium: 3.5 [AP]   1223 Chloride: 109 [AP]   1223 CO2(!): 20 [AP]   1223 Glucose(!): 115 [AP]   1223 BUN: 10 [AP]   1223 Creatinine: 0.9 [AP]   1223 Calcium(!): 8.3 [AP]   1223 PROTEIN TOTAL: 6.9 [AP]   1223 Albumin(!): 3.3 [AP]   1223 BILIRUBIN TOTAL: 0.7 [AP]   1223 Alkaline Phosphatase: 87 [AP]   1223 AST: 22 [AP]   1223 ALT: 17 [AP]   1223 WBC: 9.60 [AP]   1223 Hemoglobin(!): 11.7 [AP]   1223 Hematocrit(!): 36.9 [AP]   1223 Platelets(!): 570 [AP]   1223 Lactate, Wei: 1.2 [AP]   1223 Lipase: 33 [AP]   1223 Protime: 11.4 [AP]   1223 INR: 1.0 [AP]      ED Course User Index  [AP] London Ryder MD                    Clinical Impression:   Final diagnoses:  [K57.92] Acute diverticulitis (Primary)  [K64.9] Hemorrhoids, unspecified hemorrhoid type  [K62.5] Rectal bleeding        ED Disposition Condition    Admit Stable                London Ryder MD  08/27/23 1314

## 2023-08-27 NOTE — SUBJECTIVE & OBJECTIVE
Past Medical History:   Diagnosis Date    Allergy     Arthritis     Atrophic kidney     left. probably childhood illness or disease    Chronic kidney disease     Chronic rhinitis     Colon polyps     Depression     HEARING LOSS     Hiatal hernia     Hypertension     Insomnia     Migraines     Otitis media     Sleep apnea     mild    TIA (transient ischemic attack)        Past Surgical History:   Procedure Laterality Date     SECTION      COLONOSCOPY  2014    Dr Escobar 5 year yossi    COLONOSCOPY N/A 4/10/2019    Procedure: COLONOSCOPY;  Surgeon: Matt Orozco MD;  Location: [facility] END[facility];  Service: Endoscopy;  Laterality: N/A;    ESOPHAGOGASTRODUODENOSCOPY N/A 2023    Procedure: EGD (ESOPHAGOGASTRODUODENOSCOPY);  Surgeon: Merrill Vela MD;  Location: [facility] ENDO;  Service: Endoscopy;  Laterality: N/A;    HEMORRHOID SURGERY      HYSTERECTOMY      left mandibular node resection      OOPHORECTOMY         Review of patient's allergies indicates:   Allergen Reactions    Neomycin-polymyxin Swelling    Cephalexin      Other reaction(s): Unknown    Doxycycline      Other reaction(s): Unknown    Iodinated contrast media      Other reaction(s): Unknown    Penicillins      Other reaction(s): Unknown    Valsartan      Other reaction(s): Unknown       No current facility-administered medications on file prior to encounter.     Current Outpatient Medications on File Prior to Encounter   Medication Sig    gabapentin (NEURONTIN) 600 MG tablet Take 600 mg by mouth 3 (three) times daily.    ketoconazole (NIZORAL) 2 % shampoo APPLY ONE APPLICATION TO WET HAIR EVERY 3 TO 4 DAYS AS NEEDED    latanoprost 0.005 % ophthalmic solution INSTILL 1 DROP INTO AFFECTED EYE(S) ONCE DAILY AT BEDTIME    lisinopriL (PRINIVIL,ZESTRIL) 40 MG tablet Take 40 mg by mouth once daily.    NYSTOP powder Apply 1 g topically as needed.    OZEMPIC 0.25 mg or 0.5 mg (2 mg/3 mL) pen injector Inject 0.25 mg into the skin every 7 days.     pantoprazole (PROTONIX) 40 MG tablet Take 40 mg by mouth once daily.    timolol maleate 0.5% (TIMOPTIC) 0.5 % Drop Place 1 drop into both eyes 2 (two) times daily.    topiramate (TOPAMAX) 50 MG tablet Take 1 tablet (50 mg total) by mouth once daily.    verapamil (CALAN-SR) 240 MG CR tablet Take 240 mg by mouth every evening.     Family History       Problem Relation (Age of Onset)    Breast cancer Maternal Aunt, Paternal Aunt    Cancer Sister    Diabetes Mother, Paternal Grandmother    Early death Father          Tobacco Use    Smoking status: Former     Current packs/day: 0.00     Average packs/day: 1 pack/day for 18.0 years (18.0 ttl pk-yrs)     Types: Cigarettes     Start date: 2000     Quit date: 2018     Years since quittin.4    Smokeless tobacco: Never   Substance and Sexual Activity    Alcohol use: No    Drug use: No    Sexual activity: Not on file     Review of Systems   Constitutional:  Positive for activity change, appetite change and chills. Negative for diaphoresis and fever.   HENT:  Negative for congestion, hearing loss, sore throat, tinnitus and trouble swallowing.    Eyes:  Negative for photophobia, discharge, itching and visual disturbance.   Respiratory:  Negative for apnea, cough, wheezing and stridor.    Cardiovascular:  Negative for chest pain, palpitations and leg swelling.   Gastrointestinal:  Positive for abdominal distention, abdominal pain and nausea. Negative for blood in stool, constipation and diarrhea.   Endocrine: Negative for polydipsia, polyphagia and polyuria.   Genitourinary:  Negative for difficulty urinating, dysuria, flank pain and frequency.   Musculoskeletal:  Negative for arthralgias, joint swelling and neck stiffness.   Skin:  Negative for color change, rash and wound.   Neurological:  Positive for weakness. Negative for dizziness, tremors, seizures, light-headedness, numbness and headaches.   Hematological:  Negative for adenopathy.   Psychiatric/Behavioral:   Negative for hallucinations and self-injury.      Objective:     Vital Signs (Most Recent):  Temp: 97.9 °F (36.6 °C) (08/27/23 1000)  Pulse: 74 (08/27/23 1000)  Resp: (!) 22 (08/27/23 1000)  BP: 134/65 (08/27/23 1000)  SpO2: 95 % (08/27/23 1000) Vital Signs (24h Range):  Temp:  [97.9 °F (36.6 °C)] 97.9 °F (36.6 °C)  Pulse:  [74] 74  Resp:  [22] 22  SpO2:  [95 %] 95 %  BP: (134)/(65) 134/65     Weight: 120.2 kg (265 lb)  Body mass index is 51.75 kg/m².     Physical Exam  Constitutional:       Appearance: She is well-developed.   HENT:      Head: Normocephalic and atraumatic.   Eyes:      General: Lids are normal.      Extraocular Movements: Extraocular movements intact.      Conjunctiva/sclera: Conjunctivae normal.      Pupils: Pupils are equal, round, and reactive to light.   Neck:      Thyroid: No thyroid mass.      Vascular: No JVD.   Cardiovascular:      Rate and Rhythm: Normal rate and regular rhythm.      Heart sounds: S1 normal and S2 normal. No murmur heard.  Abdominal:      General: There is distension. There is no abdominal bruit.      Palpations: There is no hepatomegaly or splenomegaly.      Tenderness: There is abdominal tenderness.      Comments: No muscular guarding - large abdominal girth, hypoactive bowel sounds [id number]   Musculoskeletal:      Cervical back: Full passive range of motion without pain and neck supple. No edema.   Lymphadenopathy:      Cervical: No cervical adenopathy.   Skin:     General: Skin is warm and dry.   Neurological:      Mental Status: She is alert and oriented to person, place, and time.      Motor: No tremor or seizure activity.      Deep Tendon Reflexes: Reflexes are normal and symmetric.   Psychiatric:         Speech: Speech normal.         Behavior: Behavior is cooperative.         Thought Content: Thought content normal.              CRANIAL NERVES     CN III, IV, VI   Pupils are equal, round, and reactive to light.       Significant Labs: All pertinent labs within the  past 24 hours have been reviewed.  CBC:   Recent Labs   Lab 08/27/23  1052   WBC 9.60   HGB 11.7*   HCT 36.9*   *     CMP:   Recent Labs   Lab 08/27/23  1052   *   K 3.5      CO2 20*   *   BUN 10   CREATININE 0.9   CALCIUM 8.3*   PROT 6.9   ALBUMIN 3.3*   BILITOT 0.7   ALKPHOS 87   AST 22   ALT 17   ANIONGAP 5*     Lactic Acid:   Recent Labs   Lab 08/27/23  1052   LACTATE 1.2     Lipase:   Recent Labs   Lab 08/27/23  1052   LIPASE 33     Significant Imaging:   Imaging Results              CT Abdomen Pelvis  Without Contrast (Final result)  Result time 08/27/23 12:11:56   Procedure changed from CT Abdomen Pelvis With Contrast     Final result by Nick Velazquez MD (08/27/23 12:11:56)                   Narrative:    CMS MANDATED QUALITY DATA - CT RADIATION - 436    All CT scans at this facility utilize dose modulation, iterative reconstruction, and/or weight based dosing when appropriate to reduce radiation dose to as low as reasonably achievable.        Reason: GI bleed; Abdominal abscess/infection suspected; LLQ abdominal pain; divet    TECHNIQUE: CT abdomen and pelvis without IV or oral contrast. Study is tailored for detection of urinary tract calculi and evaluation of solid organs, hollow viscera, and vascular structures is limited.    COMPARISON: CT 8/6/2023    CT ABDOMEN:  Partially visualized lung bases are clear. Moderate coronary artery calcification are present.    Noncontrast liver, pancreas, spleen, and adrenals are normal.    Hyperdensity in gallbladder unchanged with slight haziness about the gallbladder wall, nonspecific.    Left renal cyst unchanged. Severe cortical atrophy of left kidney is unchanged. Superior right renal hypodensity is unchanged. Negative for hydronephrosis or urolithiasis. Minor aortoiliac calcifications unchanged.    Focal mild inflammatory fat stranding affects segment of proximal sigmoid colon where colonic wall thickening and colonic diverticula  coexists, similar to the prior exam. Large and small intestines are otherwise unremarkable. A normal appendix is present. No free intraperitoneal gas.    Degenerative changes affect the spine.    CT PELVIS:  Bladder is normal. Uterus has been removed. No free pelvic fluid or adnexal mass.    IMPRESSION:    1. Persistent mild inflammatory fat stranding about segment of proximal sigmoid colon where wall thickening and colonic diverticula coexist, unchanged. The appearance suggests persistent acute diverticulitis without perforation or other complication. As colon carcinoma can occasionally no acute acute diverticulitis on imaging alone, a follow-up CT abdomen and pelvis with IV contrast for endoscopy is recommended to exclude possibility of colon carcinoma.  2. Hyperdensity gallbladder could reflect cholelithiasis with slight haziness about the gallbladder wall, nonspecific. Clinical and laboratory correlation for acute cholecystitis is requested. If further imaging evaluation is desired, upper quadrant ultrasound can be considered.  3. Coronary artery calcifications.    Electronically signed by:  Nick Velazquez MD  8/27/2023 12:11 PM CDT Workstation: [id number]

## 2023-08-27 NOTE — CONSULTS
GENERAL SURGERY  INPATIENT CONSULT    REASON FOR CONSULT: Diverticulitis    HPI: Jonna Pearl is a 63 y.o. female with morbid obesity, chronic kidney disease, hypertension, WES, TIA who re-presented to the emergency room with fatigue, chills and left lower quadrant abdominal pain. Patient had a recent admission on 08/6/2023 through 8/11/2023 was diagnosed with acute diverticulitis without complication.  She was admitted and started on levofloxacin and metronidazole however white count increased up to 24 K. She was switched to Zosyn and had subsequent improvement in her white blood cell count and symptoms.  She was then discharged home on Cipro and Flagyl for 5 days.  Saw her PCP and she was still having some discomfort therefore her antibiotics were continued for another 5 days. She came to the emergency room today due to ongoing fatigue, chills and persistent left lower quadrant abdominal pain. Today the patient did not have any significant leukocytosis.  She did undergo repeat CT scan which showed what appears to be some persistent inflammatory changes along the sigmoid colon but again no abscess or evidence of phlegmon. General surgery has been consulted for evaluation. Blood cultures have been taken at this hospitalization but not previously.    Of note patient was also previous complaining of upper abdominal pain and nausea. This has not present. Does have diagnosis of gallstones.    I have reviewed the patient's chart including prior progress notes, procedures and testing.     ROS:   Review of Systems    PROBLEM LIST:  Patient Active Problem List   Diagnosis    Spinal enthesopathy    Hypertension, essential    Personal history of kidney stones    DJD (degenerative joint disease) of knee    Migraine    History of colonic polyps    ALLERGIC RHINITIS    History of colon polyps    Diverticulitis of large intestine with perforation and abscess    Morbid obesity with BMI of 40.0-44.9, adult    Asymptomatic  gallstones    Stage 3 chronic kidney disease    Morbid obesity    Dysmetabolic syndrome    Essential hypertension    Postmenopausal    Weight gain    Chronic fatigue    Hypovitaminosis D    Obstructive sleep apnea syndrome    Osteopenia of multiple sites    Gait abnormality    Right elbow pain    History of nephrolithiasis    Chronic migraine without aura, with intractable migraine, so stated, with status migrainosus    Nondisplaced fracture of head of right radius, initial encounter for closed fracture    Chronic bilateral low back pain without sciatica    Acute diverticulitis    Anemia    Leucocytosis    Bigeminy    WES on CPAP    Type 2 diabetes mellitus, without long-term current use of insulin         HISTORY  Past Medical History:   Diagnosis Date    Allergy     Arthritis     Atrophic kidney     left. probably childhood illness or disease    Chronic kidney disease     Chronic rhinitis     Colon polyps     Depression     HEARING LOSS     Hiatal hernia     Hypertension     Insomnia     Migraines     Otitis media     Sleep apnea     mild    TIA (transient ischemic attack)        Past Surgical History:   Procedure Laterality Date     SECTION      COLONOSCOPY  2014    Dr Escobar 5 year yossi    COLONOSCOPY N/A 4/10/2019    Procedure: COLONOSCOPY;  Surgeon: Matt Orozco MD;  Location: [facility];  Service: Endoscopy;  Laterality: N/A;    ESOPHAGOGASTRODUODENOSCOPY N/A 2023    Procedure: EGD (ESOPHAGOGASTRODUODENOSCOPY);  Surgeon: Merrill Vela MD;  Location: [facility];  Service: Endoscopy;  Laterality: N/A;    HEMORRHOID SURGERY      HYSTERECTOMY      left mandibular node resection      OOPHORECTOMY         Social History     Tobacco Use    Smoking status: Former     Current packs/day: 0.00     Average packs/day: 1 pack/day for 18.0 years (18.0 ttl pk-yrs)     Types: Cigarettes     Start date: 2000     Quit date: 2018     Years since quittin.4    Smokeless tobacco: Never    Substance Use Topics    Alcohol use: No    Drug use: No       Family History   Problem Relation Age of Onset    Cancer Sister         breast    Diabetes Mother     Early death Father     Diabetes Paternal Grandmother     Breast cancer Maternal Aunt     Breast cancer Paternal Aunt          MEDS:  No current facility-administered medications on file prior to encounter.     Current Outpatient Medications on File Prior to Encounter   Medication Sig Dispense Refill    ciprofloxacin HCl (CIPRO) 500 MG tablet Take 1 tablet by mouth every 12 (twelve) hours.      gabapentin (NEURONTIN) 600 MG tablet Take 600 mg by mouth 3 (three) times daily.  9    latanoprost 0.005 % ophthalmic solution Place 1 drop into both eyes every evening.  6    lisinopriL (PRINIVIL,ZESTRIL) 40 MG tablet Take 1 tablet by mouth once daily.      metroNIDAZOLE (FLAGYL) 500 MG tablet Take 1 tablet by mouth 3 (three) times daily.      promethazine (PHENERGAN) 25 MG tablet Take 25 mg by mouth as needed for Nausea.      timolol maleate 0.5% (TIMOPTIC) 0.5 % Drop Place 1 drop into both eyes 2 (two) times daily.      verapamiL (CALAN-SR) 240 MG CR tablet Take 1 tablet by mouth once daily.      acetaminophen (TYLENOL ORAL) Take 1 tablet by mouth as needed.      ergocalciferol (ERGOCALCIFEROL) 50,000 unit Cap Take 1 capsule by mouth every 7 days.      ketoconazole (NIZORAL) 2 % shampoo Apply 1 Application topically Every 3 (three) days. 1 application as needed to wet hair every 3 to 4 days external once a day      nystatin (MYCOSTATIN) cream Apply 1 Application topically 2 (two) times a day.      NYSTOP powder Apply 1 g topically as needed.      OZEMPIC 0.25 mg or 0.5 mg (2 mg/3 mL) pen injector Inject 0.25 mg into the skin every 7 days.      pantoprazole (PROTONIX) 40 MG tablet Take 40 mg by mouth once daily.      topiramate (TOPAMAX) 50 MG tablet Take 1 tablet (50 mg total) by mouth once daily. 30 tablet 11    triamcinolone acetonide 0.1% (KENALOG) 0.1 %  cream Apply 1 g topically 2 (two) times daily.      [DISCONTINUED] ketoconazole (NIZORAL) 2 % shampoo APPLY ONE APPLICATION TO WET HAIR EVERY 3 TO 4 DAYS AS NEEDED      [DISCONTINUED] lisinopriL (PRINIVIL,ZESTRIL) 40 MG tablet Take 40 mg by mouth once daily.      [DISCONTINUED] metroNIDAZOLE (FLAGYL) 500 MG tablet Take 1 tablet by mouth 3 (three) times daily.      [DISCONTINUED] verapamil (CALAN-SR) 240 MG CR tablet Take 240 mg by mouth every evening.  2       ALLERGIES:  Review of patient's allergies indicates:   Allergen Reactions    Neomycin-polymyxin Swelling    Cephalexin      Other reaction(s): Unknown    Doxycycline      Other reaction(s): Unknown    Iodinated contrast media      Other reaction(s): Unknown    Penicillins      Other reaction(s): Unknown    Valsartan      Other reaction(s): Unknown         VITALS:  Temp:  [97.9 °F (36.6 °C)] 97.9 °F (36.6 °C)  Pulse:  [70-74] 70  Resp:  [22-31] 22  SpO2:  [95 %-100 %] 98 %  BP: (131-168)/() 168/74    No intake/output data recorded.      PHYSICAL EXAM:  Physical Exam  Vitals reviewed.   Constitutional:       General: She is not in acute distress.     Appearance: Normal appearance. She is well-developed. She is obese.   HENT:      Head: Normocephalic and atraumatic.      Nose: Nose normal.   Eyes:      General: No scleral icterus.     Conjunctiva/sclera: Conjunctivae normal.   Neck:      Trachea: No tracheal tenderness or tracheal deviation.   Cardiovascular:      Rate and Rhythm: Normal rate and regular rhythm.      Pulses: Normal pulses.   Pulmonary:      Effort: Pulmonary effort is normal. No accessory muscle usage or respiratory distress.      Breath sounds: Normal breath sounds.   Abdominal:      General: There is no distension.      Palpations: Abdomen is soft.      Tenderness: There is abdominal tenderness (Lower abdomen mostly on the left). There is no guarding or rebound.   Musculoskeletal:         General: No swelling or tenderness. Normal range  of motion.      Cervical back: Normal range of motion and neck supple. No rigidity.   Skin:     General: Skin is warm and dry.      Coloration: Skin is not jaundiced.      Findings: No erythema.   Neurological:      General: No focal deficit present.      Mental Status: She is alert and oriented to person, place, and time.      Motor: No weakness or abnormal muscle tone.   Psychiatric:         Mood and Affect: Mood normal.         Behavior: Behavior normal.         Thought Content: Thought content normal.         Judgment: Judgment normal.           LABS:  Lab Results   Component Value Date    WBC 9.60 08/27/2023    RBC 4.23 08/27/2023    HGB 11.7 (L) 08/27/2023    HCT 36.9 (L) 08/27/2023     (H) 08/27/2023     Lab Results   Component Value Date     (H) 08/27/2023     (L) 08/27/2023    K 3.5 08/27/2023     08/27/2023    CO2 20 (L) 08/27/2023    BUN 10 08/27/2023    CREATININE 0.9 08/27/2023    CALCIUM 8.3 (L) 08/27/2023     Lab Results   Component Value Date    ALT 17 08/27/2023    AST 22 08/27/2023    ALKPHOS 87 08/27/2023    BILITOT 0.7 08/27/2023     Lab Results   Component Value Date    MG 1.9 08/11/2023    PHOS 2.7 03/31/2006       STUDIES:  Images and reports were personally reviewed.    CT abdomen pelvis  CT ABDOMEN:  Partially visualized lung bases are clear. Moderate coronary artery calcification are present.     Noncontrast liver, pancreas, spleen, and adrenals are normal.     Hyperdensity in gallbladder unchanged with slight haziness about the gallbladder wall, nonspecific.     Left renal cyst unchanged. Severe cortical atrophy of left kidney is unchanged. Superior right renal hypodensity is unchanged. Negative for hydronephrosis or urolithiasis. Minor aortoiliac calcifications unchanged.     Focal mild inflammatory fat stranding affects segment of proximal sigmoid colon where colonic wall thickening and colonic diverticula coexists, similar to the prior exam. Large and small  intestines are otherwise unremarkable. A normal appendix is present. No free intraperitoneal gas.     Degenerative changes affect the spine.     CT PELVIS:  Bladder is normal. Uterus has been removed. No free pelvic fluid or adnexal mass.     IMPRESSION:     1. Persistent mild inflammatory fat stranding about segment of proximal sigmoid colon where wall thickening and colonic diverticula coexist, unchanged. The appearance suggests persistent acute diverticulitis without perforation or other complication. As colon carcinoma can occasionally no acute acute diverticulitis on imaging alone, a follow-up CT abdomen and pelvis with IV contrast for endoscopy is recommended to exclude possibility of colon carcinoma.  2. Hyperdensity gallbladder could reflect cholelithiasis with slight haziness about the gallbladder wall, nonspecific. Clinical and laboratory correlation for acute cholecystitis is requested. If further imaging evaluation is desired, upper quadrant ultrasound can be considered.  3. Coronary artery calcifications.      ASSESSMENT & PLAN:  63 y.o. female with persistent mild diverticulitis  -imaging consistent with ongoing mild diverticulitis though no evidence of more complicated issue  -no immediate for surgical intervention at this time  -at the patient's previous hospitalization she was started on fluoroquinolone and Flagyl but did not have improvement therefore was transitioned to Zosyn, at discharge however she was placed back on a fluoroquinolone and metronidazole which may be reason why her diverticulitis has persisted  -at this time I am recommending starting her on Bactrim DS and observe over 24 hours, if symptoms are improved she can be discharged home on Bactrim DS for a total of 14 days  -will follow-up blood cultures as well  -okay for clear liquids as tolerated to be advanced if pain improved and no leukocytosis in the morning

## 2023-08-27 NOTE — ASSESSMENT & PLAN NOTE
Body mass index is 51.75 kg/m². Morbid obesity complicates all aspects of disease management from diagnostic modalities to treatment. Weight loss encouraged and health benefits explained to patient.   HgA1c = 5.9  Recommend discontinuing ozempic

## 2023-08-28 VITALS
SYSTOLIC BLOOD PRESSURE: 123 MMHG | HEART RATE: 58 BPM | RESPIRATION RATE: 20 BRPM | HEIGHT: 62 IN | WEIGHT: 260.88 LBS | BODY MASS INDEX: 48.01 KG/M2 | TEMPERATURE: 98 F | DIASTOLIC BLOOD PRESSURE: 57 MMHG | OXYGEN SATURATION: 98 %

## 2023-08-28 PROBLEM — K57.32 DIVERTICULITIS OF LARGE INTESTINE WITHOUT PERFORATION OR ABSCESS: Status: ACTIVE | Noted: 2019-09-11

## 2023-08-28 LAB
ALBUMIN SERPL BCP-MCNC: 2.9 G/DL (ref 3.5–5.2)
ALP SERPL-CCNC: 68 U/L (ref 55–135)
ALT SERPL W/O P-5'-P-CCNC: 14 U/L (ref 10–44)
ANION GAP SERPL CALC-SCNC: 4 MMOL/L (ref 8–16)
AST SERPL-CCNC: 16 U/L (ref 10–40)
BASOPHILS # BLD AUTO: 0.02 K/UL (ref 0–0.2)
BASOPHILS NFR BLD: 0.2 % (ref 0–1.9)
BILIRUB SERPL-MCNC: 0.4 MG/DL (ref 0.1–1)
BUN SERPL-MCNC: 10 MG/DL (ref 8–23)
CALCIUM SERPL-MCNC: 8.1 MG/DL (ref 8.7–10.5)
CHLORIDE SERPL-SCNC: 114 MMOL/L (ref 95–110)
CO2 SERPL-SCNC: 21 MMOL/L (ref 23–29)
CREAT SERPL-MCNC: 1 MG/DL (ref 0.5–1.4)
DIFFERENTIAL METHOD: ABNORMAL
EOSINOPHIL # BLD AUTO: 0.1 K/UL (ref 0–0.5)
EOSINOPHIL NFR BLD: 0.8 % (ref 0–8)
ERYTHROCYTE [DISTWIDTH] IN BLOOD BY AUTOMATED COUNT: 17.2 % (ref 11.5–14.5)
EST. GFR  (NO RACE VARIABLE): >60 ML/MIN/1.73 M^2
GLUCOSE SERPL-MCNC: 109 MG/DL (ref 70–110)
GLUCOSE SERPL-MCNC: 121 MG/DL (ref 70–110)
GLUCOSE SERPL-MCNC: 93 MG/DL (ref 70–110)
HCT VFR BLD AUTO: 33 % (ref 37–48.5)
HGB BLD-MCNC: 10.3 G/DL (ref 12–16)
IMM GRANULOCYTES # BLD AUTO: 0.03 K/UL (ref 0–0.04)
IMM GRANULOCYTES NFR BLD AUTO: 0.3 % (ref 0–0.5)
LYMPHOCYTES # BLD AUTO: 2.9 K/UL (ref 1–4.8)
LYMPHOCYTES NFR BLD: 28.1 % (ref 18–48)
MCH RBC QN AUTO: 27.8 PG (ref 27–31)
MCHC RBC AUTO-ENTMCNC: 31.2 G/DL (ref 32–36)
MCV RBC AUTO: 89 FL (ref 82–98)
MONOCYTES # BLD AUTO: 0.9 K/UL (ref 0.3–1)
MONOCYTES NFR BLD: 8.9 % (ref 4–15)
NEUTROPHILS # BLD AUTO: 6.5 K/UL (ref 1.8–7.7)
NEUTROPHILS NFR BLD: 61.7 % (ref 38–73)
NRBC BLD-RTO: 0 /100 WBC
OB PNL STL: POSITIVE
PLATELET # BLD AUTO: 492 K/UL (ref 150–450)
PMV BLD AUTO: 9.7 FL (ref 9.2–12.9)
POTASSIUM SERPL-SCNC: 3.7 MMOL/L (ref 3.5–5.1)
PROT SERPL-MCNC: 6 G/DL (ref 6–8.4)
RBC # BLD AUTO: 3.7 M/UL (ref 4–5.4)
SODIUM SERPL-SCNC: 139 MMOL/L (ref 136–145)
WBC # BLD AUTO: 10.44 K/UL (ref 3.9–12.7)

## 2023-08-28 PROCEDURE — 80053 COMPREHEN METABOLIC PANEL: CPT | Performed by: NURSE PRACTITIONER

## 2023-08-28 PROCEDURE — 25000003 PHARM REV CODE 250: Performed by: INTERNAL MEDICINE

## 2023-08-28 PROCEDURE — 94761 N-INVAS EAR/PLS OXIMETRY MLT: CPT

## 2023-08-28 PROCEDURE — 25000003 PHARM REV CODE 250: Performed by: FAMILY MEDICINE

## 2023-08-28 PROCEDURE — 99900035 HC TECH TIME PER 15 MIN (STAT)

## 2023-08-28 PROCEDURE — G0378 HOSPITAL OBSERVATION PER HR: HCPCS

## 2023-08-28 PROCEDURE — A4216 STERILE WATER/SALINE, 10 ML: HCPCS | Performed by: NURSE PRACTITIONER

## 2023-08-28 PROCEDURE — 36415 COLL VENOUS BLD VENIPUNCTURE: CPT | Performed by: NURSE PRACTITIONER

## 2023-08-28 PROCEDURE — 94660 CPAP INITIATION&MGMT: CPT

## 2023-08-28 PROCEDURE — 25000003 PHARM REV CODE 250: Performed by: NURSE PRACTITIONER

## 2023-08-28 PROCEDURE — 85025 COMPLETE CBC W/AUTO DIFF WBC: CPT | Performed by: NURSE PRACTITIONER

## 2023-08-28 RX ORDER — METRONIDAZOLE 250 MG/1
500 TABLET ORAL EVERY 8 HOURS
Status: DISCONTINUED | OUTPATIENT
Start: 2023-08-28 | End: 2023-08-28 | Stop reason: HOSPADM

## 2023-08-28 RX ORDER — SULFAMETHOXAZOLE AND TRIMETHOPRIM 800; 160 MG/1; MG/1
1 TABLET ORAL 2 TIMES DAILY
Qty: 28 TABLET | Refills: 0 | Status: SHIPPED | OUTPATIENT
Start: 2023-08-28 | End: 2023-09-11

## 2023-08-28 RX ORDER — HYDROCORTISONE 25 MG/G
CREAM TOPICAL 2 TIMES DAILY
Status: DISCONTINUED | OUTPATIENT
Start: 2023-08-28 | End: 2023-08-28 | Stop reason: HOSPADM

## 2023-08-28 RX ADMIN — LISINOPRIL 40 MG: 20 TABLET ORAL at 08:08

## 2023-08-28 RX ADMIN — FAMOTIDINE 20 MG: 20 TABLET ORAL at 08:08

## 2023-08-28 RX ADMIN — SULFAMETHOXAZOLE AND TRIMETHOPRIM 1 TABLET: 800; 160 TABLET ORAL at 08:08

## 2023-08-28 RX ADMIN — GABAPENTIN 600 MG: 300 CAPSULE ORAL at 08:08

## 2023-08-28 RX ADMIN — HYDROCORTISONE: 25 CREAM TOPICAL at 03:08

## 2023-08-28 RX ADMIN — SODIUM CHLORIDE 10 ML: 9 INJECTION INTRAMUSCULAR; INTRAVENOUS; SUBCUTANEOUS at 09:08

## 2023-08-28 RX ADMIN — VERAPAMIL HYDROCHLORIDE 240 MG: 240 TABLET, FILM COATED, EXTENDED RELEASE ORAL at 08:08

## 2023-08-28 RX ADMIN — TIMOLOL MALEATE 1 DROP: 5 SOLUTION OPHTHALMIC at 09:08

## 2023-08-28 NOTE — PLAN OF CARE
[facility]  Initial Discharge Assessment       Primary Care Provider: Padmini Lackey FNP     met with Pt at bedside to complete discharge assessment. Pt AAOx4s. Demographics, PCP, and insurance verified. No home health. No dialysis. Pt reports ability to complete ADLs without assistance. Pt verbalized plan to discharge home via drive self.     Admission Diagnosis: Acute diverticulitis [K57.92]    Admission Date: 8/27/2023  Expected Discharge Date:     Transition of Care Barriers: None    Payor: MEDICARE / Plan: MEDICARE PART A & B / Product Type: Government /     Extended Emergency Contact Information  Primary Emergency Contact: Angella Soliz  Mobile Phone: [phone]  Relation: Friend  Preferred language: English   needed? No    Discharge Plan A: Home with family  Discharge Plan B: Home with family      [facility] [address] [place] - [address]  [address] [address]  Phone: [phone] Fax: [phone]      Initial Assessment (most recent)       Adult Discharge Assessment - 08/28/23 1132          Discharge Assessment    Assessment Type Discharge Planning Assessment     Confirmed/corrected address, phone number and insurance Yes     Confirmed Demographics Correct on Facesheet     Source of Information patient     Does patient/caregiver understand observation status Yes     Reason For Admission Diverticulitis of large intestine with perforation and abscess     People in Home friend(s)     Do you expect to return to your current living situation? Yes     Do you have help at home or someone to help you manage your care at home? Yes     Who are your caregiver(s) and their phone number(s)? Angella Soliz (Friend)   [phone] (Mobile)     Prior to hospitilization cognitive status: Alert/Oriented     Current cognitive status: Alert/Oriented     Equipment Currently Used at Home cane, straight;shower chair;CPAP     Readmission within 30 days? Yes      Patient currently being followed by outpatient case management? No     Do you currently have service(s) that help you manage your care at home? No     Do you take prescription medications? Yes     Do you have prescription coverage? Yes     Coverage MEDICARE - MEDICARE PART A & B     Do you have any problems affording any of your prescribed medications? Yes     If yes, what medications? if the medication is over $6.00     Is the patient taking medications as prescribed? yes     Who is going to help you get home at discharge? Patient states she will drive herself     How do you get to doctors appointments? car, drives self     Are you on dialysis? No     Do you take coumadin? No     DME Needed Upon Discharge  none     Discharge Plan discussed with: Patient     Transition of Care Barriers None     Discharge Plan A Home with family     Discharge Plan B Home with family

## 2023-08-28 NOTE — PLAN OF CARE
Pt was explained ZIMMERMAN. Pt verbalized understanding of ZIMMERMAN and signed. ZIMMERMAN scanned to .        08/28/23 1132   ZIMMERMAN Message   Medicare Outpatient and Observation Notification regarding financial responsibility Given to patient/caregiver;Explained to patient/caregiver;Signed/date by patient/caregiver   Date ZIMMERMAN was signed 08/28/23   Time ZIMMERMAN was signed [id number]

## 2023-08-28 NOTE — CARE UPDATE
08/28/23 0026   Patient Assessment/Suction   Level of Consciousness (AVPU) alert   Preset CPAP/BiPAP Settings   Mode Of Delivery CPAP   $ CPAP/BiPAP Daily Charge BiPAP/CPAP Daily   $ Initial CPAP/BiPAP Setup? Yes   $ Is patient using? Yes   Size of Mask Small   Sized Appropriately? Yes   Airway Device Type small full face mask   CPAP (cm H2O) 14   Patient CPAP/BiPAP Settings   CPAP/BIPAP ID 13   Tidal Volume (mL) 579   VE Minute Ventilation (L/min) 12 L/min   Peak Inspiratory Pressure (cm H2O) 17   TiTOT (%) 25   Total Leak (L/Min) 5   Patient Trigger - ST Mode Only (%) 100   CPAP/BiPAP Alarms   Low Pressure (cm H2O) 5   Minute Ventilation (L/Min) 2   High RR (breaths/min) 40   Low RR (breaths/min) 8

## 2023-08-28 NOTE — PLAN OF CARE
Pt clear for DC from case management standpoint. Discharging to home       08/28/23 1301   Final Note   Assessment Type Final Discharge Note   Anticipated Discharge Disposition Home

## 2023-08-28 NOTE — DISCHARGE INSTRUCTIONS
What care is needed at home?   Ask your doctor what you need to do when you go home. Make sure you ask questions if you do not understand what the doctor says. This way you will know what you need to do.  Take your drugs as ordered by your doctor.  Eat a balanced diet.  Do not delay having a bowel movement. Go as soon as you have the urge.  Do not strain or force your bowel movements.  Drink 8 to 10 glasses of water each day. Talk to your doctor if you are drinking less fluids due to a health problem.    What changes to diet are needed?   Talk to your doctor about any changes you need to make to your diet. When the pouches become inflamed or infected, you may need to take in just liquids for a few days. This may help lessen your pain and help you heal.  When you are feeling better, start to add more fiber to your diet.  You do not need to avoid seeds, nuts, corn, or other similar foods.  You will need to eat food rich in fiber and drink more water.  Eat 5 or more servings of fresh fruits and vegetables every day.  Eat 6 or more servings of whole-wheat grain breads and cereals.  Try to get 25 to 30 grams of fiber every day. Read the labels to learn how much fiber is in foods.  Do not drink beer, wine, and mixed drinks (alcohol).

## 2023-08-28 NOTE — CARE UPDATE
08/28/23 0756   Patient Assessment/Suction   Level of Consciousness (AVPU) alert   Respiratory Effort Normal;Unlabored   Expansion/Accessory Muscles/Retractions expansion symmetric;no retractions   Rhythm/Pattern, Respiratory depth regular;pattern regular;unlabored   Skin Integrity   $ Wound Care Tech Time 15 min   Area Observed Bridge of nose;Cheek;Forehead;Chin   Skin Appearance without discoloration   PRE-TX-O2   Device (Oxygen Therapy) room air   SpO2 99 %   Pulse Oximetry Type Intermittent   $ Pulse Oximetry - Multiple Charge Pulse Oximetry - Multiple   Preset CPAP/BiPAP Settings   Mode Of Delivery Standby

## 2023-08-28 NOTE — NURSING
Pt able to tolerate clear liquids, apple sauce and crackers. She is very hungry. Diet advanced to bland.     Rosmery Curry

## 2023-08-29 NOTE — DISCHARGE SUMMARY
"[facility] Medicine  Discharge Summary      Patient Name: Jonna Pearl  MRN: [id number]  ADELSO: [id number]  Patient Class: OP- Observation  Admission Date: 8/27/2023  Hospital Length of Stay: 0 days  Discharge Date and Time: 8/28/2023  2:11 PM  Attending Physician: No att. providers found   Discharging Provider: Deni Mukherjee MD  Primary Care Provider: Padmini Lackey FNP    Primary Care Team: Networked reference to record PCT     HPI:   oJnna Pearl is a 63 y.o. female with a history as  has a past medical history of Allergy, Arthritis, Atrophic kidney, Chronic kidney disease, Chronic rhinitis, Colon polyps, Depression, HEARING LOSS, Hiatal hernia, Hypertension, Insomnia, Migraines, Otitis media, Sleep apnea, and TIA (transient ischemic attack). who presented to the ED with a Rectal Bleeding and abdominal pain (bright red). Pain is lower left quad - cramping and deep aching, uncomfortable to lay down or sleep with associated sweating, chills and nausea. Per patient she was discharged on oral abx X5days with minimal improvement after discharge. States she saw her primary care provider on the September 18, 2023 who RX'ed 5 more days and told her if she didn't feel better to come back to the hospital. Patient endorses 1-2 small BM's qd they have been loose and mucusy. She reports seeing both dark and bright red blood over recently, stating, "I know I have hemorrhoids but his is different". Eleuterio upper respiratory symptoms (cough/post nasal drip), CP, dysuria, HA, CP, numbness or tingling in any extremity - denies recent trauma. At the time of assessment she reports resolution of her abdominal pain after getting IV abx in the ED (no pain meds administered)    Review of records reveals that she was recently discharged on 8/11/2023 after similar presentation, although she had associated leucocytosis. She received IV abx, discharged on oral abx after feeling better with referral to GI in " "clinic. Last upper GI was Jan 2023, and last colonoscopy in [facility] was 2019.    In ED: no leukocytosis, afebrile, lactic normal = sodium 134, glucose. CT abdomen Pelvis demonstrated "persistent mild inflammatory fat stranding about segment of proximal sigmoid colon" - "appearance suggests persistent acute diverticulitis without perforation" - "follow-up CT abdomen and pelvis with IV contrast for endoscopy is recommended to exclude possibility of colon carcinoma" as well, "gallbladder findings reflect cholelithiasis" again "if further imaging evaluation is desired upper quadrant US can be considered". No hemoccult. Hgb 11.7 stable since prior hospitalization    PLAN: Restart IV ABX, consult to GI, bowel rest advance diet as tolerated, IV fluids - Consult Surgery  -Awaiting, BC, stool cultures, stop ozempic for now      * No surgery found *      Hospital Course:   Dr. Kaminski with general surgery evaluated patient and noted no signs that would warrant surgical treatment.  He recommended continuing IV therapy and then switching to Bactrim on discharge.  The 2nd day in the hospital, patient's pain was substantially less intense and she was tolerating bland diet.  She no longer required hospital services.    Physical Exam  Constitutional:       Appearance: She is well-developed.   Cardiovascular:      Rate and Rhythm: Normal rate and regular rhythm.      Heart sounds: S1 normal and S2 normal. No murmur heard.  Abdominal:      General: There is no distension.  There is no abdominal bruit.      Palpations: There is no hepatomegaly or splenomegaly.      Tenderness: There is abdominal tenderness.      Comments: No muscular guarding - large abdominal girth.       Goals of Care Treatment Preferences:  Code Status: Full Code      Consults:   Consults (From admission, onward)        Status Ordering Provider     IP consult to case management  Once        Provider:  (Not yet assigned)    Completed EVA TRUJILLO     " Inpatient consult to General Surgery  Once        Provider:  Anders Kaminski Jr., MD    Completed NICK BROUSSARD            Final Active Diagnoses:    Diagnosis Date Noted POA    PRINCIPAL PROBLEM:  Diverticulitis of large intestine without perforation or abscess [K57.32] 09/11/2019 Yes    Type 2 diabetes mellitus, without long-term current use of insulin [E11.9] 08/27/2023 Yes    Morbid obesity with BMI of 40.0-44.9, adult [E66.01, Z68.41] 09/11/2019 Not Applicable     Chronic    Hypertension, essential [I10] 05/18/2012 Yes     Chronic      Problems Resolved During this Admission:       Discharged Condition: good    Disposition: Home or Self Care    Follow Up:   Follow-up Information     Padmini Lackey FNP Follow up on 8/31/2023.    Specialty: Family Medicine  Why: 11:45 AM for hospital follow up  Contact information:  [address] [facility]  [address]  [facility] [address]  [phone]                       Patient Instructions:      Diet Adult Regular     Activity as tolerated       Significant Diagnostic Studies:  See HPI       Medications:  Reconciled Home Medications:      Medication List      START taking these medications    sulfamethoxazole-trimethoprim 800-160mg 800-160 mg Tab  Commonly known as: BACTRIM DS  Take 1 tablet by mouth 2 (two) times daily. for 14 days        CONTINUE taking these medications    ergocalciferol 50,000 unit Cap  Commonly known as: ERGOCALCIFEROL  Take 1 capsule by mouth every 7 days.     gabapentin 600 MG tablet  Commonly known as: NEURONTIN  Take 600 mg by mouth 3 (three) times daily.     ketoconazole 2 % shampoo  Commonly known as: NIZORAL  Apply 1 Application topically Every 3 (three) days. 1 application as needed to wet hair every 3 to 4 days external once a day     latanoprost 0.005 % ophthalmic solution  Place 1 drop into both eyes every evening.     lisinopriL 40 MG tablet  Commonly known as: PRINIVIL,ZESTRIL  Take 1 tablet by mouth once daily.     * NYSTOP  powder  Generic drug: nystatin  Apply 1 g topically as needed.     * nystatin cream  Commonly known as: MYCOSTATIN  Apply 1 Application topically 2 (two) times a day.     OZEMPIC 0.25 mg or 0.5 mg (2 mg/3 mL) pen injector  Generic drug: semaglutide  Inject 0.25 mg into the skin every 7 days.     pantoprazole 40 MG tablet  Commonly known as: PROTONIX  Take 40 mg by mouth once daily.     promethazine 25 MG tablet  Commonly known as: PHENERGAN  Take 25 mg by mouth as needed for Nausea.     timolol maleate 0.5% 0.5 % Drop  Commonly known as: TIMOPTIC  Place 1 drop into both eyes 2 (two) times daily.     topiramate 50 MG tablet  Commonly known as: TOPAMAX  Take 1 tablet (50 mg total) by mouth once daily.     triamcinolone acetonide 0.1% 0.1 % cream  Commonly known as: KENALOG  Apply 1 g topically 2 (two) times daily.     TYLENOL ORAL  Take 1 tablet by mouth as needed.     verapamiL 240 MG CR tablet  Commonly known as: CALAN-SR  Take 1 tablet by mouth once daily.         * This list has 2 medication(s) that are the same as other medications prescribed for you. Read the directions carefully, and ask your doctor or other care provider to review them with you.            STOP taking these medications    CIPRO 500 MG tablet  Generic drug: ciprofloxacin HCl     metroNIDAZOLE 500 MG tablet  Commonly known as: FLAGYL            Indwelling Lines/Drains at time of discharge:   Lines/Drains/Airways     None                 Time spent on the discharge of patient: 21 minutes         Deni Mukherjee MD  Department of Hospital Medicine  [facility]

## 2023-08-29 NOTE — HOSPITAL COURSE
Dr. Kaminski with general surgery evaluated patient and noted no signs that would warrant surgical treatment.  He recommended continuing IV therapy and then switching to Bactrim on discharge.  The 2nd day in the hospital, patient's pain was substantially less intense and she was tolerating bland diet.  She no longer required hospital services.    Physical Exam  Constitutional:       Appearance: She is well-developed.   Cardiovascular:      Rate and Rhythm: Normal rate and regular rhythm.      Heart sounds: S1 normal and S2 normal. No murmur heard.  Abdominal:      General: There is no distension.  There is no abdominal bruit.      Palpations: There is no hepatomegaly or splenomegaly.      Tenderness: There is abdominal tenderness.      Comments: No muscular guarding - large abdominal girth.

## 2023-08-30 LAB
STOOL CULTURE: NORMAL
STOOL CULTURE: NORMAL

## 2023-09-01 LAB
BACTERIA BLD CULT: NORMAL
BACTERIA BLD CULT: NORMAL
O+P SPEC MICRO: NORMAL
O+P STL CONC: NORMAL

## 2023-10-11 ENCOUNTER — OFFICE VISIT (OUTPATIENT)
Dept: CARDIOLOGY | Facility: CLINIC | Age: 63
End: 2023-10-11
Payer: MEDICARE

## 2023-10-11 VITALS
HEIGHT: 62 IN | WEIGHT: 262 LBS | BODY MASS INDEX: 48.21 KG/M2 | RESPIRATION RATE: 16 BRPM | OXYGEN SATURATION: 98 % | HEART RATE: 66 BPM | DIASTOLIC BLOOD PRESSURE: 80 MMHG | SYSTOLIC BLOOD PRESSURE: 142 MMHG

## 2023-10-11 DIAGNOSIS — I49.8 BIGEMINY: Primary | ICD-10-CM

## 2023-10-11 DIAGNOSIS — R23.3 EASY BRUISING: ICD-10-CM

## 2023-10-11 DIAGNOSIS — I10 ESSENTIAL HYPERTENSION: ICD-10-CM

## 2023-10-11 DIAGNOSIS — R94.31 NONSPECIFIC ABNORMAL ELECTROCARDIOGRAM (ECG) (EKG): ICD-10-CM

## 2023-10-11 DIAGNOSIS — N18.31 STAGE 3A CHRONIC KIDNEY DISEASE: ICD-10-CM

## 2023-10-11 DIAGNOSIS — E66.01 SEVERE OBESITY: ICD-10-CM

## 2023-10-11 DIAGNOSIS — E78.2 MIXED HYPERLIPIDEMIA: ICD-10-CM

## 2023-10-11 DIAGNOSIS — G47.33 OBSTRUCTIVE SLEEP APNEA: ICD-10-CM

## 2023-10-11 PROCEDURE — 93005 ELECTROCARDIOGRAM TRACING: CPT | Mod: PBBFAC,PN | Performed by: INTERNAL MEDICINE

## 2023-10-11 PROCEDURE — 99205 PR OFFICE/OUTPT VISIT, NEW, LEVL V, 60-74 MIN: ICD-10-PCS | Mod: S$PBB,,, | Performed by: INTERNAL MEDICINE

## 2023-10-11 PROCEDURE — 99999 PR PBB SHADOW E&M-EST. PATIENT-LVL IV: ICD-10-PCS | Mod: PBBFAC,,, | Performed by: INTERNAL MEDICINE

## 2023-10-11 PROCEDURE — 93010 EKG 12-LEAD: ICD-10-PCS | Mod: S$PBB,,, | Performed by: INTERNAL MEDICINE

## 2023-10-11 PROCEDURE — 99205 OFFICE O/P NEW HI 60 MIN: CPT | Mod: S$PBB,,, | Performed by: INTERNAL MEDICINE

## 2023-10-11 PROCEDURE — 93010 ELECTROCARDIOGRAM REPORT: CPT | Mod: S$PBB,,, | Performed by: INTERNAL MEDICINE

## 2023-10-11 PROCEDURE — 99999 PR PBB SHADOW E&M-EST. PATIENT-LVL IV: CPT | Mod: PBBFAC,,, | Performed by: INTERNAL MEDICINE

## 2023-10-11 PROCEDURE — 99214 OFFICE O/P EST MOD 30 MIN: CPT | Mod: PBBFAC,PN | Performed by: INTERNAL MEDICINE

## 2023-10-11 NOTE — PROGRESS NOTES
Subjective:    Patient ID:  Jonna Pearl is a 63 y.o. female     Chief Complaint   Patient presents with    Hospital Follow Up    Shortness of Breath       HPI:  Ms Jonna Pearl is a 63 y.o. female is here for initial consultation.    Patient was admitted to the hospital recently for diverticulitis and she was treated in the hospital and was found to have bigeminy and she was evaluated in the hospital and was discharged home.  At the present time patient denies any palpitations or rapid heart rate or irregular heart rhythm.    Patient denies any chest pain or tightness or heaviness.  Denies any dizziness or lightheadedness or loss of consciousness.    She does get winded intermittently.  And she does get dyspnea on exertion.  Patient has neuropathy and she is on gabapentin for the same.    She also has known history of hypertension and is not adequately controlled.    She has renal insufficiency and she had issues with her kidney stones.    She has had history of colitis and had been admitted to the hospital before.    She does have some glaucoma and takes eyedrops for the same.    Review of patient's allergies indicates:   Allergen Reactions    Neomycin-polymyxin Swelling    Cephalexin      Other reaction(s): Unknown    Doxycycline      Other reaction(s): Unknown    Iodinated contrast media      Other reaction(s): Unknown    Penicillins      Other reaction(s): Unknown    Valsartan      Other reaction(s): Unknown       Past Medical History:   Diagnosis Date    Allergy     Arthritis     Atrophic kidney     left. probably childhood illness or disease    Chronic kidney disease     Chronic rhinitis     Colon polyps     Depression     HEARING LOSS     Hiatal hernia     Hypertension     Insomnia     Migraines     Otitis media     Sleep apnea     mild    TIA (transient ischemic attack)      Past Surgical History:   Procedure Laterality Date     SECTION      COLONOSCOPY  2014    Dr Escobar 5 year yossi     COLONOSCOPY N/A 4/10/2019    Procedure: COLONOSCOPY;  Surgeon: Matt Orozco MD;  Location: [facility] ENDO;  Service: Endoscopy;  Laterality: N/A;    ESOPHAGOGASTRODUODENOSCOPY N/A 2023    Procedure: EGD (ESOPHAGOGASTRODUODENOSCOPY);  Surgeon: Merrill Vela MD;  Location: [facility] ENDO;  Service: Endoscopy;  Laterality: N/A;    HEMORRHOID SURGERY      HYSTERECTOMY      left mandibular node resection      OOPHORECTOMY       Social History     Tobacco Use    Smoking status: Former     Current packs/day: 0.00     Average packs/day: 1 pack/day for 18.0 years (18.0 ttl pk-yrs)     Types: Cigarettes     Start date: 2000     Quit date: 2018     Years since quittin.5    Smokeless tobacco: Never   Substance Use Topics    Alcohol use: No    Drug use: No     Family History   Problem Relation Age of Onset    Cancer Sister         breast    Diabetes Mother     Early death Father     Diabetes Paternal Grandmother     Breast cancer Maternal Aunt     Breast cancer Paternal Aunt         Review of Systems:   Constitution: Negative for diaphoresis and fever.   HEENT: Negative for nosebleeds.    Cardiovascular: Negative for chest pain       Intermittent dyspnea on exertion       Intermittent leg swelling        No palpitations  Respiratory: Negative for shortness of breath and wheezing.    Hematologic/Lymphatic: Negative for bleeding problem. Does not bruise/bleed easily.   Skin: Negative for color change and rash.  Easy bruising.  Musculoskeletal: Negative for falls and myalgias.   Gastrointestinal: Negative for hematemesis and hematochezia.   Genitourinary: Negative for hematuria.   Neurological: Negative for dizziness and light-headedness.   Psychiatric/Behavioral: Negative for altered mental status and memory loss.          Objective:        Vitals:    10/11/23 1503   BP: (!) 142/80   Pulse: 66   Resp: 16       Lab Results   Component Value Date    WBC 10.44 2023    HGB 10.3 (L) 2023    HCT 33.0 (L)  08/28/2023     (H) 08/28/2023    CHOL 154 06/20/2022    TRIG 93 06/20/2022    HDL 44 06/20/2022    ALT 14 08/28/2023    AST 16 08/28/2023     08/28/2023    K 3.7 08/28/2023     (H) 08/28/2023    CREATININE 1.0 08/28/2023    BUN 10 08/28/2023    CO2 21 (L) 08/28/2023    TSH 2.468 06/20/2022    INR 1.0 08/27/2023    HGBA1C 5.9 08/06/2023        ECHOCARDIOGRAM RESULTS  Results for orders placed during the hospital encounter of 08/06/23    Echo    Interpretation Summary    Left Ventricle: The left ventricle is normal in size. Normal wall thickness. Normal wall motion. There is normal systolic function with a visually estimated ejection fraction of 55 - 60%. There is normal diastolic function.    Left Atrium: Left atrium is mildly dilated.    Right Ventricle: Normal right ventricular cavity size. Wall thickness is normal. Right ventricle wall motion  is normal. Systolic function is normal.    Mitral Valve: There is mild regurgitation with a centrally directed jet.    Tricuspid Valve: There is mild regurgitation.    IVC/SVC: Normal venous pressure at 3 mmHg.        CURRENT/PREVIOUS VISIT EKG  Results for orders placed or performed during the hospital encounter of 08/06/23   EKG 12-lead    Collection Time: 08/10/23  5:53 AM    Narrative    Test Reason : R07.9,    Vent. Rate : 047 BPM     Atrial Rate : 047 BPM     P-R Int : 146 ms          QRS Dur : 080 ms      QT Int : 520 ms       P-R-T Axes : 040 037 056 degrees     QTc Int : 460 ms    Sinus bradycardia  Otherwise normal ECG    Confirmed by Shaneka SORIA, Cait Collins ([id number]) on 8/15/2023 7:24:43 PM    Referred By: [email or web address]   SELF           Confirmed By:Cait Munroe MD     No valid procedures specified.   No results found for this or any previous visit.      Physical Exam:  CONSTITUTIONAL: No fever, no chills  HEENT: Normocephalic, atraumatic,pupils reactive to light                 NECK:  No JVD no carotid bruit  CVS: S1S2+, RRR, systolic murmurs,    LUNGS: Clear  ABDOMEN: Soft, NT, BS+  EXTREMITIES: No cyanosis, trace ankle edema  : No thornton catheter  NEURO: AAO X 3  PSY: Normal affect      Medication List with Changes/Refills   Current Medications    ACETAMINOPHEN (TYLENOL ORAL)    Take 1 tablet by mouth as needed.    ERGOCALCIFEROL (ERGOCALCIFEROL) 50,000 UNIT CAP    Take 1 capsule by mouth every 7 days.    GABAPENTIN (NEURONTIN) 600 MG TABLET    Take 600 mg by mouth 3 (three) times daily.    KETOCONAZOLE (NIZORAL) 2 % SHAMPOO    Apply 1 Application topically Every 3 (three) days. 1 application as needed to wet hair every 3 to 4 days external once a day    LATANOPROST 0.005 % OPHTHALMIC SOLUTION    Place 1 drop into both eyes every evening.    LISINOPRIL (PRINIVIL,ZESTRIL) 40 MG TABLET    Take 1 tablet by mouth once daily.    NYSTATIN (MYCOSTATIN) CREAM    Apply 1 Application topically 2 (two) times a day.    NYSTOP POWDER    Apply 1 g topically as needed.    OZEMPIC 0.25 MG OR 0.5 MG (2 MG/3 ML) PEN INJECTOR    Inject 0.25 mg into the skin every 7 days.    PANTOPRAZOLE (PROTONIX) 40 MG TABLET    Take 40 mg by mouth once daily.    PROMETHAZINE (PHENERGAN) 25 MG TABLET    Take 25 mg by mouth as needed for Nausea.    TIMOLOL MALEATE 0.5% (TIMOPTIC) 0.5 % DROP    Place 1 drop into both eyes 2 (two) times daily.    TOPIRAMATE (TOPAMAX) 50 MG TABLET    Take 1 tablet (50 mg total) by mouth once daily.    TRIAMCINOLONE ACETONIDE 0.1% (KENALOG) 0.1 % CREAM    Apply 1 g topically 2 (two) times daily.    VERAPAMIL (CALAN-SR) 240 MG CR TABLET    Take 1 tablet by mouth once daily.             Assessment:       1. Bigeminy    2. Essential hypertension    3. Stage 3a chronic kidney disease    4. Mixed hyperlipidemia    5. Severe obesity    6. Obstructive sleep apnea    7. Easy bruising    8. Nonspecific abnormal electrocardiogram (ECG) (EKG)         Plan:   1. Bigeminy    Patient has had intermittent bigeminy and at the present time she is in normal sinus rhythm.   Etiology is unclear.  However need to rule out cardiac cause of her bigeminy and frequent PVCs.  Will repeat her blood work her potassium was 3.7 in the hospital.  Need to replace potassium and keep the potassium at 4.1  Will also do a Lexiscan stress Cardiolite study to evaluate for ischemia and to essentially rule out coronary artery disease.  Patient already had an echocardiogram done in the hospital and it shows normal LV function normal RV function and no significant valvular dysfunction.    Will also do 1 week Zio to evaluate her palpitations.  2. Essential hypertension   Patient's blood pressure is still elevated is 142/80 and she is currently on verapamil Calan  mg p.o. daily and she is also on lisinopril 40 mg p.o. daily.  Patient takes fluid pills on p.r.n. basis as she wants to take it.    Discussed with patient will start her on hydrochlorothiazide 12.5 mg p.o. daily and she will also be on potassium chloride 10 mEq p.o. daily continue the same.  She will also take magnesium oxide 400 mg p.o. daily.  3. Patient needs to space her medications appropriately.  To avoid sudden drop in blood pressure.  4. Chronic kidney disease stage IIIA   At the present time she is stable.  Continue the same medications as above.    5. Mixed hyperlipidemia     Patient follows up with the primary physician in regards with the cholesterol.  On her last blood work her total cholesterol was 154 HDL was 44 LDL was 91 and triglycerides are 92.  6. EKG  Reviewed EKG independently patient is in normal sinus rhythm with normal intervals and early repolarization changes .  7. Obstructive sleep apnea   Patient is on CPAP she uses a CPAP regularly.  Continue the same.  8.  A easy bruising   Patient has been having intermittent easy bruising.  She does take Naprosyn intermittently.  And her blood work seems to be stable her total white count was 10.4 hemoglobin was 10.3 hematocrit was 33.0 and platelets were 492.  And her INR was  1.0.  Discussed with patient to cut back on Naprosyn to avoid excessive bleeding.  9. Glaucoma   Patient has glaucoma and she is currently on latanoprost and timolol.  Continue the same it also is the cause for her bradycardia along with the calcium channel blocker.  10. Bradycardia   Patient is on calcium channel blocker Paulette  mg and she is also on timolol as well as latanoprost.  Will see her back in the office after the testing has been completed.            Problem List Items Addressed This Visit          Cardiac/Vascular    Essential hypertension    Bigeminy - Primary       Renal/    Stage 3 chronic kidney disease     Other Visit Diagnoses       Mixed hyperlipidemia        Severe obesity        Obstructive sleep apnea        Easy bruising        Nonspecific abnormal electrocardiogram (ECG) (EKG)                Follow up in about 6 weeks (around 11/22/2023).

## 2023-10-12 ENCOUNTER — TELEPHONE (OUTPATIENT)
Dept: CARDIOLOGY | Facility: CLINIC | Age: 63
End: 2023-10-12
Payer: MEDICARE

## 2023-10-12 DIAGNOSIS — I49.8 BIGEMINY: Primary | ICD-10-CM

## 2023-10-12 RX ORDER — HYDROCHLOROTHIAZIDE 12.5 MG/1
12.5 CAPSULE ORAL DAILY
COMMUNITY
End: 2024-02-15 | Stop reason: ALTCHOICE

## 2023-10-12 RX ORDER — POTASSIUM CHLORIDE 750 MG/1
10 TABLET, EXTENDED RELEASE ORAL ONCE
COMMUNITY
End: 2023-10-12 | Stop reason: SDUPTHER

## 2023-10-12 RX ORDER — POTASSIUM CHLORIDE 750 MG/1
10 CAPSULE, EXTENDED RELEASE ORAL ONCE
COMMUNITY

## 2023-10-12 RX ORDER — HYDROCHLOROTHIAZIDE 12.5 MG/1
12.5 CAPSULE ORAL DAILY
COMMUNITY
End: 2023-10-12 | Stop reason: SDUPTHER

## 2023-10-12 RX ORDER — POTASSIUM CHLORIDE 750 MG/1
10 TABLET, EXTENDED RELEASE ORAL DAILY
Qty: 90 TABLET | Refills: 3 | Status: SHIPPED | OUTPATIENT
Start: 2023-10-12

## 2023-10-12 RX ORDER — HYDROCHLOROTHIAZIDE 12.5 MG/1
12.5 CAPSULE ORAL DAILY
Qty: 90 CAPSULE | Refills: 3 | Status: SHIPPED | OUTPATIENT
Start: 2023-10-12

## 2023-10-12 RX ORDER — HYDROCHLOROTHIAZIDE 12.5 MG/1
12.5 TABLET ORAL DAILY
COMMUNITY
End: 2024-02-15 | Stop reason: ALTCHOICE

## 2023-10-12 NOTE — TELEPHONE ENCOUNTER
----- Message from Blair Washington sent at 10/12/2023  4:25 PM CDT -----  Regarding: Return Call  Contact: patient  Type:  Patient Returning Call    Who Called:patient  Who Left Message for Patient:office nurse  Does the patient know what this is regarding?:medication never called in from yesterday  Would the patient rather a call back or a response via [facility]? Please call to advise  Best Call Back Number:[phone]  Additional Information:   [facility] [place] - [address]  [address] [place] [address]  Phone: [phone] Fax: [phone]

## 2023-10-12 NOTE — TELEPHONE ENCOUNTER
----- Message from Alessandra Yang RN sent at 10/12/2023 10:50 AM CDT -----  Regarding: 10/11 EKG Order  The EKG performed on 10/11/23 is missing an order.  Please place an order so that the EKG can be read in [place].    Thank you,  Alessandra Yang RN  [facility]   [facility] [place] [facility]  3rd Floor Cardiology [facility]  [phone]/ [id number]

## 2023-10-18 ENCOUNTER — HOSPITAL ENCOUNTER (OUTPATIENT)
Dept: RADIOLOGY | Facility: HOSPITAL | Age: 63
Discharge: HOME | End: 2023-10-18
Attending: NURSE PRACTITIONER
Payer: MEDICARE

## 2023-10-18 DIAGNOSIS — R60.9 EDEMA: ICD-10-CM

## 2023-10-18 DIAGNOSIS — M79.605 LEFT LEG PAIN: ICD-10-CM

## 2023-10-18 DIAGNOSIS — R60.9 EDEMA: Primary | ICD-10-CM

## 2023-10-18 DIAGNOSIS — M25.562 LEFT KNEE PAIN: ICD-10-CM

## 2023-10-18 PROCEDURE — 93971 EXTREMITY STUDY: CPT | Mod: TC,PO,LT

## 2023-10-18 PROCEDURE — 76882 US LMTD JT/FCL EVL NVASC XTR: CPT | Mod: TC,PO,LT

## 2023-10-25 ENCOUNTER — CLINICAL SUPPORT (OUTPATIENT)
Dept: REHABILITATION | Facility: HOSPITAL | Age: 63
End: 2023-10-25
Payer: MEDICARE

## 2023-10-25 DIAGNOSIS — G89.29 CHRONIC BILATERAL LOW BACK PAIN WITHOUT SCIATICA: Primary | ICD-10-CM

## 2023-10-25 DIAGNOSIS — M54.50 CHRONIC BILATERAL LOW BACK PAIN WITHOUT SCIATICA: Primary | ICD-10-CM

## 2023-10-25 DIAGNOSIS — S52.124A NONDISPLACED FRACTURE OF HEAD OF RIGHT RADIUS, INITIAL ENCOUNTER FOR CLOSED FRACTURE: ICD-10-CM

## 2023-10-25 DIAGNOSIS — M79.662 PAIN IN LEFT LOWER LEG: Primary | ICD-10-CM

## 2023-10-25 PROCEDURE — 97810 ACUP 1/> WO ESTIM 1ST 15 MIN: CPT | Mod: PN

## 2023-10-25 PROCEDURE — 97811 ACUP 1/> W/O ESTIM EA ADD 15: CPT | Mod: PN

## 2023-10-27 ENCOUNTER — OFFICE VISIT (OUTPATIENT)
Dept: ORTHOPEDICS | Facility: CLINIC | Age: 63
End: 2023-10-27
Payer: MEDICARE

## 2023-10-27 ENCOUNTER — HOSPITAL ENCOUNTER (OUTPATIENT)
Dept: RADIOLOGY | Facility: HOSPITAL | Age: 63
Discharge: HOME | End: 2023-10-27
Attending: ORTHOPAEDIC SURGERY
Payer: MEDICARE

## 2023-10-27 VITALS — WEIGHT: 262 LBS | RESPIRATION RATE: 16 BRPM | BODY MASS INDEX: 48.21 KG/M2 | HEIGHT: 62 IN

## 2023-10-27 DIAGNOSIS — M79.662 PAIN IN LEFT LOWER LEG: ICD-10-CM

## 2023-10-27 DIAGNOSIS — M79.662 PAIN IN LEFT LOWER LEG: Primary | ICD-10-CM

## 2023-10-27 PROCEDURE — 99214 OFFICE O/P EST MOD 30 MIN: CPT | Mod: S$PBB,,, | Performed by: ORTHOPAEDIC SURGERY

## 2023-10-27 PROCEDURE — 99999 PR PBB SHADOW E&M-EST. PATIENT-LVL IV: ICD-10-PCS | Mod: PBBFAC,,, | Performed by: ORTHOPAEDIC SURGERY

## 2023-10-27 PROCEDURE — 99214 OFFICE O/P EST MOD 30 MIN: CPT | Mod: PBBFAC,PO | Performed by: ORTHOPAEDIC SURGERY

## 2023-10-27 PROCEDURE — 99214 PR OFFICE/OUTPT VISIT, EST, LEVL IV, 30-39 MIN: ICD-10-PCS | Mod: S$PBB,,, | Performed by: ORTHOPAEDIC SURGERY

## 2023-10-27 PROCEDURE — 73590 XR TIBIA FIBULA 2 VIEW LEFT: ICD-10-PCS | Mod: 26,LT,, | Performed by: RADIOLOGY

## 2023-10-27 PROCEDURE — 73590 X-RAY EXAM OF LOWER LEG: CPT | Mod: 26,LT,, | Performed by: RADIOLOGY

## 2023-10-27 PROCEDURE — 99999 PR PBB SHADOW E&M-EST. PATIENT-LVL IV: CPT | Mod: PBBFAC,,, | Performed by: ORTHOPAEDIC SURGERY

## 2023-10-27 PROCEDURE — 73590 X-RAY EXAM OF LOWER LEG: CPT | Mod: TC,PO,LT

## 2023-10-27 RX ORDER — TRAMADOL HYDROCHLORIDE 50 MG/1
50 TABLET ORAL EVERY 6 HOURS PRN
Qty: 28 TABLET | Refills: 0 | Status: SHIPPED | OUTPATIENT
Start: 2023-10-27 | End: 2023-11-08

## 2023-10-27 NOTE — PROGRESS NOTES
Patient ID: Jonna Pearl is a 63 y.o. female    Chief Complaint:   Chief Complaint   Patient presents with    Right Lower Leg - Pain       History of Present Illness:    Pleasant 63-year-old female who is here for evaluation of left knee pain.  Reports pain in the medial proximal tibia and back of the knee.  Also reports swelling for the past couple of weeks.  No known trauma.  Did have ultrasound to rule out DVT which was negative but showed a complex posterior cyst of the knee.  Likely Baker cyst.  Reports pain 10/10 today.  No instability.  No fevers chills induration or signs of infection.    PAST MEDICAL HISTORY:   Past Medical History:   Diagnosis Date    Allergy     Arthritis     Atrophic kidney     left. probably childhood illness or disease    Chronic kidney disease     Chronic rhinitis     Colon polyps     Depression     HEARING LOSS     Hiatal hernia     Hypertension     Insomnia     Migraines     Otitis media     Sleep apnea     mild    TIA (transient ischemic attack)      PAST SURGICAL HISTORY:   Past Surgical History:   Procedure Laterality Date     SECTION      COLONOSCOPY  2014    Dr Escobar 5 year yossi    COLONOSCOPY N/A 4/10/2019    Procedure: COLONOSCOPY;  Surgeon: Matt Orozco MD;  Location: [facility];  Service: Endoscopy;  Laterality: N/A;    ESOPHAGOGASTRODUODENOSCOPY N/A 2023    Procedure: EGD (ESOPHAGOGASTRODUODENOSCOPY);  Surgeon: Merrill Vela MD;  Location: [facility];  Service: Endoscopy;  Laterality: N/A;    HEMORRHOID SURGERY      HYSTERECTOMY      left mandibular node resection      OOPHORECTOMY       FAMILY HISTORY:   Family History   Problem Relation Age of Onset    Cancer Sister         breast    Diabetes Mother     Early death Father     Diabetes Paternal Grandmother     Breast cancer Maternal Aunt     Breast cancer Paternal Aunt      SOCIAL HISTORY:   Social History     Occupational History    Not on file   Tobacco Use    Smoking status: Former      Current packs/day: 0.00     Average packs/day: 1 pack/day for 18.0 years (18.0 ttl pk-yrs)     Types: Cigarettes     Start date: 2000     Quit date: 2018     Years since quittin.5    Smokeless tobacco: Never   Substance and Sexual Activity    Alcohol use: No    Drug use: No    Sexual activity: Not on file        MEDICATIONS:   Current Outpatient Medications:     acetaminophen (TYLENOL ORAL), Take 1 tablet by mouth as needed., Disp: , Rfl:     ergocalciferol (ERGOCALCIFEROL) 50,000 unit Cap, Take 1 capsule by mouth every 7 days., Disp: , Rfl:     gabapentin (NEURONTIN) 600 MG tablet, Take 600 mg by mouth 3 (three) times daily., Disp: , Rfl: 9    hydroCHLOROthiazide (HYDRODIURIL) 12.5 MG Tab, Take 12.5 mg by mouth once daily., Disp: , Rfl:     hydroCHLOROthiazide (MICROZIDE) 12.5 mg capsule, Take 12.5 mg by mouth once daily., Disp: , Rfl:     hydroCHLOROthiazide (MICROZIDE) 12.5 mg capsule, Take 1 capsule (12.5 mg total) by mouth once daily., Disp: 90 capsule, Rfl: 3    ketoconazole (NIZORAL) 2 % shampoo, Apply 1 Application topically Every 3 (three) days. 1 application as needed to wet hair every 3 to 4 days external once a day, Disp: , Rfl:     latanoprost 0.005 % ophthalmic solution, Place 1 drop into both eyes every evening., Disp: , Rfl: 6    lisinopriL (PRINIVIL,ZESTRIL) 40 MG tablet, Take 1 tablet by mouth once daily., Disp: , Rfl:     nystatin (MYCOSTATIN) cream, Apply 1 Application topically 2 (two) times a day., Disp: , Rfl:     NYSTOP powder, Apply 1 g topically as needed., Disp: , Rfl:     OZEMPIC 0.25 mg or 0.5 mg (2 mg/3 mL) pen injector, Inject 0.25 mg into the skin every 7 days., Disp: , Rfl:     pantoprazole (PROTONIX) 40 MG tablet, Take 40 mg by mouth once daily., Disp: , Rfl:     potassium chloride (KLOR-CON) 10 MEQ TbSR, Take 1 tablet (10 mEq total) by mouth once daily., Disp: 90 tablet, Rfl: 3    potassium chloride (MICRO-K) 10 MEQ CpSR, Take 10 mEq by mouth once., Disp: , Rfl:      promethazine (PHENERGAN) 25 MG tablet, Take 25 mg by mouth as needed for Nausea., Disp: , Rfl:     timolol maleate 0.5% (TIMOPTIC) 0.5 % Drop, Place 1 drop into both eyes 2 (two) times daily., Disp: , Rfl:     triamcinolone acetonide 0.1% (KENALOG) 0.1 % cream, Apply 1 g topically 2 (two) times daily., Disp: , Rfl:     verapamiL (CALAN-SR) 240 MG CR tablet, Take 1 tablet by mouth once daily., Disp: , Rfl:     topiramate (TOPAMAX) 50 MG tablet, Take 1 tablet (50 mg total) by mouth once daily., Disp: 30 tablet, Rfl: 11    traMADoL (ULTRAM) 50 mg tablet, Take 1 tablet (50 mg total) by mouth every 6 (six) hours as needed for Pain., Disp: 28 tablet, Rfl: 0  ALLERGIES:   Review of patient's allergies indicates:   Allergen Reactions    Neomycin-polymyxin Swelling    Cephalexin      Other reaction(s): Unknown    Doxycycline      Other reaction(s): Unknown    Iodinated contrast media      Other reaction(s): Unknown    Iodine Other (See Comments)     Due to kidney function    Penicillins      Other reaction(s): Unknown    Valsartan      Other reaction(s): Unknown    Bacitracin-polymyxin b Rash         Physical Exam     Vitals:    10/27/23 1227   Resp: 16     Alert and oriented to person, place and time. No acute distress. Well-groomed, not ill appearing. Pupils round and reactive, normal respiratory effort, no audible wheezing.     She is global tenderness to palpation of the left leg and pes anserinus .  Pain with passive range of motion of the left knee.  The knee is stable to varus and valgus stress.  There is swelling of the left leg with nonpitting edema.  Tenderness over the tibial crest.  Medial and lateral joint line tenderness.  Patellofemoral arthritis and crepitus.      Imaging:       X-Ray: I have reviewed all pertinent results/findings and my personal findings are:  Moderate DJD of the left knee.  Ultrasound negative for DVT with 4 x 2 cm popliteal cyst    Assessment & Plan    Pain in left lower leg  -     MRI  Tibia Fibula Without Contrast Left; Future; Expected date: 10/27/2023  -     traMADoL (ULTRAM) 50 mg tablet; Take 1 tablet (50 mg total) by mouth every 6 (six) hours as needed for Pain.  Dispense: 28 tablet; Refill: 0         Treatment options were discussed with her in detail.  She has swelling and pain of the left leg.  Most of her. Pain is more in the proximal tibia and not necessarily the knee.  Certainly could be a ruptured Baker cyst causing swelling of the leg.  Recommendation is for MRI of the left tib-fib to evaluate and rule out stress fracture, infection and ruptured Baker cyst/hematoma.    Follow up: for MRI/CT Results   X-rays next visit:  None

## 2023-10-27 NOTE — PROGRESS NOTES
Acupuncture Evaluation Note     Name: Jonna Pearl  [facility] Number: [id number]    Traditional Chinese Medicine (TCM) Diagnosis: Qi Stagnation  Medical Diagnosis:   Encounter Diagnoses   Name Primary?    Chronic bilateral low back pain without sciatica Yes    Nondisplaced fracture of head of right radius, initial encounter for closed fracture         Evaluation Date: 10/25/23    Visit #/Visits authorized: 5/12     Precautions: Standard    Subjective     Chief Concern: Closed nondisplaced fracture of head of right radius. Chronic low back pain. Skin irritation on elbows.    Medical necessity is demonstrated by the following IMPAIRMENTS: Medical Necessity: Decreased mobility limits day to day activities, social, and emergent situations              Aggravating Factors:  changing positions and pressure   Relieving Factors:  rest    Symptom Description:     Quality:  Aching  Severity:  Varies, but could be 5/10  Frequency:  when active      Treatment       Acupuncture points used:  K3, UB 62, SI 4, LI 5, GB 20  Needles In: 10  Needles Out: 10  Needles W/O STIM placed: 1:45pm  [place] W/O STIM removed: 2:30pm       Assessment     After treatment, patient felt relaxed and lessened stiffness/pain    Patient prognosis is Fair.     Patient will continue to benefit from acupuncture treatment to address the deficits listed in the problem list box on initial evaluation, provide patient family education and to maximize pt's level of independence in the home and community environment.     Patient's spiritual, cultural and educational needs considered and pt agreeable to plan of care and goals.     Anticipated barriers to treatment: none    Plan     Recommend every 2 weeks for 12 sessions with midway re-assess.      Education:  Patient is aware of cumulative benefit of acupuncture

## 2023-11-02 ENCOUNTER — HOSPITAL ENCOUNTER (OUTPATIENT)
Dept: RADIOLOGY | Facility: HOSPITAL | Age: 63
Discharge: HOME | End: 2023-11-02
Attending: ORTHOPAEDIC SURGERY
Payer: MEDICARE

## 2023-11-02 DIAGNOSIS — M79.662 PAIN IN LEFT LOWER LEG: ICD-10-CM

## 2023-11-02 PROCEDURE — 73718 MRI LOWER EXTREMITY W/O DYE: CPT | Mod: TC,PO,LT

## 2023-11-03 ENCOUNTER — OFFICE VISIT (OUTPATIENT)
Dept: ORTHOPEDICS | Facility: CLINIC | Age: 63
End: 2023-11-03
Payer: MEDICARE

## 2023-11-03 VITALS — WEIGHT: 262 LBS | BODY MASS INDEX: 48.21 KG/M2 | RESPIRATION RATE: 16 BRPM | HEIGHT: 62 IN

## 2023-11-03 DIAGNOSIS — M84.40XA INSUFFICIENCY FRACTURE: Primary | ICD-10-CM

## 2023-11-03 PROCEDURE — 99213 OFFICE O/P EST LOW 20 MIN: CPT | Mod: PBBFAC,PO | Performed by: ORTHOPAEDIC SURGERY

## 2023-11-03 PROCEDURE — 99214 PR OFFICE/OUTPT VISIT, EST, LEVL IV, 30-39 MIN: ICD-10-PCS | Mod: 25,S$PBB,, | Performed by: ORTHOPAEDIC SURGERY

## 2023-11-03 PROCEDURE — 20610 PR DRAIN/INJECT LARGE JOINT/BURSA: ICD-10-PCS | Mod: S$PBB,LT,, | Performed by: ORTHOPAEDIC SURGERY

## 2023-11-03 PROCEDURE — 20610 DRAIN/INJ JOINT/BURSA W/O US: CPT | Mod: S$PBB,LT,, | Performed by: ORTHOPAEDIC SURGERY

## 2023-11-03 PROCEDURE — 99999PBSHW PR PBB SHADOW TECHNICAL ONLY FILED TO HB: Mod: PBBFAC,,,

## 2023-11-03 PROCEDURE — 99999PBSHW PR PBB SHADOW TECHNICAL ONLY FILED TO HB: ICD-10-PCS | Mod: PBBFAC,,,

## 2023-11-03 PROCEDURE — 99999 PR PBB SHADOW E&M-EST. PATIENT-LVL III: ICD-10-PCS | Mod: PBBFAC,,, | Performed by: ORTHOPAEDIC SURGERY

## 2023-11-03 PROCEDURE — 20610 DRAIN/INJ JOINT/BURSA W/O US: CPT | Mod: PBBFAC,LT | Performed by: ORTHOPAEDIC SURGERY

## 2023-11-03 PROCEDURE — 99214 OFFICE O/P EST MOD 30 MIN: CPT | Mod: 25,S$PBB,, | Performed by: ORTHOPAEDIC SURGERY

## 2023-11-03 PROCEDURE — 99999 PR PBB SHADOW E&M-EST. PATIENT-LVL III: CPT | Mod: PBBFAC,,, | Performed by: ORTHOPAEDIC SURGERY

## 2023-11-03 RX ORDER — TRIAMCINOLONE ACETONIDE 40 MG/ML
40 INJECTION, SUSPENSION INTRA-ARTICULAR; INTRAMUSCULAR
Status: DISCONTINUED | OUTPATIENT
Start: 2023-11-03 | End: 2023-11-03 | Stop reason: HOSPADM

## 2023-11-03 RX ADMIN — TRIAMCINOLONE ACETONIDE 40 MG: 40 INJECTION, SUSPENSION INTRA-ARTICULAR; INTRAMUSCULAR at 01:11

## 2023-11-03 NOTE — PROGRESS NOTES
Patient ID: Jonna Pearl is a 63 y.o. female    Chief Complaint:   Chief Complaint   Patient presents with    Left Lower Leg - Pain       History of Present Illness:    Pleasant 63-year-old female who is here for evaluation of left knee pain.  Reports pain in the medial proximal tibia and back of the knee.  Also reports swelling for the past couple of weeks.  No known trauma.  Did have ultrasound to rule out DVT which was negative but showed a complex posterior cyst of the knee.  Likely Baker cyst.  Reports pain 10/10 today.  No instability.  No fevers chills induration or signs of infection.    ____________________________________________________________________    Interval history 2023 : Patient returns today for MRI follow-up of their left tibia.  They report no changes since I saw them last.     PAST MEDICAL HISTORY:   Past Medical History:   Diagnosis Date    Allergy     Arthritis     Atrophic kidney     left. probably childhood illness or disease    Chronic kidney disease     Chronic rhinitis     Colon polyps     Depression     HEARING LOSS     Hiatal hernia     Hypertension     Insomnia     Migraines     Otitis media     Sleep apnea     mild    TIA (transient ischemic attack)      PAST SURGICAL HISTORY:   Past Surgical History:   Procedure Laterality Date     SECTION      COLONOSCOPY  2014    Dr Escobar 5 year yossi    COLONOSCOPY N/A 4/10/2019    Procedure: COLONOSCOPY;  Surgeon: Matt Orozco MD;  Location: [facility];  Service: Endoscopy;  Laterality: N/A;    ESOPHAGOGASTRODUODENOSCOPY N/A 2023    Procedure: EGD (ESOPHAGOGASTRODUODENOSCOPY);  Surgeon: Merrill Vela MD;  Location: [facility];  Service: Endoscopy;  Laterality: N/A;    HEMORRHOID SURGERY      HYSTERECTOMY      left mandibular node resection      OOPHORECTOMY       FAMILY HISTORY:   Family History   Problem Relation Age of Onset    Cancer Sister         breast    Diabetes Mother     Early death Father     Diabetes  Paternal Grandmother     Breast cancer Maternal Aunt     Breast cancer Paternal Aunt      SOCIAL HISTORY:   Social History     Occupational History    Not on file   Tobacco Use    Smoking status: Former     Current packs/day: 0.00     Average packs/day: 1 pack/day for 18.0 years (18.0 ttl pk-yrs)     Types: Cigarettes     Start date: 2000     Quit date: 2018     Years since quittin.5    Smokeless tobacco: Never   Substance and Sexual Activity    Alcohol use: No    Drug use: No    Sexual activity: Not on file        MEDICATIONS:   Current Outpatient Medications:     acetaminophen (TYLENOL ORAL), Take 1 tablet by mouth as needed., Disp: , Rfl:     ergocalciferol (ERGOCALCIFEROL) 50,000 unit Cap, Take 1 capsule by mouth every 7 days., Disp: , Rfl:     gabapentin (NEURONTIN) 600 MG tablet, Take 600 mg by mouth 3 (three) times daily., Disp: , Rfl: 9    hydroCHLOROthiazide (HYDRODIURIL) 12.5 MG Tab, Take 12.5 mg by mouth once daily., Disp: , Rfl:     hydroCHLOROthiazide (MICROZIDE) 12.5 mg capsule, Take 12.5 mg by mouth once daily., Disp: , Rfl:     hydroCHLOROthiazide (MICROZIDE) 12.5 mg capsule, Take 1 capsule (12.5 mg total) by mouth once daily., Disp: 90 capsule, Rfl: 3    ketoconazole (NIZORAL) 2 % shampoo, Apply 1 Application topically Every 3 (three) days. 1 application as needed to wet hair every 3 to 4 days external once a day, Disp: , Rfl:     latanoprost 0.005 % ophthalmic solution, Place 1 drop into both eyes every evening., Disp: , Rfl: 6    lisinopriL (PRINIVIL,ZESTRIL) 40 MG tablet, Take 1 tablet by mouth once daily., Disp: , Rfl:     nystatin (MYCOSTATIN) cream, Apply 1 Application topically 2 (two) times a day., Disp: , Rfl:     NYSTOP powder, Apply 1 g topically as needed., Disp: , Rfl:     OZEMPIC 0.25 mg or 0.5 mg (2 mg/3 mL) pen injector, Inject 0.25 mg into the skin every 7 days., Disp: , Rfl:     pantoprazole (PROTONIX) 40 MG tablet, Take 40 mg by mouth once daily., Disp: , Rfl:      potassium chloride (KLOR-CON) 10 MEQ TbSR, Take 1 tablet (10 mEq total) by mouth once daily., Disp: 90 tablet, Rfl: 3    potassium chloride (MICRO-K) 10 MEQ CpSR, Take 10 mEq by mouth once., Disp: , Rfl:     promethazine (PHENERGAN) 25 MG tablet, Take 25 mg by mouth as needed for Nausea., Disp: , Rfl:     timolol maleate 0.5% (TIMOPTIC) 0.5 % Drop, Place 1 drop into both eyes 2 (two) times daily., Disp: , Rfl:     traMADoL (ULTRAM) 50 mg tablet, Take 1 tablet (50 mg total) by mouth every 6 (six) hours as needed for Pain., Disp: 28 tablet, Rfl: 0    triamcinolone acetonide 0.1% (KENALOG) 0.1 % cream, Apply 1 g topically 2 (two) times daily., Disp: , Rfl:     verapamiL (CALAN-SR) 240 MG CR tablet, Take 1 tablet by mouth once daily., Disp: , Rfl:     topiramate (TOPAMAX) 50 MG tablet, Take 1 tablet (50 mg total) by mouth once daily., Disp: 30 tablet, Rfl: 11  ALLERGIES:   Review of patient's allergies indicates:   Allergen Reactions    Neomycin-polymyxin Swelling    Cephalexin      Other reaction(s): Unknown    Doxycycline      Other reaction(s): Unknown    Iodinated contrast media      Other reaction(s): Unknown    Iodine Other (See Comments)     Due to kidney function    Penicillins      Other reaction(s): Unknown    Valsartan      Other reaction(s): Unknown    Bacitracin-polymyxin b Rash         Physical Exam     Vitals:    11/03/23 1239   Resp: 16     Alert and oriented to person, place and time. No acute distress. Well-groomed, not ill appearing. Pupils round and reactive, normal respiratory effort, no audible wheezing.     She is global tenderness to palpation of the left leg and pes anserinus .  Pain with passive range of motion of the left knee.  The knee is stable to varus and valgus stress.  There is swelling of the left leg with nonpitting edema.  Tenderness over the tibial crest.  Medial and lateral joint line tenderness.  Patellofemoral arthritis and crepitus.      Imaging:       X-Ray: I have reviewed all  pertinent results/findings and my personal findings are:  Moderate DJD of the left knee.  Ultrasound negative for DVT with 4 x 2 cm popliteal cyst    Tibia MRI reviewed showing insufficiency stress reaction of the medial tibial plateau    Assessment & Plan    Insufficiency fracture           Treatment options were discussed with her in detail.  She has swelling and pain of the left leg.  MRI does show medial insufficiency fracture of the tibial plateau.  Likely secondary to osteopenia, obesity as well as varus deformity and arthritis of the knee.  We discussed treatment options including subchondroplasty as well as bracing and anti-inflammatories.  Based on her body habitus I think a brace would be difficult.  We did discuss injection today.  She would like to try that for pain relief.  I will follow up in 3 or 4 weeks and discuss subchondroplasty if her pain persists.

## 2023-11-03 NOTE — PROCEDURES
Large Joint Aspiration/Injection: L knee joint    Date/Time: 11/3/2023 1:15 PM    Performed by: Mirza Jeffries MD  Authorized by: Mirza Jeffries MD    Consent Done?:  Yes (Verbal)  Indications:  Pain  Site marked: the procedure site was marked    Timeout: prior to procedure the correct patient, procedure, and site was verified    Local anesthetic:  Lidocaine spray and lidocaine 1% without epinephrine  Anesthetic total (ml):  3      Details:  Needle Size:  22 G  Approach:  Anterolateral  Location:  Knee  Site:  L knee joint  Medications:  40 mg triamcinolone acetonide 40 mg/mL  Patient tolerance:  Patient tolerated the procedure well with no immediate complications

## 2023-11-07 ENCOUNTER — CLINICAL SUPPORT (OUTPATIENT)
Dept: REHABILITATION | Facility: HOSPITAL | Age: 63
End: 2023-11-07
Payer: MEDICARE

## 2023-11-07 DIAGNOSIS — M54.50 CHRONIC BILATERAL LOW BACK PAIN WITHOUT SCIATICA: Primary | ICD-10-CM

## 2023-11-07 DIAGNOSIS — S52.124A NONDISPLACED FRACTURE OF HEAD OF RIGHT RADIUS, INITIAL ENCOUNTER FOR CLOSED FRACTURE: ICD-10-CM

## 2023-11-07 DIAGNOSIS — G89.29 CHRONIC BILATERAL LOW BACK PAIN WITHOUT SCIATICA: Primary | ICD-10-CM

## 2023-11-07 PROCEDURE — 97811 ACUP 1/> W/O ESTIM EA ADD 15: CPT | Mod: PN

## 2023-11-07 PROCEDURE — 97810 ACUP 1/> WO ESTIM 1ST 15 MIN: CPT | Mod: PN

## 2023-11-08 DIAGNOSIS — M79.662 PAIN IN LEFT LOWER LEG: ICD-10-CM

## 2023-11-08 RX ORDER — TRAMADOL HYDROCHLORIDE 50 MG/1
50 TABLET ORAL EVERY 6 HOURS PRN
Qty: 28 TABLET | Refills: 0 | Status: SHIPPED | OUTPATIENT
Start: 2023-11-08 | End: 2024-02-15 | Stop reason: ALTCHOICE

## 2023-11-09 ENCOUNTER — TELEPHONE (OUTPATIENT)
Dept: CARDIOLOGY | Facility: CLINIC | Age: 63
End: 2023-11-09
Payer: MEDICARE

## 2023-11-09 NOTE — TELEPHONE ENCOUNTER
----- Message from Tianna Dominique, Patient Care Assistant sent at 11/9/2023  9:59 AM CST -----  Regarding: appointment  Contact: pt  Type: Needs Medical Advice    Who Called:  pt     Best Call Back Number: [phone] (home)     Additional Information: pt states she would like a callback regarding her stress test appointment on 11/15/23 and 11/16/23. Please call to advise. Thanks!

## 2023-11-09 NOTE — TELEPHONE ENCOUNTER
"Patient needing to schedule her teston Tuesday"s due to traveling to [place] on Tuesday forwarded request to Vandana"

## 2023-11-10 NOTE — PROGRESS NOTES
Acupuncture Evaluation Note     Name: Jonna Pearl  [facility] Number: [id number]    Traditional Chinese Medicine (TCM) Diagnosis: Qi Stagnation  Medical Diagnosis:   Encounter Diagnoses   Name Primary?    Chronic bilateral low back pain without sciatica Yes    Nondisplaced fracture of head of right radius, initial encounter for closed fracture         Evaluation Date: 11/7/23    Visit #/Visits authorized: 6/12     Precautions: Standard    Subjective     Chief Concern: Closed nondisplaced fracture of head of right radius. Chronic low back pain. Skin irritation on elbows.    Medical necessity is demonstrated by the following IMPAIRMENTS: Medical Necessity: Decreased mobility limits day to day activities, social, and emergent situations              Aggravating Factors:  changing positions and pressure   Relieving Factors:  rest    Symptom Description:     Quality:  Aching  Severity:  Varies, but could be 5/10  Frequency:  when active      Treatment       Acupuncture points used:  K3, UB 62, SI 4, LI 5, GB 20  Needles In: 10  Needles Out: 10  Needles W/O STIM placed: 9:45am  Needles W/O STIM removed: 10:30am       Assessment     After treatment, patient felt relaxed and lessened stiffness/pain    Patient prognosis is Fair.     Patient will continue to benefit from acupuncture treatment to address the deficits listed in the problem list box on initial evaluation, provide patient family education and to maximize pt's level of independence in the home and community environment.     Patient's spiritual, cultural and educational needs considered and pt agreeable to plan of care and goals.     Anticipated barriers to treatment: none    Plan     Recommend every 2 weeks for 12 sessions with midway re-assess.      Education:  Patient is aware of cumulative benefit of acupuncture

## 2023-11-14 ENCOUNTER — TELEPHONE (OUTPATIENT)
Dept: CARDIOLOGY | Facility: HOSPITAL | Age: 63
End: 2023-11-14

## 2023-11-14 NOTE — TELEPHONE ENCOUNTER
Left message on voicemail.    Patient advised, test will be at [facility] ([address]   Will need to register on the first floor at the main entrance.   Patient advised that arrival time is 9:00am.  Patient advised that she may be here about 2.5-3 hours, and may want to bring something to occupy their time, as there will be periods of waiting.    Patient advised, may take her medications prior to testing if you need to.   Advised if she needs to eat to take her medications, please keep it light, like toast and juice.    Patient advised to avoid all caffeine 12 hours prior to testing.  This includes decaf tea and coffee.    Will provide peanut butter crackers for a snack after stress test.  If patient would prefer something else, please bring a snack from home.    Wear comfortable clothing.   No lotions, oils, or powders to the upper chest area. May wear deodorant.    No metal jewelry, buttons, or zippers to the upper body.  Patient verbalizes understanding of instructions.

## 2023-12-18 ENCOUNTER — TELEPHONE (OUTPATIENT)
Dept: CARDIOLOGY | Facility: HOSPITAL | Age: 63
End: 2023-12-18

## 2023-12-18 NOTE — TELEPHONE ENCOUNTER
Left message on voicemail.     Patient advised, test will be at [facility] ([address]   Will need to register on the first floor at the main entrance.   Patient advised that arrival time is 8:15am.  Patient advised that she may be here about 2.5 hours, and may want to bring something to occupy their time, as there will be periods of waiting.    Patient advised, may take her medications prior to testing if you need to.   Advised if she needs to eat to take her medications, please keep it light, like toast and juice.    Patient advised to avoid all caffeine 12 hours prior to testing.  This includes decaf tea and coffee.    Will provide peanut butter crackers for a snack after stress test.  If patient would prefer something else, please bring a snack from home.    Wear comfortable clothing.   No lotions, oils, or powders to the upper chest area. May wear deodorant.    No metal jewelry, buttons, or zippers to the upper body.  Advised to call the office if any questions.

## 2023-12-19 ENCOUNTER — HOSPITAL ENCOUNTER (OUTPATIENT)
Dept: RADIOLOGY | Facility: HOSPITAL | Age: 63
Discharge: HOME | End: 2023-12-19
Attending: INTERNAL MEDICINE
Payer: MEDICARE

## 2023-12-19 ENCOUNTER — HOSPITAL ENCOUNTER (OUTPATIENT)
Dept: CARDIOLOGY | Facility: HOSPITAL | Age: 63
Discharge: HOME | End: 2023-12-19
Attending: INTERNAL MEDICINE
Payer: MEDICARE

## 2023-12-19 DIAGNOSIS — I10 ESSENTIAL HYPERTENSION: ICD-10-CM

## 2023-12-19 DIAGNOSIS — N18.31 STAGE 3A CHRONIC KIDNEY DISEASE: ICD-10-CM

## 2023-12-19 DIAGNOSIS — R94.31 NONSPECIFIC ABNORMAL ELECTROCARDIOGRAM (ECG) (EKG): ICD-10-CM

## 2023-12-19 DIAGNOSIS — I49.8 BIGEMINY: ICD-10-CM

## 2023-12-19 DIAGNOSIS — E66.01 SEVERE OBESITY: ICD-10-CM

## 2023-12-19 DIAGNOSIS — E78.2 MIXED HYPERLIPIDEMIA: ICD-10-CM

## 2023-12-19 DIAGNOSIS — G47.33 OBSTRUCTIVE SLEEP APNEA: ICD-10-CM

## 2023-12-19 DIAGNOSIS — R23.3 EASY BRUISING: ICD-10-CM

## 2023-12-19 PROCEDURE — 93018 CV STRESS TEST I&R ONLY: CPT | Mod: ,,, | Performed by: INTERNAL MEDICINE

## 2023-12-19 PROCEDURE — 93018 NUCLEAR STRESS - CARDIOLOGY INTERPRETED (CUPID ONLY): ICD-10-PCS | Mod: ,,, | Performed by: INTERNAL MEDICINE

## 2023-12-19 PROCEDURE — 93017 CV STRESS TEST TRACING ONLY: CPT

## 2023-12-19 PROCEDURE — A9502 TC99M TETROFOSMIN: HCPCS

## 2023-12-19 PROCEDURE — 78452 NUCLEAR STRESS - CARDIOLOGY INTERPRETED (CUPID ONLY): ICD-10-PCS | Mod: 26,,, | Performed by: INTERNAL MEDICINE

## 2023-12-19 PROCEDURE — 93016 NUCLEAR STRESS - CARDIOLOGY INTERPRETED (CUPID ONLY): ICD-10-PCS | Mod: ,,, | Performed by: NURSE PRACTITIONER

## 2023-12-19 PROCEDURE — 93016 CV STRESS TEST SUPVJ ONLY: CPT | Mod: ,,, | Performed by: NURSE PRACTITIONER

## 2023-12-19 PROCEDURE — 78452 HT MUSCLE IMAGE SPECT MULT: CPT | Mod: 26,,, | Performed by: INTERNAL MEDICINE

## 2023-12-19 RX ORDER — REGADENOSON 0.08 MG/ML
0.4 INJECTION, SOLUTION INTRAVENOUS ONCE
Status: COMPLETED | OUTPATIENT
Start: 2023-12-19 | End: 2023-12-19

## 2023-12-19 RX ADMIN — REGADENOSON 0.4 MG: 0.08 INJECTION, SOLUTION INTRAVENOUS at 08:12

## 2023-12-20 ENCOUNTER — HOSPITAL ENCOUNTER (OUTPATIENT)
Dept: RADIOLOGY | Facility: HOSPITAL | Age: 63
Discharge: HOME | End: 2023-12-20
Attending: INTERNAL MEDICINE
Payer: MEDICARE

## 2023-12-21 LAB
CV PHARM DOSE: 0.4 MG
CV STRESS BASE HR: 63 BPM
DIASTOLIC BLOOD PRESSURE: 68 MMHG
EJECTION FRACTION- HIGH: 65 %
END DIASTOLIC INDEX-HIGH: 153 ML/M2
END DIASTOLIC INDEX-LOW: 93 ML/M2
END SYSTOLIC INDEX-HIGH: 71 ML/M2
END SYSTOLIC INDEX-LOW: 31 ML/M2
NUC REST DIASTOLIC VOLUME INDEX: 83
NUC REST EJECTION FRACTION: 53
NUC REST SYSTOLIC VOLUME INDEX: 39
NUC STRESS DIASTOLIC VOLUME INDEX: 96
NUC STRESS EJECTION FRACTION: 67 %
NUC STRESS SYSTOLIC VOLUME INDEX: 32
OHS CV CPX 1 MINUTE RECOVERY HEART RATE: 79 BPM
OHS CV CPX 85 PERCENT MAX PREDICTED HEART RATE MALE: 133
OHS CV CPX MAX PREDICTED HEART RATE: 156
OHS CV CPX PATIENT IS FEMALE: 1
OHS CV CPX PATIENT IS MALE: 0
OHS CV CPX PEAK DIASTOLIC BLOOD PRESSURE: 74 MMHG
OHS CV CPX PEAK HEAR RATE: 79 BPM
OHS CV CPX PEAK RATE PRESSURE PRODUCT: NORMAL
OHS CV CPX PEAK SYSTOLIC BLOOD PRESSURE: 130 MMHG
OHS CV CPX PERCENT MAX PREDICTED HEART RATE ACHIEVED: 53
OHS CV CPX RATE PRESSURE PRODUCT PRESENTING: 8820
RETIRED EF AND QEF - SEE NOTES: 53 %
SYSTOLIC BLOOD PRESSURE: 140 MMHG

## 2024-01-03 ENCOUNTER — TELEPHONE (OUTPATIENT)
Dept: CARDIOLOGY | Facility: CLINIC | Age: 64
End: 2024-01-03
Payer: MEDICARE

## 2024-01-03 NOTE — TELEPHONE ENCOUNTER
----- Message from Mara Mazariegos NP sent at 12/29/2023  3:12 PM CST -----  Your stress test did show an abnormality which means there may be an issues with the blood flow in your heart. The test is a tool we use and is about 85-90% accurate so the abnormality may be related to your heart or could also be a shadow from your abdomen or organs. My recommendation is for you to come in for a follow up to discuss these results and how to proceed.  Please keep your upcoming appointment. If you are having any concerns, please reach out.   If you have any severe symptoms in the meantime, please proceed to the nearest ER for evaluation.

## 2024-01-11 NOTE — H&P
Writer called to convey results to pt. Pt verbalizes understanding. Pt would like MD to know that she can confirm she has intermittent burning sensation in her right foot. Routing to MD as fyi and referral specialist to f/u to schedule with podiatry.       ----- Message from Verito Buck MD sent at 1/11/2024  4:56 PM CST -----  There is a fracture at the base of fourth proximal phalanx. Slight  displacement is noted. No joint dislocation.  I placed a referral to podiatry, please have her get set up.  Please call the patient regarding the plan.    "[facility] - Emergency Dept  Hospital Medicine  History & Physical    Patient Name: Jonna Pearl  MRN: [id number]  Patient Class: Emergency  Admission Date: 8/27/2023  Attending Physician: Amanda   Primary Care Provider: Padmini Lackey FNP         Patient information was obtained from patient and ER records.     Subjective:     Principal Problem:Diverticulitis of large intestine with perforation and abscess    Chief Complaint:   Chief Complaint   Patient presents with    Rectal Bleeding     Pt had diverticulitis was on two rounds of abt, no feeling better.         HPI: Jonna Pearl is a 63 y.o. female with a history as  has a past medical history of Allergy, Arthritis, Atrophic kidney, Chronic kidney disease, Chronic rhinitis, Colon polyps, Depression, HEARING LOSS, Hiatal hernia, Hypertension, Insomnia, Migraines, Otitis media, Sleep apnea, and TIA (transient ischemic attack). who presented to the ED with a Rectal Bleeding and abdominal pain (bright red). Pain is lower left quad - cramping and deep aching, uncomfortable to lay down or sleep with associated sweating, chills and nausea. Per patient she was discharged on oral abx X5days with minimal improvement after discharge. States she saw her primary care provider on the September 18, 2023 who RX'ed 5 more days and told her if she didn't feel better to come back to the hospital. Patient endorses 1-2 small BM's qd they have been loose and mucusy. She reports seeing both dark and bright red blood over recently, stating, "I know I have hemorrhoids but his is different". Eleuterio upper respiratory symptoms (cough/post nasal drip), CP, dysuria, HA, CP, numbness or tingling in any extremity - denies recent trauma. At the time of assessment she reports resolution of her abdominal pain after getting IV abx in the ED (no pain meds administered)    Review of records reveals that she was recently discharged on 8/11/2023 after similar presentation, although " "she had associated leucocytosis. She received IV abx, discharged on oral abx after feeling better with referral to GI in clinic. Last upper GI was 2023, and last colonoscopy in [facility] was .    In ED: no leukocytosis, afebrile, lactic normal = sodium 134, glucose. CT abdomen Pelvis demonstrated "persistent mild inflammatory fat stranding about segment of proximal sigmoid colon" - "appearance suggests persistent acute diverticulitis without perforation" - "follow-up CT abdomen and pelvis with IV contrast for endoscopy is recommended to exclude possibility of colon carcinoma" as well, "gallbladder findings reflect cholelithiasis" again "if further imaging evaluation is desired upper quadrant US can be considered". No hemoccult. Hgb 11.7 stable since prior hospitalization    PLAN: Restart IV ABX, consult to GI, bowel rest advance diet as tolerated, IV fluids - Consult Surgery  -Awaiting, BC, stool cultures, stop ozempic for now      Past Medical History:   Diagnosis Date    Allergy     Arthritis     Atrophic kidney     left. probably childhood illness or disease    Chronic kidney disease     Chronic rhinitis     Colon polyps     Depression     HEARING LOSS     Hiatal hernia     Hypertension     Insomnia     Migraines     Otitis media     Sleep apnea     mild    TIA (transient ischemic attack)        Past Surgical History:   Procedure Laterality Date     SECTION      COLONOSCOPY  2014    Dr Escobar 5 year yossi    COLONOSCOPY N/A 4/10/2019    Procedure: COLONOSCOPY;  Surgeon: Matt Orozco MD;  Location: [facility];  Service: Endoscopy;  Laterality: N/A;    ESOPHAGOGASTRODUODENOSCOPY N/A 2023    Procedure: EGD (ESOPHAGOGASTRODUODENOSCOPY);  Surgeon: Merrill Vela MD;  Location: [facility];  Service: Endoscopy;  Laterality: N/A;    HEMORRHOID SURGERY      HYSTERECTOMY      left mandibular node resection      OOPHORECTOMY         Review of patient's allergies indicates:   Allergen Reactions    " Neomycin-polymyxin Swelling    Cephalexin      Other reaction(s): Unknown    Doxycycline      Other reaction(s): Unknown    Iodinated contrast media      Other reaction(s): Unknown    Penicillins      Other reaction(s): Unknown    Valsartan      Other reaction(s): Unknown       No current facility-administered medications on file prior to encounter.     Current Outpatient Medications on File Prior to Encounter   Medication Sig    gabapentin (NEURONTIN) 600 MG tablet Take 600 mg by mouth 3 (three) times daily.    ketoconazole (NIZORAL) 2 % shampoo APPLY ONE APPLICATION TO WET HAIR EVERY 3 TO 4 DAYS AS NEEDED    latanoprost 0.005 % ophthalmic solution INSTILL 1 DROP INTO AFFECTED EYE(S) ONCE DAILY AT BEDTIME    lisinopriL (PRINIVIL,ZESTRIL) 40 MG tablet Take 40 mg by mouth once daily.    NYSTOP powder Apply 1 g topically as needed.    OZEMPIC 0.25 mg or 0.5 mg (2 mg/3 mL) pen injector Inject 0.25 mg into the skin every 7 days.    pantoprazole (PROTONIX) 40 MG tablet Take 40 mg by mouth once daily.    timolol maleate 0.5% (TIMOPTIC) 0.5 % Drop Place 1 drop into both eyes 2 (two) times daily.    topiramate (TOPAMAX) 50 MG tablet Take 1 tablet (50 mg total) by mouth once daily.    verapamil (CALAN-SR) 240 MG CR tablet Take 240 mg by mouth every evening.     Family History       Problem Relation (Age of Onset)    Breast cancer Maternal Aunt, Paternal Aunt    Cancer Sister    Diabetes Mother, Paternal Grandmother    Early death Father          Tobacco Use    Smoking status: Former     Current packs/day: 0.00     Average packs/day: 1 pack/day for 18.0 years (18.0 ttl pk-yrs)     Types: Cigarettes     Start date: 2000     Quit date: 2018     Years since quittin.4    Smokeless tobacco: Never   Substance and Sexual Activity    Alcohol use: No    Drug use: No    Sexual activity: Not on file     Review of Systems   Constitutional:  Positive for activity change, appetite change and chills. Negative for diaphoresis and  fever.   HENT:  Negative for congestion, hearing loss, sore throat, tinnitus and trouble swallowing.    Eyes:  Negative for photophobia, discharge, itching and visual disturbance.   Respiratory:  Negative for apnea, cough, wheezing and stridor.    Cardiovascular:  Negative for chest pain, palpitations and leg swelling.   Gastrointestinal:  Positive for abdominal distention, abdominal pain and nausea. Negative for blood in stool, constipation and diarrhea.   Endocrine: Negative for polydipsia, polyphagia and polyuria.   Genitourinary:  Negative for difficulty urinating, dysuria, flank pain and frequency.   Musculoskeletal:  Negative for arthralgias, joint swelling and neck stiffness.   Skin:  Negative for color change, rash and wound.   Neurological:  Positive for weakness. Negative for dizziness, tremors, seizures, light-headedness, numbness and headaches.   Hematological:  Negative for adenopathy.   Psychiatric/Behavioral:  Negative for hallucinations and self-injury.      Objective:     Vital Signs (Most Recent):  Temp: 97.9 °F (36.6 °C) (08/27/23 1000)  Pulse: 74 (08/27/23 1000)  Resp: (!) 22 (08/27/23 1000)  BP: 134/65 (08/27/23 1000)  SpO2: 95 % (08/27/23 1000) Vital Signs (24h Range):  Temp:  [97.9 °F (36.6 °C)] 97.9 °F (36.6 °C)  Pulse:  [74] 74  Resp:  [22] 22  SpO2:  [95 %] 95 %  BP: (134)/(65) 134/65     Weight: 120.2 kg (265 lb)  Body mass index is 51.75 kg/m².     Physical Exam  Constitutional:       Appearance: She is well-developed.   HENT:      Head: Normocephalic and atraumatic.   Eyes:      General: Lids are normal.      Extraocular Movements: Extraocular movements intact.      Conjunctiva/sclera: Conjunctivae normal.      Pupils: Pupils are equal, round, and reactive to light.   Neck:      Thyroid: No thyroid mass.      Vascular: No JVD.   Cardiovascular:      Rate and Rhythm: Normal rate and regular rhythm.      Heart sounds: S1 normal and S2 normal. No murmur heard.  Abdominal:      General:  There is distension. There is no abdominal bruit.      Palpations: There is no hepatomegaly or splenomegaly.      Tenderness: There is abdominal tenderness.      Comments: No muscular guarding - large abdominal girth, hypoactive bowel sounds P6iopsp   Musculoskeletal:      Cervical back: Full passive range of motion without pain and neck supple. No edema.   Lymphadenopathy:      Cervical: No cervical adenopathy.   Skin:     General: Skin is warm and dry.   Neurological:      Mental Status: She is alert and oriented to person, place, and time.      Motor: No tremor or seizure activity.      Deep Tendon Reflexes: Reflexes are normal and symmetric.   Psychiatric:         Speech: Speech normal.         Behavior: Behavior is cooperative.         Thought Content: Thought content normal.              CRANIAL NERVES     CN III, IV, VI   Pupils are equal, round, and reactive to light.       Significant Labs: All pertinent labs within the past 24 hours have been reviewed.  CBC:   Recent Labs   Lab 08/27/23  1052   WBC 9.60   HGB 11.7*   HCT 36.9*   *     CMP:   Recent Labs   Lab 08/27/23  1052   *   K 3.5      CO2 20*   *   BUN 10   CREATININE 0.9   CALCIUM 8.3*   PROT 6.9   ALBUMIN 3.3*   BILITOT 0.7   ALKPHOS 87   AST 22   ALT 17   ANIONGAP 5*     Lactic Acid:   Recent Labs   Lab 08/27/23  1052   LACTATE 1.2     Lipase:   Recent Labs   Lab 08/27/23  1052   LIPASE 33     Significant Imaging:   Imaging Results              CT Abdomen Pelvis  Without Contrast (Final result)  Result time 08/27/23 12:11:56   Procedure changed from CT Abdomen Pelvis With Contrast     Final result by Nick Velazquez MD (08/27/23 12:11:56)                   Narrative:    CMS MANDATED QUALITY DATA - CT RADIATION - 436    All CT scans at this facility utilize dose modulation, iterative reconstruction, and/or weight based dosing when appropriate to reduce radiation dose to as low as reasonably achievable.        Reason: GI  bleed; Abdominal abscess/infection suspected; LLQ abdominal pain; divet    TECHNIQUE: CT abdomen and pelvis without IV or oral contrast. Study is tailored for detection of urinary tract calculi and evaluation of solid organs, hollow viscera, and vascular structures is limited.    COMPARISON: CT 8/6/2023    CT ABDOMEN:  Partially visualized lung bases are clear. Moderate coronary artery calcification are present.    Noncontrast liver, pancreas, spleen, and adrenals are normal.    Hyperdensity in gallbladder unchanged with slight haziness about the gallbladder wall, nonspecific.    Left renal cyst unchanged. Severe cortical atrophy of left kidney is unchanged. Superior right renal hypodensity is unchanged. Negative for hydronephrosis or urolithiasis. Minor aortoiliac calcifications unchanged.    Focal mild inflammatory fat stranding affects segment of proximal sigmoid colon where colonic wall thickening and colonic diverticula coexists, similar to the prior exam. Large and small intestines are otherwise unremarkable. A normal appendix is present. No free intraperitoneal gas.    Degenerative changes affect the spine.    CT PELVIS:  Bladder is normal. Uterus has been removed. No free pelvic fluid or adnexal mass.    IMPRESSION:    1. Persistent mild inflammatory fat stranding about segment of proximal sigmoid colon where wall thickening and colonic diverticula coexist, unchanged. The appearance suggests persistent acute diverticulitis without perforation or other complication. As colon carcinoma can occasionally no acute acute diverticulitis on imaging alone, a follow-up CT abdomen and pelvis with IV contrast for endoscopy is recommended to exclude possibility of colon carcinoma.  2. Hyperdensity gallbladder could reflect cholelithiasis with slight haziness about the gallbladder wall, nonspecific. Clinical and laboratory correlation for acute cholecystitis is requested. If further imaging evaluation is desired, upper  quadrant ultrasound can be considered.  3. Coronary artery calcifications.    Electronically signed by:  Nick Velazquez MD  8/27/2023 12:11 PM CDT Workstation: [id number]                                      Assessment/Plan:     * Diverticulitis of large intestine with perforation and abscess  -Failed outpatient therapy  -Last upper GI was Jan 2023, and last colonoscopy in epic was 2019.  -During last hospitalization antibiotics were adjusted to IV piperacillin/tazobactam. Discharged on flagyl + cipro oral  -Recurrent issue with minimal intermittent relief from abdominal pain  -Continues to see dark and bright red blood - although Hgb 11.7 consistent with prior  -Discontinue Ozempic - could be contributory/exaerbating GI complications  -Restart IV antibiotics  -Bowel rest - advance diet as tolerated  -Stool studies including culture, OB, and ova parasites  -Consulted Surgery -     Type 2 diabetes mellitus, without long-term current use of insulin  Patient's sBGL 115  Last A1c reviewed-   Lab Results   Component Value Date    HGBA1C 5.9 08/06/2023     Current correctional scale  Low   Recommendation to discontinue ozempic could be contributory/exaerbating GI complications    Morbid obesity with BMI of 40.0-44.9, adult  Body mass index is 51.75 kg/m². Morbid obesity complicates all aspects of disease management from diagnostic modalities to treatment. Weight loss encouraged and health benefits explained to patient.   HgA1c = 5.9  Recommend discontinuing ozempic        Hypertension, essential  Continue home lisinopril and verapamil  Trend blood pressures        VTE Risk Mitigation (From admission, onward)           Ordered     Place sequential compression device  Until discontinued         08/27/23 1334     Place HUE hose  Until discontinued         08/27/23 1334                Defer Lovenox recent rectal bleeding               GIOVANNI Leos  Department of Hospital Medicine  [facility] - Emergency  Dept

## 2024-01-26 ENCOUNTER — OFFICE VISIT (OUTPATIENT)
Dept: CARDIOLOGY | Facility: CLINIC | Age: 64
End: 2024-01-26
Payer: MEDICARE

## 2024-01-26 VITALS
BODY MASS INDEX: 45.82 KG/M2 | OXYGEN SATURATION: 98 % | WEIGHT: 249 LBS | HEIGHT: 62 IN | DIASTOLIC BLOOD PRESSURE: 70 MMHG | SYSTOLIC BLOOD PRESSURE: 128 MMHG | RESPIRATION RATE: 16 BRPM

## 2024-01-26 DIAGNOSIS — R94.39 ABNORMAL FINDING ON CARDIOVASCULAR STRESS TEST: Primary | ICD-10-CM

## 2024-01-26 DIAGNOSIS — I49.8 BIGEMINY: ICD-10-CM

## 2024-01-26 DIAGNOSIS — I10 ESSENTIAL HYPERTENSION: ICD-10-CM

## 2024-01-26 DIAGNOSIS — G47.33 OBSTRUCTIVE SLEEP APNEA: ICD-10-CM

## 2024-01-26 DIAGNOSIS — R00.1 BRADYCARDIA, DRUG INDUCED: ICD-10-CM

## 2024-01-26 DIAGNOSIS — R94.31 NONSPECIFIC ABNORMAL ELECTROCARDIOGRAM (ECG) (EKG): ICD-10-CM

## 2024-01-26 DIAGNOSIS — T50.905A BRADYCARDIA, DRUG INDUCED: ICD-10-CM

## 2024-01-26 DIAGNOSIS — N18.31 STAGE 3A CHRONIC KIDNEY DISEASE: ICD-10-CM

## 2024-01-26 DIAGNOSIS — E66.01 SEVERE OBESITY: ICD-10-CM

## 2024-01-26 PROCEDURE — 99215 OFFICE O/P EST HI 40 MIN: CPT | Mod: S$PBB,,, | Performed by: INTERNAL MEDICINE

## 2024-01-26 PROCEDURE — 99214 OFFICE O/P EST MOD 30 MIN: CPT | Mod: PBBFAC,PN | Performed by: INTERNAL MEDICINE

## 2024-01-26 PROCEDURE — 99999 PR PBB SHADOW E&M-EST. PATIENT-LVL IV: CPT | Mod: PBBFAC,,, | Performed by: INTERNAL MEDICINE

## 2024-01-26 RX ORDER — SODIUM CHLORIDE 9 MG/ML
INJECTION, SOLUTION INTRAVENOUS ONCE
Status: CANCELLED | OUTPATIENT
Start: 2024-01-26 | End: 2024-01-26

## 2024-01-26 RX ORDER — PREDNISONE 20 MG/1
40 TABLET ORAL DAILY
Qty: 10 TABLET | Refills: 0 | Status: SHIPPED | OUTPATIENT
Start: 2024-01-26 | End: 2024-02-16

## 2024-01-26 NOTE — H&P (VIEW-ONLY)
Subjective:    Patient ID:  Jonna Pearl is a 63 y.o. female   \  Chief Complaint   Patient presents with    Results       HPI:  Ms Jonna Pearl is a 63 y.o. female here for follow-up.    Patient recently had a stress test and is here for the results.    Patient at the present time is doing well.  Denies any chest pain or tightness or heaviness her breathing has been stable denies any loss of consciousness.          Review of patient's allergies indicates:   Allergen Reactions    Neomycin-polymyxin Swelling    Cephalexin      Other reaction(s): Unknown    Doxycycline      Other reaction(s): Unknown    Iodinated contrast media      Other reaction(s): Unknown    Iodine Other (See Comments)     Due to kidney function    Penicillins      Other reaction(s): Unknown    Valsartan      Other reaction(s): Unknown    Bacitracin-polymyxin b Rash       Past Medical History:   Diagnosis Date    Allergy     Arthritis     Atrophic kidney     left. probably childhood illness or disease    Chronic kidney disease     Chronic rhinitis     Colon polyps     Depression     HEARING LOSS     Hiatal hernia     Hypertension     Insomnia     Migraines     Otitis media     Sleep apnea     mild    TIA (transient ischemic attack)      Past Surgical History:   Procedure Laterality Date     SECTION      COLONOSCOPY  2014    Dr Escobar 5 year yossi    COLONOSCOPY N/A 4/10/2019    Procedure: COLONOSCOPY;  Surgeon: Matt Orozco MD;  Location: [facility];  Service: Endoscopy;  Laterality: N/A;    ESOPHAGOGASTRODUODENOSCOPY N/A 2023    Procedure: EGD (ESOPHAGOGASTRODUODENOSCOPY);  Surgeon: Merrill Vela MD;  Location: [facility];  Service: Endoscopy;  Laterality: N/A;    HEMORRHOID SURGERY      HYSTERECTOMY      left mandibular node resection      OOPHORECTOMY       Social History     Tobacco Use    Smoking status: Former     Current packs/day: 0.00     Average packs/day: 1 pack/day for 18.0 years (18.0 ttl pk-yrs)      Types: Cigarettes     Start date: 2000     Quit date: 2018     Years since quittin.8    Smokeless tobacco: Never   Substance Use Topics    Alcohol use: No    Drug use: No     Family History   Problem Relation Age of Onset    Cancer Sister         breast    Diabetes Mother     Early death Father     Diabetes Paternal Grandmother     Breast cancer Maternal Aunt     Breast cancer Paternal Aunt         Review of Systems:   Constitution: Negative for diaphoresis and fever.   HEENT: Negative for nosebleeds.    Cardiovascular: Negative for chest pain       No dyspnea on exertion       No leg swelling        No palpitations  Respiratory: Negative for shortness of breath and wheezing.    Hematologic/Lymphatic: Negative for bleeding problem. Does not bruise/bleed easily.   Skin: Negative for color change and rash.   Musculoskeletal: Negative for falls and myalgias.   Gastrointestinal: Negative for hematemesis and hematochezia.   Genitourinary: Negative for hematuria.   Neurological: Negative for dizziness and light-headedness.   Psychiatric/Behavioral: Negative for altered mental status and memory loss.          Objective:        Vitals:    24 1100   BP: 128/70   Pulse: (!) (P) 51   Resp: 16       Lab Results   Component Value Date    WBC 10.44 2023    HGB 10.3 (L) 2023    HCT 33.0 (L) 2023     (H) 2023    CHOL 154 2022    TRIG 93 2022    HDL 44 2022    ALT 14 2023    AST 16 2023     2023    K 3.7 2023     (H) 2023    CREATININE 1.0 2023    BUN 10 2023    CO2 21 (L) 2023    TSH 2.468 2022    INR 1.0 2023    HGBA1C 5.9 2023        ECHOCARDIOGRAM RESULTS  Results for orders placed during the hospital encounter of 23    Echo    Interpretation Summary    Left Ventricle: The left ventricle is normal in size. Normal wall thickness. Normal wall motion. There is normal systolic  function with a visually estimated ejection fraction of 55 - 60%. There is normal diastolic function.    Left Atrium: Left atrium is mildly dilated.    Right Ventricle: Normal right ventricular cavity size. Wall thickness is normal. Right ventricle wall motion  is normal. Systolic function is normal.    Mitral Valve: There is mild regurgitation with a centrally directed jet.    Tricuspid Valve: There is mild regurgitation.    IVC/SVC: Normal venous pressure at 3 mmHg.        CURRENT/PREVIOUS VISIT EKG  Results for orders placed or performed in visit on 10/11/23   IN OFFICE EKG 12-LEAD (to [facility])    Collection Time: 10/11/23  2:59 PM    Narrative    Test Reason : I49.8,    Vent. Rate : 054 BPM     Atrial Rate : 054 BPM     P-R Int : 148 ms          QRS Dur : 080 ms      QT Int : 464 ms       P-R-T Axes : 051 050 067 degrees     QTc Int : 440 ms    Program found technically poor ECG  Sinus bradycardia  Otherwise normal ECG  When compared with ECG of 10-AUG-2023 05:53,  No significant change was found  Confirmed by Hay Burch MD ([id number]) on 10/18/2023 1:02:22 PM    Referred By: KOKO MCGRATH           Confirmed By:Hay Burch MD     No valid procedures specified.   Results for orders placed during the hospital encounter of 12/19/23    Nuclear Stress - Cardiology Interpreted    Interpretation Summary    Abnormal myocardial perfusion scan.    There is a moderate intensity, moderate sized, mostly reversible perfusion abnormality with some fixed areas in the inferior, apical, anteroapical and inferoapical wall(s).    There are no other significant perfusion abnormalities.    There is a trivial to mild intensity perfusion abnormality in the  wall of the left ventricle, secondary to breast attenuation.    There is a trivial to mild intensity perfusion abnormality in the anteroapical wall of the left ventricle consistent with the RV insertion site.    The gated perfusion images showed an ejection fraction of 53% at rest.  The gated perfusion images showed an ejection fraction of 67% post stress. Normal ejection fraction is greater than 53%.    There is normal wall motion at rest and post stress.    LV cavity size is normal at rest and normal at stress.    The ECG portion of the study is negative for ischemia.    The patient reported chest pain during the stress test.    During stress, frequent PVCs are noted.      Physical Exam:  CONSTITUTIONAL: No fever, no chills  HEENT: Normocephalic, atraumatic,pupils reactive to light                 NECK:  No JVD no carotid bruit  CVS: S1S2+, RRR, systolic murmurs,   LUNGS: Clear  ABDOMEN: Soft, NT, BS+  EXTREMITIES: No cyanosis, edema  : No thornton catheter  NEURO: AAO X 3  PSY: Normal affect      Medication List with Changes/Refills   Current Medications    ACETAMINOPHEN (TYLENOL ORAL)    Take 1 tablet by mouth as needed.    ERGOCALCIFEROL (ERGOCALCIFEROL) 50,000 UNIT CAP    Take 1 capsule by mouth every 7 days.    GABAPENTIN (NEURONTIN) 600 MG TABLET    Take 600 mg by mouth 3 (three) times daily.    HYDROCHLOROTHIAZIDE (HYDRODIURIL) 12.5 MG TAB    Take 12.5 mg by mouth once daily.    HYDROCHLOROTHIAZIDE (MICROZIDE) 12.5 MG CAPSULE    Take 12.5 mg by mouth once daily.    HYDROCHLOROTHIAZIDE (MICROZIDE) 12.5 MG CAPSULE    Take 1 capsule (12.5 mg total) by mouth once daily.    KETOCONAZOLE (NIZORAL) 2 % SHAMPOO    Apply 1 Application topically Every 3 (three) days. 1 application as needed to wet hair every 3 to 4 days external once a day    LATANOPROST 0.005 % OPHTHALMIC SOLUTION    Place 1 drop into both eyes every evening.    LISINOPRIL (PRINIVIL,ZESTRIL) 40 MG TABLET    Take 1 tablet by mouth once daily.    NYSTATIN (MYCOSTATIN) CREAM    Apply 1 Application topically 2 (two) times a day.    NYSTOP POWDER    Apply 1 g topically as needed.    OZEMPIC 0.25 MG OR 0.5 MG (2 MG/3 ML) PEN INJECTOR    Inject 0.25 mg into the skin every 7 days.    PANTOPRAZOLE (PROTONIX) 40 MG TABLET    Take 40 mg by  mouth once daily.    POTASSIUM CHLORIDE (KLOR-CON) 10 MEQ TBSR    Take 1 tablet (10 mEq total) by mouth once daily.    POTASSIUM CHLORIDE (MICRO-K) 10 MEQ CPSR    Take 10 mEq by mouth once.    PROMETHAZINE (PHENERGAN) 25 MG TABLET    Take 25 mg by mouth as needed for Nausea.    TIMOLOL MALEATE 0.5% (TIMOPTIC) 0.5 % DROP    Place 1 drop into both eyes 2 (two) times daily.    TOPIRAMATE (TOPAMAX) 50 MG TABLET    Take 1 tablet (50 mg total) by mouth once daily.    TRAMADOL (ULTRAM) 50 MG TABLET    TAKE 1 TABLET BY MOUTH EVERY 6 HOURS AS NEEDED FOR PAIN    TRIAMCINOLONE ACETONIDE 0.1% (KENALOG) 0.1 % CREAM    Apply 1 g topically 2 (two) times daily.    VERAPAMIL (CALAN-SR) 240 MG CR TABLET    Take 1 tablet by mouth once daily.             Assessment:       1. Abnormal finding on cardiovascular stress test    2. Bigeminy    3. Essential hypertension    4. Severe obesity    5. Obstructive sleep apnea    6. Stage 3a chronic kidney disease    7. Bradycardia, drug induced    8. Nonspecific abnormal electrocardiogram (ECG) (EKG)         Plan:   1. Abnormal finding on the cardiovascular stress test   Patient has myocardial perfusion defect in the inferior wall inferior apical and apical wall and anterior apical wall.  Partly reversible partly fixed.  Discussed with patient in regards with the abnormal stress test options of cardiac catheterization and coronary angiography versus CTA versus medical management.    Patient also has PVCs and sometimes in bigeminy.  2. Discussed with patient in regards with the coronary angiography  Discussed with patient the benefits and options at length in detail.  Coronary Angiogram +/- PCI  AntiPlatelet therapy-  Asprin Plavix Brilinta  Access - Bilateral CFA/ Radial  Allergies :  She has Iodine Allergy needs pretreatment with prednisone and Benadryl  Pre Hydration IVF  cc /hr  Pre Procedure Medication : Benadryl 25 - 50 mg po prior to procedure X 1 and Solu-Medrol prior to the  procedure.  No recent head trauma.  No bleeding issues or Blood in the stools or Urine.   Risks benefits and alternate options were explained to the Patient.    Risks include but not limited to bleeding, allergic reaction to the dye, vascular damage, renal failure with potential need for Dialysis, Infection, Rhythm abnormalities, stroke, myocardial infarction, emergency bypass surgery and death.    The risks is of moderate sedation include hypotension respiratory depression arrhythmias bronchospasm and death.  Patient does understand these risks and wants to proceed with cardiac catheterization.  Should stenting be indicated, patient has agreed to dual antiplatelet therapy for 12 to 18 months with drug-eluting stent and a minimum of 1 month of dual antiplatelet therapy with the use of bare metal stent.  Additionally patient is aware of the noncompliance with medications is likely to result in the stent clotting with heart attack and heart failure and or death.  All questions have been answered to patient's satisfaction.And patient has voiced to proceed with the procedure.  Verbal consent has been obtained and patient is agreeable to proceed with the procedure.  Discussed with patient that will pre treat her with prednisone 40 mg p.o. q.8 hours for 48 hours and Benadryl 25 mg p.o. q.8 hours for 48 hours.  Also discussed with patient that because of her atrophic kidney on 1 side even though the renal function numbers are normal there is a risk that it can compromise her renal function.  Will do a staged procedure.  In in which she will do coronary angiogram diagnostic 1st and for intervention she will come back again in a week or 2 weeks time.  Patient is currently on Ozempic 0.25 mg every 7 days.  And she follows up with the primary physician.  3. Essential hypertension   Her blood pressure is stable is 128/70.  Continue hydrochlorothiazide and she is also on lisinopril 40 mg p.o. daily continue the same.  4.  Obstructive sleep apnea   Patient is still using her CPAP continue to do the same.  5. Patient to follow-up with me in the office after the testing has been completed.                  Problem List Items Addressed This Visit          Cardiac/Vascular    Essential hypertension    Bigeminy       Renal/    Stage 3 chronic kidney disease     Other Visit Diagnoses       Abnormal finding on cardiovascular stress test    -  Primary    Severe obesity        Obstructive sleep apnea        Bradycardia, drug induced        Nonspecific abnormal electrocardiogram (ECG) (EKG)                No follow-ups on file.

## 2024-01-26 NOTE — PROGRESS NOTES
Subjective:    Patient ID:  Jonna Pearl is a 63 y.o. female   \  Chief Complaint   Patient presents with    Results       HPI:  Ms Jonna Pearl is a 63 y.o. female here for follow-up.    Patient recently had a stress test and is here for the results.    Patient at the present time is doing well.  Denies any chest pain or tightness or heaviness her breathing has been stable denies any loss of consciousness.          Review of patient's allergies indicates:   Allergen Reactions    Neomycin-polymyxin Swelling    Cephalexin      Other reaction(s): Unknown    Doxycycline      Other reaction(s): Unknown    Iodinated contrast media      Other reaction(s): Unknown    Iodine Other (See Comments)     Due to kidney function    Penicillins      Other reaction(s): Unknown    Valsartan      Other reaction(s): Unknown    Bacitracin-polymyxin b Rash       Past Medical History:   Diagnosis Date    Allergy     Arthritis     Atrophic kidney     left. probably childhood illness or disease    Chronic kidney disease     Chronic rhinitis     Colon polyps     Depression     HEARING LOSS     Hiatal hernia     Hypertension     Insomnia     Migraines     Otitis media     Sleep apnea     mild    TIA (transient ischemic attack)      Past Surgical History:   Procedure Laterality Date     SECTION      COLONOSCOPY  2014    Dr Escobar 5 year yossi    COLONOSCOPY N/A 4/10/2019    Procedure: COLONOSCOPY;  Surgeon: Matt Orozco MD;  Location: [facility];  Service: Endoscopy;  Laterality: N/A;    ESOPHAGOGASTRODUODENOSCOPY N/A 2023    Procedure: EGD (ESOPHAGOGASTRODUODENOSCOPY);  Surgeon: Merrill Vela MD;  Location: [facility];  Service: Endoscopy;  Laterality: N/A;    HEMORRHOID SURGERY      HYSTERECTOMY      left mandibular node resection      OOPHORECTOMY       Social History     Tobacco Use    Smoking status: Former     Current packs/day: 0.00     Average packs/day: 1 pack/day for 18.0 years (18.0 ttl pk-yrs)      Types: Cigarettes     Start date: 2000     Quit date: 2018     Years since quittin.8    Smokeless tobacco: Never   Substance Use Topics    Alcohol use: No    Drug use: No     Family History   Problem Relation Age of Onset    Cancer Sister         breast    Diabetes Mother     Early death Father     Diabetes Paternal Grandmother     Breast cancer Maternal Aunt     Breast cancer Paternal Aunt         Review of Systems:   Constitution: Negative for diaphoresis and fever.   HEENT: Negative for nosebleeds.    Cardiovascular: Negative for chest pain       No dyspnea on exertion       No leg swelling        No palpitations  Respiratory: Negative for shortness of breath and wheezing.    Hematologic/Lymphatic: Negative for bleeding problem. Does not bruise/bleed easily.   Skin: Negative for color change and rash.   Musculoskeletal: Negative for falls and myalgias.   Gastrointestinal: Negative for hematemesis and hematochezia.   Genitourinary: Negative for hematuria.   Neurological: Negative for dizziness and light-headedness.   Psychiatric/Behavioral: Negative for altered mental status and memory loss.          Objective:        Vitals:    24 1100   BP: 128/70   Pulse: (!) (P) 51   Resp: 16       Lab Results   Component Value Date    WBC 10.44 2023    HGB 10.3 (L) 2023    HCT 33.0 (L) 2023     (H) 2023    CHOL 154 2022    TRIG 93 2022    HDL 44 2022    ALT 14 2023    AST 16 2023     2023    K 3.7 2023     (H) 2023    CREATININE 1.0 2023    BUN 10 2023    CO2 21 (L) 2023    TSH 2.468 2022    INR 1.0 2023    HGBA1C 5.9 2023        ECHOCARDIOGRAM RESULTS  Results for orders placed during the hospital encounter of 23    Echo    Interpretation Summary    Left Ventricle: The left ventricle is normal in size. Normal wall thickness. Normal wall motion. There is normal systolic  function with a visually estimated ejection fraction of 55 - 60%. There is normal diastolic function.    Left Atrium: Left atrium is mildly dilated.    Right Ventricle: Normal right ventricular cavity size. Wall thickness is normal. Right ventricle wall motion  is normal. Systolic function is normal.    Mitral Valve: There is mild regurgitation with a centrally directed jet.    Tricuspid Valve: There is mild regurgitation.    IVC/SVC: Normal venous pressure at 3 mmHg.        CURRENT/PREVIOUS VISIT EKG  Results for orders placed or performed in visit on 10/11/23   IN OFFICE EKG 12-LEAD (to el?)    Collection Time: 10/11/23  2:59 PM    Narrative    Test Reason : I49.8,    Vent. Rate : 054 BPM     Atrial Rate : 054 BPM     P-R Int : 148 ms          QRS Dur : 080 ms      QT Int : 464 ms       P-R-T Axes : 051 050 067 degrees     QTc Int : 440 ms    Program found technically poor ECG  Sinus bradycardia  Otherwise normal ECG  When compared with ECG of 10-AUG-2023 05:53,  No significant change was found  Confirmed by Hay Burch MD ([id number]) on 10/18/2023 1:02:22 PM    Referred By: KOKO MCGRATH           Confirmed By:Hay Burch MD     No valid procedures specified.   Results for orders placed during the hospital encounter of 12/19/23    Nuclear Stress - Cardiology Interpreted    Interpretation Summary    Abnormal myocardial perfusion scan.    There is a moderate intensity, moderate sized, mostly reversible perfusion abnormality with some fixed areas in the inferior, apical, anteroapical and inferoapical wall(s).    There are no other significant perfusion abnormalities.    There is a trivial to mild intensity perfusion abnormality in the  wall of the left ventricle, secondary to breast attenuation.    There is a trivial to mild intensity perfusion abnormality in the anteroapical wall of the left ventricle consistent with the RV insertion site.    The gated perfusion images showed an ejection fraction of 53% at rest.  The gated perfusion images showed an ejection fraction of 67% post stress. Normal ejection fraction is greater than 53%.    There is normal wall motion at rest and post stress.    LV cavity size is normal at rest and normal at stress.    The ECG portion of the study is negative for ischemia.    The patient reported chest pain during the stress test.    During stress, frequent PVCs are noted.      Physical Exam:  CONSTITUTIONAL: No fever, no chills  HEENT: Normocephalic, atraumatic,pupils reactive to light                 NECK:  No JVD no carotid bruit  CVS: S1S2+, RRR, systolic murmurs,   LUNGS: Clear  ABDOMEN: Soft, NT, BS+  EXTREMITIES: No cyanosis, edema  : No thornton catheter  NEURO: AAO X 3  PSY: Normal affect      Medication List with Changes/Refills   Current Medications    ACETAMINOPHEN (TYLENOL ORAL)    Take 1 tablet by mouth as needed.    ERGOCALCIFEROL (ERGOCALCIFEROL) 50,000 UNIT CAP    Take 1 capsule by mouth every 7 days.    GABAPENTIN (NEURONTIN) 600 MG TABLET    Take 600 mg by mouth 3 (three) times daily.    HYDROCHLOROTHIAZIDE (HYDRODIURIL) 12.5 MG TAB    Take 12.5 mg by mouth once daily.    HYDROCHLOROTHIAZIDE (MICROZIDE) 12.5 MG CAPSULE    Take 12.5 mg by mouth once daily.    HYDROCHLOROTHIAZIDE (MICROZIDE) 12.5 MG CAPSULE    Take 1 capsule (12.5 mg total) by mouth once daily.    KETOCONAZOLE (NIZORAL) 2 % SHAMPOO    Apply 1 Application topically Every 3 (three) days. 1 application as needed to wet hair every 3 to 4 days external once a day    LATANOPROST 0.005 % OPHTHALMIC SOLUTION    Place 1 drop into both eyes every evening.    LISINOPRIL (PRINIVIL,ZESTRIL) 40 MG TABLET    Take 1 tablet by mouth once daily.    NYSTATIN (MYCOSTATIN) CREAM    Apply 1 Application topically 2 (two) times a day.    NYSTOP POWDER    Apply 1 g topically as needed.    OZEMPIC 0.25 MG OR 0.5 MG (2 MG/3 ML) PEN INJECTOR    Inject 0.25 mg into the skin every 7 days.    PANTOPRAZOLE (PROTONIX) 40 MG TABLET    Take 40 mg by  mouth once daily.    POTASSIUM CHLORIDE (KLOR-CON) 10 MEQ TBSR    Take 1 tablet (10 mEq total) by mouth once daily.    POTASSIUM CHLORIDE (MICRO-K) 10 MEQ CPSR    Take 10 mEq by mouth once.    PROMETHAZINE (PHENERGAN) 25 MG TABLET    Take 25 mg by mouth as needed for Nausea.    TIMOLOL MALEATE 0.5% (TIMOPTIC) 0.5 % DROP    Place 1 drop into both eyes 2 (two) times daily.    TOPIRAMATE (TOPAMAX) 50 MG TABLET    Take 1 tablet (50 mg total) by mouth once daily.    TRAMADOL (ULTRAM) 50 MG TABLET    TAKE 1 TABLET BY MOUTH EVERY 6 HOURS AS NEEDED FOR PAIN    TRIAMCINOLONE ACETONIDE 0.1% (KENALOG) 0.1 % CREAM    Apply 1 g topically 2 (two) times daily.    VERAPAMIL (CALAN-SR) 240 MG CR TABLET    Take 1 tablet by mouth once daily.             Assessment:       1. Abnormal finding on cardiovascular stress test    2. Bigeminy    3. Essential hypertension    4. Severe obesity    5. Obstructive sleep apnea    6. Stage 3a chronic kidney disease    7. Bradycardia, drug induced    8. Nonspecific abnormal electrocardiogram (ECG) (EKG)         Plan:   1. Abnormal finding on the cardiovascular stress test   Patient has myocardial perfusion defect in the inferior wall inferior apical and apical wall and anterior apical wall.  Partly reversible partly fixed.  Discussed with patient in regards with the abnormal stress test options of cardiac catheterization and coronary angiography versus CTA versus medical management.    Patient also has PVCs and sometimes in bigeminy.  2. Discussed with patient in regards with the coronary angiography  Discussed with patient the benefits and options at length in detail.  Coronary Angiogram +/- PCI  AntiPlatelet therapy-  Asprin Plavix Brilinta  Access - Bilateral CFA/ Radial  Allergies :  She has Iodine Allergy needs pretreatment with prednisone and Benadryl  Pre Hydration IVF  cc /hr  Pre Procedure Medication : Benadryl 25 - 50 mg po prior to procedure X 1 and Solu-Medrol prior to the  procedure.  No recent head trauma.  No bleeding issues or Blood in the stools or Urine.   Risks benefits and alternate options were explained to the Patient.    Risks include but not limited to bleeding, allergic reaction to the dye, vascular damage, renal failure with potential need for Dialysis, Infection, Rhythm abnormalities, stroke, myocardial infarction, emergency bypass surgery and death.    The risks is of moderate sedation include hypotension respiratory depression arrhythmias bronchospasm and death.  Patient does understand these risks and wants to proceed with cardiac catheterization.  Should stenting be indicated, patient has agreed to dual antiplatelet therapy for 12 to 18 months with drug-eluting stent and a minimum of 1 month of dual antiplatelet therapy with the use of bare metal stent.  Additionally patient is aware of the noncompliance with medications is likely to result in the stent clotting with heart attack and heart failure and or death.  All questions have been answered to patient's satisfaction.And patient has voiced to proceed with the procedure.  Verbal consent has been obtained and patient is agreeable to proceed with the procedure.  Discussed with patient that will pre treat her with prednisone 40 mg p.o. q.8 hours for 48 hours and Benadryl 25 mg p.o. q.8 hours for 48 hours.  Also discussed with patient that because of her atrophic kidney on 1 side even though the renal function numbers are normal there is a risk that it can compromise her renal function.  Will do a staged procedure.  In in which she will do coronary angiogram diagnostic 1st and for intervention she will come back again in a week or 2 weeks time.  Patient is currently on Ozempic 0.25 mg every 7 days.  And she follows up with the primary physician.  3. Essential hypertension   Her blood pressure is stable is 128/70.  Continue hydrochlorothiazide and she is also on lisinopril 40 mg p.o. daily continue the same.  4.  Obstructive sleep apnea   Patient is still using her CPAP continue to do the same.  5. Patient to follow-up with me in the office after the testing has been completed.                  Problem List Items Addressed This Visit          Cardiac/Vascular    Essential hypertension    Bigeminy       Renal/    Stage 3 chronic kidney disease     Other Visit Diagnoses       Abnormal finding on cardiovascular stress test    -  Primary    Severe obesity        Obstructive sleep apnea        Bradycardia, drug induced        Nonspecific abnormal electrocardiogram (ECG) (EKG)                No follow-ups on file.

## 2024-02-05 ENCOUNTER — TELEPHONE (OUTPATIENT)
Dept: CARDIOLOGY | Facility: CLINIC | Age: 64
End: 2024-02-05
Payer: MEDICARE

## 2024-02-05 NOTE — TELEPHONE ENCOUNTER
----- Message from Marco Figueroa sent at 2/5/2024  8:17 AM CST -----  Contact: Self  Type: Needs Medical Advice  Who Called:  Patient    Best Call Back Number: [phone]   Additional Information: Called to speak with office regarding angiogram. Please call.

## 2024-02-06 ENCOUNTER — TELEPHONE (OUTPATIENT)
Dept: CARDIOLOGY | Facility: CLINIC | Age: 64
End: 2024-02-06
Payer: MEDICARE

## 2024-02-06 NOTE — TELEPHONE ENCOUNTER
----- Message from Josi Camacho sent at 2/6/2024  9:12 AM CST -----  Type: Needs Medical Advice  Who Called:  pt  Symptoms (please be specific):  pt said this is her 3rd time calling to have the office to return her call and she have not heard anything from the office--said she need to speak to the nurse about a procedure she having--please call and advise  Best Call Back Number: [phone] (home)     Additional Information: thank you

## 2024-02-15 ENCOUNTER — HOSPITAL ENCOUNTER (OUTPATIENT)
Dept: RADIOLOGY | Facility: HOSPITAL | Age: 64
Discharge: HOME | End: 2024-02-15
Attending: STUDENT IN AN ORGANIZED HEALTH CARE EDUCATION/TRAINING PROGRAM
Payer: MEDICARE

## 2024-02-15 ENCOUNTER — HOSPITAL ENCOUNTER (OUTPATIENT)
Dept: PREADMISSION TESTING | Facility: HOSPITAL | Age: 64
Discharge: HOME | End: 2024-02-15
Attending: STUDENT IN AN ORGANIZED HEALTH CARE EDUCATION/TRAINING PROGRAM
Payer: MEDICARE

## 2024-02-15 VITALS
SYSTOLIC BLOOD PRESSURE: 123 MMHG | BODY MASS INDEX: 45.36 KG/M2 | TEMPERATURE: 98 F | OXYGEN SATURATION: 97 % | RESPIRATION RATE: 18 BRPM | WEIGHT: 248 LBS | HEART RATE: 43 BPM | DIASTOLIC BLOOD PRESSURE: 82 MMHG

## 2024-02-15 DIAGNOSIS — R94.31 NONSPECIFIC ABNORMAL ELECTROCARDIOGRAM (ECG) (EKG): ICD-10-CM

## 2024-02-15 DIAGNOSIS — T50.905A BRADYCARDIA, DRUG INDUCED: ICD-10-CM

## 2024-02-15 DIAGNOSIS — R94.31 ABNORMAL EKG: ICD-10-CM

## 2024-02-15 DIAGNOSIS — R94.39 ABNORMAL FINDING ON CARDIOVASCULAR STRESS TEST: ICD-10-CM

## 2024-02-15 DIAGNOSIS — Z01.818 PRE-OP TESTING: ICD-10-CM

## 2024-02-15 DIAGNOSIS — Z01.818 PRE-OP TESTING: Primary | ICD-10-CM

## 2024-02-15 DIAGNOSIS — R00.1 BRADYCARDIA, DRUG INDUCED: ICD-10-CM

## 2024-02-15 LAB
ALBUMIN SERPL BCP-MCNC: 3.8 G/DL (ref 3.5–5.2)
ALP SERPL-CCNC: 108 U/L (ref 55–135)
ALT SERPL W/O P-5'-P-CCNC: 10 U/L (ref 10–44)
ANION GAP SERPL CALC-SCNC: 6 MMOL/L (ref 8–16)
AST SERPL-CCNC: 12 U/L (ref 10–40)
BASOPHILS # BLD AUTO: 0.05 K/UL (ref 0–0.2)
BASOPHILS NFR BLD: 0.4 % (ref 0–1.9)
BILIRUB SERPL-MCNC: 0.4 MG/DL (ref 0.1–1)
BUN SERPL-MCNC: 17 MG/DL (ref 8–23)
CALCIUM SERPL-MCNC: 9.1 MG/DL (ref 8.7–10.5)
CHLORIDE SERPL-SCNC: 106 MMOL/L (ref 95–110)
CO2 SERPL-SCNC: 26 MMOL/L (ref 23–29)
CREAT SERPL-MCNC: 1 MG/DL (ref 0.5–1.4)
DIFFERENTIAL METHOD BLD: ABNORMAL
EOSINOPHIL # BLD AUTO: 0.2 K/UL (ref 0–0.5)
EOSINOPHIL NFR BLD: 2.1 % (ref 0–8)
ERYTHROCYTE [DISTWIDTH] IN BLOOD BY AUTOMATED COUNT: 16.5 % (ref 11.5–14.5)
EST. GFR  (NO RACE VARIABLE): >60 ML/MIN/1.73 M^2
GLUCOSE SERPL-MCNC: 87 MG/DL (ref 70–110)
HCT VFR BLD AUTO: 42.3 % (ref 37–48.5)
HGB BLD-MCNC: 13.5 G/DL (ref 12–16)
IMM GRANULOCYTES # BLD AUTO: 0.03 K/UL (ref 0–0.04)
IMM GRANULOCYTES NFR BLD AUTO: 0.3 % (ref 0–0.5)
LYMPHOCYTES # BLD AUTO: 3.2 K/UL (ref 1–4.8)
LYMPHOCYTES NFR BLD: 27.6 % (ref 18–48)
MCH RBC QN AUTO: 29.7 PG (ref 27–31)
MCHC RBC AUTO-ENTMCNC: 31.9 G/DL (ref 32–36)
MCV RBC AUTO: 93 FL (ref 82–98)
MONOCYTES # BLD AUTO: 0.8 K/UL (ref 0.3–1)
MONOCYTES NFR BLD: 7 % (ref 4–15)
NEUTROPHILS # BLD AUTO: 7.3 K/UL (ref 1.8–7.7)
NEUTROPHILS NFR BLD: 62.6 % (ref 38–73)
NRBC BLD-RTO: 0 /100 WBC
PLATELET # BLD AUTO: 342 K/UL (ref 150–450)
PMV BLD AUTO: 10.5 FL (ref 9.2–12.9)
POTASSIUM SERPL-SCNC: 4.3 MMOL/L (ref 3.5–5.1)
PROT SERPL-MCNC: 6.7 G/DL (ref 6–8.4)
RBC # BLD AUTO: 4.54 M/UL (ref 4–5.4)
SODIUM SERPL-SCNC: 138 MMOL/L (ref 136–145)
WBC # BLD AUTO: 11.7 K/UL (ref 3.9–12.7)

## 2024-02-15 PROCEDURE — 93010 ELECTROCARDIOGRAM REPORT: CPT | Mod: ,,, | Performed by: GENERAL PRACTICE

## 2024-02-15 PROCEDURE — 85025 COMPLETE CBC W/AUTO DIFF WBC: CPT | Performed by: INTERNAL MEDICINE

## 2024-02-15 PROCEDURE — 93005 ELECTROCARDIOGRAM TRACING: CPT | Performed by: GENERAL PRACTICE

## 2024-02-15 PROCEDURE — 71046 X-RAY EXAM CHEST 2 VIEWS: CPT | Mod: TC

## 2024-02-15 PROCEDURE — 80053 COMPREHEN METABOLIC PANEL: CPT | Performed by: INTERNAL MEDICINE

## 2024-02-15 PROCEDURE — 36415 COLL VENOUS BLD VENIPUNCTURE: CPT | Performed by: INTERNAL MEDICINE

## 2024-02-15 RX ORDER — ASPIRIN 81 MG/1
81 TABLET ORAL DAILY
COMMUNITY

## 2024-02-15 NOTE — TELEPHONE ENCOUNTER
----- Message from Marco Figueroa sent at 2/15/2024  2:48 PM CST -----  Contact: Self  Type: Needs Medical Advice  Who Called:  Patient    Best Call Back Number: [phone]   Additional Information: States she has conflict with pre-op instructions. Please call regarding Prednisone.

## 2024-02-15 NOTE — DISCHARGE INSTRUCTIONS
Your procedure is scheduled for:February 21           [facility]6:30 am     Nothing to eat or drink after midnight the night before your procedure.  Do not take any medications the morning of your procedure  Bring all your medications with you in the original pill bottles from pharmacy.  If you take blood thinners, ask your doctor if you should stop taking them.    Do your chlorhexidine wash the night before and morning of your procedure.  If you use a CPAP or BiPAP at home, please bring it with you the day of your procedure.  Make arrangements for someone you know to drive you home after your procedure. Taxi and Uber are not acceptable.         Any questions call the [facility] at [phone]

## 2024-02-15 NOTE — PRE-PROCEDURE INSTRUCTIONS
Preadmit assessment complete. Review of pt health history and home medications.  Preop education per AVS. Pt voiced understanding      Bruises to hands - advised to bring CPAP and all medicines in bottles day of procedure.

## 2024-02-16 ENCOUNTER — TELEPHONE (OUTPATIENT)
Dept: CARDIOLOGY | Facility: CLINIC | Age: 64
End: 2024-02-16
Payer: MEDICARE

## 2024-02-16 RX ORDER — PREDNISONE 20 MG/1
40 TABLET ORAL 3 TIMES DAILY
Qty: 18 TABLET | Refills: 0 | Status: SHIPPED | OUTPATIENT
Start: 2024-02-16

## 2024-02-16 NOTE — NURSING
Spoke with pt, Mrs Pearl, stated her Rx states to take prednisone 40mg q day X 5 days. MD note is 40mg po q8 [id number].   I spoke with ARIANNE Wilkins NP, she called office and spoke with Marcin duran. I was instructed that office will call pt and tell her the correct way to take prednisone. I called pt back to inform her that office will call her with the correct instructions on how to take her prednisone

## 2024-02-16 NOTE — TELEPHONE ENCOUNTER
----- Message from Marco Figueroa sent at 2/16/2024  2:26 PM CST -----  Contact: Self  Type: Needs Medical Advice  Who Called:  Patient    Best Call Back Number: [phone]   Additional Information:  Called again to speak with office regarding prednisone before. 2/21 procedure  Requested to speak with Frances      
Spoke to Mrs Coyle. Went over the instructions with her   
(4) no limitation

## 2024-02-20 LAB
OHS QRS DURATION: 82 MS
OHS QTC CALCULATION: 430 MS

## 2024-02-20 NOTE — NURSING
Dr. Mcmahon notified of patient's iodinated contrast allergy. Solumedrol 125mg IV, benadryl 50 mg PO ordered pre-procedure per Dr. Mcmahon.

## 2024-02-21 ENCOUNTER — HOSPITAL ENCOUNTER (OUTPATIENT)
Facility: HOSPITAL | Age: 64
Discharge: HOME | End: 2024-02-21
Attending: STUDENT IN AN ORGANIZED HEALTH CARE EDUCATION/TRAINING PROGRAM | Admitting: STUDENT IN AN ORGANIZED HEALTH CARE EDUCATION/TRAINING PROGRAM
Payer: MEDICARE

## 2024-02-21 VITALS
DIASTOLIC BLOOD PRESSURE: 67 MMHG | OXYGEN SATURATION: 98 % | HEART RATE: 78 BPM | SYSTOLIC BLOOD PRESSURE: 116 MMHG | RESPIRATION RATE: 15 BRPM

## 2024-02-21 DIAGNOSIS — R94.31 NONSPECIFIC ABNORMAL ELECTROCARDIOGRAM (ECG) (EKG): ICD-10-CM

## 2024-02-21 DIAGNOSIS — R94.39 ABNORMAL FINDING ON CARDIOVASCULAR STRESS TEST: ICD-10-CM

## 2024-02-21 DIAGNOSIS — R00.1 BRADYCARDIA, DRUG INDUCED: ICD-10-CM

## 2024-02-21 DIAGNOSIS — I25.10 CAD (CORONARY ARTERY DISEASE): ICD-10-CM

## 2024-02-21 DIAGNOSIS — T50.905A BRADYCARDIA, DRUG INDUCED: ICD-10-CM

## 2024-02-21 PROCEDURE — 25000003 PHARM REV CODE 250: Performed by: INTERNAL MEDICINE

## 2024-02-21 PROCEDURE — C1769 GUIDE WIRE: HCPCS | Performed by: STUDENT IN AN ORGANIZED HEALTH CARE EDUCATION/TRAINING PROGRAM

## 2024-02-21 PROCEDURE — 63600175 PHARM REV CODE 636 W HCPCS

## 2024-02-21 PROCEDURE — 25000003 PHARM REV CODE 250: Performed by: STUDENT IN AN ORGANIZED HEALTH CARE EDUCATION/TRAINING PROGRAM

## 2024-02-21 PROCEDURE — 93458 L HRT ARTERY/VENTRICLE ANGIO: CPT | Performed by: STUDENT IN AN ORGANIZED HEALTH CARE EDUCATION/TRAINING PROGRAM

## 2024-02-21 PROCEDURE — 93458 L HRT ARTERY/VENTRICLE ANGIO: CPT | Mod: 26,,, | Performed by: STUDENT IN AN ORGANIZED HEALTH CARE EDUCATION/TRAINING PROGRAM

## 2024-02-21 PROCEDURE — 99152 MOD SED SAME PHYS/QHP 5/>YRS: CPT | Mod: ,,, | Performed by: STUDENT IN AN ORGANIZED HEALTH CARE EDUCATION/TRAINING PROGRAM

## 2024-02-21 PROCEDURE — C1894 INTRO/SHEATH, NON-LASER: HCPCS | Performed by: STUDENT IN AN ORGANIZED HEALTH CARE EDUCATION/TRAINING PROGRAM

## 2024-02-21 PROCEDURE — 99152 MOD SED SAME PHYS/QHP 5/>YRS: CPT | Performed by: STUDENT IN AN ORGANIZED HEALTH CARE EDUCATION/TRAINING PROGRAM

## 2024-02-21 PROCEDURE — 25500020 PHARM REV CODE 255: Performed by: STUDENT IN AN ORGANIZED HEALTH CARE EDUCATION/TRAINING PROGRAM

## 2024-02-21 PROCEDURE — 63600175 PHARM REV CODE 636 W HCPCS: Performed by: STUDENT IN AN ORGANIZED HEALTH CARE EDUCATION/TRAINING PROGRAM

## 2024-02-21 PROCEDURE — 25000003 PHARM REV CODE 250

## 2024-02-21 PROCEDURE — C1887 CATHETER, GUIDING: HCPCS | Performed by: STUDENT IN AN ORGANIZED HEALTH CARE EDUCATION/TRAINING PROGRAM

## 2024-02-21 RX ORDER — DIPHENHYDRAMINE HCL 25 MG
CAPSULE ORAL
Status: COMPLETED
Start: 2024-02-21 | End: 2024-02-21

## 2024-02-21 RX ORDER — LIDOCAINE HYDROCHLORIDE 10 MG/ML
INJECTION, SOLUTION EPIDURAL; INFILTRATION; INTRACAUDAL; PERINEURAL
Status: DISCONTINUED | OUTPATIENT
Start: 2024-02-21 | End: 2024-02-21 | Stop reason: HOSPADM

## 2024-02-21 RX ORDER — DIPHENHYDRAMINE HCL 25 MG
50 CAPSULE ORAL ONCE
Status: COMPLETED | OUTPATIENT
Start: 2024-02-21 | End: 2024-02-21

## 2024-02-21 RX ORDER — MIDAZOLAM HYDROCHLORIDE 1 MG/ML
INJECTION INTRAMUSCULAR; INTRAVENOUS
Status: DISCONTINUED | OUTPATIENT
Start: 2024-02-21 | End: 2024-02-21 | Stop reason: HOSPADM

## 2024-02-21 RX ORDER — SODIUM CHLORIDE 9 MG/ML
INJECTION, SOLUTION INTRAVENOUS ONCE
Status: COMPLETED | OUTPATIENT
Start: 2024-02-21 | End: 2024-02-21

## 2024-02-21 RX ORDER — NITROGLYCERIN 5 MG/ML
INJECTION, SOLUTION INTRAVENOUS
Status: DISCONTINUED | OUTPATIENT
Start: 2024-02-21 | End: 2024-02-21 | Stop reason: HOSPADM

## 2024-02-21 RX ORDER — HEPARIN SODIUM 10000 [USP'U]/ML
INJECTION, SOLUTION INTRAVENOUS; SUBCUTANEOUS
Status: DISCONTINUED | OUTPATIENT
Start: 2024-02-21 | End: 2024-02-21 | Stop reason: HOSPADM

## 2024-02-21 RX ORDER — VERAPAMIL HYDROCHLORIDE 2.5 MG/ML
INJECTION, SOLUTION INTRAVENOUS
Status: DISCONTINUED | OUTPATIENT
Start: 2024-02-21 | End: 2024-02-21 | Stop reason: HOSPADM

## 2024-02-21 RX ORDER — METHYLPREDNISOLONE SOD SUCC 125 MG
VIAL (EA) INJECTION
Status: COMPLETED
Start: 2024-02-21 | End: 2024-02-21

## 2024-02-21 RX ORDER — SODIUM CHLORIDE 9 MG/ML
INJECTION, SOLUTION INTRAVENOUS CONTINUOUS
Status: DISCONTINUED | OUTPATIENT
Start: 2024-02-21 | End: 2024-02-21 | Stop reason: HOSPADM

## 2024-02-21 RX ORDER — FENTANYL CITRATE 50 UG/ML
INJECTION, SOLUTION INTRAMUSCULAR; INTRAVENOUS
Status: DISCONTINUED | OUTPATIENT
Start: 2024-02-21 | End: 2024-02-21 | Stop reason: HOSPADM

## 2024-02-21 RX ORDER — METHYLPREDNISOLONE SOD SUCC 125 MG
125 VIAL (EA) INJECTION ONCE
Status: COMPLETED | OUTPATIENT
Start: 2024-02-21 | End: 2024-02-21

## 2024-02-21 RX ADMIN — METHYLPREDNISOLONE SODIUM SUCCINATE 125 MG: 125 INJECTION, POWDER, FOR SOLUTION INTRAMUSCULAR; INTRAVENOUS at 06:02

## 2024-02-21 RX ADMIN — Medication 125 MG: at 06:02

## 2024-02-21 RX ADMIN — Medication 50 MG: at 06:02

## 2024-02-21 RX ADMIN — SODIUM CHLORIDE: 9 INJECTION, SOLUTION INTRAVENOUS at 06:02

## 2024-02-21 RX ADMIN — DIPHENHYDRAMINE HYDROCHLORIDE 50 MG: 25 CAPSULE ORAL at 06:02

## 2024-02-21 RX ADMIN — SODIUM CHLORIDE: 9 INJECTION, SOLUTION INTRAVENOUS at 09:02

## 2024-02-21 NOTE — DISCHARGE INSTRUCTIONS
..Post Radial Cath Discharge Instructions  Keep puncture site covered with current bandage for 24 hours.  You may shower in 24 hours. Do not take a tub bath or submerge the puncture site in water for 72 hours.   You may cover the site with a Band-Aid. Keep Band-Aid clean and dry at all times.    No lifting over 5 pounds with the arm you puncture site is on for 72 hours.  No strenuous activity such as bowling, tennis, etc for 72 hours  Do not operate lawnmower, motorcycle, chainsaw, or all terrain vehicle for 72 hours.  No blood pressure or tourniquets on affected arm for 72 hours.   The puncture site may be slightly bruised and sore following your procedure.   Drink 6-8 glasses of clear liquids during the next 24 hours to flush the dye out.     If Bleeding Occurs, DO NOT PANIC  Place 1 or 2 fingers over the puncture site and hold firm pressure to stop bleeding. You may be able to feel your pulse as you hold pressure  Lift you fingers after 5 minutes to see if the bleeding stopped.  Once has stopped, gently wipe the wrist area clean and cover with a bandage.  If the bleeding does not stop after 30 minutes, or if there is a large amount of bleeding or spurting, call 911! Do not drive yourself to the hospital.     Should any of the following occur, Contact your Physician Immediately:  Redness, inflamation, swelling, chills or fever, or discolred drainage at procedure site   Coldness, discoloration, ongoing numbness, severe pain, or swelling. Expect mild tingling of hand and tenderness at the puncture site for up to 3 days. If this persists or other symptoms develop, notify your physician

## 2024-02-21 NOTE — Clinical Note
The catheter was repositioned into the ostium   right coronary artery. An angiography was performed of the right coronary arteries. Multiple views were taken. The angiography was performed via power injection. The injected amount was 8 mL contrast at 3 mL/s.

## 2024-07-10 ENCOUNTER — OFFICE VISIT (OUTPATIENT)
Dept: CARDIOLOGY | Facility: CLINIC | Age: 64
End: 2024-07-10
Payer: MEDICARE

## 2024-07-10 VITALS
OXYGEN SATURATION: 98 % | WEIGHT: 256 LBS | HEIGHT: 62 IN | BODY MASS INDEX: 47.11 KG/M2 | RESPIRATION RATE: 16 BRPM | DIASTOLIC BLOOD PRESSURE: 80 MMHG | SYSTOLIC BLOOD PRESSURE: 118 MMHG

## 2024-07-10 DIAGNOSIS — N18.31 STAGE 3A CHRONIC KIDNEY DISEASE: ICD-10-CM

## 2024-07-10 DIAGNOSIS — E66.01 SEVERE OBESITY: ICD-10-CM

## 2024-07-10 DIAGNOSIS — I10 ESSENTIAL HYPERTENSION: ICD-10-CM

## 2024-07-10 DIAGNOSIS — R94.39 ABNORMAL FINDING ON CARDIOVASCULAR STRESS TEST: ICD-10-CM

## 2024-07-10 DIAGNOSIS — I25.10 CAD IN NATIVE ARTERY: Primary | ICD-10-CM

## 2024-07-10 DIAGNOSIS — E78.2 MIXED HYPERLIPIDEMIA: ICD-10-CM

## 2024-07-10 DIAGNOSIS — R00.1 BRADYCARDIA, DRUG INDUCED: ICD-10-CM

## 2024-07-10 DIAGNOSIS — T50.905A BRADYCARDIA, DRUG INDUCED: ICD-10-CM

## 2024-07-10 DIAGNOSIS — E08.65 DIABETES MELLITUS DUE TO UNDERLYING CONDITION WITH HYPERGLYCEMIA, WITHOUT LONG-TERM CURRENT USE OF INSULIN: ICD-10-CM

## 2024-07-10 PROCEDURE — 99999 PR PBB SHADOW E&M-EST. PATIENT-LVL IV: CPT | Mod: PBBFAC,,, | Performed by: INTERNAL MEDICINE

## 2024-07-10 PROCEDURE — 99214 OFFICE O/P EST MOD 30 MIN: CPT | Mod: PBBFAC,PN | Performed by: INTERNAL MEDICINE

## 2024-07-10 NOTE — PROGRESS NOTES
Subjective:    Patient ID:  Jonna Pearl is a 64 y.o. female     Chief Complaint   Patient presents with    Hospital Follow Up       HPI:  Ms Jonna Pearl is a 64 y.o. female is here for follow-up.    Patient had an angiogram done and is here for the results.    Patient has been doing well no specific complaints at the present time.  Her breathing has been good denies any shortness a breath or difficulty in breathing denies any chest pain or tightness or heaviness denies any dizziness or lightheadedness or loss of consciousness falls or head injury.        Review of patient's allergies indicates:   Allergen Reactions    Neomycin-polymyxin Swelling    Cephalexin      Other reaction(s): Unknown    Doxycycline      Other reaction(s): Unknown    Iodinated contrast media      Other reaction(s): Unknown    Iodine Other (See Comments)     Due to kidney function    Penicillins      As a child - has had penicillin in  with no reaction     Valsartan      Other reaction(s): Unknown    Bacitracin-polymyxin b Rash       Past Medical History:   Diagnosis Date    Allergy     Arthritis     Atrophic kidney     left. probably childhood illness or disease    Chronic kidney disease     Chronic rhinitis     Colon polyps     Depression     HEARING LOSS     both ears - no hearing aids    Hiatal hernia     Hypertension     Insomnia     Migraines     Otitis media     Sleep apnea     mild uses cpap    TIA (transient ischemic attack)      Past Surgical History:   Procedure Laterality Date     SECTION      COLONOSCOPY  2014    Dr Escobar 5 year yossi    COLONOSCOPY N/A 04/10/2019    Procedure: COLONOSCOPY;  Surgeon: Matt Orozco MD;  Location: [facility];  Service: Endoscopy;  Laterality: N/A;    ESOPHAGOGASTRODUODENOSCOPY N/A 2023    Procedure: EGD (ESOPHAGOGASTRODUODENOSCOPY);  Surgeon: Merrill Vela MD;  Location: [facility];  Service: Endoscopy;  Laterality: N/A;    HEMORRHOID SURGERY      HYSTERECTOMY       LEFT HEART CATHETERIZATION Left 2024    Procedure: Left heart cath;  Surgeon: Shoshana Mcmahon MD;  Location: [facility] CATH/EP LAB;  Service: Cardiology;  Laterality: Left;    left mandibular node resection      OOPHORECTOMY      URETEROSCOPY       Social History     Tobacco Use    Smoking status: Former     Current packs/day: 0.00     Average packs/day: 1 pack/day for 18.0 years (18.0 ttl pk-yrs)     Types: Cigarettes     Start date: 2000     Quit date: 2018     Years since quittin.2    Smokeless tobacco: Never   Substance Use Topics    Alcohol use: No    Drug use: No     Family History   Problem Relation Name Age of Onset    Cancer Sister          breast    Diabetes Mother      Early death Father      Diabetes Paternal Grandmother      Breast cancer Maternal Aunt      Breast cancer Paternal Aunt          Review of Systems:   Constitution: Negative for diaphoresis and fever.   HEENT: Negative for nosebleeds.    Cardiovascular: Negative for chest pain       No dyspnea on exertion       No leg swelling        No palpitations  Respiratory: Negative for shortness of breath and wheezing.    Hematologic/Lymphatic: Negative for bleeding problem. Does not bruise/bleed easily.   Skin: Negative for color change and rash.   Musculoskeletal: Negative for falls and myalgias.   Gastrointestinal: Negative for hematemesis and hematochezia.   Genitourinary: Negative for hematuria.   Neurological: Negative for dizziness and light-headedness.   Psychiatric/Behavioral: Negative for altered mental status and memory loss.          Objective:        Vitals:    07/10/24 1413   BP: 118/80   Pulse: (P) 71   Resp: 16       Lab Results   Component Value Date    WBC 11.70 02/15/2024    HGB 13.5 02/15/2024    HCT 42.3 02/15/2024     02/15/2024    CHOL 154 2022    TRIG 93 2022    HDL 44 2022    ALT 10 02/15/2024    AST 12 02/15/2024     02/15/2024    K 4.3 02/15/2024     02/15/2024     CREATININE 1.0 02/15/2024    BUN 17 02/15/2024    CO2 26 02/15/2024    TSH 2.468 06/20/2022    INR 1.0 08/27/2023    HGBA1C 5.9 08/06/2023        ECHOCARDIOGRAM RESULTS  Results for orders placed during the hospital encounter of 08/06/23    Echo    Interpretation Summary    Left Ventricle: The left ventricle is normal in size. Normal wall thickness. Normal wall motion. There is normal systolic function with a visually estimated ejection fraction of 55 - 60%. There is normal diastolic function.    Left Atrium: Left atrium is mildly dilated.    Right Ventricle: Normal right ventricular cavity size. Wall thickness is normal. Right ventricle wall motion  is normal. Systolic function is normal.    Mitral Valve: There is mild regurgitation with a centrally directed jet.    Tricuspid Valve: There is mild regurgitation.    IVC/SVC: Normal venous pressure at 3 mmHg.        CURRENT/PREVIOUS VISIT EKG  Results for orders placed or performed during the hospital encounter of 02/15/24   EKG 12-lead    Collection Time: 02/15/24 12:38 PM   Result Value Ref Range    QRS Duration 82 ms    [facility] QTC Calculation 430 ms    Narrative    Test Reason : R94.31,    Vent. Rate : 040 BPM     Atrial Rate : 040 BPM     P-R Int : 142 ms          QRS Dur : 082 ms      QT Int : 528 ms       P-R-T Axes : 000 031 057 degrees     QTc Int : 430 ms    Marked sinus bradycardia  Abnormal ECG  When compared with ECG of 11-OCT-2023 14:59,  Nonspecific T wave abnormality no longer evident in Anterior leads  Confirmed by Brandon SORIA, Jeff NOLASCO ([id number]) on 2/20/2024 9:46:56 PM    Referred By:             Confirmed By:Jeff Taylor MD     No valid procedures specified.   Results for orders placed during the hospital encounter of 12/19/23    Nuclear Stress - Cardiology Interpreted    Interpretation Summary    Abnormal myocardial perfusion scan.    There is a moderate intensity, moderate sized, mostly reversible perfusion abnormality with some fixed areas in the  inferior, apical, anteroapical and inferoapical wall(s).    There are no other significant perfusion abnormalities.    There is a trivial to mild intensity perfusion abnormality in the  wall of the left ventricle, secondary to breast attenuation.    There is a trivial to mild intensity perfusion abnormality in the anteroapical wall of the left ventricle consistent with the RV insertion site.    The gated perfusion images showed an ejection fraction of 53% at rest. The gated perfusion images showed an ejection fraction of 67% post stress. Normal ejection fraction is greater than 53%.    There is normal wall motion at rest and post stress.    LV cavity size is normal at rest and normal at stress.    The ECG portion of the study is negative for ischemia.    The patient reported chest pain during the stress test.    During stress, frequent PVCs are noted.      Physical Exam:  CONSTITUTIONAL: No fever, no chills  HEENT: Normocephalic, atraumatic,pupils reactive to light                 NECK:  No JVD no carotid bruit  CVS: S1S2+, RRR, systolic murmurs,   LUNGS: Clear  ABDOMEN: Soft, NT, BS+  EXTREMITIES: No cyanosis, edema  : No thornton catheter  NEURO: AAO X 3  PSY: Normal affect      Medication List with Changes/Refills   Current Medications    ASPIRIN (ECOTRIN) 81 MG EC TABLET    Take 81 mg by mouth once daily.    GABAPENTIN (NEURONTIN) 600 MG TABLET    Take 600 mg by mouth 3 (three) times daily as needed.    HYDROCHLOROTHIAZIDE (MICROZIDE) 12.5 MG CAPSULE    Take 1 capsule (12.5 mg total) by mouth once daily.    KETOCONAZOLE (NIZORAL) 2 % SHAMPOO    Apply 1 Application topically Every 3 (three) days. 1 application as needed to wet hair every 3 to 4 days external once a day    LATANOPROST 0.005 % OPHTHALMIC SOLUTION    Place 1 drop into both eyes every evening.    LISINOPRIL (PRINIVIL,ZESTRIL) 40 MG TABLET    Take 1 tablet by mouth once daily.    NYSTATIN (MYCOSTATIN) CREAM    Apply 1 Application topically 2 (two)  times a day.    NYSTOP POWDER    Apply 1 g topically as needed.    OZEMPIC 0.25 MG OR 0.5 MG (2 MG/3 ML) PEN INJECTOR    Inject 0.25 mg into the skin every 7 days.    PANTOPRAZOLE (PROTONIX) 40 MG TABLET    Take 40 mg by mouth once daily.    POTASSIUM CHLORIDE (KLOR-CON) 10 MEQ TBSR    Take 1 tablet (10 mEq total) by mouth once daily.    POTASSIUM CHLORIDE (MICRO-K) 10 MEQ CPSR    Take 10 mEq by mouth once.    PREDNISONE (DELTASONE) 20 MG TABLET    Take 2 tablets (40 mg total) by mouth 3 (three) times daily.    PROMETHAZINE (PHENERGAN) 25 MG TABLET    Take 25 mg by mouth as needed for Nausea.    TIMOLOL MALEATE 0.5% (TIMOPTIC) 0.5 % DROP    Place 1 drop into both eyes 2 (two) times daily.    TOPIRAMATE (TOPAMAX) 50 MG TABLET    Take 1 tablet (50 mg total) by mouth once daily.    TRIAMCINOLONE ACETONIDE 0.1% (KENALOG) 0.1 % CREAM    Apply 1 g topically 2 (two) times daily.    VERAPAMIL (CALAN-SR) 240 MG CR TABLET    Take 1 tablet by mouth once daily.     Coronary Findings    Diagnostic  Dominance: Right  Left Main   The vessel is large and is angiographically normal.      Left Anterior Descending   Large caliber. Gives rise to diagonal and septal  branches. There is 40% ostial LAD stenosis.      Left Circumflex   Large caliber. Gives rise to large caliber 1st obtuse marginal branch. After giving off OM1, LCX becomes a smaller vessel. There are minimal luminal irregularities.      Right Coronary Artery   Large caliber. Dominant. Gives rise to RPDA and RPL branches distally. 30% tubular stenosis in mid RCA. Minimal luminal irregularities in the remainder of the vessel.      Intervention     No interventions have been documented.     Left Heart Findings    Left Ventricle    LVEDP (Pre): 17 mmHG.     Recommendations     Routine post-cath care.   Maximize medical management.   Risk factor reductions           Assessment:       1. CAD in native artery    2. Bradycardia, drug induced    3. Abnormal finding on  cardiovascular stress test    4. Essential hypertension    5. Mixed hyperlipidemia    6. Severe obesity    7. Stage 3a chronic kidney disease         Plan:     1. CAD   Patient has mild nonobstructive CAD about 40% buildup in the LAD in the proximal portion.  Patient is currently on aspirin continue the same.    2. Bradycardia   Patient is on verapamil 240 mg daily.  Continue the same.    3. Abnormal finding on the cardiovascular stress test   Patient underwent cardiac catheterization and coronary angiography was found to have nonobstructive CAD.    4. Essential hypertension   Patient's blood pressure is very well controlled is 118/80 and she is currently on hydrochlorothiazide 12.5 mg p.o. daily lisinopril 40 mg p.o. daily verapamil 240 mg p.o. daily.    5.  Mixed hyperlipidemia   She is currently not on any specific statin therapy however her total cholesterol is 154 HDL is 44 LDL is 91 and triglycerides are 93.  Patient to continue low-fat low-cholesterol diet preferably Mediterranean diet.    6. Obesity   Again as above patient to continue low-fat low-cholesterol diet.   She is currently weighing 256 lb.    7. Diabetes mellitus   Patient follows up with the primary physician in regards with the diabetes and she is currently not on Ozempic.  Continue the same.    8. Patient to follow-up with me in the office in 6 months time.              Problem List Items Addressed This Visit          Cardiac/Vascular    Essential hypertension       Renal/    Stage 3 chronic kidney disease     Other Visit Diagnoses       CAD in native artery    -  Primary    Bradycardia, drug induced        Abnormal finding on cardiovascular stress test        Mixed hyperlipidemia        Severe obesity                No follow-ups on file.

## 2024-10-17 ENCOUNTER — TELEPHONE (OUTPATIENT)
Dept: DERMATOLOGY | Facility: CLINIC | Age: 64
End: 2024-10-17
Payer: MEDICARE

## 2024-10-17 NOTE — TELEPHONE ENCOUNTER
----- Message from Laureen sent at 10/17/2024 10:10 AM CDT -----  Regarding: Referral  Hello,    Provider  would like to refer the patient to the Dermatology department.      The patients diagnosis is: easy bruising; itchy skin    I have scanned the patients referral and records into .     Please review and contact patient to schedule appointment. If there are no appointments available at this time, please advise and I will inform the referring provider/clinic      Thank you,   [facility] Chas

## 2024-12-17 ENCOUNTER — TELEPHONE (OUTPATIENT)
Dept: GASTROENTEROLOGY | Facility: CLINIC | Age: 64
End: 2024-12-17
Payer: MEDICARE

## 2024-12-17 NOTE — TELEPHONE ENCOUNTER
----- Message from Cassie sent at 12/17/2024  4:47 PM CST -----  Hello,    Patient is being referred for colonoscopy. Referral/records are scanned in media mgr. Please contact patient to schedule.    Thanks

## 2024-12-18 ENCOUNTER — TELEPHONE (OUTPATIENT)
Dept: GASTROENTEROLOGY | Facility: CLINIC | Age: 64
End: 2024-12-18
Payer: MEDICARE

## 2025-01-02 ENCOUNTER — OFFICE VISIT (OUTPATIENT)
Dept: DERMATOLOGY | Facility: CLINIC | Age: 65
End: 2025-01-02
Payer: MEDICARE

## 2025-01-02 DIAGNOSIS — L21.9 SEBORRHEIC DERMATITIS: Primary | ICD-10-CM

## 2025-01-02 DIAGNOSIS — L72.9 CYST OF SKIN: ICD-10-CM

## 2025-01-02 DIAGNOSIS — D18.01 CHERRY ANGIOMA: ICD-10-CM

## 2025-01-02 DIAGNOSIS — D69.2 SOLAR PURPURA: ICD-10-CM

## 2025-01-02 DIAGNOSIS — L82.1 SEBORRHEIC KERATOSIS: ICD-10-CM

## 2025-01-02 DIAGNOSIS — L73.8 SEBACEOUS HYPERPLASIA: ICD-10-CM

## 2025-01-02 DIAGNOSIS — D22.9 MULTIPLE BENIGN NEVI: ICD-10-CM

## 2025-01-02 DIAGNOSIS — L91.8 SKIN TAG: ICD-10-CM

## 2025-01-02 DIAGNOSIS — L57.8 ACTINIC SKIN DAMAGE: ICD-10-CM

## 2025-01-02 PROCEDURE — 99204 OFFICE O/P NEW MOD 45 MIN: CPT | Mod: S$GLB,,, | Performed by: STUDENT IN AN ORGANIZED HEALTH CARE EDUCATION/TRAINING PROGRAM

## 2025-01-02 RX ORDER — CLOBETASOL PROPIONATE 0.5 MG/ML
SOLUTION TOPICAL 2 TIMES DAILY
Qty: 50 ML | Refills: 1 | Status: SHIPPED | OUTPATIENT
Start: 2025-01-02

## 2025-01-02 RX ORDER — KETOCONAZOLE 20 MG/ML
SHAMPOO, SUSPENSION TOPICAL
Qty: 120 ML | Refills: 5 | Status: SHIPPED | OUTPATIENT
Start: 2025-01-02

## 2025-01-02 NOTE — PROGRESS NOTES
Subjective:      Patient ID:  Jonna Pearl is a 64 y.o. female who presents for   Chief Complaint   Patient presents with    Spot     New patient     Patient here today for skin check UBSE  Complains of spot on left temple. Has been frozen in the past. Always comes back.   Also complains of easy bruising on arms. Not present today. States it looked like blood was at surface of skin.  No longer taking asa- stopped about 1 year ago.     Patient states she gets itching on scalp. Has used ketoconazole shampoo in the past and it helped - would like a new prescription.    Denies Phx of [facility]  Denies Fhx of       Review of Systems   Constitutional:  Negative for fever, chills and fatigue.   Respiratory:  Negative for cough and shortness of breath.    Gastrointestinal:  Negative for nausea and vomiting.   Skin:  Positive for activity-related sunscreen use. Negative for daily sunscreen use.   Hematologic/Lymphatic: Bruises/bleeds easily.       Objective:   Physical Exam   Constitutional: She appears well-developed and well-nourished. No distress.   Neurological: She is alert and oriented to person, place, and time. She is not disoriented.   Psychiatric: She has a normal mood and affect.   Skin:   Areas Examined (abnormalities noted in diagram):   Scalp / Hair Palpated and Inspected  Head / Face Inspection Performed  Neck Inspection Performed  Chest / Axilla Inspection Performed  Abdomen Inspection Performed  Back Inspection Performed  RUE Inspected  LUE Inspection Performed  Nails and Digits Inspection Performed                 Diagram Legend     Erythematous scaling macule/papule c/w actinic keratosis       Vascular papule c/w angioma      Pigmented verrucoid papule/plaque c/w seborrheic keratosis      Yellow umbilicated papule c/w sebaceous hyperplasia      Irregularly shaped tan macule c/w lentigo     1-2 mm smooth white papules consistent with Milia      Movable subcutaneous cyst with punctum c/w epidermal  inclusion cyst      Subcutaneous movable cyst c/w pilar cyst      Firm pink to brown papule c/w dermatofibroma      Pedunculated fleshy papule(s) c/w skin tag(s)      Evenly pigmented macule c/w junctional nevus     Mildly variegated pigmented, slightly irregular-bordered macule c/w mildly atypical nevus      Flesh colored to evenly pigmented papule c/w intradermal nevus       Pink pearly papule/plaque c/w basal cell carcinoma      Erythematous hyperkeratotic cursted plaque c/w SCC      Surgical scar with no sign of skin cancer recurrence      Open and closed comedones      Inflammatory papules and pustules      Verrucoid papule consistent consistent with wart     Erythematous eczematous patches and plaques     Dystrophic onycholytic nail with subungual debris c/w onychomycosis     Umbilicated papule    Erythematous-base heme-crusted tan verrucoid plaque consistent with inflamed seborrheic keratosis     Erythematous Silvery Scaling Plaque c/w Psoriasis     See annotation      Assessment / Plan:        Seborrheic dermatitis  -     ketoconazole (NIZORAL) 2 % shampoo; Apply topically twice a week.  Dispense: 120 mL; Refill: 5  -     clobetasoL (TEMOVATE) 0.05 % external solution; Apply topically 2 (two) times daily.  Dispense: 50 mL; Refill: 1    Seborrheic keratosis  These are benign inherited growths without a malignant potential. Reassurance given to patient. No treatment is necessary.     Cherry angioma  This is a benign vascular lesion. Reassurance given. No treatment required.     Skin tag  - discussed that lesions are benign, non-cancerous. Removal is cosmetic. Price sheet provided to patient. Discussed risks of scarring and dyspigmentation after removal.     Solar purpura  Discussed underlying etiology, may be made worse by asa  Sun protection daily    Sebaceous hyperplasia  This is a common condition representing benign enlargement of the sebaceous lobule. It typically occurs in adulthood. Reassurance given to  patient.     Actinic skin damage  Upper body skin examination performed today including at least 9 points as noted in physical examination. No lesions suspicious for malignancy noted.  Patient instructed in importance in daily broad spectrum sun protection of at least spf 30. Mineral sunscreen ingredients preferred (Zinc +/- Titanium) and can be found OTC.   Patient encouraged to wear hat for all outdoor exposure.   Also discussed sun avoidance and use of protective clothing.    Multiple benign nevi  Careful dermoscopy evaluation of nevi performed with none identified as needing biopsy today  Monitor for new mole or moles that are becoming bigger, darker, irritated, or developing irregular borders.     Cyst of skin  Reassurance given to patient. No treatment is necessary.   Discussed treatment options - excision vs observation. Cysts may recur with excision. Pt will defer treatment at this time.              No follow-ups on file.

## 2025-01-29 ENCOUNTER — HOSPITAL ENCOUNTER (INPATIENT)
Facility: HOSPITAL | Age: 65
LOS: 2 days | Discharge: HOME | DRG: 391 | End: 2025-02-01
Attending: EMERGENCY MEDICINE | Admitting: INTERNAL MEDICINE
Payer: MEDICARE

## 2025-01-29 DIAGNOSIS — R07.9 CHEST PAIN: ICD-10-CM

## 2025-01-29 DIAGNOSIS — K52.9 COLITIS: Primary | ICD-10-CM

## 2025-01-29 DIAGNOSIS — K57.92 DIVERTICULITIS: ICD-10-CM

## 2025-01-29 PROBLEM — K92.2 LOWER GI BLEED: Status: ACTIVE | Noted: 2025-01-29

## 2025-01-29 LAB
ALBUMIN SERPL BCP-MCNC: 3.5 G/DL (ref 3.5–5.2)
ALP SERPL-CCNC: 96 U/L (ref 55–135)
ALT SERPL W/O P-5'-P-CCNC: 9 U/L (ref 10–44)
ANION GAP SERPL CALC-SCNC: 6 MMOL/L (ref 8–16)
AST SERPL-CCNC: 11 U/L (ref 10–40)
BASOPHILS # BLD AUTO: 0.03 K/UL (ref 0–0.2)
BASOPHILS NFR BLD: 0.2 % (ref 0–1.9)
BILIRUB SERPL-MCNC: 0.8 MG/DL (ref 0.1–1)
BNP SERPL-MCNC: 174 PG/ML (ref 0–99)
BUN SERPL-MCNC: 25 MG/DL (ref 8–23)
CALCIUM SERPL-MCNC: 8.9 MG/DL (ref 8.7–10.5)
CHLORIDE SERPL-SCNC: 107 MMOL/L (ref 95–110)
CO2 SERPL-SCNC: 25 MMOL/L (ref 23–29)
CREAT SERPL-MCNC: 0.9 MG/DL (ref 0.5–1.4)
DIFFERENTIAL METHOD BLD: ABNORMAL
EOSINOPHIL # BLD AUTO: 0.1 K/UL (ref 0–0.5)
EOSINOPHIL NFR BLD: 0.5 % (ref 0–8)
ERYTHROCYTE [DISTWIDTH] IN BLOOD BY AUTOMATED COUNT: 13.5 % (ref 11.5–14.5)
EST. GFR  (NO RACE VARIABLE): >60 ML/MIN/1.73 M^2
GLUCOSE SERPL-MCNC: 104 MG/DL (ref 70–110)
HCT VFR BLD AUTO: 40 % (ref 37–48.5)
HCV AB SERPL QL IA: NEGATIVE
HGB BLD-MCNC: 13.3 G/DL (ref 12–16)
HIV 1+2 AB+HIV1 P24 AG SERPL QL IA: NEGATIVE
IMM GRANULOCYTES # BLD AUTO: 0.09 K/UL (ref 0–0.04)
IMM GRANULOCYTES NFR BLD AUTO: 0.5 % (ref 0–0.5)
LYMPHOCYTES # BLD AUTO: 2 K/UL (ref 1–4.8)
LYMPHOCYTES NFR BLD: 11.4 % (ref 18–48)
MAGNESIUM SERPL-MCNC: 1.8 MG/DL (ref 1.6–2.6)
MCH RBC QN AUTO: 30.6 PG (ref 27–31)
MCHC RBC AUTO-ENTMCNC: 33.3 G/DL (ref 32–36)
MCV RBC AUTO: 92 FL (ref 82–98)
MONOCYTES # BLD AUTO: 1.4 K/UL (ref 0.3–1)
MONOCYTES NFR BLD: 8 % (ref 4–15)
NEUTROPHILS # BLD AUTO: 14 K/UL (ref 1.8–7.7)
NEUTROPHILS NFR BLD: 79.4 % (ref 38–73)
NRBC BLD-RTO: 0 /100 WBC
OB PNL STL: POSITIVE
OHS QRS DURATION: 80 MS
OHS QTC CALCULATION: 440 MS
PLATELET # BLD AUTO: 299 K/UL (ref 150–450)
PMV BLD AUTO: 10.2 FL (ref 9.2–12.9)
POCT GLUCOSE: 284 MG/DL (ref 70–110)
POTASSIUM SERPL-SCNC: 4.2 MMOL/L (ref 3.5–5.1)
PROT SERPL-MCNC: 5.9 G/DL (ref 6–8.4)
RBC # BLD AUTO: 4.34 M/UL (ref 4–5.4)
SODIUM SERPL-SCNC: 138 MMOL/L (ref 136–145)
TROPONIN I SERPL HS-MCNC: 4.1 PG/ML (ref 0–14.9)
TROPONIN I SERPL HS-MCNC: 4.2 PG/ML (ref 0–14.9)
TROPONIN I SERPL HS-MCNC: 4.3 PG/ML (ref 0–14.9)
WBC # BLD AUTO: 17.56 K/UL (ref 3.9–12.7)

## 2025-01-29 PROCEDURE — 84484 ASSAY OF TROPONIN QUANT: CPT | Mod: 91 | Performed by: INTERNAL MEDICINE

## 2025-01-29 PROCEDURE — 96375 TX/PRO/DX INJ NEW DRUG ADDON: CPT

## 2025-01-29 PROCEDURE — 25500020 PHARM REV CODE 255: Performed by: EMERGENCY MEDICINE

## 2025-01-29 PROCEDURE — 96372 THER/PROPH/DIAG INJ SC/IM: CPT | Performed by: INTERNAL MEDICINE

## 2025-01-29 PROCEDURE — 36415 COLL VENOUS BLD VENIPUNCTURE: CPT | Performed by: INTERNAL MEDICINE

## 2025-01-29 PROCEDURE — 25000003 PHARM REV CODE 250: Performed by: INTERNAL MEDICINE

## 2025-01-29 PROCEDURE — 83735 ASSAY OF MAGNESIUM: CPT | Performed by: EMERGENCY MEDICINE

## 2025-01-29 PROCEDURE — 86803 HEPATITIS C AB TEST: CPT | Performed by: EMERGENCY MEDICINE

## 2025-01-29 PROCEDURE — 82272 OCCULT BLD FECES 1-3 TESTS: CPT | Performed by: EMERGENCY MEDICINE

## 2025-01-29 PROCEDURE — 99285 EMERGENCY DEPT VISIT HI MDM: CPT | Mod: 25

## 2025-01-29 PROCEDURE — 93010 ELECTROCARDIOGRAM REPORT: CPT | Mod: ,,, | Performed by: INTERNAL MEDICINE

## 2025-01-29 PROCEDURE — G0378 HOSPITAL OBSERVATION PER HR: HCPCS

## 2025-01-29 PROCEDURE — 93005 ELECTROCARDIOGRAM TRACING: CPT | Performed by: INTERNAL MEDICINE

## 2025-01-29 PROCEDURE — 25000003 PHARM REV CODE 250: Performed by: STUDENT IN AN ORGANIZED HEALTH CARE EDUCATION/TRAINING PROGRAM

## 2025-01-29 PROCEDURE — 93005 ELECTROCARDIOGRAM TRACING: CPT | Performed by: GENERAL PRACTICE

## 2025-01-29 PROCEDURE — 63600175 PHARM REV CODE 636 W HCPCS: Performed by: EMERGENCY MEDICINE

## 2025-01-29 PROCEDURE — 25000003 PHARM REV CODE 250: Performed by: EMERGENCY MEDICINE

## 2025-01-29 PROCEDURE — 63600175 PHARM REV CODE 636 W HCPCS: Performed by: INTERNAL MEDICINE

## 2025-01-29 PROCEDURE — 96374 THER/PROPH/DIAG INJ IV PUSH: CPT

## 2025-01-29 PROCEDURE — 84484 ASSAY OF TROPONIN QUANT: CPT | Performed by: EMERGENCY MEDICINE

## 2025-01-29 PROCEDURE — 85025 COMPLETE CBC W/AUTO DIFF WBC: CPT | Performed by: EMERGENCY MEDICINE

## 2025-01-29 PROCEDURE — 83880 ASSAY OF NATRIURETIC PEPTIDE: CPT | Performed by: EMERGENCY MEDICINE

## 2025-01-29 PROCEDURE — 93010 ELECTROCARDIOGRAM REPORT: CPT | Mod: ,,, | Performed by: GENERAL PRACTICE

## 2025-01-29 PROCEDURE — 80053 COMPREHEN METABOLIC PANEL: CPT | Performed by: EMERGENCY MEDICINE

## 2025-01-29 PROCEDURE — 87389 HIV-1 AG W/HIV-1&-2 AB AG IA: CPT | Performed by: EMERGENCY MEDICINE

## 2025-01-29 RX ORDER — ENOXAPARIN SODIUM 100 MG/ML
40 INJECTION SUBCUTANEOUS EVERY 12 HOURS
Status: DISCONTINUED | OUTPATIENT
Start: 2025-01-29 | End: 2025-01-29

## 2025-01-29 RX ORDER — ACETAMINOPHEN 325 MG/1
650 TABLET ORAL EVERY 8 HOURS PRN
Status: DISCONTINUED | OUTPATIENT
Start: 2025-01-29 | End: 2025-01-29

## 2025-01-29 RX ORDER — GLUCAGON 1 MG
1 KIT INJECTION
Status: DISCONTINUED | OUTPATIENT
Start: 2025-01-29 | End: 2025-02-01 | Stop reason: HOSPADM

## 2025-01-29 RX ORDER — NALOXONE HCL 0.4 MG/ML
0.02 VIAL (ML) INJECTION
Status: DISCONTINUED | OUTPATIENT
Start: 2025-01-29 | End: 2025-02-01 | Stop reason: HOSPADM

## 2025-01-29 RX ORDER — LANOLIN ALCOHOL/MO/W.PET/CERES
800 CREAM (GRAM) TOPICAL
Status: DISCONTINUED | OUTPATIENT
Start: 2025-01-29 | End: 2025-02-01 | Stop reason: HOSPADM

## 2025-01-29 RX ORDER — INSULIN ASPART 100 [IU]/ML
0-10 INJECTION, SOLUTION INTRAVENOUS; SUBCUTANEOUS
Status: DISCONTINUED | OUTPATIENT
Start: 2025-01-29 | End: 2025-02-01 | Stop reason: HOSPADM

## 2025-01-29 RX ORDER — HYDROMORPHONE HYDROCHLORIDE 1 MG/ML
0.5 INJECTION, SOLUTION INTRAMUSCULAR; INTRAVENOUS; SUBCUTANEOUS EVERY 6 HOURS PRN
Status: DISCONTINUED | OUTPATIENT
Start: 2025-01-29 | End: 2025-02-01 | Stop reason: HOSPADM

## 2025-01-29 RX ORDER — METHYLPREDNISOLONE SOD SUCC 125 MG
125 VIAL (EA) INJECTION
Status: COMPLETED | OUTPATIENT
Start: 2025-01-29 | End: 2025-01-29

## 2025-01-29 RX ORDER — LATANOPROST 50 UG/ML
1 SOLUTION/ DROPS OPHTHALMIC NIGHTLY
Status: DISCONTINUED | OUTPATIENT
Start: 2025-01-29 | End: 2025-02-01 | Stop reason: HOSPADM

## 2025-01-29 RX ORDER — ONDANSETRON HYDROCHLORIDE 2 MG/ML
4 INJECTION, SOLUTION INTRAVENOUS EVERY 6 HOURS PRN
Status: DISCONTINUED | OUTPATIENT
Start: 2025-01-29 | End: 2025-02-01 | Stop reason: HOSPADM

## 2025-01-29 RX ORDER — ALUMINUM HYDROXIDE, MAGNESIUM HYDROXIDE, AND SIMETHICONE 1200; 120; 1200 MG/30ML; MG/30ML; MG/30ML
30 SUSPENSION ORAL 4 TIMES DAILY PRN
Status: DISCONTINUED | OUTPATIENT
Start: 2025-01-29 | End: 2025-02-01 | Stop reason: HOSPADM

## 2025-01-29 RX ORDER — HYDROCODONE BITARTRATE AND ACETAMINOPHEN 5; 325 MG/1; MG/1
1 TABLET ORAL EVERY 6 HOURS PRN
Status: DISCONTINUED | OUTPATIENT
Start: 2025-01-29 | End: 2025-02-01 | Stop reason: HOSPADM

## 2025-01-29 RX ORDER — ONDANSETRON HYDROCHLORIDE 2 MG/ML
4 INJECTION, SOLUTION INTRAVENOUS
Status: COMPLETED | OUTPATIENT
Start: 2025-01-29 | End: 2025-01-29

## 2025-01-29 RX ORDER — ALUMINUM HYDROXIDE, MAGNESIUM HYDROXIDE, AND SIMETHICONE 1200; 120; 1200 MG/30ML; MG/30ML; MG/30ML
30 SUSPENSION ORAL ONCE
Status: COMPLETED | OUTPATIENT
Start: 2025-01-29 | End: 2025-01-29

## 2025-01-29 RX ORDER — SODIUM,POTASSIUM PHOSPHATES 280-250MG
2 POWDER IN PACKET (EA) ORAL
Status: DISCONTINUED | OUTPATIENT
Start: 2025-01-29 | End: 2025-02-01 | Stop reason: HOSPADM

## 2025-01-29 RX ORDER — DIPHENHYDRAMINE HYDROCHLORIDE 50 MG/ML
25 INJECTION INTRAMUSCULAR; INTRAVENOUS
Status: COMPLETED | OUTPATIENT
Start: 2025-01-29 | End: 2025-01-29

## 2025-01-29 RX ORDER — PANTOPRAZOLE SODIUM 40 MG/1
40 TABLET, DELAYED RELEASE ORAL
Status: DISCONTINUED | OUTPATIENT
Start: 2025-01-30 | End: 2025-01-29

## 2025-01-29 RX ORDER — IBUPROFEN 200 MG
16 TABLET ORAL
Status: DISCONTINUED | OUTPATIENT
Start: 2025-01-29 | End: 2025-02-01 | Stop reason: HOSPADM

## 2025-01-29 RX ORDER — SODIUM CHLORIDE 9 MG/ML
INJECTION, SOLUTION INTRAVENOUS CONTINUOUS
Status: DISCONTINUED | OUTPATIENT
Start: 2025-01-29 | End: 2025-01-30

## 2025-01-29 RX ORDER — LIDOCAINE HYDROCHLORIDE 20 MG/ML
15 SOLUTION OROPHARYNGEAL ONCE
Status: COMPLETED | OUTPATIENT
Start: 2025-01-29 | End: 2025-01-29

## 2025-01-29 RX ORDER — PANTOPRAZOLE SODIUM 40 MG/10ML
40 INJECTION, POWDER, LYOPHILIZED, FOR SOLUTION INTRAVENOUS DAILY
Status: DISCONTINUED | OUTPATIENT
Start: 2025-01-30 | End: 2025-01-31

## 2025-01-29 RX ORDER — TALC
6 POWDER (GRAM) TOPICAL NIGHTLY PRN
Status: DISCONTINUED | OUTPATIENT
Start: 2025-01-29 | End: 2025-02-01 | Stop reason: HOSPADM

## 2025-01-29 RX ORDER — IBUPROFEN 200 MG
24 TABLET ORAL
Status: DISCONTINUED | OUTPATIENT
Start: 2025-01-29 | End: 2025-02-01 | Stop reason: HOSPADM

## 2025-01-29 RX ORDER — ACETAMINOPHEN 325 MG/1
650 TABLET ORAL EVERY 4 HOURS PRN
Status: DISCONTINUED | OUTPATIENT
Start: 2025-01-29 | End: 2025-02-01 | Stop reason: HOSPADM

## 2025-01-29 RX ORDER — TIMOLOL MALEATE 5 MG/ML
1 SOLUTION/ DROPS OPHTHALMIC 2 TIMES DAILY
Status: DISCONTINUED | OUTPATIENT
Start: 2025-01-29 | End: 2025-02-01 | Stop reason: HOSPADM

## 2025-01-29 RX ADMIN — INSULIN ASPART 3 UNITS: 100 INJECTION, SOLUTION INTRAVENOUS; SUBCUTANEOUS at 10:01

## 2025-01-29 RX ADMIN — LIDOCAINE HYDROCHLORIDE 15 ML: 20 SOLUTION ORAL at 02:01

## 2025-01-29 RX ADMIN — PIPERACILLIN AND TAZOBACTAM 4.5 G: 4; .5 INJECTION, POWDER, LYOPHILIZED, FOR SOLUTION INTRAVENOUS; PARENTERAL at 04:01

## 2025-01-29 RX ADMIN — LATANOPROST 1 DROP: 50 SOLUTION OPHTHALMIC at 10:01

## 2025-01-29 RX ADMIN — IOHEXOL 100 ML: 350 INJECTION, SOLUTION INTRAVENOUS at 01:01

## 2025-01-29 RX ADMIN — DIPHENHYDRAMINE HYDROCHLORIDE 25 MG: 50 INJECTION INTRAMUSCULAR; INTRAVENOUS at 01:01

## 2025-01-29 RX ADMIN — ALUMINUM HYDROXIDE, MAGNESIUM HYDROXIDE, AND SIMETHICONE 30 ML: 200; 200; 20 SUSPENSION ORAL at 02:01

## 2025-01-29 RX ADMIN — ONDANSETRON 4 MG: 2 INJECTION INTRAMUSCULAR; INTRAVENOUS at 01:01

## 2025-01-29 RX ADMIN — Medication 800 MG: at 10:01

## 2025-01-29 RX ADMIN — Medication 800 MG: at 06:01

## 2025-01-29 RX ADMIN — TIMOLOL MALEATE 1 DROP: 5 SOLUTION/ DROPS OPHTHALMIC at 10:01

## 2025-01-29 RX ADMIN — SODIUM CHLORIDE: 9 INJECTION, SOLUTION INTRAVENOUS at 05:01

## 2025-01-29 RX ADMIN — METHYLPREDNISOLONE SODIUM SUCCINATE 125 MG: 125 INJECTION, POWDER, FOR SOLUTION INTRAMUSCULAR; INTRAVENOUS at 01:01

## 2025-01-29 NOTE — H&P
[facility] - Emergency Dept  [facility] Medicine  History & Physical    Patient Name: Jonna Pearl  MRN: [id number]  Patient Class: OP- Observation  Admission Date: 2025  Attending Physician: Johnny Manning MD   Primary Care Provider: Padmini Lackey FNP         Patient information was obtained from patient, past medical records, and ER records.     Subjective:     Principal Problem:Acute colitis    Chief Complaint:   Chief Complaint   Patient presents with    Chest Pain     Chest pain since last night, bright red blood in stools since last night, denies abdominal pain - nauseated since last night - patient presents to ER with increased WOB - hx of COPD and diverticulitis    Nausea    Shortness of Breath    Rectal Bleeding        HPI: 64 year old pt getting admitted with chest pain/lower GI bleed and colitis  Pt started having chest pain few days ago  Pain mostly on retrosternal area / described as belching   Later she started having nausea with some abdominal pain'  Followed by multiple bouts of fresh blood in stools and pt came to ER & got admitted     Past Medical History:   Diagnosis Date    Allergy     Arthritis     Atrophic kidney     left. probably childhood illness or disease    Chronic kidney disease     Chronic rhinitis     Colon polyps     Depression     HEARING LOSS     both ears - no hearing aids    Hiatal hernia     Hypertension     Insomnia     Migraines     Otitis media     Sleep apnea     mild uses cpap    TIA (transient ischemic attack)        Past Surgical History:   Procedure Laterality Date     SECTION      COLONOSCOPY  2014    Dr Escobar 5 year yossi    COLONOSCOPY N/A 04/10/2019    Procedure: COLONOSCOPY;  Surgeon: Matt Orozco MD;  Location: [facility];  Service: Endoscopy;  Laterality: N/A;    ESOPHAGOGASTRODUODENOSCOPY N/A 2023    Procedure: EGD (ESOPHAGOGASTRODUODENOSCOPY);  Surgeon: Merrill Vela MD;  Location: [facility];  Service: Endoscopy;   Laterality: N/A;    HEMORRHOID SURGERY      HYSTERECTOMY      LEFT HEART CATHETERIZATION Left 2/21/2024    Procedure: Left heart cath;  Surgeon: Shoshana Mcmahon MD;  Location: [facility] CATH/EP LAB;  Service: Cardiology;  Laterality: Left;    left mandibular node resection      OOPHORECTOMY      URETEROSCOPY         Review of patient's allergies indicates:   Allergen Reactions    Neomycin-polymyxin Swelling    Cephalexin      Other reaction(s): Unknown    Doxycycline      Other reaction(s): Unknown    Iodinated contrast media      Other reaction(s): Unknown    Iodine Other (See Comments)     Due to kidney function    Valsartan      Other reaction(s): Unknown    Bacitracin-polymyxin b Rash       No current facility-administered medications on file prior to encounter.     Current Outpatient Medications on File Prior to Encounter   Medication Sig    clobetasoL (TEMOVATE) 0.05 % external solution Apply topically 2 (two) times daily. (Patient taking differently: Apply 1 g topically 2 (two) times daily.)    gabapentin (NEURONTIN) 600 MG tablet Take 600 mg by mouth 3 (three) times daily as needed.    ketoconazole (NIZORAL) 2 % shampoo Apply 1 Application topically Every 3 (three) days. 1 application as needed to wet hair every 3 to 4 days external once a day    latanoprost 0.005 % ophthalmic solution Place 1 drop into both eyes every evening.    lisinopriL (PRINIVIL,ZESTRIL) 40 MG tablet Take 1 tablet by mouth once daily.    timolol maleate 0.5% (TIMOPTIC) 0.5 % Drop Place 1 drop into both eyes 2 (two) times daily.    verapamiL (CALAN-SR) 240 MG CR tablet Take 1 tablet by mouth once daily.    ketoconazole (NIZORAL) 2 % shampoo Apply topically twice a week.    predniSONE (DELTASONE) 20 MG tablet Take 2 tablets (40 mg total) by mouth 3 (three) times daily. (Patient not taking: Reported on 1/29/2025)    topiramate (TOPAMAX) 50 MG tablet Take 1 tablet (50 mg total) by mouth once daily. (Patient not taking: Reported on 1/29/2025)     [DISCONTINUED] aspirin (ECOTRIN) 81 MG EC tablet Take 81 mg by mouth once daily.    [DISCONTINUED] hydroCHLOROthiazide (MICROZIDE) 12.5 mg capsule Take 1 capsule (12.5 mg total) by mouth once daily.    [DISCONTINUED] nystatin (MYCOSTATIN) cream Apply 1 Application topically 2 (two) times a day.    [DISCONTINUED] NYSTOP powder Apply 1 g topically as needed.    [DISCONTINUED] OZEMPIC 0.25 mg or 0.5 mg (2 mg/3 mL) pen injector Inject 0.25 mg into the skin every 7 days.    [DISCONTINUED] pantoprazole (PROTONIX) 40 MG tablet Take 40 mg by mouth once daily.    [DISCONTINUED] potassium chloride (KLOR-CON) 10 MEQ TbSR Take 1 tablet (10 mEq total) by mouth once daily.    [DISCONTINUED] potassium chloride (MICRO-K) 10 MEQ CpSR Take 10 mEq by mouth once.    [DISCONTINUED] promethazine (PHENERGAN) 25 MG tablet Take 25 mg by mouth as needed for Nausea.    [DISCONTINUED] triamcinolone acetonide 0.1% (KENALOG) 0.1 % cream Apply 1 g topically 2 (two) times daily.     Family History       Problem Relation (Age of Onset)    Breast cancer Maternal Aunt, Paternal Aunt    Cancer Sister    Diabetes Mother, Paternal Grandmother    Early death Father          Tobacco Use    Smoking status: Former     Current packs/day: 0.00     Average packs/day: 1 pack/day for 18.0 years (18.0 ttl pk-yrs)     Types: Cigarettes     Start date: 2000     Quit date: 2018     Years since quittin.8    Smokeless tobacco: Never   Substance and Sexual Activity    Alcohol use: No    Drug use: No    Sexual activity: Not on file     Review of Systems   Constitutional:  Negative for activity change and appetite change.   HENT:  Negative for congestion and dental problem.    Eyes:  Negative for discharge and itching.   Respiratory:  Negative for shortness of breath.    Cardiovascular:  Positive for chest pain.   Gastrointestinal:  Positive for anal bleeding and blood in stool. Negative for abdominal distention and abdominal pain.   Endocrine: Negative  for cold intolerance.   Genitourinary:  Negative for difficulty urinating and dysuria.   Musculoskeletal:  Negative for arthralgias and back pain.   Skin:  Negative for color change.   Neurological:  Negative for dizziness and facial asymmetry.   Hematological:  Negative for adenopathy.   Psychiatric/Behavioral:  Negative for agitation and behavioral problems.      Objective:     Vital Signs (Most Recent):  Temp: 98.2 °F (36.8 °C) (01/29/25 1856)  Pulse: (!) 58 (01/29/25 1856)  Resp: 16 (01/29/25 1856)  BP: 130/69 (01/29/25 1856)  SpO2: 95 % (01/29/25 1856) Vital Signs (24h Range):  Temp:  [97.9 °F (36.6 °C)-98.6 °F (37 °C)] 98.2 °F (36.8 °C)  Pulse:  [52-67] 58  Resp:  [16-22] 16  SpO2:  [94 %-100 %] 95 %  BP: (101-148)/(47-84) 130/69     Weight: 120.2 kg (264 lb 14.4 oz)  Body mass index is 48.45 kg/m².     Physical Exam  Vitals and nursing note reviewed.   Constitutional:       General: She is not in acute distress.  HENT:      Head: Atraumatic.      Right Ear: External ear normal.      Left Ear: External ear normal.      Nose: Nose normal.      Mouth/Throat:      Mouth: Mucous membranes are moist.   Eyes:      Extraocular Movements: Extraocular movements intact.   Cardiovascular:      Rate and Rhythm: Normal rate.   Pulmonary:      Effort: Pulmonary effort is normal.   Musculoskeletal:         General: Normal range of motion.      Cervical back: Normal range of motion.   Skin:     General: Skin is warm.   Neurological:      Mental Status: She is alert and oriented to person, place, and time.   Psychiatric:         Behavior: Behavior normal.                Significant Labs: All pertinent labs within the past 24 hours have been reviewed.  CBC:   Recent Labs   Lab 01/29/25  1043   WBC 17.56*   HGB 13.3   HCT 40.0        CMP:   Recent Labs   Lab 01/29/25  1043      K 4.2      CO2 25      BUN 25*   CREATININE 0.9   CALCIUM 8.9   PROT 5.9*   ALBUMIN 3.5   BILITOT 0.8   ALKPHOS 96   AST 11    ALT 9*   ANIONGAP 6*       Significant Imaging: I have reviewed all pertinent imaging results/findings within the past 24 hours.    Assessment/Plan:     * Acute colitis  Getting admitted with acute colitis  / diverticulitis  Maintain iv abx and iv fluids       Lower GI bleed  Maintain iv abx  Transfuse as needed  GI team consulted for diverticular lower GI bleed     Diverticulitis  Maintain iv abx      Chest pain  Atypical chest pain       Type 2 diabetes mellitus, without long-term current use of insulin  Maintain SSI      WES on CPAP  Aware       Morbid obesity  Body mass index is 48.45 kg/m². Morbid obesity complicates all aspects of disease management from diagnostic modalities to treatment.     Stage 3 chronic kidney disease  Creatine stable for now. BMP reviewed- noted Estimated Creatinine Clearance: 76.8 mL/min (based on SCr of 0.9 mg/dL). according to latest data. Based on current GFR, CKD stage is stage 3 - GFR 30-59.  Monitor UOP and serial BMP and adjust therapy as needed. Renally dose meds. Avoid nephrotoxic medications and procedures.    Hypertension, essential  Patient's blood pressure range in the last 24 hours was: BP  Min: 101/47  Max: 148/84.The patient's inpatient anti-hypertensive regimen is listed below:  Current Antihypertensives       Plan  Hold bp meds in view with low normal BP      VTE Risk Mitigation (From admission, onward)           Ordered     IP VTE HIGH RISK PATIENT  Once         01/29/25 1521     Place sequential compression device  Until discontinued         01/29/25 1521                       On 01/29/2025, patient should be placed in hospital observation services under my care.        Pharmacist Renal Dose Adjustment Note    Jonna Pearl is a 64 y.o. female being treated with the medication enoxaparin.    Patient Data:    Vital Signs (Most Recent):  Temp: 98.6 °F (37 °C) (01/29/25 0850)  Pulse: (!) 52 (01/29/25 0935)  Resp: (!) 22 (01/29/25 0850)  BP: (!) 127/58 (01/29/25  0935)  SpO2: 99 % (01/29/25 0935) Vital Signs (72h Range):  Temp:  [98.6 °F (37 °C)]   Pulse:  [52-57]   Resp:  [22]   BP: (127-138)/(58-78)   SpO2:  [99 %]      Recent Labs   Lab 01/29/25  1043   CREATININE 0.9     Serum creatinine: 0.9 mg/dL 01/29/25 1043  Estimated creatinine clearance: 75.9 mL/min    Enoxaparin 40 mg Q24H will be changed to Enoxaparin 40 mg Q12H due to BMI > 40    Pharmacist's Name: Magdi Mensah  Pharmacist's Extension: [phone]      Johnny Manning MD  Department of Hospital Medicine  [facility] - Emergency Dept

## 2025-01-29 NOTE — PLAN OF CARE
01/29/25 1555   ZIMMERMAN Message   Medicare Outpatient and Observation Notification regarding financial responsibility Explained to patient/caregiver;Signed/date by patient/caregiver   Date ZIMMERMAN was signed 01/29/25   Time ZIMMERMAN was signed [id number]

## 2025-01-29 NOTE — ED PROVIDER NOTES
Encounter Date: 1/29/2025       History     Chief Complaint   Patient presents with    Chest Pain     Chest pain since last night, bright red blood in stools since last night, denies abdominal pain - nauseated since last night - patient presents to ER with increased WOB - hx of COPD and diverticulitis    Nausea    Shortness of Breath    Rectal Bleeding     This is a 64-year-old female past medical history of hyperlipidemia, hypertension, who presents emergency department with chest pain discomfort in her upper chest, blood in his stool.  Patient says that last night she started to pain in her throat and her upper chest.  She said it was kind of aching type pain.  Nonradiating.  She has been taking some Tums and some reflux type medicine in Pepto-Bismol, but it really has not been helping so she decided to come and get it checked out today.  Pain does not radiate anywhere.  She does have some nausea.  Not associated with any vomiting or any shortness of breath.  No associated diaphoresis.  She says that she has also had some blood, right red in his stool for the past 2-3 days.  It is painless about 3 episodes per day of bright red blood.  She is unsure if it is hemorrhoids or diverticulosis or something like that.  She is not on any blood thinners.  She says she is due for colonoscopy, has not had 1 in 6 years.      Review of patient's allergies indicates:   Allergen Reactions    Neomycin-polymyxin Swelling    Cephalexin      Other reaction(s): Unknown    Doxycycline      Other reaction(s): Unknown    Iodinated contrast media      Other reaction(s): Unknown    Iodine Other (See Comments)     Due to kidney function    Valsartan      Other reaction(s): Unknown    Bacitracin-polymyxin b Rash     Past Medical History:   Diagnosis Date    Allergy     Arthritis     Atrophic kidney     left. probably childhood illness or disease    Chronic kidney disease     Chronic rhinitis     Colon polyps     Depression     HEARING LOSS      both ears - no hearing aids    Hiatal hernia     Hypertension     Insomnia     Migraines     Otitis media     Sleep apnea     mild uses cpap    TIA (transient ischemic attack)      Past Surgical History:   Procedure Laterality Date     SECTION      COLONOSCOPY  2014    Dr Escobar 5 year yossi    COLONOSCOPY N/A 04/10/2019    Procedure: COLONOSCOPY;  Surgeon: Matt Orozco MD;  Location: [facility] ENDO;  Service: Endoscopy;  Laterality: N/A;    ESOPHAGOGASTRODUODENOSCOPY N/A 2023    Procedure: EGD (ESOPHAGOGASTRODUODENOSCOPY);  Surgeon: Merrill Vela MD;  Location: [facility] ENDO;  Service: Endoscopy;  Laterality: N/A;    HEMORRHOID SURGERY      HYSTERECTOMY      LEFT HEART CATHETERIZATION Left 2024    Procedure: Left heart cath;  Surgeon: Shoshana Mcmahon MD;  Location: [facility] CATH/EP LAB;  Service: Cardiology;  Laterality: Left;    left mandibular node resection      OOPHORECTOMY      URETEROSCOPY       Family History   Problem Relation Name Age of Onset    Cancer Sister          breast    Diabetes Mother      Early death Father      Diabetes Paternal Grandmother      Breast cancer Maternal Aunt      Breast cancer Paternal Aunt       Social History     Tobacco Use    Smoking status: Former     Current packs/day: 0.00     Average packs/day: 1 pack/day for 18.0 years (18.0 ttl pk-yrs)     Types: Cigarettes     Start date: 2000     Quit date: 2018     Years since quittin.8    Smokeless tobacco: Never   Substance Use Topics    Alcohol use: No    Drug use: No     Review of Systems   Constitutional:  Negative for chills and fever.   HENT:  Negative for congestion and sore throat.    Respiratory:  Negative for shortness of breath.    Cardiovascular:  Positive for chest pain. Negative for palpitations and leg swelling.   Gastrointestinal:  Positive for blood in stool and nausea. Negative for abdominal pain and vomiting.   Genitourinary:  Negative for dysuria.   Musculoskeletal:   Negative for back pain and neck pain.   Skin:  Negative for rash.   Neurological:  Negative for seizures, weakness and headaches.   Hematological:  Does not bruise/bleed easily.   All other systems reviewed and are negative.      Physical Exam     Initial Vitals [01/29/25 0850]   BP Pulse Resp Temp SpO2   138/78 (!) 57 (!) 22 98.6 °F (37 °C) 99 %      MAP       --         Physical Exam    Nursing note and vitals reviewed.  Constitutional: She appears well-developed and well-nourished. She is Obese . No distress.   HENT:   Head: Normocephalic and atraumatic. Mouth/Throat: No oropharyngeal exudate.   Eyes: Conjunctivae and EOM are normal. Pupils are equal, round, and reactive to light.   Neck: Neck supple. No tracheal deviation present.   Cardiovascular:  Normal rate, regular rhythm, normal heart sounds and intact distal pulses.           No murmur heard.  Pulmonary/Chest: Breath sounds normal. No stridor. No respiratory distress. She has no wheezes. She has no rhonchi. She has no rales.   Abdominal: Abdomen is soft. She exhibits no distension. There is no abdominal tenderness. There is no rebound.   Genitourinary: Rectum:      Guaiac result negative.   Guaiac negative stool.    Genitourinary Comments: Rectal exam with evidence of old skin tags/no acute hemorrhoids, no active bleeding.  Stool is brown.  Exam chaperoned by Yulissa HUTCHISON     Musculoskeletal:         General: No tenderness or edema. Normal range of motion.      Cervical back: Neck supple.     Neurological: She is alert and oriented to person, place, and time. She has normal strength. No cranial nerve deficit or sensory deficit.   Skin: Skin is warm and dry. Capillary refill takes less than 2 seconds. No rash noted. No erythema. No pallor.   Psychiatric: She has a normal mood and affect. Her behavior is normal. Thought content normal.         ED Course   Procedures  Labs Reviewed   CBC W/ AUTO DIFFERENTIAL - Abnormal       Result Value    WBC 17.56 (*)     RBC  4.34      Hemoglobin 13.3      Hematocrit 40.0      MCV 92      MCH 30.6      MCHC 33.3      RDW 13.5      Platelets 299      MPV 10.2      Immature Granulocytes 0.5      Gran # (ANC) 14.0 (*)     Immature Grans (Abs) 0.09 (*)     Lymph # 2.0      Mono # 1.4 (*)     Eos # 0.1      Baso # 0.03      nRBC 0      Gran % 79.4 (*)     Lymph % 11.4 (*)     Mono % 8.0      Eosinophil % 0.5      Basophil % 0.2      Differential Method Automated     COMPREHENSIVE METABOLIC PANEL - Abnormal    Sodium 138      Potassium 4.2      Chloride 107      CO2 25      Glucose 104      BUN 25 (*)     Creatinine 0.9      Calcium 8.9      Total Protein 5.9 (*)     Albumin 3.5      Total Bilirubin 0.8      Alkaline Phosphatase 96      AST 11      ALT 9 (*)     eGFR >60.0      Anion Gap 6 (*)    B-TYPE NATRIURETIC PEPTIDE - Abnormal     (*)    OCCULT BLOOD X 1, STOOL - Abnormal    Occult Blood Positive (*)    HEPATITIS C ANTIBODY    Hepatitis C Ab Negative      Narrative:     Release to patient->Immediate   HIV 1 / 2 ANTIBODY    HIV 1/2 Ag/Ab Negative      Narrative:     Release to patient->Immediate   MAGNESIUM    Magnesium 1.8     TROPONIN I HIGH SENSITIVITY    Troponin I High Sensitivity 4.1     TROPONIN I HIGH SENSITIVITY    Troponin I High Sensitivity 4.3          ECG Results              Repeat EKG 12-lead (In process)        Collection Time Result Time QRS Duration OHS QTC Calculation    01/29/25 13:24:32 01/29/25 13:42:01 80 440                     In process by Interface, Lab In [facility] (01/29/25 13:42:07)                   Narrative:    Test Reason : R07.9,    Vent. Rate :  49 BPM     Atrial Rate :  49 BPM     P-R Int : 150 ms          QRS Dur :  80 ms      QT Int : 488 ms       P-R-T Axes :  58  62  70 degrees    QTcB Int : 440 ms    Sinus bradycardia  Otherwise normal ECG  When compared with ECG of 29-Jan-2025 08:59,  No significant change was found    Referred By: AAAREFERRAL SELF           Confirmed By:                                       EKG 12-lead (In process)        Collection Time Result Time QRS Duration OHS QTC Calculation    01/29/25 08:59:53 01/29/25 09:47:12 76 422                     In process by Interface, Lab In [facility] (01/29/25 09:47:17)                   Narrative:    Test Reason : R07.9,    Vent. Rate :  53 BPM     Atrial Rate :  53 BPM     P-R Int : 146 ms          QRS Dur :  76 ms      QT Int : 450 ms       P-R-T Axes :  59  53  66 degrees    QTcB Int : 422 ms    Sinus bradycardia  Otherwise normal ECG  No previous ECGs available    Referred By: AAAREFERRAL SELF           Confirmed By:                                   Imaging Results              CT Chest Abdomen Pelvis With IV Contrast (XPD) NO Oral Contrast (Final result)  Result time 01/29/25 14:17:23   Procedure changed from CT Abdomen Pelvis With IV Contrast NO Oral Contrast     Final result by Pavan Pierson MD (01/29/25 14:17:23)                   Impression:      1.  Mild relative long segment circumferential colonic wall thickening and adjacent fat stranding in the sigmoid colo, likely from mild infectious/inflammatory colitis, and or diverticulitis, without finding of obstruction, intra-abdominal free air or abscess (consider correlation with a history of colonoscopy in this age group and follow-up after treatment to document resolution as underlying malignancy is not excluded).    2.  Numerous additional, and incidental findings as outlined above.      Electronically signed by: Pavan Pierson  Date:    01/29/2025  Time:    14:17               Narrative:      CMS MANDATED QUALITY DATA - CT RADIATION - 436    All CT scans at this facility utilize dose modulation, iterative reconstruction, and/or weight based dosing when appropriate to reduce radiation dose to as low as reasonably achievable.    CLINICAL HISTORY:  ([id number])63 y/o  (1960) F    LLQ abdominal pain;    TECHNIQUE:  (A#[id number], exam time 1/29/2025 14:00)    CT CHEST ABDOMEN  PELVIS WITH IV CONTRAST (XPD) [id number]    Axial CT images of the chest, abdomen and pelvis were obtained from the thoracic inlet to the proximal thigh.    COMPARISON:  None available.    FINDINGS:  CT Chest:    Visualized neck: normal    Lungs: The lungs are clear.    Airway: The trachea and central bronchial tree appear normal.    Pleura: There is no pleural effusion. There is no pneumothorax.    Cardiovascular: The heart is normal in size. There is no pericardial effusion. The aorta and great vessels are normal in course and caliber  noting atherosclerotic calcifications in the aorta and coronary arteries.    Mediastinum: There is no supraclavicular  axillary  mediastinal  or hilar lymphadenopathy.    Soft tissues: The peripheral soft tissues appear normal.    Musculoskeletal: There are mild degenerative changes of the thoracic spine.  There are no suspicious osseous lesions.    Esophagus: The esophagus appears grossly normal.    CT Abdomen:    Liver: The liver is normal in size and imaging appearance.    Gallbladder: There are stones seen in the gallbladder without wall thickening or pericholecystic fluid to suggest acute cholecystitis.    Biliary Tree: No intra or extrahepatic ductal dilation.    Spleen: Normal.    Pancreas: The pancreas is normal.    Adrenal Glands: Normal    Kidneys: No hydronephrosis/hydroureter or evidence of obstructive uropathy.  The left distal ureter is somewhat patulous with no radiopaque stone identified.  The left kidney is asymmetrically small/atrophic with renal cortical thinning.  There is a 3 mm cortical calcifications suggesting sequelae of old infection/trauma.  Rounded simple fluid attenuation renal cortical cysts are seen bilaterally, for example measuring 3 cm in the inferior pole of the left kidney, and 15 mm in the midpole and upper pole of the right kidney.    Vasculature: There are scattered atheromatous mural plaques in the aorta and its major branch vessels with no  aneurysm seen.    Lymph nodes: No abdominal lymphadenopathy is seen.    Intraperitoneal structures: There is no ascites.    Bowel: Mild long segment circumferential colonic wall thickening in the sigmoid colon with adjacent fat stranding (image 252).  There is scattered colonic diverticula in this area.  The appendix is normal (image 204).  No finding of bowel obstruction, intra-abdominal free air or abscess.    Abdominal wall: The abdominal wall and musculature are normal.    Musculoskeletal: Severe disc height loss with adjacent endplate sclerosis is seen at T12-L1, with moderate to severe disc height loss at L4-L5.    CT Pelvis:    Bladder: Normal.    Reproductive Organs: The uterus is not visualized/surgically absent.  Hysterectomy    Pelvic Lymph nodes: No pelvic lymphadenopathy or pelvic mass is identified.                                       X-Ray Chest AP Portable (Final result)  Result time 01/29/25 10:11:33      Final result by Ludivina Dias MD (01/29/25 10:11:33)                   Impression:      No acute cardiopulmonary abnormality.      Electronically signed by: Ludivina Dias  Date:    01/29/2025  Time:    10:11               Narrative:    EXAMINATION:  XR CHEST AP PORTABLE    CLINICAL HISTORY:  Chest Pain;    FINDINGS:  Portable chest at 09:36 is compared to 02/15/2024 shows normal cardiomediastinal silhouette.    Lungs are clear. Pulmonary vasculature is normal. No acute osseous abnormality.                                       Medications   piperacillin-tazobactam (ZOSYN) 4.5 g in D5W 100 mL IVPB (MB+) (has no administration in time range)   ondansetron injection 4 mg (4 mg Intravenous Given 1/29/25 1325)   methylPREDNISolone sodium succinate injection 125 mg (125 mg Intravenous Given 1/29/25 1327)   diphenhydrAMINE injection 25 mg (25 mg Intravenous Given 1/29/25 1329)   aluminum-magnesium hydroxide-simethicone 200-200-20 mg/5 mL suspension 30 mL (30 mLs Oral Given 1/29/25 1414)     And    LIDOcaine viscous HCl 2% oral solution 15 mL (15 mLs Oral Given 1/29/25 1414)   iohexoL (OMNIPAQUE 350) injection 100 mL (100 mLs Intravenous Given 1/29/25 1348)     Medical Decision Making  Differential includes but not limited to peptic ulcer disease, pancreatitis, splenic infarct, kidney stone, pyelonephritis, urinary tract infection, bowel obstruction, volvulus, intussusception, mesenteric adenitis, mesenteric ischemia, intra-abdominal abscess, intra-abdominal hemorrhage, ACS, pneumonia, constipation, perforation, appendicitis, cholecystitis, cholelithiasis, cholangitis, cancer, chest wall pain, acute coronary syndrome, PE, pericarditis, myocarditis, pericardial tamponade, aortic dissection, pneumonia, pneumothorax, Boerhaave syndrome, anemia, electrolyte abnormalities, herpes zoster, pancreatitis, anxiety, chest wall strain, costochondritis    Emergent evaluation of a 64-year-old female presents emergency department with the above-mentioned complaints.  The patient with some chest pain as mentioned above.  She actually had some relief of symptoms after GI cocktail administered.  Patient also having some rectal bleeding.  On repeat exam patient had minimal tenderness in his left lower quadrant region.  Obtained a CT of her abdomen and pelvis which shows evidence of colitis versus diverticulitis.  Given blood in his stool over the last 2 days will treat for colitis.  Patient likely will need definitive colonoscopy to rule out mass/underlying lesion possibly this hospitalization or when she is feeling better.  She may need upper endoscopy as well given her upper chest complaints.  Patient has a 17,000 white count.  Patient will be admitted for further evaluation of symptoms.    A dictation software program was used for this note.  Please expect some simple typographical  errors in this note.         Amount and/or Complexity of Data Reviewed  External Data Reviewed: labs and notes.  Labs: ordered. Decision-making  details documented in ED Course.  Radiology: ordered and independent interpretation performed. Decision-making details documented in ED Course.  ECG/medicine tests: ordered and independent interpretation performed. Decision-making details documented in ED Course.    Risk  OTC drugs.  Prescription drug management.  Decision regarding hospitalization.               ED Course as of 01/29/25 1504   Wed Jan 29, 2025   0927 EKG 8:59 a.m. normal sinus rhythm rate of 53, sinus bradycardia.  No ST elevation or depression.  No evidence of ischemia.  Normal intervals.  No STEMI.  EKG interpreted independently by me. [JR]   1333 EKG 1:24 p.m. sinus bradycardia rate of 49.  No ST elevation or depression.  No STEMI.  EKG interpreted independently by me. [JR]   8521 Dr. Manning from [facility] will admit the patient [JR]      ED Course User Index  [JR] Nba Murillo DO                           Clinical Impression:  Final diagnoses:  [R07.9] Chest pain  [K52.9] Colitis (Primary)          ED Disposition Condition    Admit Stable                Nba Murillo DO  01/29/25 1504

## 2025-01-29 NOTE — PLAN OF CARE
[facility] - Emergency Dept  Initial Discharge Assessment       Primary Care Provider: Padmini Lackey FNP    Admission Diagnosis: Colitis [K52.9]    Admission Date: 1/29/2025  Expected Discharge Date:     Transition of Care Barriers: (P) None     met with Pt at bedside to complete an initial discharge assessment. Pt AAOx4s.  Demographics, PCP, and insurance verified. Pt has No dialysis. Pt reports an inability to complete ADLs without assistance. DME reported. See below. Pt verbalized plan to discharge home by driving herself. Pt reports difficulty paying for meds. Pt has no other needs to be addressed at this time.     Payor: MEDICARE / Plan: MEDICARE PART A & B / Product Type: Government /     Extended Emergency Contact Information  Primary Emergency Contact: Deiv Pearl  Mobile Phone: [phone]  Relation: Mother  Preferred language: English   needed? No  Secondary Emergency Contact: gitadonovan  Mobile Phone: [phone]  Relation: Brother  Preferred language: English   needed? No    Discharge Plan A: (P) Home  Discharge Plan B: (P) Home      [facility] [address] [place], [place] - [address] [facility]  [address] [facility] [address]  Phone: [phone] Fax: [phone]    [id number] - [place], [place] - [address] [employer]  [address] [facility] [address]  Phone: [phone] Fax: [phone]    [facility] [address] [address]  [address] [address] [address]  Phone: [phone] Fax: [phone]      Initial Assessment (most recent)       Adult Discharge Assessment - 01/29/25 1551          Discharge Assessment    Assessment Type Discharge Planning Assessment (P)      Confirmed/corrected address, phone number and insurance Yes (P)      Confirmed Demographics Correct on Facesheet (P)      Source of Information patient (P)      When was your last doctors appointment? -- (P)     2 months ago    Reason For Admission Acute colitis (P)      People in Home alone (P)      Facility Arrived From: Home (P)      Do you expect to return to your current living situation? Yes (P)      Do you have help at home or someone to help you manage your care at home? No (P)      Prior to hospitilization cognitive status: Unable to Assess (P)      Current cognitive status: Alert/Oriented (P)      Walking or Climbing Stairs Difficulty yes (P)      Dressing/Bathing Difficulty no (P)      Home Accessibility not wheelchair accessible (P)      Home Layout Able to live on 1st floor (P)      Equipment Currently Used at Home cane, straight;walker, rolling (P)      Readmission within 30 days? No (P)      Patient currently being followed by outpatient case management? No (P)      Do you currently have service(s) that help you manage your care at home? No (P)      Do you take prescription medications? Yes (P)      Do you have prescription coverage? Yes (P)      Coverage Payor: MEDICARE - MEDICARE PART A & B - (P)      Do you have any problems affording any of your prescribed medications? Yes (P)    Sometimes, get specific names of meds from patient    Is the patient taking medications as prescribed? yes (P)      Who is going to help you get home at discharge? Pt will drive self (P)      How do you get to doctors appointments? car, drives self (P)      Are you on dialysis? No (P)      Do you take coumadin? No (P)      Discharge Plan A Home (P)      Discharge Plan B Home (P)      DME Needed Upon Discharge  none (P)      Discharge Plan discussed with: Patient (P)      Transition of Care Barriers None (P)

## 2025-01-29 NOTE — PROGRESS NOTES
Pharmacist Renal Dose Adjustment Note    Jonna Pearl is a 64 y.o. female being treated with the medication enoxaparin.    Patient Data:    Vital Signs (Most Recent):  Temp: 98.6 °F (37 °C) (01/29/25 0850)  Pulse: (!) 52 (01/29/25 0935)  Resp: (!) 22 (01/29/25 0850)  BP: (!) 127/58 (01/29/25 0935)  SpO2: 99 % (01/29/25 0935) Vital Signs (72h Range):  Temp:  [98.6 °F (37 °C)]   Pulse:  [52-57]   Resp:  [22]   BP: (127-138)/(58-78)   SpO2:  [99 %]      Recent Labs   Lab 01/29/25  1043   CREATININE 0.9     Serum creatinine: 0.9 mg/dL 01/29/25 1043  Estimated creatinine clearance: 75.9 mL/min    Enoxaparin 40 mg Q24H will be changed to Enoxaparin 40 mg Q12H due to BMI > 40    Pharmacist's Name: Madgi Mensah  Pharmacist's Extension: [phone]

## 2025-01-30 ENCOUNTER — TELEPHONE (OUTPATIENT)
Dept: PHARMACY | Facility: CLINIC | Age: 65
End: 2025-01-30
Payer: MEDICARE

## 2025-01-30 LAB
ALBUMIN SERPL BCP-MCNC: 3.2 G/DL (ref 3.5–5.2)
ALP SERPL-CCNC: 85 U/L (ref 55–135)
ALT SERPL W/O P-5'-P-CCNC: 8 U/L (ref 10–44)
ANION GAP SERPL CALC-SCNC: 8 MMOL/L (ref 8–16)
AST SERPL-CCNC: 12 U/L (ref 10–40)
BACTERIA #/AREA URNS HPF: ABNORMAL /HPF
BASOPHILS # BLD AUTO: 0.02 K/UL (ref 0–0.2)
BASOPHILS NFR BLD: 0.1 % (ref 0–1.9)
BILIRUB SERPL-MCNC: 0.5 MG/DL (ref 0.1–1)
BILIRUB UR QL STRIP: NEGATIVE
BUN SERPL-MCNC: 25 MG/DL (ref 8–23)
CALCIUM SERPL-MCNC: 8.4 MG/DL (ref 8.7–10.5)
CHLORIDE SERPL-SCNC: 109 MMOL/L (ref 95–110)
CLARITY UR: ABNORMAL
CO2 SERPL-SCNC: 20 MMOL/L (ref 23–29)
COLOR UR: YELLOW
CREAT SERPL-MCNC: 0.9 MG/DL (ref 0.5–1.4)
DIFFERENTIAL METHOD BLD: ABNORMAL
EOSINOPHIL # BLD AUTO: 0 K/UL (ref 0–0.5)
EOSINOPHIL NFR BLD: 0 % (ref 0–8)
ERYTHROCYTE [DISTWIDTH] IN BLOOD BY AUTOMATED COUNT: 13.5 % (ref 11.5–14.5)
EST. GFR  (NO RACE VARIABLE): >60 ML/MIN/1.73 M^2
GLUCOSE SERPL-MCNC: 173 MG/DL (ref 70–110)
GLUCOSE UR QL STRIP: NEGATIVE
HCT VFR BLD AUTO: 40.2 % (ref 37–48.5)
HGB BLD-MCNC: 12.6 G/DL (ref 12–16)
HGB UR QL STRIP: NEGATIVE
HYALINE CASTS #/AREA URNS LPF: 0 /LPF
IMM GRANULOCYTES # BLD AUTO: 0.14 K/UL (ref 0–0.04)
IMM GRANULOCYTES NFR BLD AUTO: 0.6 % (ref 0–0.5)
KETONES UR QL STRIP: ABNORMAL
LEUKOCYTE ESTERASE UR QL STRIP: ABNORMAL
LYMPHOCYTES # BLD AUTO: 1.4 K/UL (ref 1–4.8)
LYMPHOCYTES NFR BLD: 5.8 % (ref 18–48)
MAGNESIUM SERPL-MCNC: 2.3 MG/DL (ref 1.6–2.6)
MCH RBC QN AUTO: 29.6 PG (ref 27–31)
MCHC RBC AUTO-ENTMCNC: 31.3 G/DL (ref 32–36)
MCV RBC AUTO: 94 FL (ref 82–98)
MICROSCOPIC COMMENT: ABNORMAL
MONOCYTES # BLD AUTO: 0.5 K/UL (ref 0.3–1)
MONOCYTES NFR BLD: 2 % (ref 4–15)
NEUTROPHILS # BLD AUTO: 22.4 K/UL (ref 1.8–7.7)
NEUTROPHILS NFR BLD: 91.5 % (ref 38–73)
NITRITE UR QL STRIP: NEGATIVE
NRBC BLD-RTO: 0 /100 WBC
PH UR STRIP: 6 [PH] (ref 5–8)
PLATELET # BLD AUTO: 230 K/UL (ref 150–450)
PMV BLD AUTO: 10.9 FL (ref 9.2–12.9)
POCT GLUCOSE: 131 MG/DL (ref 70–110)
POCT GLUCOSE: 158 MG/DL (ref 70–110)
POCT GLUCOSE: 182 MG/DL (ref 70–110)
POCT GLUCOSE: 187 MG/DL (ref 70–110)
POTASSIUM SERPL-SCNC: 4.3 MMOL/L (ref 3.5–5.1)
PROT SERPL-MCNC: 5.8 G/DL (ref 6–8.4)
PROT UR QL STRIP: ABNORMAL
RBC # BLD AUTO: 4.26 M/UL (ref 4–5.4)
RBC #/AREA URNS HPF: 4 /HPF (ref 0–4)
SODIUM SERPL-SCNC: 137 MMOL/L (ref 136–145)
SP GR UR STRIP: >1.03 (ref 1–1.03)
SQUAMOUS #/AREA URNS HPF: 17 /HPF
TROPONIN I SERPL HS-MCNC: 3.4 PG/ML (ref 0–14.9)
UNIDENT CRYS URNS QL MICRO: 3
URN SPEC COLLECT METH UR: ABNORMAL
UROBILINOGEN UR STRIP-ACNC: NEGATIVE EU/DL
WBC # BLD AUTO: 24.46 K/UL (ref 3.9–12.7)
WBC #/AREA URNS HPF: 44 /HPF (ref 0–5)
YEAST URNS QL MICRO: ABNORMAL

## 2025-01-30 PROCEDURE — 94761 N-INVAS EAR/PLS OXIMETRY MLT: CPT

## 2025-01-30 PROCEDURE — 87086 URINE CULTURE/COLONY COUNT: CPT | Performed by: NURSE PRACTITIONER

## 2025-01-30 PROCEDURE — 36415 COLL VENOUS BLD VENIPUNCTURE: CPT | Performed by: INTERNAL MEDICINE

## 2025-01-30 PROCEDURE — 25000003 PHARM REV CODE 250: Performed by: INTERNAL MEDICINE

## 2025-01-30 PROCEDURE — 63600175 PHARM REV CODE 636 W HCPCS: Performed by: INTERNAL MEDICINE

## 2025-01-30 PROCEDURE — 83735 ASSAY OF MAGNESIUM: CPT | Performed by: INTERNAL MEDICINE

## 2025-01-30 PROCEDURE — 80053 COMPREHEN METABOLIC PANEL: CPT | Performed by: INTERNAL MEDICINE

## 2025-01-30 PROCEDURE — 85025 COMPLETE CBC W/AUTO DIFF WBC: CPT | Performed by: INTERNAL MEDICINE

## 2025-01-30 PROCEDURE — 12000002 HC ACUTE/MED SURGE SEMI-PRIVATE ROOM

## 2025-01-30 PROCEDURE — 84484 ASSAY OF TROPONIN QUANT: CPT | Performed by: INTERNAL MEDICINE

## 2025-01-30 PROCEDURE — 81001 URINALYSIS AUTO W/SCOPE: CPT | Performed by: NURSE PRACTITIONER

## 2025-01-30 RX ADMIN — TIMOLOL MALEATE 1 DROP: 5 SOLUTION/ DROPS OPHTHALMIC at 07:01

## 2025-01-30 RX ADMIN — PIPERACILLIN SODIUM AND TAZOBACTAM SODIUM 3.38 G: 3; .375 INJECTION, POWDER, FOR SOLUTION INTRAVENOUS at 08:01

## 2025-01-30 RX ADMIN — TIMOLOL MALEATE 1 DROP: 5 SOLUTION/ DROPS OPHTHALMIC at 08:01

## 2025-01-30 RX ADMIN — PANTOPRAZOLE SODIUM 40 MG: 40 INJECTION, POWDER, FOR SOLUTION INTRAVENOUS at 08:01

## 2025-01-30 RX ADMIN — PIPERACILLIN SODIUM AND TAZOBACTAM SODIUM 4.5 G: 4; .5 INJECTION, POWDER, FOR SOLUTION INTRAVENOUS at 04:01

## 2025-01-30 RX ADMIN — PIPERACILLIN SODIUM AND TAZOBACTAM SODIUM 3.38 G: 3; .375 INJECTION, POWDER, FOR SOLUTION INTRAVENOUS at 01:01

## 2025-01-30 RX ADMIN — INSULIN ASPART 1 UNITS: 100 INJECTION, SOLUTION INTRAVENOUS; SUBCUTANEOUS at 01:01

## 2025-01-30 RX ADMIN — ACETAMINOPHEN 650 MG: 325 TABLET ORAL at 01:01

## 2025-01-30 RX ADMIN — LATANOPROST 1 DROP: 50 SOLUTION OPHTHALMIC at 07:01

## 2025-01-30 NOTE — ASSESSMENT & PLAN NOTE
Patient's FSGs are controlled on current medication regimen.  Last A1c reviewed-   Lab Results   Component Value Date    HGBA1C 5.9 08/06/2023     Most recent fingerstick glucose reviewed-   Recent Labs   Lab 01/29/25  1806 01/30/25  0034 01/30/25  0651   POCTGLUCOSE 284* 187* 158*     Current correctional scale  Medium  Maintain anti-hyperglycemic dose as follows-   Antihyperglycemics (From admission, onward)      Start     Stop Route Frequency Ordered    01/29/25 1620  insulin aspart U-100 pen 0-10 Units         -- SubQ Before meals & nightly PRN 01/29/25 1521          Hold Oral hypoglycemics while patient is in the hospital.

## 2025-01-30 NOTE — CONSULTS
GASTROENTEROLOGY INPATIENT CONSULT NOTE  Patient Name: Jonna Pearl  Patient MRN: [id number]  Patient : 1960    Admit Date: 2025  Service date: 2025    Reason for Consult:  Diverticulitis    PCP: Padmini Lackey FNP    Chief Complaint   Patient presents with    Chest Pain     Chest pain since last night, bright red blood in stools since last night, denies abdominal pain - nauseated since last night - patient presents to ER with increased WOB - hx of COPD and diverticulitis    Nausea    Shortness of Breath    Rectal Bleeding       HPI: Patient is  64 year old pt getting admitted with chest pain/lower GI bleed and colitis  Pt started having chest pain few days ago  Pain mostly on retrosternal area / described as belching   Later she started having nausea with some abdominal pain'  Followed by multiple bouts of fresh blood in stools and pt came to ER & got admitted        CHART REVIEW:  Colonoscopy 04/10/2019 dr. magallanes.  Normal  EGD 2023 2 cm hiatal hernia.  Antral gastropathy.  Lower esophageal ring  Colonoscopy 2014 external hemorrhoids. Diverticulosis    CT chest abdomen pelvis 2025 :  Long segment thought stranding in the sigmoid colon suggestive of colitis or diverticulitis.  ct AP 2023.  Sigmoid persistent diverticulitis.  Hypodensity gallbladder with gallstones and haziness of the gallbladder wall nonspecific.  CT abdomen pelvis 2023: Sigmoid diverticulitis and gallstones.    Past Medical History:  Past Medical History:   Diagnosis Date    Allergy     Arthritis     Atrophic kidney     left. probably childhood illness or disease    Chronic kidney disease     Chronic rhinitis     Colon polyps     Depression     HEARING LOSS     both ears - no hearing aids    Hiatal hernia     Hypertension     Insomnia     Migraines     Otitis media     Sleep apnea     mild uses cpap    TIA (transient ischemic attack)         Past Surgical History:  Past Surgical History:    Procedure Laterality Date     SECTION      COLONOSCOPY  2014    Dr Escobar 5 year yossi    COLONOSCOPY N/A 04/10/2019    Procedure: COLONOSCOPY;  Surgeon: Matt Orozco MD;  Location: [facility] ENDO;  Service: Endoscopy;  Laterality: N/A;    ESOPHAGOGASTRODUODENOSCOPY N/A 2023    Procedure: EGD (ESOPHAGOGASTRODUODENOSCOPY);  Surgeon: Merrill Vela MD;  Location: [facility] ENDO;  Service: Endoscopy;  Laterality: N/A;    HEMORRHOID SURGERY      HYSTERECTOMY      LEFT HEART CATHETERIZATION Left 2024    Procedure: Left heart cath;  Surgeon: Shoshana Mcmahon MD;  Location: [facility] CATH/EP LAB;  Service: Cardiology;  Laterality: Left;    left mandibular node resection      OOPHORECTOMY      URETEROSCOPY          Home Medications:  Medications Prior to Admission   Medication Sig Dispense Refill Last Dose/Taking    clobetasoL (TEMOVATE) 0.05 % external solution Apply topically 2 (two) times daily. (Patient taking differently: Apply 1 g topically 2 (two) times daily.) 50 mL 1 2025 Evening    gabapentin (NEURONTIN) 600 MG tablet Take 600 mg by mouth 3 (three) times daily as needed.  9 Past Week    ketoconazole (NIZORAL) 2 % shampoo Apply 1 Application topically Every 3 (three) days. 1 application as needed to wet hair every 3 to 4 days external once a day   Past Week    latanoprost 0.005 % ophthalmic solution Place 1 drop into both eyes every evening.  6 2025 Evening    lisinopriL (PRINIVIL,ZESTRIL) 40 MG tablet Take 1 tablet by mouth once daily.   2025 Morning    timolol maleate 0.5% (TIMOPTIC) 0.5 % Drop Place 1 drop into both eyes 2 (two) times daily.   2025 Morning    verapamiL (CALAN-SR) 240 MG CR tablet Take 1 tablet by mouth once daily.   2025 Morning    ketoconazole (NIZORAL) 2 % shampoo Apply topically twice a week. 120 mL 5     predniSONE (DELTASONE) 20 MG tablet Take 2 tablets (40 mg total) by mouth 3 (three) times daily. (Patient not taking: Reported on 2025) 18  tablet 0 Not Taking    topiramate (TOPAMAX) 50 MG tablet Take 1 tablet (50 mg total) by mouth once daily. (Patient not taking: Reported on 2025) 30 tablet 11 Not Taking       Inpatient Medications:  Review of patient's allergies indicates:   Allergen Reactions    Neomycin-polymyxin Swelling    Cephalexin      Other reaction(s): Unknown    Doxycycline      Other reaction(s): Unknown    Iodinated contrast media      Other reaction(s): Unknown    Iodine Other (See Comments)     Due to kidney function    Valsartan      Other reaction(s): Unknown    Bacitracin-polymyxin b Rash       Social History:   Social History     Occupational History    Not on file   Tobacco Use    Smoking status: Former     Current packs/day: 0.00     Average packs/day: 1 pack/day for 18.0 years (18.0 ttl pk-yrs)     Types: Cigarettes     Start date: 2000     Quit date: 2018     Years since quittin.8    Smokeless tobacco: Never   Substance and Sexual Activity    Alcohol use: No    Drug use: No    Sexual activity: Not on file       Family History:   Family History   Problem Relation Name Age of Onset    Cancer Sister          breast    Diabetes Mother      Early death Father      Diabetes Paternal Grandmother      Breast cancer Maternal Aunt      Breast cancer Paternal Aunt         Review of Systems:  GENERAL: No fever, chills, weight loss  SKIN: No rashes, jaundice, pruritus  HEENT: No rhinorrhea, epistaxis, vision changes.  No trauma, tinnitus, lymphadenopathy or pharyngitis  CV: No chest pain, palpitations, edima or ROSADO  PULM: No cough or sputum production.  No wheezing.  GI: AS IN HPI  URINARY:  No hematuria, dysuria  MS: No change in muscle or joint pain, weakness, or ROM  Neuro: No focal neurologic changes  PSYCH: No change in mood or personality.  No suicidal ideation.  ENDOCRINE: No fatigue      OBJECTIVE:    Vitals:    25 0649 25 0710 25 1042 25 1512   BP: 138/75  (!) 144/83 (!) 155/74   BP  "Location: Right arm  Right arm Right arm   Patient Position: Sitting  Sitting Sitting   Pulse: 72  65 67   Resp: 16  17    Temp: 97.5 °F (36.4 °C)  98.1 °F (36.7 °C) 98.4 °F (36.9 °C)   TempSrc: Oral  Oral Oral   SpO2: 95% 96% 96% 98%   Weight:       Height:         Physical Exam:    GEN: well-developed, well-nourished, awake and alert, non-toxic appearing adult  HEENT: PERRL, sclera anicteric, oral mucosa pink and moist without lesion  NECK: trachea midline; Good ROM  CV: regular rate and rhythm, no murmurs or gallops  RESP: clear to auscultation bilaterally, no wheezes, rhonci or rales  ABD: soft, non-tender, non-distended, normal bowel sounds  EXT: no swelling or edema, 2+ pulses distally  SKIN: no rashes or jaundice  PSYCH: normal affect    Labs:   Recent Labs   Lab 01/29/25  1043 01/30/25  0440   WBC 17.56* 24.46*   HGB 13.3 12.6    230   MCV 92 94     No results for input(s): "IRON", "FERRITIN" in the last 168 hours.    Invalid input(s): "IRONSAT"  Recent Labs   Lab 01/29/25  1043 01/30/25  0440    137   K 4.2 4.3   BUN 25* 25*   CREATININE 0.9 0.9   ALBUMIN 3.5 3.2*   AST 11 12   ALT 9* 8*   ALKPHOS 96 85            Radiology Review:  Imaging Results              CT Chest Abdomen Pelvis With IV Contrast (XPD) NO Oral Contrast (Final result)  Result time 01/29/25 14:17:23   Procedure changed from CT Abdomen Pelvis With IV Contrast NO Oral Contrast     Final result by Pavan Pierson MD (01/29/25 14:17:23)                   Impression:      1.  Mild relative long segment circumferential colonic wall thickening and adjacent fat stranding in the sigmoid colo, likely from mild infectious/inflammatory colitis, and or diverticulitis, without finding of obstruction, intra-abdominal free air or abscess (consider correlation with a history of colonoscopy in this age group and follow-up after treatment to document resolution as underlying malignancy is not excluded).    2.  Numerous additional, and " incidental findings as outlined above.      Electronically signed by: Pavan Pierson  Date:    01/29/2025  Time:    14:17               Narrative:      CMS MANDATED QUALITY DATA - CT RADIATION - 436    All CT scans at this facility utilize dose modulation, iterative reconstruction, and/or weight based dosing when appropriate to reduce radiation dose to as low as reasonably achievable.    CLINICAL HISTORY:  ([id number])63 y/o  (1960) F    LLQ abdominal pain;    TECHNIQUE:  (A#[id number], exam time 1/29/2025 14:00)    CT CHEST ABDOMEN PELVIS WITH IV CONTRAST (XPD) [id number]    Axial CT images of the chest, abdomen and pelvis were obtained from the thoracic inlet to the proximal thigh.    COMPARISON:  None available.    FINDINGS:  CT Chest:    Visualized neck: normal    Lungs: The lungs are clear.    Airway: The trachea and central bronchial tree appear normal.    Pleura: There is no pleural effusion. There is no pneumothorax.    Cardiovascular: The heart is normal in size. There is no pericardial effusion. The aorta and great vessels are normal in course and caliber  noting atherosclerotic calcifications in the aorta and coronary arteries.    Mediastinum: There is no supraclavicular  axillary  mediastinal  or hilar lymphadenopathy.    Soft tissues: The peripheral soft tissues appear normal.    Musculoskeletal: There are mild degenerative changes of the thoracic spine.  There are no suspicious osseous lesions.    Esophagus: The esophagus appears grossly normal.    CT Abdomen:    Liver: The liver is normal in size and imaging appearance.    Gallbladder: There are stones seen in the gallbladder without wall thickening or pericholecystic fluid to suggest acute cholecystitis.    Biliary Tree: No intra or extrahepatic ductal dilation.    Spleen: Normal.    Pancreas: The pancreas is normal.    Adrenal Glands: Normal    Kidneys: No hydronephrosis/hydroureter or evidence of obstructive uropathy.  The left distal ureter is  somewhat patulous with no radiopaque stone identified.  The left kidney is asymmetrically small/atrophic with renal cortical thinning.  There is a 3 mm cortical calcifications suggesting sequelae of old infection/trauma.  Rounded simple fluid attenuation renal cortical cysts are seen bilaterally, for example measuring 3 cm in the inferior pole of the left kidney, and 15 mm in the midpole and upper pole of the right kidney.    Vasculature: There are scattered atheromatous mural plaques in the aorta and its major branch vessels with no aneurysm seen.    Lymph nodes: No abdominal lymphadenopathy is seen.    Intraperitoneal structures: There is no ascites.    Bowel: Mild long segment circumferential colonic wall thickening in the sigmoid colon with adjacent fat stranding (image 252).  There is scattered colonic diverticula in this area.  The appendix is normal (image 204).  No finding of bowel obstruction, intra-abdominal free air or abscess.    Abdominal wall: The abdominal wall and musculature are normal.    Musculoskeletal: Severe disc height loss with adjacent endplate sclerosis is seen at T12-L1, with moderate to severe disc height loss at L4-L5.    CT Pelvis:    Bladder: Normal.    Reproductive Organs: The uterus is not visualized/surgically absent.  Hysterectomy    Pelvic Lymph nodes: No pelvic lymphadenopathy or pelvic mass is identified.                                       X-Ray Chest AP Portable (Final result)  Result time 01/29/25 10:11:33      Final result by Ludivina Dias MD (01/29/25 10:11:33)                   Impression:      No acute cardiopulmonary abnormality.      Electronically signed by: Ludivina Dias  Date:    01/29/2025  Time:    10:11               Narrative:    EXAMINATION:  XR CHEST AP PORTABLE    CLINICAL HISTORY:  Chest Pain;    FINDINGS:  Portable chest at 09:36 is compared to 02/15/2024 shows normal cardiomediastinal silhouette.    Lungs are clear. Pulmonary vasculature is  normal. No acute osseous abnormality.                                          IMPRESSION / RECOMMENDATIONS:   Active Problem List with Overview Notes    Diagnosis Date Noted    Acute colitis 01/29/2025    Chest pain 01/29/2025    Diverticulitis 01/29/2025    Lower GI bleed 01/29/2025    Type 2 diabetes mellitus, without long-term current use of insulin 08/27/2023    Bigeminy 08/11/2023    WES on CPAP 08/11/2023    Anemia 08/10/2023    Leucocytosis 08/10/2023    Acute diverticulitis 08/06/2023    Nondisplaced fracture of head of right radius, initial encounter for closed fracture 06/22/2023    Chronic bilateral low back pain without sciatica 06/22/2023    History of nephrolithiasis 04/08/2023    Chronic migraine without aura, with intractable migraine, so stated, with status migrainosus 04/08/2023    Right elbow pain 03/21/2023    Gait abnormality 01/13/2023    Osteopenia of multiple sites 07/01/2022    Morbid obesity 06/20/2022    Dysmetabolic syndrome 06/20/2022    Essential hypertension 06/20/2022    Postmenopausal 06/20/2022    Weight gain 06/20/2022    Chronic fatigue 06/20/2022    Hypovitaminosis D 06/20/2022    Obstructive sleep apnea syndrome 06/20/2022    Diverticulitis of large intestine without perforation or abscess 09/11/2019    Morbid obesity with BMI of 40.0-44.9, adult 09/11/2019    Asymptomatic gallstones 09/11/2019    Stage 3 chronic kidney disease 09/11/2019    History of colon polyps 04/10/2019    Hypertension, essential 05/18/2012    Personal history of kidney stones 05/18/2012    DJD (degenerative joint disease) of knee 05/18/2012    Migraine 05/18/2012    History of colonic polyps 05/18/2012    ALLERGIC RHINITIS 05/18/2012    Spinal enthesopathy 08/28/2011      64-year-old female patient with a history of recurrent diverticulitis admitted with diverticulitis associated with rectal bleeding.  Patient's hemoglobin and hematocrit are stable.  Patient with leukocytosis and similar to presentation  in 2023.  She reports that she responded better to oral Augmentin upon discharge on her last episode.    Okay to advance diet to low residue diet and have patient follow up with us as an outpatient to schedule colonoscopy after resolution of this acute event    Thank you for this consult.    Alexia Quintero  1/30/2025  3:30 PM

## 2025-01-30 NOTE — SUBJECTIVE & OBJECTIVE
Interval History: no acute events overnight. No further rectal bleeding, but states no stool either. WBC trended up, continue zosyn. Follow urine culture.     Review of Systems   Constitutional:  Positive for fatigue.   Cardiovascular:  Negative for chest pain.   Gastrointestinal:  Positive for abdominal pain and blood in stool.     Objective:     Vital Signs (Most Recent):  Temp: 98.1 °F (36.7 °C) (01/30/25 1042)  Pulse: 65 (01/30/25 1042)  Resp: 17 (01/30/25 1042)  BP: (!) 144/83 (01/30/25 1042)  SpO2: 96 % (01/30/25 1042) Vital Signs (24h Range):  Temp:  [97.5 °F (36.4 °C)-98.2 °F (36.8 °C)] 98.1 °F (36.7 °C)  Pulse:  [53-72] 65  Resp:  [16-18] 17  SpO2:  [94 %-100 %] 96 %  BP: (123-148)/(58-84) 144/83     Weight: 120.2 kg (264 lb 14.4 oz)  Body mass index is 48.45 kg/m².    Intake/Output Summary (Last 24 hours) at 1/30/2025 1343  Last data filed at 1/30/2025 1244  Gross per 24 hour   Intake 540 ml   Output 1000 ml   Net -460 ml         Physical Exam  Vitals and nursing note reviewed.   Constitutional:       Appearance: Normal appearance. She is obese.   HENT:      Head: Normocephalic and atraumatic.      Nose: Nose normal.      Mouth/Throat:      Mouth: Mucous membranes are moist.      Pharynx: Oropharynx is clear.   Eyes:      Extraocular Movements: Extraocular movements intact.      Pupils: Pupils are equal, round, and reactive to light.   Cardiovascular:      Rate and Rhythm: Normal rate and regular rhythm.      Pulses: Normal pulses.   Pulmonary:      Effort: Pulmonary effort is normal.      Comments: Diminished throughout, likely d/t body habitus  Abdominal:      Palpations: Abdomen is soft.      Tenderness: There is abdominal tenderness.      Comments: Hypoactive bowel sounds throughout, tenderness with palpation of the LLQ   Musculoskeletal:         General: Normal range of motion.      Cervical back: Normal range of motion and neck supple.   Skin:     General: Skin is warm and dry.      Capillary Refill:  Capillary refill takes less than 2 seconds.   Neurological:      General: No focal deficit present.      Mental Status: She is alert and oriented to person, place, and time.   Psychiatric:         Mood and Affect: Mood normal.         Behavior: Behavior normal.             Significant Labs: All pertinent labs within the past 24 hours have been reviewed.  CBC:   Recent Labs   Lab 01/29/25  1043 01/30/25  0440   WBC 17.56* 24.46*   HGB 13.3 12.6   HCT 40.0 40.2    230     CMP:   Recent Labs   Lab 01/29/25  1043 01/30/25  0440    137   K 4.2 4.3    109   CO2 25 20*    173*   BUN 25* 25*   CREATININE 0.9 0.9   CALCIUM 8.9 8.4*   PROT 5.9* 5.8*   ALBUMIN 3.5 3.2*   BILITOT 0.8 0.5   ALKPHOS 96 85   AST 11 12   ALT 9* 8*   ANIONGAP 6* 8     Urine Studies:   Recent Labs   Lab 01/30/25  1051   COLORU Yellow   APPEARANCEUA Hazy*   PHUR 6.0   SPECGRAV >1.030*   PROTEINUA 1+*   GLUCUA Negative   KETONESU Trace*   BILIRUBINUA Negative   OCCULTUA Negative   NITRITE Negative   UROBILINOGEN Negative   LEUKOCYTESUR 2+*   RBCUA 4   WBCUA 44*   BACTERIA Rare   SQUAMEPITHEL 17   HYALINECASTS 0       Significant Imaging: I have reviewed all pertinent imaging results/findings within the past 24 hours.    CT Chest Abdomen Pelvis With IV Contrast (XPD) NO Oral Contrast    Result Date: 1/29/2025  CMS MANDATED QUALITY DATA - CT RADIATION - 436 All CT scans at this facility utilize dose modulation, iterative reconstruction, and/or weight based dosing when appropriate to reduce radiation dose to as low as reasonably achievable. CLINICAL HISTORY: ([id number])63 y/o  (1960) F LLQ abdominal pain; TECHNIQUE: (A#[id number], exam time 1/29/2025 14:00) CT CHEST ABDOMEN PELVIS WITH IV CONTRAST (XPD) [id number] Axial CT images of the chest, abdomen and pelvis were obtained from the thoracic inlet to the proximal thigh. COMPARISON: None available. FINDINGS: CT Chest: Visualized neck: normal Lungs: The lungs are clear. Airway:  The trachea and central bronchial tree appear normal. Pleura: There is no pleural effusion. There is no pneumothorax. Cardiovascular: The heart is normal in size. There is no pericardial effusion. The aorta and great vessels are normal in course and caliber  noting atherosclerotic calcifications in the aorta and coronary arteries. Mediastinum: There is no supraclavicular  axillary  mediastinal  or hilar lymphadenopathy. Soft tissues: The peripheral soft tissues appear normal. Musculoskeletal: There are mild degenerative changes of the thoracic spine.  There are no suspicious osseous lesions. Esophagus: The esophagus appears grossly normal. CT Abdomen: Liver: The liver is normal in size and imaging appearance. Gallbladder: There are stones seen in the gallbladder without wall thickening or pericholecystic fluid to suggest acute cholecystitis. Biliary Tree: No intra or extrahepatic ductal dilation. Spleen: Normal. Pancreas: The pancreas is normal. Adrenal Glands: Normal Kidneys: No hydronephrosis/hydroureter or evidence of obstructive uropathy.  The left distal ureter is somewhat patulous with no radiopaque stone identified.  The left kidney is asymmetrically small/atrophic with renal cortical thinning.  There is a 3 mm cortical calcifications suggesting sequelae of old infection/trauma.  Rounded simple fluid attenuation renal cortical cysts are seen bilaterally, for example measuring 3 cm in the inferior pole of the left kidney, and 15 mm in the midpole and upper pole of the right kidney. Vasculature: There are scattered atheromatous mural plaques in the aorta and its major branch vessels with no aneurysm seen. Lymph nodes: No abdominal lymphadenopathy is seen. Intraperitoneal structures: There is no ascites. Bowel: Mild long segment circumferential colonic wall thickening in the sigmoid colon with adjacent fat stranding (image 252).  There is scattered colonic diverticula in this area.  The appendix is normal (image  204).  No finding of bowel obstruction, intra-abdominal free air or abscess. Abdominal wall: The abdominal wall and musculature are normal. Musculoskeletal: Severe disc height loss with adjacent endplate sclerosis is seen at T12-L1, with moderate to severe disc height loss at L4-L5. CT Pelvis: Bladder: Normal. Reproductive Organs: The uterus is not visualized/surgically absent.  Hysterectomy Pelvic Lymph nodes: No pelvic lymphadenopathy or pelvic mass is identified.     1.  Mild relative long segment circumferential colonic wall thickening and adjacent fat stranding in the sigmoid colo, likely from mild infectious/inflammatory colitis, and or diverticulitis, without finding of obstruction, intra-abdominal free air or abscess (consider correlation with a history of colonoscopy in this age group and follow-up after treatment to document resolution as underlying malignancy is not excluded). 2.  Numerous additional, and incidental findings as outlined above. Electronically signed by: Pavan Pierson Date:    01/29/2025 Time:    14:17    X-Ray Chest AP Portable    Result Date: 1/29/2025  EXAMINATION: XR CHEST AP PORTABLE CLINICAL HISTORY: Chest Pain; FINDINGS: Portable chest at 09:36 is compared to 02/15/2024 shows normal cardiomediastinal silhouette. Lungs are clear. Pulmonary vasculature is normal. No acute osseous abnormality.     No acute cardiopulmonary abnormality. Electronically signed by: Ludivina Dias Date:    01/29/2025 Time:    10:11

## 2025-01-30 NOTE — PLAN OF CARE
Inpatient Upgrade Note     Jonna Pearl has warranted treatment spanning two or more midnights of hospital level care for the management of  Acute colitis, Lower GI bleed, and Diverticulitis . She continues to require IV antibiotics, IV fluids, daily labs, monitoring of vital signs, and further evaluation by consultants. Her condition is also complicated by the following comorbidities: Hypertension, Diabetes, and Chronic respiratory disease.

## 2025-01-30 NOTE — ASSESSMENT & PLAN NOTE
Body mass index is 48.45 kg/m². Morbid obesity complicates all aspects of disease management from diagnostic modalities to treatment.

## 2025-01-30 NOTE — ASSESSMENT & PLAN NOTE
Patient's blood pressure range in the last 24 hours was: BP  Min: 101/47  Max: 148/84.The patient's inpatient anti-hypertensive regimen is listed below:  Current Antihypertensives       Plan  Hold bp meds in view with low normal BP

## 2025-01-30 NOTE — ASSESSMENT & PLAN NOTE
Admitted to med/surg  IV zosyn  Significant leukocytosis - will continue IV abx until trends down  Abdominal exam benign for now   GI consulted for rectal bleeding  H&H is stable  Clears for now until evaluated by GI

## 2025-01-30 NOTE — SUBJECTIVE & OBJECTIVE
Past Medical History:   Diagnosis Date    Allergy     Arthritis     Atrophic kidney     left. probably childhood illness or disease    Chronic kidney disease     Chronic rhinitis     Colon polyps     Depression     HEARING LOSS     both ears - no hearing aids    Hiatal hernia     Hypertension     Insomnia     Migraines     Otitis media     Sleep apnea     mild uses cpap    TIA (transient ischemic attack)        Past Surgical History:   Procedure Laterality Date     SECTION      COLONOSCOPY  2014    Dr Escobar 5 year yossi    COLONOSCOPY N/A 04/10/2019    Procedure: COLONOSCOPY;  Surgeon: Matt Orozco MD;  Location: [facility] ENDO;  Service: Endoscopy;  Laterality: N/A;    ESOPHAGOGASTRODUODENOSCOPY N/A 2023    Procedure: EGD (ESOPHAGOGASTRODUODENOSCOPY);  Surgeon: Merrill Vela MD;  Location: [facility] ENDO;  Service: Endoscopy;  Laterality: N/A;    HEMORRHOID SURGERY      HYSTERECTOMY      LEFT HEART CATHETERIZATION Left 2024    Procedure: Left heart cath;  Surgeon: Shoshana Mcmahon MD;  Location: [facility] CATH/EP LAB;  Service: Cardiology;  Laterality: Left;    left mandibular node resection      OOPHORECTOMY      URETEROSCOPY         Review of patient's allergies indicates:   Allergen Reactions    Neomycin-polymyxin Swelling    Cephalexin      Other reaction(s): Unknown    Doxycycline      Other reaction(s): Unknown    Iodinated contrast media      Other reaction(s): Unknown    Iodine Other (See Comments)     Due to kidney function    Valsartan      Other reaction(s): Unknown    Bacitracin-polymyxin b Rash       No current facility-administered medications on file prior to encounter.     Current Outpatient Medications on File Prior to Encounter   Medication Sig    clobetasoL (TEMOVATE) 0.05 % external solution Apply topically 2 (two) times daily. (Patient taking differently: Apply 1 g topically 2 (two) times daily.)    gabapentin (NEURONTIN) 600 MG tablet Take 600 mg by mouth 3 (three) times  daily as needed.    ketoconazole (NIZORAL) 2 % shampoo Apply 1 Application topically Every 3 (three) days. 1 application as needed to wet hair every 3 to 4 days external once a day    latanoprost 0.005 % ophthalmic solution Place 1 drop into both eyes every evening.    lisinopriL (PRINIVIL,ZESTRIL) 40 MG tablet Take 1 tablet by mouth once daily.    timolol maleate 0.5% (TIMOPTIC) 0.5 % Drop Place 1 drop into both eyes 2 (two) times daily.    verapamiL (CALAN-SR) 240 MG CR tablet Take 1 tablet by mouth once daily.    ketoconazole (NIZORAL) 2 % shampoo Apply topically twice a week.    predniSONE (DELTASONE) 20 MG tablet Take 2 tablets (40 mg total) by mouth 3 (three) times daily. (Patient not taking: Reported on 1/29/2025)    topiramate (TOPAMAX) 50 MG tablet Take 1 tablet (50 mg total) by mouth once daily. (Patient not taking: Reported on 1/29/2025)    [DISCONTINUED] aspirin (ECOTRIN) 81 MG EC tablet Take 81 mg by mouth once daily.    [DISCONTINUED] hydroCHLOROthiazide (MICROZIDE) 12.5 mg capsule Take 1 capsule (12.5 mg total) by mouth once daily.    [DISCONTINUED] nystatin (MYCOSTATIN) cream Apply 1 Application topically 2 (two) times a day.    [DISCONTINUED] NYSTOP powder Apply 1 g topically as needed.    [DISCONTINUED] OZEMPIC 0.25 mg or 0.5 mg (2 mg/3 mL) pen injector Inject 0.25 mg into the skin every 7 days.    [DISCONTINUED] pantoprazole (PROTONIX) 40 MG tablet Take 40 mg by mouth once daily.    [DISCONTINUED] potassium chloride (KLOR-CON) 10 MEQ TbSR Take 1 tablet (10 mEq total) by mouth once daily.    [DISCONTINUED] potassium chloride (MICRO-K) 10 MEQ CpSR Take 10 mEq by mouth once.    [DISCONTINUED] promethazine (PHENERGAN) 25 MG tablet Take 25 mg by mouth as needed for Nausea.    [DISCONTINUED] triamcinolone acetonide 0.1% (KENALOG) 0.1 % cream Apply 1 g topically 2 (two) times daily.     Family History       Problem Relation (Age of Onset)    Breast cancer Maternal Aunt, Paternal Aunt    Cancer Sister     Diabetes Mother, Paternal Grandmother    Early death Father          Tobacco Use    Smoking status: Former     Current packs/day: 0.00     Average packs/day: 1 pack/day for 18.0 years (18.0 ttl pk-yrs)     Types: Cigarettes     Start date: 2000     Quit date: 2018     Years since quittin.8    Smokeless tobacco: Never   Substance and Sexual Activity    Alcohol use: No    Drug use: No    Sexual activity: Not on file     Review of Systems   Constitutional:  Negative for activity change and appetite change.   HENT:  Negative for congestion and dental problem.    Eyes:  Negative for discharge and itching.   Respiratory:  Negative for shortness of breath.    Cardiovascular:  Positive for chest pain.   Gastrointestinal:  Positive for anal bleeding and blood in stool. Negative for abdominal distention and abdominal pain.   Endocrine: Negative for cold intolerance.   Genitourinary:  Negative for difficulty urinating and dysuria.   Musculoskeletal:  Negative for arthralgias and back pain.   Skin:  Negative for color change.   Neurological:  Negative for dizziness and facial asymmetry.   Hematological:  Negative for adenopathy.   Psychiatric/Behavioral:  Negative for agitation and behavioral problems.      Objective:     Vital Signs (Most Recent):  Temp: 98.2 °F (36.8 °C) (25)  Pulse: (!) 58 (25)  Resp: 16 (25)  BP: 130/69 (25)  SpO2: 95 % (25) Vital Signs (24h Range):  Temp:  [97.9 °F (36.6 °C)-98.6 °F (37 °C)] 98.2 °F (36.8 °C)  Pulse:  [52-67] 58  Resp:  [16-22] 16  SpO2:  [94 %-100 %] 95 %  BP: (101-148)/(47-84) 130/69     Weight: 120.2 kg (264 lb 14.4 oz)  Body mass index is 48.45 kg/m².     Physical Exam  Vitals and nursing note reviewed.   Constitutional:       General: She is not in acute distress.  HENT:      Head: Atraumatic.      Right Ear: External ear normal.      Left Ear: External ear normal.      Nose: Nose normal.      Mouth/Throat:      Mouth:  Mucous membranes are moist.   Eyes:      Extraocular Movements: Extraocular movements intact.   Cardiovascular:      Rate and Rhythm: Normal rate.   Pulmonary:      Effort: Pulmonary effort is normal.   Musculoskeletal:         General: Normal range of motion.      Cervical back: Normal range of motion.   Skin:     General: Skin is warm.   Neurological:      Mental Status: She is alert and oriented to person, place, and time.   Psychiatric:         Behavior: Behavior normal.                Significant Labs: All pertinent labs within the past 24 hours have been reviewed.  CBC:   Recent Labs   Lab 01/29/25  1043   WBC 17.56*   HGB 13.3   HCT 40.0        CMP:   Recent Labs   Lab 01/29/25  1043      K 4.2      CO2 25      BUN 25*   CREATININE 0.9   CALCIUM 8.9   PROT 5.9*   ALBUMIN 3.5   BILITOT 0.8   ALKPHOS 96   AST 11   ALT 9*   ANIONGAP 6*       Significant Imaging: I have reviewed all pertinent imaging results/findings within the past 24 hours.

## 2025-01-30 NOTE — ASSESSMENT & PLAN NOTE
Patient's blood pressure range in the last 24 hours was: BP  Min: 123/58  Max: 148/84.The patient's inpatient anti-hypertensive regimen is listed below:  Current Antihypertensives  timolol maleate 0.5% ophthalmic solution 1 drop, 2 times daily, Both Eyes    Plan  Hold bp meds in view with low normal BP - resume when clinically indicated

## 2025-01-30 NOTE — HOSPITAL COURSE
"Ms. Pearl has been monitored closely during her hospitalization. She was admitted on 1/29/25 for acute colitis/diverticulitis with bright red bleed per rectum. She had 2 stools with blood streaks, but did not fill the toilet bowl with blood and no significant clots. She had a CT abd/pelvis that revealed: "Mild relative long segment circumferential colonic wall thickening and adjacent fat stranding in the sigmoid colo, likely from mild infectious/inflammatory colitis, and or diverticulitis" with no abscess or perforation. She was empirically started on zosyn. WBC trended 17K-->24K--18K--12K. UA with pyuria, rare bacteruria - obtained after abx initiated, but culture has been sent and pending. H&H trended and stable 13.3/40-->12.6/40. GI has been consulted for BRBPR. She also had chest pain in the ED, troponins were trended and negative. EKG SB with no acute ischemic changes. She had an angiogram 2/2024 with nonobstructive CAD.  Rectal bleeding resolved and leukocytosis improved.  Patient was transitioned to oral Augmentin to complete.  Patient ambulating in room with no difficulty.  Case management followed for discharge planning.     Patient seen and examined on day of discharge.  Patient in no acute distress.  Patient is hemodynamically stable.  Patient deemed appropriate for discharge.  Patient remained afebrile during hospitalization.  Prescription for Augmentin, Zofran, and a probiotic were sent to patient's preferred pharmacy.  Patient educated on discharge planning, she verbalized understanding.  Patient is a follow with primary care provider as needed.  She is to follow up with GI in approximately 3 weeks after colitis has resolved.  Patient is safely discharged home with family.  "

## 2025-01-30 NOTE — PROGRESS NOTES
"[facility] Medicine  Progress Note    Patient Name: Jonna Pearl  MRN: [id number]  Patient Class: IP- Inpatient   Admission Date: 1/29/2025  Length of Stay: 0 days  Attending Physician: Cheryle Mcnair MD  Primary Care Provider: Padmini Lackey FNP        Subjective     Principal Problem:Acute colitis        HPI:  64 year old pt getting admitted with chest pain/lower GI bleed and colitis  Pt started having chest pain few days ago  Pain mostly on retrosternal area / described as belching   Later she started having nausea with some abdominal pain'  Followed by multiple bouts of fresh blood in stools and pt came to ER & got admitted     Overview/Hospital Course:  Ms. Pearl has been monitored closely during her hospitalization. She was admitted on 1/29/25 for acute colitis/diverticulitis with BRBPR. She had 2 stools with blood streaks, but did not fill the toilet bowl with blood and no significant clots. She had a CT abd/pelvis that revealed: "Mild relative long segment circumferential colonic wall thickening and adjacent fat stranding in the sigmoid colo, likely from mild infectious/inflammatory colitis, and or diverticulitis" with no abscess or perforation. She was empirically started on zosyn. WBC trended 17K-->24K. UA with pyuria, rare bacteruria - obtained after abx initiated, but culture has been sent and pending. H&H trended and stable 13.3/40-->12.6/40. GI has been consulted for BRBPR. She also had chest pain in the ED, troponins were trended and negative. EKG SB with no acute ischemic changes. She had an angiogram 2/2024 with nonobstructive CAD.     Interval History: no acute events overnight. No further rectal bleeding, but states no stool either. WBC trended up, continue zosyn. Follow urine culture.     Review of Systems   Constitutional:  Positive for fatigue.   Cardiovascular:  Negative for chest pain.   Gastrointestinal:  Positive for abdominal pain and blood in stool. "     Objective:     Vital Signs (Most Recent):  Temp: 98.1 °F (36.7 °C) (01/30/25 1042)  Pulse: 65 (01/30/25 1042)  Resp: 17 (01/30/25 1042)  BP: (!) 144/83 (01/30/25 1042)  SpO2: 96 % (01/30/25 1042) Vital Signs (24h Range):  Temp:  [97.5 °F (36.4 °C)-98.2 °F (36.8 °C)] 98.1 °F (36.7 °C)  Pulse:  [53-72] 65  Resp:  [16-18] 17  SpO2:  [94 %-100 %] 96 %  BP: (123-148)/(58-84) 144/83     Weight: 120.2 kg (264 lb 14.4 oz)  Body mass index is 48.45 kg/m².    Intake/Output Summary (Last 24 hours) at 1/30/2025 1343  Last data filed at 1/30/2025 1244  Gross per 24 hour   Intake 540 ml   Output 1000 ml   Net -460 ml         Physical Exam  Vitals and nursing note reviewed.   Constitutional:       Appearance: Normal appearance. She is obese.   HENT:      Head: Normocephalic and atraumatic.      Nose: Nose normal.      Mouth/Throat:      Mouth: Mucous membranes are moist.      Pharynx: Oropharynx is clear.   Eyes:      Extraocular Movements: Extraocular movements intact.      Pupils: Pupils are equal, round, and reactive to light.   Cardiovascular:      Rate and Rhythm: Normal rate and regular rhythm.      Pulses: Normal pulses.   Pulmonary:      Effort: Pulmonary effort is normal.      Comments: Diminished throughout, likely d/t body habitus  Abdominal:      Palpations: Abdomen is soft.      Tenderness: There is abdominal tenderness.      Comments: Hypoactive bowel sounds throughout, tenderness with palpation of the LLQ   Musculoskeletal:         General: Normal range of motion.      Cervical back: Normal range of motion and neck supple.   Skin:     General: Skin is warm and dry.      Capillary Refill: Capillary refill takes less than 2 seconds.   Neurological:      General: No focal deficit present.      Mental Status: She is alert and oriented to person, place, and time.   Psychiatric:         Mood and Affect: Mood normal.         Behavior: Behavior normal.             Significant Labs: All pertinent labs within the past 24  hours have been reviewed.  CBC:   Recent Labs   Lab 01/29/25  1043 01/30/25  0440   WBC 17.56* 24.46*   HGB 13.3 12.6   HCT 40.0 40.2    230     CMP:   Recent Labs   Lab 01/29/25  1043 01/30/25  0440    137   K 4.2 4.3    109   CO2 25 20*    173*   BUN 25* 25*   CREATININE 0.9 0.9   CALCIUM 8.9 8.4*   PROT 5.9* 5.8*   ALBUMIN 3.5 3.2*   BILITOT 0.8 0.5   ALKPHOS 96 85   AST 11 12   ALT 9* 8*   ANIONGAP 6* 8     Urine Studies:   Recent Labs   Lab 01/30/25  1051   COLORU Yellow   APPEARANCEUA Hazy*   PHUR 6.0   SPECGRAV >1.030*   PROTEINUA 1+*   GLUCUA Negative   KETONESU Trace*   BILIRUBINUA Negative   OCCULTUA Negative   NITRITE Negative   UROBILINOGEN Negative   LEUKOCYTESUR 2+*   RBCUA 4   WBCUA 44*   BACTERIA Rare   SQUAMEPITHEL 17   HYALINECASTS 0       Significant Imaging: I have reviewed all pertinent imaging results/findings within the past 24 hours.    CT Chest Abdomen Pelvis With IV Contrast (XPD) NO Oral Contrast    Result Date: 1/29/2025  CMS MANDATED QUALITY DATA - CT RADIATION - 436 All CT scans at this facility utilize dose modulation, iterative reconstruction, and/or weight based dosing when appropriate to reduce radiation dose to as low as reasonably achievable. CLINICAL HISTORY: ([id number])65 y/o  (1960) F LLQ abdominal pain; TECHNIQUE: (A#[id number], exam time 1/29/2025 14:00) CT CHEST ABDOMEN PELVIS WITH IV CONTRAST (XPD) [id number] Axial CT images of the chest, abdomen and pelvis were obtained from the thoracic inlet to the proximal thigh. COMPARISON: None available. FINDINGS: CT Chest: Visualized neck: normal Lungs: The lungs are clear. Airway: The trachea and central bronchial tree appear normal. Pleura: There is no pleural effusion. There is no pneumothorax. Cardiovascular: The heart is normal in size. There is no pericardial effusion. The aorta and great vessels are normal in course and caliber  noting atherosclerotic calcifications in the aorta and coronary  arteries. Mediastinum: There is no supraclavicular  axillary  mediastinal  or hilar lymphadenopathy. Soft tissues: The peripheral soft tissues appear normal. Musculoskeletal: There are mild degenerative changes of the thoracic spine.  There are no suspicious osseous lesions. Esophagus: The esophagus appears grossly normal. CT Abdomen: Liver: The liver is normal in size and imaging appearance. Gallbladder: There are stones seen in the gallbladder without wall thickening or pericholecystic fluid to suggest acute cholecystitis. Biliary Tree: No intra or extrahepatic ductal dilation. Spleen: Normal. Pancreas: The pancreas is normal. Adrenal Glands: Normal Kidneys: No hydronephrosis/hydroureter or evidence of obstructive uropathy.  The left distal ureter is somewhat patulous with no radiopaque stone identified.  The left kidney is asymmetrically small/atrophic with renal cortical thinning.  There is a 3 mm cortical calcifications suggesting sequelae of old infection/trauma.  Rounded simple fluid attenuation renal cortical cysts are seen bilaterally, for example measuring 3 cm in the inferior pole of the left kidney, and 15 mm in the midpole and upper pole of the right kidney. Vasculature: There are scattered atheromatous mural plaques in the aorta and its major branch vessels with no aneurysm seen. Lymph nodes: No abdominal lymphadenopathy is seen. Intraperitoneal structures: There is no ascites. Bowel: Mild long segment circumferential colonic wall thickening in the sigmoid colon with adjacent fat stranding (image 252).  There is scattered colonic diverticula in this area.  The appendix is normal (image 204).  No finding of bowel obstruction, intra-abdominal free air or abscess. Abdominal wall: The abdominal wall and musculature are normal. Musculoskeletal: Severe disc height loss with adjacent endplate sclerosis is seen at T12-L1, with moderate to severe disc height loss at L4-L5. CT Pelvis: Bladder: Normal.  Reproductive Organs: The uterus is not visualized/surgically absent.  Hysterectomy Pelvic Lymph nodes: No pelvic lymphadenopathy or pelvic mass is identified.     1.  Mild relative long segment circumferential colonic wall thickening and adjacent fat stranding in the sigmoid colo, likely from mild infectious/inflammatory colitis, and or diverticulitis, without finding of obstruction, intra-abdominal free air or abscess (consider correlation with a history of colonoscopy in this age group and follow-up after treatment to document resolution as underlying malignancy is not excluded). 2.  Numerous additional, and incidental findings as outlined above. Electronically signed by: Pavan Pierson Date:    01/29/2025 Time:    14:17    X-Ray Chest AP Portable    Result Date: 1/29/2025  EXAMINATION: XR CHEST AP PORTABLE CLINICAL HISTORY: Chest Pain; FINDINGS: Portable chest at 09:36 is compared to 02/15/2024 shows normal cardiomediastinal silhouette. Lungs are clear. Pulmonary vasculature is normal. No acute osseous abnormality.     No acute cardiopulmonary abnormality. Electronically signed by: Ludivina Dias Date:    01/29/2025 Time:    10:11       Assessment and Plan     * Acute colitis  Admitted to med/surg  IV zosyn  Significant leukocytosis - will continue IV abx until trends down  Abdominal exam benign for now   GI consulted for rectal bleeding  H&H is stable  Clears for now until evaluated by GI    Lower GI bleed  Maintain iv abx  Transfuse as needed  GI team consulted for diverticular lower GI bleed     Diverticulitis  Maintain iv abx      Chest pain  Troponins trended and negative  EKG with on acute ischemic changes  Angiogram 2/2024 with nonobstructive CAD   CP resolved  No further work up at this time      Type 2 diabetes mellitus, without long-term current use of insulin  Patient's FSGs are controlled on current medication regimen.  Last A1c reviewed-   Lab Results   Component Value Date    HGBA1C 5.9 08/06/2023      Most recent fingerstick glucose reviewed-   Recent Labs   Lab 01/29/25  1806 01/30/25  0034 01/30/25  0651   POCTGLUCOSE 284* 187* 158*     Current correctional scale  Medium  Maintain anti-hyperglycemic dose as follows-   Antihyperglycemics (From admission, onward)      Start     Stop Route Frequency Ordered    01/29/25 1620  insulin aspart U-100 pen 0-10 Units         -- SubQ Before meals & nightly PRN 01/29/25 1521          Hold Oral hypoglycemics while patient is in the hospital.      WES on CPAP  Aware       Morbid obesity  Body mass index is 48.45 kg/m². Morbid obesity complicates all aspects of disease management from diagnostic modalities to treatment.     Stage 3 chronic kidney disease  Creatine stable for now. BMP reviewed- noted Estimated Creatinine Clearance: 76.8 mL/min (based on SCr of 0.9 mg/dL). according to latest data. Based on current GFR, CKD stage is stage 3 - GFR 30-59.  Monitor UOP and serial BMP and adjust therapy as needed. Renally dose meds. Avoid nephrotoxic medications and procedures.    Hypertension, essential  Patient's blood pressure range in the last 24 hours was: BP  Min: 123/58  Max: 148/84.The patient's inpatient anti-hypertensive regimen is listed below:  Current Antihypertensives  timolol maleate 0.5% ophthalmic solution 1 drop, 2 times daily, Both Eyes    Plan  Hold bp meds in view with low normal BP - resume when clinically indicated      VTE Risk Mitigation (From admission, onward)           Ordered     IP VTE HIGH RISK PATIENT  Once         01/29/25 1521     Place sequential compression device  Until discontinued         01/29/25 1521                    Discharge Planning   HELENA: 2/1/2025     Code Status: Full Code   Medical Readiness for Discharge Date:   Discharge Plan A: Home                        Tiff Mahmood NP  Department of Hospital Medicine   [facility]

## 2025-01-30 NOTE — HPI
64 year old pt getting admitted with chest pain/lower GI bleed and colitis  Pt started having chest pain few days ago  Pain mostly on retrosternal area / described as belching   Later she started having nausea with some abdominal pain'  Followed by multiple bouts of fresh blood in stools and pt came to ER & got admitted

## 2025-01-30 NOTE — PROGRESS NOTES
Pharmacist Renal Dose Adjustment Note    Jonna Pearl is a 64 y.o. female being treated with Piperacillin-tazobactam    Patient Data:    Vital Signs (Most Recent):  Temp: 98.4 °F (36.9 °C) (01/30/25 1512)  Pulse: 67 (01/30/25 1512)  Resp: 16 (01/30/25 1512)  BP: (!) 155/74 (01/30/25 1512)  SpO2: 98 % (01/30/25 1512) Vital Signs (72h Range):  Temp:  [97.5 °F (36.4 °C)-98.6 °F (37 °C)]   Pulse:  [52-72]   Resp:  [16-22]   BP: (101-155)/(47-84)   SpO2:  [94 %-100 %]      Recent Labs   Lab 01/29/25  1043 01/30/25  0440   CREATININE 0.9 0.9     Serum creatinine: 0.9 mg/dL 01/30/25 0440  Estimated creatinine clearance: 76.8 mL/min    Piperacillin-tazobactam 3.375 g q8h will be changed to 4.5 g q8h per [facility] policy.    Pharmacist's Name: Norma Burgess  Pharmacist's Extension: [phone]

## 2025-01-30 NOTE — ASSESSMENT & PLAN NOTE
Creatine stable for now. BMP reviewed- noted Estimated Creatinine Clearance: 76.8 mL/min (based on SCr of 0.9 mg/dL). according to latest data. Based on current GFR, CKD stage is stage 3 - GFR 30-59.  Monitor UOP and serial BMP and adjust therapy as needed. Renally dose meds. Avoid nephrotoxic medications and procedures.

## 2025-01-30 NOTE — ASSESSMENT & PLAN NOTE
Troponins trended and negative  EKG with on acute ischemic changes  Angiogram 2/2024 with nonobstructive CAD   CP resolved  No further work up at this time

## 2025-01-31 LAB
ALBUMIN SERPL BCP-MCNC: 3.1 G/DL (ref 3.5–5.2)
ALP SERPL-CCNC: 77 U/L (ref 55–135)
ALT SERPL W/O P-5'-P-CCNC: 8 U/L (ref 10–44)
ANION GAP SERPL CALC-SCNC: 6 MMOL/L (ref 8–16)
AST SERPL-CCNC: 11 U/L (ref 10–40)
BASOPHILS # BLD AUTO: 0.03 K/UL (ref 0–0.2)
BASOPHILS NFR BLD: 0.2 % (ref 0–1.9)
BILIRUB SERPL-MCNC: 0.4 MG/DL (ref 0.1–1)
BUN SERPL-MCNC: 28 MG/DL (ref 8–23)
CALCIUM SERPL-MCNC: 8.2 MG/DL (ref 8.7–10.5)
CHLORIDE SERPL-SCNC: 110 MMOL/L (ref 95–110)
CO2 SERPL-SCNC: 25 MMOL/L (ref 23–29)
CREAT SERPL-MCNC: 1 MG/DL (ref 0.5–1.4)
DIFFERENTIAL METHOD BLD: ABNORMAL
EOSINOPHIL # BLD AUTO: 0 K/UL (ref 0–0.5)
EOSINOPHIL NFR BLD: 0.1 % (ref 0–8)
ERYTHROCYTE [DISTWIDTH] IN BLOOD BY AUTOMATED COUNT: 14 % (ref 11.5–14.5)
EST. GFR  (NO RACE VARIABLE): >60 ML/MIN/1.73 M^2
GLUCOSE SERPL-MCNC: 93 MG/DL (ref 70–110)
HCT VFR BLD AUTO: 36.3 % (ref 37–48.5)
HGB BLD-MCNC: 11.5 G/DL (ref 12–16)
IMM GRANULOCYTES # BLD AUTO: 0.1 K/UL (ref 0–0.04)
IMM GRANULOCYTES NFR BLD AUTO: 0.5 % (ref 0–0.5)
LYMPHOCYTES # BLD AUTO: 4 K/UL (ref 1–4.8)
LYMPHOCYTES NFR BLD: 21.1 % (ref 18–48)
MAGNESIUM SERPL-MCNC: 2.3 MG/DL (ref 1.6–2.6)
MCH RBC QN AUTO: 29.8 PG (ref 27–31)
MCHC RBC AUTO-ENTMCNC: 31.7 G/DL (ref 32–36)
MCV RBC AUTO: 94 FL (ref 82–98)
MONOCYTES # BLD AUTO: 1.5 K/UL (ref 0.3–1)
MONOCYTES NFR BLD: 7.7 % (ref 4–15)
NEUTROPHILS # BLD AUTO: 13.2 K/UL (ref 1.8–7.7)
NEUTROPHILS NFR BLD: 70.4 % (ref 38–73)
NRBC BLD-RTO: 0 /100 WBC
OHS QRS DURATION: 76 MS
OHS QTC CALCULATION: 422 MS
PLATELET # BLD AUTO: 272 K/UL (ref 150–450)
PMV BLD AUTO: 11.1 FL (ref 9.2–12.9)
POCT GLUCOSE: 115 MG/DL (ref 70–110)
POCT GLUCOSE: 121 MG/DL (ref 70–110)
POCT GLUCOSE: 142 MG/DL (ref 70–110)
POCT GLUCOSE: 94 MG/DL (ref 70–110)
POTASSIUM SERPL-SCNC: 4 MMOL/L (ref 3.5–5.1)
PROT SERPL-MCNC: 5.4 G/DL (ref 6–8.4)
RBC # BLD AUTO: 3.86 M/UL (ref 4–5.4)
SODIUM SERPL-SCNC: 141 MMOL/L (ref 136–145)
WBC # BLD AUTO: 18.72 K/UL (ref 3.9–12.7)

## 2025-01-31 PROCEDURE — 80053 COMPREHEN METABOLIC PANEL: CPT | Performed by: INTERNAL MEDICINE

## 2025-01-31 PROCEDURE — 63600175 PHARM REV CODE 636 W HCPCS: Performed by: INTERNAL MEDICINE

## 2025-01-31 PROCEDURE — 85025 COMPLETE CBC W/AUTO DIFF WBC: CPT | Performed by: INTERNAL MEDICINE

## 2025-01-31 PROCEDURE — 25000003 PHARM REV CODE 250: Performed by: INTERNAL MEDICINE

## 2025-01-31 PROCEDURE — 25000003 PHARM REV CODE 250

## 2025-01-31 PROCEDURE — 36415 COLL VENOUS BLD VENIPUNCTURE: CPT | Performed by: INTERNAL MEDICINE

## 2025-01-31 PROCEDURE — 12000002 HC ACUTE/MED SURGE SEMI-PRIVATE ROOM

## 2025-01-31 PROCEDURE — 94799 UNLISTED PULMONARY SVC/PX: CPT

## 2025-01-31 PROCEDURE — 99900035 HC TECH TIME PER 15 MIN (STAT)

## 2025-01-31 PROCEDURE — 83735 ASSAY OF MAGNESIUM: CPT | Performed by: INTERNAL MEDICINE

## 2025-01-31 PROCEDURE — 25000003 PHARM REV CODE 250: Performed by: STUDENT IN AN ORGANIZED HEALTH CARE EDUCATION/TRAINING PROGRAM

## 2025-01-31 PROCEDURE — 86255 FLUORESCENT ANTIBODY SCREEN: CPT | Performed by: INTERNAL MEDICINE

## 2025-01-31 PROCEDURE — 94761 N-INVAS EAR/PLS OXIMETRY MLT: CPT

## 2025-01-31 RX ORDER — LISINOPRIL 20 MG/1
40 TABLET ORAL DAILY
Status: DISCONTINUED | OUTPATIENT
Start: 2025-01-31 | End: 2025-02-01 | Stop reason: HOSPADM

## 2025-01-31 RX ORDER — VERAPAMIL HCL 240 MG
240 TABLET, EXTENDED RELEASE ORAL DAILY
Status: DISCONTINUED | OUTPATIENT
Start: 2025-01-31 | End: 2025-02-01 | Stop reason: HOSPADM

## 2025-01-31 RX ORDER — PANTOPRAZOLE SODIUM 40 MG/1
40 TABLET, DELAYED RELEASE ORAL DAILY
Status: DISCONTINUED | OUTPATIENT
Start: 2025-01-31 | End: 2025-02-01 | Stop reason: HOSPADM

## 2025-01-31 RX ADMIN — PIPERACILLIN SODIUM AND TAZOBACTAM SODIUM 4.5 G: 4; .5 INJECTION, POWDER, FOR SOLUTION INTRAVENOUS at 12:01

## 2025-01-31 RX ADMIN — PIPERACILLIN SODIUM AND TAZOBACTAM SODIUM 4.5 G: 4; .5 INJECTION, POWDER, FOR SOLUTION INTRAVENOUS at 11:01

## 2025-01-31 RX ADMIN — TIMOLOL MALEATE 1 DROP: 5 SOLUTION/ DROPS OPHTHALMIC at 09:01

## 2025-01-31 RX ADMIN — LISINOPRIL 40 MG: 20 TABLET ORAL at 02:01

## 2025-01-31 RX ADMIN — VERAPAMIL HYDROCHLORIDE 240 MG: 240 TABLET ORAL at 02:01

## 2025-01-31 RX ADMIN — LATANOPROST 1 DROP: 50 SOLUTION OPHTHALMIC at 09:01

## 2025-01-31 NOTE — PLAN OF CARE
CM requested hospital follow up appointment from [facility] DC clinic     CM anticipating hospital discharge on today if patient tolerating diet     01/31/25 0830   Post-Acute Status   Post-Acute Authorization Other   Other Status Awaiting f/u Appts

## 2025-01-31 NOTE — ASSESSMENT & PLAN NOTE
Admitted to med/surg  Continue IV zosyn  Leukocytosis improving - will continue IV abx until trends down  Abdominal exam benign for now   GI consulted for rectal bleeding recommended outpatient colonoscopy in 3 months  H&H is stable  Diet advanced per GI

## 2025-01-31 NOTE — CARE UPDATE
01/31/25 0723   Patient Assessment/Suction   Level of Consciousness (AVPU) alert   Respiratory Effort Normal;Unlabored   Expansion/Accessory Muscles/Retractions no use of accessory muscles;no retractions;expansion symmetric   PRE-TX-O2   Device (Oxygen Therapy) room air   SpO2 99 %   Pulse Oximetry Type Intermittent   $ Pulse Oximetry - Multiple Charge Pulse Oximetry - Multiple

## 2025-01-31 NOTE — SUBJECTIVE & OBJECTIVE
Interval History:   Patient seen and examined.  No acute distress.  No acute events overnight.  Patient denies rectal bleeding but endorses continued diarrhea.  White blood cell count trending, continue antibiotics.  Follow urine culture.  Review of Systems   Constitutional:  Positive for fatigue. Negative for chills and fever.   Gastrointestinal:  Positive for abdominal pain and diarrhea. Negative for blood in stool, nausea and vomiting.     Objective:     Vital Signs (Most Recent):  Temp: 97.4 °F (36.3 °C) (01/31/25 1111)  Pulse: (!) 58 (01/31/25 1111)  Resp: 17 (01/31/25 1111)  BP: (!) 143/76 (01/31/25 1111)  SpO2: 97 % (01/31/25 1111) Vital Signs (24h Range):  Temp:  [97.4 °F (36.3 °C)-98.4 °F (36.9 °C)] 97.4 °F (36.3 °C)  Pulse:  [55-73] 58  Resp:  [16-18] 17  SpO2:  [96 %-99 %] 97 %  BP: (118-155)/(51-76) 143/76     Weight: 120.2 kg (264 lb 14.4 oz)  Body mass index is 48.45 kg/m².    Intake/Output Summary (Last 24 hours) at 1/31/2025 1344  Last data filed at 1/31/2025 1247  Gross per 24 hour   Intake 600 ml   Output 500 ml   Net 100 ml         Physical Exam  Constitutional:       Appearance: She is obese.   Cardiovascular:      Rate and Rhythm: Bradycardia present.      Pulses: Normal pulses.      Heart sounds: Normal heart sounds.   Pulmonary:      Effort: Pulmonary effort is normal. No respiratory distress.      Comments: Diminished secondary to body habitus  Abdominal:      General: Bowel sounds are normal.      Palpations: Abdomen is soft.      Tenderness: There is abdominal tenderness (LLQ).   Musculoskeletal:      Right lower leg: No edema.      Left lower leg: No edema.   Skin:     General: Skin is warm and dry.   Neurological:      Mental Status: She is alert and oriented to person, place, and time.   Psychiatric:         Mood and Affect: Mood normal.         Behavior: Behavior normal.             Significant Labs: All pertinent labs within the past 24 hours have been reviewed.  CBC:   Recent Labs    Lab 01/30/25  0440 01/31/25  0615   WBC 24.46* 18.72*   HGB 12.6 11.5*   HCT 40.2 36.3*    272     CMP:   Recent Labs   Lab 01/30/25  0440 01/31/25  0615    141   K 4.3 4.0    110   CO2 20* 25   * 93   BUN 25* 28*   CREATININE 0.9 1.0   CALCIUM 8.4* 8.2*   PROT 5.8* 5.4*   ALBUMIN 3.2* 3.1*   BILITOT 0.5 0.4   ALKPHOS 85 77   AST 12 11   ALT 8* 8*   ANIONGAP 8 6*     Magnesium:   Recent Labs   Lab 01/30/25  0440 01/31/25  0615   MG 2.3 2.3     POCT Glucose:   Recent Labs   Lab 01/30/25  1604 01/31/25  0104 01/31/25  0616   POCTGLUCOSE 182* 121* 94     Urine Studies:   Recent Labs   Lab 01/30/25  1051   COLORU Yellow   APPEARANCEUA Hazy*   PHUR 6.0   SPECGRAV >1.030*   PROTEINUA 1+*   GLUCUA Negative   KETONESU Trace*   BILIRUBINUA Negative   OCCULTUA Negative   NITRITE Negative   UROBILINOGEN Negative   LEUKOCYTESUR 2+*   RBCUA 4   WBCUA 44*   BACTERIA Rare   SQUAMEPITHEL 17   HYALINECASTS 0     Microbiology Results (last 7 days)       Procedure Component Value Units Date/Time    Urine culture [[id number]] Collected: 01/30/25 1051    Order Status: Completed Specimen: Urine Updated: 01/31/25 0856     Urine Culture, Routine No growth to date    Narrative:      Specimen Source->Urine           Significant Imaging: I have reviewed all pertinent imaging results/findings within the past 24 hours.  Imaging Results              CT Chest Abdomen Pelvis With IV Contrast (XPD) NO Oral Contrast (Final result)  Result time 01/29/25 14:17:23   Procedure changed from CT Abdomen Pelvis With IV Contrast NO Oral Contrast     Final result by Pavan Pierson MD (01/29/25 14:17:23)                   Impression:      1.  Mild relative long segment circumferential colonic wall thickening and adjacent fat stranding in the sigmoid colo, likely from mild infectious/inflammatory colitis, and or diverticulitis, without finding of obstruction, intra-abdominal free air or abscess (consider correlation with a  history of colonoscopy in this age group and follow-up after treatment to document resolution as underlying malignancy is not excluded).    2.  Numerous additional, and incidental findings as outlined above.      Electronically signed by: Pavan Pierson  Date:    01/29/2025  Time:    14:17               Narrative:      CMS MANDATED QUALITY DATA - CT RADIATION - 436    All CT scans at this facility utilize dose modulation, iterative reconstruction, and/or weight based dosing when appropriate to reduce radiation dose to as low as reasonably achievable.    CLINICAL HISTORY:  ([id number])65 y/o  (1960) F    LLQ abdominal pain;    TECHNIQUE:  (A#[id number], exam time 1/29/2025 14:00)    CT CHEST ABDOMEN PELVIS WITH IV CONTRAST (XPD) [id number]    Axial CT images of the chest, abdomen and pelvis were obtained from the thoracic inlet to the proximal thigh.    COMPARISON:  None available.    FINDINGS:  CT Chest:    Visualized neck: normal    Lungs: The lungs are clear.    Airway: The trachea and central bronchial tree appear normal.    Pleura: There is no pleural effusion. There is no pneumothorax.    Cardiovascular: The heart is normal in size. There is no pericardial effusion. The aorta and great vessels are normal in course and caliber  noting atherosclerotic calcifications in the aorta and coronary arteries.    Mediastinum: There is no supraclavicular  axillary  mediastinal  or hilar lymphadenopathy.    Soft tissues: The peripheral soft tissues appear normal.    Musculoskeletal: There are mild degenerative changes of the thoracic spine.  There are no suspicious osseous lesions.    Esophagus: The esophagus appears grossly normal.    CT Abdomen:    Liver: The liver is normal in size and imaging appearance.    Gallbladder: There are stones seen in the gallbladder without wall thickening or pericholecystic fluid to suggest acute cholecystitis.    Biliary Tree: No intra or extrahepatic ductal dilation.    Spleen:  Normal.    Pancreas: The pancreas is normal.    Adrenal Glands: Normal    Kidneys: No hydronephrosis/hydroureter or evidence of obstructive uropathy.  The left distal ureter is somewhat patulous with no radiopaque stone identified.  The left kidney is asymmetrically small/atrophic with renal cortical thinning.  There is a 3 mm cortical calcifications suggesting sequelae of old infection/trauma.  Rounded simple fluid attenuation renal cortical cysts are seen bilaterally, for example measuring 3 cm in the inferior pole of the left kidney, and 15 mm in the midpole and upper pole of the right kidney.    Vasculature: There are scattered atheromatous mural plaques in the aorta and its major branch vessels with no aneurysm seen.    Lymph nodes: No abdominal lymphadenopathy is seen.    Intraperitoneal structures: There is no ascites.    Bowel: Mild long segment circumferential colonic wall thickening in the sigmoid colon with adjacent fat stranding (image 252).  There is scattered colonic diverticula in this area.  The appendix is normal (image 204).  No finding of bowel obstruction, intra-abdominal free air or abscess.    Abdominal wall: The abdominal wall and musculature are normal.    Musculoskeletal: Severe disc height loss with adjacent endplate sclerosis is seen at T12-L1, with moderate to severe disc height loss at L4-L5.    CT Pelvis:    Bladder: Normal.    Reproductive Organs: The uterus is not visualized/surgically absent.  Hysterectomy    Pelvic Lymph nodes: No pelvic lymphadenopathy or pelvic mass is identified.                                       X-Ray Chest AP Portable (Final result)  Result time 01/29/25 10:11:33      Final result by Ludivina Dias MD (01/29/25 10:11:33)                   Impression:      No acute cardiopulmonary abnormality.      Electronically signed by: Ludivina Dias  Date:    01/29/2025  Time:    10:11               Narrative:    EXAMINATION:  XR CHEST AP PORTABLE    CLINICAL  HISTORY:  Chest Pain;    FINDINGS:  Portable chest at 09:36 is compared to 02/15/2024 shows normal cardiomediastinal silhouette.    Lungs are clear. Pulmonary vasculature is normal. No acute osseous abnormality.

## 2025-01-31 NOTE — ASSESSMENT & PLAN NOTE
Patient's blood pressure range in the last 24 hours was: BP  Min: 118/51  Max: 155/74.The patient's inpatient anti-hypertensive regimen is listed below:  Current Antihypertensives  timolol maleate 0.5% ophthalmic solution 1 drop, 2 times daily, Both Eyes  verapamiL CR tablet 240 mg, Daily, Oral  lisinopriL tablet 40 mg, Daily, Oral    Plan  Blood pressure medications resumed

## 2025-01-31 NOTE — ASSESSMENT & PLAN NOTE
Maintain iv abx  Transfuse as needed  GI team consulted for diverticular lower GI bleed  H&H stable

## 2025-01-31 NOTE — ASSESSMENT & PLAN NOTE
Creatine stable for now. BMP reviewed- noted Estimated Creatinine Clearance: 69.2 mL/min (based on SCr of 1 mg/dL). according to latest data. Based on current GFR, CKD stage is stage 3 - GFR 30-59.  Monitor UOP and serial BMP and adjust therapy as needed. Renally dose meds. Avoid nephrotoxic medications and procedures.

## 2025-01-31 NOTE — PROGRESS NOTES
"[facility] Medicine  Progress Note    Patient Name: Jonna Pearl  MRN: [id number]  Patient Class: IP- Inpatient   Admission Date: 1/29/2025  Length of Stay: 1 days  Attending Physician: Kendrick Umaña DO  Primary Care Provider: Padmini Lackey FNP        Subjective     Principal Problem:Acute colitis        HPI:  64 year old pt getting admitted with chest pain/lower GI bleed and colitis  Pt started having chest pain few days ago  Pain mostly on retrosternal area / described as belching   Later she started having nausea with some abdominal pain'  Followed by multiple bouts of fresh blood in stools and pt came to ER & got admitted     Overview/Hospital Course:  Ms. Pearl has been monitored closely during her hospitalization. She was admitted on 1/29/25 for acute colitis/diverticulitis with bright red bleed per rectum. She had 2 stools with blood streaks, but did not fill the toilet bowl with blood and no significant clots. She had a CT abd/pelvis that revealed: "Mild relative long segment circumferential colonic wall thickening and adjacent fat stranding in the sigmoid colo, likely from mild infectious/inflammatory colitis, and or diverticulitis" with no abscess or perforation. She was empirically started on zosyn. WBC trended 17K-->24K. UA with pyuria, rare bacteruria - obtained after abx initiated, but culture has been sent and pending. H&H trended and stable 13.3/40-->12.6/40. GI has been consulted for BRBPR. She also had chest pain in the ED, troponins were trended and negative. EKG SB with no acute ischemic changes. She had an angiogram 2/2024 with nonobstructive CAD.     Interval History:   Patient seen and examined.  No acute distress.  No acute events overnight.  Patient denies rectal bleeding but endorses continued diarrhea.  White blood cell count trending, continue antibiotics.  Follow urine culture.  Review of Systems   Constitutional:  Positive for fatigue. Negative for chills " and fever.   Gastrointestinal:  Positive for abdominal pain and diarrhea. Negative for blood in stool, nausea and vomiting.     Objective:     Vital Signs (Most Recent):  Temp: 97.4 °F (36.3 °C) (01/31/25 1111)  Pulse: (!) 58 (01/31/25 1111)  Resp: 17 (01/31/25 1111)  BP: (!) 143/76 (01/31/25 1111)  SpO2: 97 % (01/31/25 1111) Vital Signs (24h Range):  Temp:  [97.4 °F (36.3 °C)-98.4 °F (36.9 °C)] 97.4 °F (36.3 °C)  Pulse:  [55-73] 58  Resp:  [16-18] 17  SpO2:  [96 %-99 %] 97 %  BP: (118-155)/(51-76) 143/76     Weight: 120.2 kg (264 lb 14.4 oz)  Body mass index is 48.45 kg/m².    Intake/Output Summary (Last 24 hours) at 1/31/2025 1344  Last data filed at 1/31/2025 1247  Gross per 24 hour   Intake 600 ml   Output 500 ml   Net 100 ml         Physical Exam  Constitutional:       Appearance: She is obese.   Cardiovascular:      Rate and Rhythm: Bradycardia present.      Pulses: Normal pulses.      Heart sounds: Normal heart sounds.   Pulmonary:      Effort: Pulmonary effort is normal. No respiratory distress.      Comments: Diminished secondary to body habitus  Abdominal:      General: Bowel sounds are normal.      Palpations: Abdomen is soft.      Tenderness: There is abdominal tenderness (LLQ).   Musculoskeletal:      Right lower leg: No edema.      Left lower leg: No edema.   Skin:     General: Skin is warm and dry.   Neurological:      Mental Status: She is alert and oriented to person, place, and time.   Psychiatric:         Mood and Affect: Mood normal.         Behavior: Behavior normal.             Significant Labs: All pertinent labs within the past 24 hours have been reviewed.  CBC:   Recent Labs   Lab 01/30/25  0440 01/31/25  0615   WBC 24.46* 18.72*   HGB 12.6 11.5*   HCT 40.2 36.3*    272     CMP:   Recent Labs   Lab 01/30/25  0440 01/31/25  0615    141   K 4.3 4.0    110   CO2 20* 25   * 93   BUN 25* 28*   CREATININE 0.9 1.0   CALCIUM 8.4* 8.2*   PROT 5.8* 5.4*   ALBUMIN 3.2* 3.1*    BILITOT 0.5 0.4   ALKPHOS 85 77   AST 12 11   ALT 8* 8*   ANIONGAP 8 6*     Magnesium:   Recent Labs   Lab 01/30/25  0440 01/31/25  0615   MG 2.3 2.3     POCT Glucose:   Recent Labs   Lab 01/30/25  1604 01/31/25  0104 01/31/25  0616   POCTGLUCOSE 182* 121* 94     Urine Studies:   Recent Labs   Lab 01/30/25  1051   COLORU Yellow   APPEARANCEUA Hazy*   PHUR 6.0   SPECGRAV >1.030*   PROTEINUA 1+*   GLUCUA Negative   KETONESU Trace*   BILIRUBINUA Negative   OCCULTUA Negative   NITRITE Negative   UROBILINOGEN Negative   LEUKOCYTESUR 2+*   RBCUA 4   WBCUA 44*   BACTERIA Rare   SQUAMEPITHEL 17   HYALINECASTS 0     Microbiology Results (last 7 days)       Procedure Component Value Units Date/Time    Urine culture [[id number]] Collected: 01/30/25 1051    Order Status: Completed Specimen: Urine Updated: 01/31/25 0856     Urine Culture, Routine No growth to date    Narrative:      Specimen Source->Urine           Significant Imaging: I have reviewed all pertinent imaging results/findings within the past 24 hours.  Imaging Results              CT Chest Abdomen Pelvis With IV Contrast (XPD) NO Oral Contrast (Final result)  Result time 01/29/25 14:17:23   Procedure changed from CT Abdomen Pelvis With IV Contrast NO Oral Contrast     Final result by Pavan Pierson MD (01/29/25 14:17:23)                   Impression:      1.  Mild relative long segment circumferential colonic wall thickening and adjacent fat stranding in the sigmoid colo, likely from mild infectious/inflammatory colitis, and or diverticulitis, without finding of obstruction, intra-abdominal free air or abscess (consider correlation with a history of colonoscopy in this age group and follow-up after treatment to document resolution as underlying malignancy is not excluded).    2.  Numerous additional, and incidental findings as outlined above.      Electronically signed by: Pavan Pierson  Date:    01/29/2025  Time:    14:17               Narrative:       CMS MANDATED QUALITY DATA - CT RADIATION - 436    All CT scans at this facility utilize dose modulation, iterative reconstruction, and/or weight based dosing when appropriate to reduce radiation dose to as low as reasonably achievable.    CLINICAL HISTORY:  ([id number])65 y/o  (1960) F    LLQ abdominal pain;    TECHNIQUE:  (A#[id number], exam time 1/29/2025 14:00)    CT CHEST ABDOMEN PELVIS WITH IV CONTRAST (XPD) [id number]    Axial CT images of the chest, abdomen and pelvis were obtained from the thoracic inlet to the proximal thigh.    COMPARISON:  None available.    FINDINGS:  CT Chest:    Visualized neck: normal    Lungs: The lungs are clear.    Airway: The trachea and central bronchial tree appear normal.    Pleura: There is no pleural effusion. There is no pneumothorax.    Cardiovascular: The heart is normal in size. There is no pericardial effusion. The aorta and great vessels are normal in course and caliber  noting atherosclerotic calcifications in the aorta and coronary arteries.    Mediastinum: There is no supraclavicular  axillary  mediastinal  or hilar lymphadenopathy.    Soft tissues: The peripheral soft tissues appear normal.    Musculoskeletal: There are mild degenerative changes of the thoracic spine.  There are no suspicious osseous lesions.    Esophagus: The esophagus appears grossly normal.    CT Abdomen:    Liver: The liver is normal in size and imaging appearance.    Gallbladder: There are stones seen in the gallbladder without wall thickening or pericholecystic fluid to suggest acute cholecystitis.    Biliary Tree: No intra or extrahepatic ductal dilation.    Spleen: Normal.    Pancreas: The pancreas is normal.    Adrenal Glands: Normal    Kidneys: No hydronephrosis/hydroureter or evidence of obstructive uropathy.  The left distal ureter is somewhat patulous with no radiopaque stone identified.  The left kidney is asymmetrically small/atrophic with renal cortical thinning.  There is a 3 mm  cortical calcifications suggesting sequelae of old infection/trauma.  Rounded simple fluid attenuation renal cortical cysts are seen bilaterally, for example measuring 3 cm in the inferior pole of the left kidney, and 15 mm in the midpole and upper pole of the right kidney.    Vasculature: There are scattered atheromatous mural plaques in the aorta and its major branch vessels with no aneurysm seen.    Lymph nodes: No abdominal lymphadenopathy is seen.    Intraperitoneal structures: There is no ascites.    Bowel: Mild long segment circumferential colonic wall thickening in the sigmoid colon with adjacent fat stranding (image 252).  There is scattered colonic diverticula in this area.  The appendix is normal (image 204).  No finding of bowel obstruction, intra-abdominal free air or abscess.    Abdominal wall: The abdominal wall and musculature are normal.    Musculoskeletal: Severe disc height loss with adjacent endplate sclerosis is seen at T12-L1, with moderate to severe disc height loss at L4-L5.    CT Pelvis:    Bladder: Normal.    Reproductive Organs: The uterus is not visualized/surgically absent.  Hysterectomy    Pelvic Lymph nodes: No pelvic lymphadenopathy or pelvic mass is identified.                                       X-Ray Chest AP Portable (Final result)  Result time 01/29/25 10:11:33      Final result by Ludivina Dias MD (01/29/25 10:11:33)                   Impression:      No acute cardiopulmonary abnormality.      Electronically signed by: Ludivina Dias  Date:    01/29/2025  Time:    10:11               Narrative:    EXAMINATION:  XR CHEST AP PORTABLE    CLINICAL HISTORY:  Chest Pain;    FINDINGS:  Portable chest at 09:36 is compared to 02/15/2024 shows normal cardiomediastinal silhouette.    Lungs are clear. Pulmonary vasculature is normal. No acute osseous abnormality.                                       Assessment and Plan     * Acute colitis  Admitted to med/surg  Continue IV  zosyn  Leukocytosis improving - will continue IV abx until trends down  Abdominal exam benign for now   GI consulted for rectal bleeding recommended outpatient colonoscopy in 3 months  H&H is stable  Diet advanced per GI    Lower GI bleed  Maintain iv abx  Transfuse as needed  GI team consulted for diverticular lower GI bleed  H&H stable    Diverticulitis  Maintain iv abx      Chest pain  Troponins trended and negative  EKG with on acute ischemic changes  Angiogram 2/2024 with nonobstructive CAD   CP resolved  No further work up at this time      Type 2 diabetes mellitus, without long-term current use of insulin  Patient's FSGs are controlled on current medication regimen.  Last A1c reviewed-   Lab Results   Component Value Date    HGBA1C 5.9 08/06/2023     Most recent fingerstick glucose reviewed-   Recent Labs   Lab 01/30/25  1604 01/31/25  0104 01/31/25  0616   POCTGLUCOSE 182* 121* 94       Current correctional scale  Medium  Maintain anti-hyperglycemic dose as follows-   Antihyperglycemics (From admission, onward)      Start     Stop Route Frequency Ordered    01/29/25 1620  insulin aspart U-100 pen 0-10 Units         -- SubQ Before meals & nightly PRN 01/29/25 1521          Hold Oral hypoglycemics while patient is in the hospital.      WES on CPAP  Aware       Morbid obesity  Body mass index is 48.45 kg/m². Morbid obesity complicates all aspects of disease management from diagnostic modalities to treatment.     Stage 3 chronic kidney disease  Creatine stable for now. BMP reviewed- noted Estimated Creatinine Clearance: 69.2 mL/min (based on SCr of 1 mg/dL). according to latest data. Based on current GFR, CKD stage is stage 3 - GFR 30-59.  Monitor UOP and serial BMP and adjust therapy as needed. Renally dose meds. Avoid nephrotoxic medications and procedures.    Hypertension, essential  Patient's blood pressure range in the last 24 hours was: BP  Min: 118/51  Max: 155/74.The patient's inpatient anti-hypertensive  regimen is listed below:  Current Antihypertensives  timolol maleate 0.5% ophthalmic solution 1 drop, 2 times daily, Both Eyes  verapamiL CR tablet 240 mg, Daily, Oral  lisinopriL tablet 40 mg, Daily, Oral    Plan  Blood pressure medications resumed      VTE Risk Mitigation (From admission, onward)           Ordered     IP VTE HIGH RISK PATIENT  Once         01/29/25 1521     Place sequential compression device  Until discontinued         01/29/25 1521                    Discharge Planning   HELENA: 2/1/2025     Code Status: Full Code   Medical Readiness for Discharge Date:   Discharge Plan A: Home                        Naresh Martinez NP  Department of Hospital Medicine   [facility]

## 2025-01-31 NOTE — PROGRESS NOTES
"[facility]  Progress Note    Patient Name: Jonna Pearl  MRN: [id number]  Patient Class: IP- Inpatient   Admission Date: 1/29/2025  Length of Stay: 1 days  Attending Physician: Kendrick Umaña DO  Primary Care Provider: Padmini Lackey FNP        Subjective     Principal Problem:Acute colitis        HPI:  64 year old pt getting admitted with chest pain/lower GI bleed and colitis  Pt started having chest pain few days ago  Pain mostly on retrosternal area / described as belching   Later she started having nausea with some abdominal pain'  Followed by multiple bouts of fresh blood in stools and pt came to ER & got admitted     Overview/Hospital Course:  Ms. Pearl has been monitored closely during her hospitalization. She was admitted on 1/29/25 for acute colitis/diverticulitis with bright red bleed per rectum. She had 2 stools with blood streaks, but did not fill the toilet bowl with blood and no significant clots. She had a CT abd/pelvis that revealed: "Mild relative long segment circumferential colonic wall thickening and adjacent fat stranding in the sigmoid colo, likely from mild infectious/inflammatory colitis, and or diverticulitis" with no abscess or perforation. She was empirically started on zosyn. WBC trended 17K-->24K. UA with pyuria, rare bacteruria - obtained after abx initiated, but culture has been sent and pending. H&H trended and stable 13.3/40-->12.6/40. GI has been consulted for BRBPR. She also had chest pain in the ED, troponins were trended and negative. EKG SB with no acute ischemic changes. She had an angiogram 2/2024 with nonobstructive CAD.     No new subjective & objective note has been filed under this hospital service since the last note was generated.      Assessment and Plan     * Acute colitis  Admitted to med/surg  Continue IV zosyn  Leukocytosis improving - will continue IV abx until trends down  Abdominal exam benign for now   GI consulted for " rectal bleeding recommended outpatient colonoscopy in 3 months  H&H is stable  Diet advanced per GI    Lower GI bleed  Maintain iv abx  Transfuse as needed  GI team consulted for diverticular lower GI bleed  H&H stable    Diverticulitis  Maintain iv abx      Chest pain  Troponins trended and negative  EKG with on acute ischemic changes  Angiogram 2/2024 with nonobstructive CAD   CP resolved  No further work up at this time      Type 2 diabetes mellitus, without long-term current use of insulin  Patient's FSGs are controlled on current medication regimen.  Last A1c reviewed-   Lab Results   Component Value Date    HGBA1C 5.9 08/06/2023     Most recent fingerstick glucose reviewed-   Recent Labs   Lab 01/30/25  1604 01/31/25  0104 01/31/25  0616   POCTGLUCOSE 182* 121* 94       Current correctional scale  Medium  Maintain anti-hyperglycemic dose as follows-   Antihyperglycemics (From admission, onward)      Start     Stop Route Frequency Ordered    01/29/25 1620  insulin aspart U-100 pen 0-10 Units         -- SubQ Before meals & nightly PRN 01/29/25 1521          Hold Oral hypoglycemics while patient is in the hospital.      WES on CPAP  Aware       Morbid obesity  Body mass index is 48.45 kg/m². Morbid obesity complicates all aspects of disease management from diagnostic modalities to treatment.     Stage 3 chronic kidney disease  Creatine stable for now. BMP reviewed- noted Estimated Creatinine Clearance: 69.2 mL/min (based on SCr of 1 mg/dL). according to latest data. Based on current GFR, CKD stage is stage 3 - GFR 30-59.  Monitor UOP and serial BMP and adjust therapy as needed. Renally dose meds. Avoid nephrotoxic medications and procedures.    Hypertension, essential  Patient's blood pressure range in the last 24 hours was: BP  Min: 118/51  Max: 155/74.The patient's inpatient anti-hypertensive regimen is listed below:  Current Antihypertensives  timolol maleate 0.5% ophthalmic solution 1 drop, 2 times daily,  Both Eyes  verapamiL CR tablet 240 mg, Daily, Oral  lisinopriL tablet 40 mg, Daily, Oral    Plan  Blood pressure medications resumed      VTE Risk Mitigation (From admission, onward)           Ordered     IP VTE HIGH RISK PATIENT  Once         01/29/25 1521     Place sequential compression device  Until discontinued         01/29/25 1521                    Discharge Planning   HELENA: 2/1/2025     Code Status: Full Code   Medical Readiness for Discharge Date:   Discharge Plan A: Home                        Naresh Martinez NP  Department of Hospital Medicine   [facility]

## 2025-02-01 VITALS
OXYGEN SATURATION: 98 % | TEMPERATURE: 97 F | BODY MASS INDEX: 48.74 KG/M2 | WEIGHT: 264.88 LBS | RESPIRATION RATE: 18 BRPM | HEIGHT: 62 IN | HEART RATE: 57 BPM | SYSTOLIC BLOOD PRESSURE: 144 MMHG | DIASTOLIC BLOOD PRESSURE: 91 MMHG

## 2025-02-01 LAB
ALBUMIN SERPL BCP-MCNC: 3 G/DL (ref 3.5–5.2)
ALP SERPL-CCNC: 75 U/L (ref 55–135)
ALT SERPL W/O P-5'-P-CCNC: 11 U/L (ref 10–44)
ANION GAP SERPL CALC-SCNC: 4 MMOL/L (ref 8–16)
AST SERPL-CCNC: 12 U/L (ref 10–40)
BACTERIA UR CULT: NO GROWTH
BASOPHILS # BLD AUTO: 0.03 K/UL (ref 0–0.2)
BASOPHILS NFR BLD: 0.2 % (ref 0–1.9)
BILIRUB SERPL-MCNC: 0.3 MG/DL (ref 0.1–1)
BUN SERPL-MCNC: 22 MG/DL (ref 8–23)
CALCIUM SERPL-MCNC: 8.1 MG/DL (ref 8.7–10.5)
CHLORIDE SERPL-SCNC: 110 MMOL/L (ref 95–110)
CO2 SERPL-SCNC: 25 MMOL/L (ref 23–29)
CREAT SERPL-MCNC: 1 MG/DL (ref 0.5–1.4)
DIFFERENTIAL METHOD BLD: ABNORMAL
EOSINOPHIL # BLD AUTO: 0.2 K/UL (ref 0–0.5)
EOSINOPHIL NFR BLD: 1.2 % (ref 0–8)
ERYTHROCYTE [DISTWIDTH] IN BLOOD BY AUTOMATED COUNT: 14.1 % (ref 11.5–14.5)
EST. GFR  (NO RACE VARIABLE): >60 ML/MIN/1.73 M^2
GLUCOSE SERPL-MCNC: 82 MG/DL (ref 70–110)
HCT VFR BLD AUTO: 34.9 % (ref 37–48.5)
HGB BLD-MCNC: 11.2 G/DL (ref 12–16)
IMM GRANULOCYTES # BLD AUTO: 0.04 K/UL (ref 0–0.04)
IMM GRANULOCYTES NFR BLD AUTO: 0.3 % (ref 0–0.5)
LYMPHOCYTES # BLD AUTO: 5.2 K/UL (ref 1–4.8)
LYMPHOCYTES NFR BLD: 40.9 % (ref 18–48)
MAGNESIUM SERPL-MCNC: 2.3 MG/DL (ref 1.6–2.6)
MCH RBC QN AUTO: 30.2 PG (ref 27–31)
MCHC RBC AUTO-ENTMCNC: 32.1 G/DL (ref 32–36)
MCV RBC AUTO: 94 FL (ref 82–98)
MONOCYTES # BLD AUTO: 1.1 K/UL (ref 0.3–1)
MONOCYTES NFR BLD: 8.2 % (ref 4–15)
NEUTROPHILS # BLD AUTO: 6.3 K/UL (ref 1.8–7.7)
NEUTROPHILS NFR BLD: 49.2 % (ref 38–73)
NRBC BLD-RTO: 0 /100 WBC
PLATELET # BLD AUTO: 277 K/UL (ref 150–450)
PMV BLD AUTO: 10.7 FL (ref 9.2–12.9)
POCT GLUCOSE: 77 MG/DL (ref 70–110)
POCT GLUCOSE: 87 MG/DL (ref 70–110)
POCT GLUCOSE: 89 MG/DL (ref 70–110)
POTASSIUM SERPL-SCNC: 4.1 MMOL/L (ref 3.5–5.1)
PROT SERPL-MCNC: 5.1 G/DL (ref 6–8.4)
RBC # BLD AUTO: 3.71 M/UL (ref 4–5.4)
SODIUM SERPL-SCNC: 139 MMOL/L (ref 136–145)
WBC # BLD AUTO: 12.8 K/UL (ref 3.9–12.7)

## 2025-02-01 PROCEDURE — 85025 COMPLETE CBC W/AUTO DIFF WBC: CPT | Performed by: INTERNAL MEDICINE

## 2025-02-01 PROCEDURE — 63600175 PHARM REV CODE 636 W HCPCS: Performed by: INTERNAL MEDICINE

## 2025-02-01 PROCEDURE — 25000003 PHARM REV CODE 250

## 2025-02-01 PROCEDURE — 25000003 PHARM REV CODE 250: Performed by: STUDENT IN AN ORGANIZED HEALTH CARE EDUCATION/TRAINING PROGRAM

## 2025-02-01 PROCEDURE — 36415 COLL VENOUS BLD VENIPUNCTURE: CPT | Performed by: INTERNAL MEDICINE

## 2025-02-01 PROCEDURE — 83735 ASSAY OF MAGNESIUM: CPT | Performed by: INTERNAL MEDICINE

## 2025-02-01 PROCEDURE — 80053 COMPREHEN METABOLIC PANEL: CPT | Performed by: INTERNAL MEDICINE

## 2025-02-01 PROCEDURE — 25000003 PHARM REV CODE 250: Performed by: INTERNAL MEDICINE

## 2025-02-01 PROCEDURE — 63600175 PHARM REV CODE 636 W HCPCS: Performed by: STUDENT IN AN ORGANIZED HEALTH CARE EDUCATION/TRAINING PROGRAM

## 2025-02-01 RX ORDER — AMOXICILLIN AND CLAVULANATE POTASSIUM 875; 125 MG/1; MG/1
1 TABLET, FILM COATED ORAL 2 TIMES DAILY
Qty: 24 TABLET | Refills: 0 | Status: SHIPPED | OUTPATIENT
Start: 2025-02-01 | End: 2025-02-13

## 2025-02-01 RX ORDER — ONDANSETRON 4 MG/1
4 TABLET, FILM COATED ORAL EVERY 8 HOURS PRN
Qty: 15 TABLET | Refills: 0 | Status: SHIPPED | OUTPATIENT
Start: 2025-02-01

## 2025-02-01 RX ORDER — AMOXICILLIN AND CLAVULANATE POTASSIUM 875; 125 MG/1; MG/1
1 TABLET, FILM COATED ORAL 2 TIMES DAILY
Qty: 24 TABLET | Refills: 0 | Status: SHIPPED | OUTPATIENT
Start: 2025-02-01 | End: 2025-02-01

## 2025-02-01 RX ADMIN — LISINOPRIL 40 MG: 20 TABLET ORAL at 08:02

## 2025-02-01 RX ADMIN — PANTOPRAZOLE SODIUM 40 MG: 40 TABLET, DELAYED RELEASE ORAL at 06:02

## 2025-02-01 RX ADMIN — ONDANSETRON 4 MG: 2 INJECTION INTRAMUSCULAR; INTRAVENOUS at 08:02

## 2025-02-01 RX ADMIN — VERAPAMIL HYDROCHLORIDE 240 MG: 240 TABLET ORAL at 08:02

## 2025-02-01 RX ADMIN — PIPERACILLIN AND TAZOBACTAM 4.5 G: 4; .5 INJECTION, POWDER, LYOPHILIZED, FOR SOLUTION INTRAVENOUS; PARENTERAL at 06:02

## 2025-02-01 NOTE — DISCHARGE SUMMARY
"[facility] Medicine  Discharge Summary      Patient Name: Jonna Pearl  MRN: [id number]  ADELSO: [id number]  Patient Class: IP- Inpatient  Admission Date: 1/29/2025  Hospital Length of Stay: 2 days  Discharge Date and Time: 2/1/2025  1:30 PM  Attending Physician: Lissett Huang DO   Discharging Provider: Naresh Martinez NP  Primary Care Provider: Padmini Lackey FNP    Primary Care Team: Networked reference to record PCT     HPI:   64 year old pt getting admitted with chest pain/lower GI bleed and colitis  Pt started having chest pain few days ago  Pain mostly on retrosternal area / described as belching   Later she started having nausea with some abdominal pain'  Followed by multiple bouts of fresh blood in stools and pt came to ER & got admitted     * No surgery found *      Hospital Course:   Ms. Pearl has been monitored closely during her hospitalization. She was admitted on 1/29/25 for acute colitis/diverticulitis with bright red bleed per rectum. She had 2 stools with blood streaks, but did not fill the toilet bowl with blood and no significant clots. She had a CT abd/pelvis that revealed: "Mild relative long segment circumferential colonic wall thickening and adjacent fat stranding in the sigmoid colo, likely from mild infectious/inflammatory colitis, and or diverticulitis" with no abscess or perforation. She was empirically started on zosyn. WBC trended 17K-->24K--18K--12K. UA with pyuria, rare bacteruria - obtained after abx initiated, but culture has been sent and pending. H&H trended and stable 13.3/40-->12.6/40. GI has been consulted for BRBPR. She also had chest pain in the ED, troponins were trended and negative. EKG SB with no acute ischemic changes. She had an angiogram 2/2024 with nonobstructive CAD.  Rectal bleeding resolved and leukocytosis improved.  Patient was transitioned to oral Augmentin to complete.  Patient ambulating in room with no difficulty.  Case management " followed for discharge planning.     Patient seen and examined on day of discharge.  Patient in no acute distress.  Patient is hemodynamically stable.  Patient deemed appropriate for discharge.  Patient remained afebrile during hospitalization.  Prescription for Augmentin, Zofran, and a probiotic were sent to patient's preferred pharmacy.  Patient educated on discharge planning, she verbalized understanding.  Patient is a follow with primary care provider as needed.  She is to follow up with GI in approximately 3 weeks after colitis has resolved.  Patient is safely discharged home with family.     Goals of Care Treatment Preferences:  Code Status: Full Code      SDOH Screening:  The patient was screened for utility difficulties, food insecurity, transport difficulties, housing insecurity, and interpersonal safety and there were no concerns identified this admission.     Consults:   Consults (From admission, onward)          Status Ordering Provider     Inpatient consult to Gastroenterology  Once        Provider:  Ashish Ivy III, MD    Acknowledged CHLOE STAFFORD            * Acute colitis  Admitted to med/surg  Continue IV zosyn  Leukocytosis improving - will continue IV abx until trends down  Abdominal exam benign for now   GI consulted for rectal bleeding recommended outpatient colonoscopy in 3 months  H&H is stable  Diet advanced per GI    Lower GI bleed  Maintain iv abx  Transfuse as needed  GI team consulted for diverticular lower GI bleed  H&H stable    Diverticulitis  Maintain iv abx      Chest pain  Troponins trended and negative  EKG with on acute ischemic changes  Angiogram 2/2024 with nonobstructive CAD   CP resolved  No further work up at this time      Type 2 diabetes mellitus, without long-term current use of insulin  Patient's FSGs are controlled on current medication regimen.  Last A1c reviewed-   Lab Results   Component Value Date    HGBA1C 5.9 08/06/2023     Most recent fingerstick glucose  reviewed-   Recent Labs   Lab 01/30/25  1604 01/31/25  0104 01/31/25  0616   POCTGLUCOSE 182* 121* 94       Current correctional scale  Medium  Maintain anti-hyperglycemic dose as follows-   Antihyperglycemics (From admission, onward)      Start     Stop Route Frequency Ordered    01/29/25 1620  insulin aspart U-100 pen 0-10 Units         -- SubQ Before meals & nightly PRN 01/29/25 1521          Hold Oral hypoglycemics while patient is in the hospital.      WES on CPAP  Aware       Morbid obesity  Body mass index is 48.45 kg/m². Morbid obesity complicates all aspects of disease management from diagnostic modalities to treatment.     Stage 3 chronic kidney disease  Creatine stable for now. BMP reviewed- noted Estimated Creatinine Clearance: 69.2 mL/min (based on SCr of 1 mg/dL). according to latest data. Based on current GFR, CKD stage is stage 3 - GFR 30-59.  Monitor UOP and serial BMP and adjust therapy as needed. Renally dose meds. Avoid nephrotoxic medications and procedures.    Hypertension, essential  Patient's blood pressure range in the last 24 hours was: BP  Min: 118/51  Max: 155/74.The patient's inpatient anti-hypertensive regimen is listed below:  Current Antihypertensives  timolol maleate 0.5% ophthalmic solution 1 drop, 2 times daily, Both Eyes  verapamiL CR tablet 240 mg, Daily, Oral  lisinopriL tablet 40 mg, Daily, Oral    Plan  Blood pressure medications resumed      Final Active Diagnoses:    Diagnosis Date Noted POA    PRINCIPAL PROBLEM:  Acute colitis [K52.9] 01/29/2025 Yes    Chest pain [R07.9] 01/29/2025 Yes    Diverticulitis [K57.92] 01/29/2025 Yes    Lower GI bleed [K92.2] 01/29/2025 Yes    Type 2 diabetes mellitus, without long-term current use of insulin [E11.9] 08/27/2023 Yes    WES on CPAP [G47.33] 08/11/2023 Yes     Chronic    Morbid obesity [E66.01] 06/20/2022 Yes    Stage 3 chronic kidney disease [N18.30] 09/11/2019 Yes    Hypertension, essential [I10] 05/18/2012 Yes     Chronic       Problems Resolved During this Admission:       Discharged Condition: stable    Disposition: Home or Self Care    Follow Up:   Follow-up Information       Dominick Garrison FNP. Go on 2/10/2025.    Specialty: Family Medicine  Why: Hospital follow up scheduled for 1:00 PM at   Discharge Clinic  Contact information:  [address]  [address]  [place] [address]  [phone]               Karthik Winslow MD Follow up in 3 week(s).    Specialty: Gastroenterology  Why: To schedule a colonoscopy  Contact information:  [address]  [address]  [place] [address]  [phone]               Merrill Vela MD. Call.    Specialty: Gastroenterology  Why: Please call to schedule appt.  Contact information:  [place] [address] [phone]                           Patient Instructions:      Ambulatory referral/consult to Pharmacy Assistance   Standing Status: Future   Referral Priority: Routine Referral Type: Consultation   Referral Reason: Specialty Services Required   Number of Visits Requested: 1     Ambulatory referral/consult to Outpatient Case Management   Referral Priority: Routine Referral Type: Consultation   Referral Reason: Specialty Services Required   Number of Visits Requested: 1       Significant Diagnostic Studies: Labs: CMP   Recent Labs   Lab 01/31/25  0615 02/01/25  0444    139   K 4.0 4.1    110   CO2 25 25   GLU 93 82   BUN 28* 22   CREATININE 1.0 1.0   CALCIUM 8.2* 8.1*   PROT 5.4* 5.1*   ALBUMIN 3.1* 3.0*   BILITOT 0.4 0.3   ALKPHOS 77 75   AST 11 12   ALT 8* 11   ANIONGAP 6* 4*   , CBC   Recent Labs   Lab 01/31/25  0615 02/01/25  0444   WBC 18.72* 12.80*   HGB 11.5* 11.2*   HCT 36.3* 34.9*    277   , and All labs within the past 24 hours have been reviewed    Pending Diagnostic Studies:       Procedure Component Value Units Date/Time    IBD Expanded Panel [[id number]] Collected: 01/31/25 0615    Order Status: Sent Lab Status: In process Updated:  01/31/25 0657    Specimen: Blood     Narrative:      Collection has been rescheduled by RE1 at 01/30/2025 15:45            Medications:  Reconciled Home Medications:      Medication List        START taking these medications      amoxicillin-clavulanate 875-125mg 875-125 mg per tablet  Commonly known as: AUGMENTIN  Take 1 tablet by mouth 2 (two) times daily. for 12 days     Lactobacillus acidophilus 1 billion cell Cap  Take 1 capsule by mouth once daily. for 14 days     ondansetron 4 MG tablet  Commonly known as: ZOFRAN  Take 1 tablet (4 mg total) by mouth every 8 (eight) hours as needed for Nausea.            CONTINUE taking these medications      clobetasoL 0.05 % external solution  Commonly known as: TEMOVATE  Apply topically 2 (two) times daily.     gabapentin 600 MG tablet  Commonly known as: NEURONTIN  Take 600 mg by mouth 3 (three) times daily as needed.     * ketoconazole 2 % shampoo  Commonly known as: NIZORAL  Apply topically twice a week.     * ketoconazole 2 % shampoo  Commonly known as: NIZORAL  Apply 1 Application topically Every 3 (three) days. 1 application as needed to wet hair every 3 to 4 days external once a day     latanoprost 0.005 % ophthalmic solution  Place 1 drop into both eyes every evening.     lisinopriL 40 MG tablet  Commonly known as: PRINIVIL,ZESTRIL  Take 1 tablet by mouth once daily.     timolol maleate 0.5% 0.5 % Drop  Commonly known as: TIMOPTIC  Place 1 drop into both eyes 2 (two) times daily.     topiramate 50 MG tablet  Commonly known as: TOPAMAX  Take 1 tablet (50 mg total) by mouth once daily.     verapamiL 240 MG CR tablet  Commonly known as: CALAN-SR  Take 1 tablet by mouth once daily.           * This list has 2 medication(s) that are the same as other medications prescribed for you. Read the directions carefully, and ask your doctor or other care provider to review them with you.                ASK your doctor about these medications      predniSONE 20 MG tablet  Commonly  known as: DELTASONE  Take 2 tablets (40 mg total) by mouth 3 (three) times daily.              Indwelling Lines/Drains at time of discharge:   Lines/Drains/Airways       None                   Time spent on the discharge of patient: 35 minutes         Naresh Martinez NP  Department of Hospital Medicine  [facility]

## 2025-02-01 NOTE — DISCHARGE INSTRUCTIONS
Review patient instructions.  Take all medications as prescribed.  Complete full course of antibiotics.  Stay well hydrated.  Please follow up with Gastroenterology in 3 weeks to schedule your colonoscopy.  You were admitted to Hospital Medicine for acute colitis and evaluated by Gastroenterology.  Gastroenterology recommended follow up colonoscopy with resolution of colitis.  You were given IV Zosyn while in the hospital and transitioned to p.o. Augmentin.  Prescriptions for Augmentin and lactobacillus were sent to patient's preferred pharmacy.  Follow with your PCP in 1-2 weeks as needed.

## 2025-02-01 NOTE — NURSING
Pt discharged via wheelchair with RN. All belongings sent home with pt. Discharge instruction and AVS given to patient. Prescription pickup and follow up visits were given to pt. Patient verbalized understanding. No acute distress noted. VSS.

## 2025-02-01 NOTE — PLAN OF CARE
02/01/25 1017   Final Note   Assessment Type Final Discharge Note   Anticipated Discharge Disposition Home   What phone number can be called within the next 1-3 days to see how you are doing after discharge? [phone]   Hospital Resources/Appts/Education Provided Appointments scheduled and added to AVS;Post-Acute resouces added to AVS   Post-Acute Status   Discharge Delays None known at this time       ** Patient cleared for DC from CM standpoint**    Per order, patient is to discharge to home/self care. Patient is to follow up with her PCP, GI, and [facility] discharge clinic. Previous CM sent staff message to [facility] DC clinic for scheduling. Educated patient to contact her primary GI office and PCP for scheduling as CM unable to schedule appointment due to office closure on weekends. Discharge prescriptions will be sent to patients preferred pharmacy. Previous CM stated in notes that patient has difficulty affording medications; ambulatory referral pharmacy assistance and ambulatory referral to out patient case management ordered per Roxanna Martinez NP, will send staff message for scheduling.Per patient,she doesn't always have difficulty affording medications,but sometimes she does. Patients family/friend will provide transportation home. No further DC needs at this time.

## 2025-02-03 ENCOUNTER — PATIENT OUTREACH (OUTPATIENT)
Dept: ADMINISTRATIVE | Facility: OTHER | Age: 65
End: 2025-02-03
Payer: MEDICARE

## 2025-02-03 LAB
BAKER'S YEAST IGG QN IA: 11 UNITS (ref 0–50)
CHITOBIOSIDE IGA SERPL IA-ACNC: 0 UNITS (ref 0–90)
LABORATORY COMMENT REPORT: NORMAL
LAMINARIBIOSIDE IGG SERPL IA-ACNC: 2 UNITS (ref 0–60)
MANNOBIOSIDE IGG SERPL IA-ACNC: 7 UNITS (ref 0–100)
P-ANCA ATYPICAL SER QL IF: NEGATIVE

## 2025-02-03 NOTE — PROGRESS NOTES
CHW - Outreach Attempt    Community Health Worker left a voicemail message for 1st attempt to contact patient regarding: sdoh  Community Health Worker to attempt to contact patient on: [phone]

## 2025-02-06 ENCOUNTER — PATIENT OUTREACH (OUTPATIENT)
Dept: ADMINISTRATIVE | Facility: OTHER | Age: 65
End: 2025-02-06
Payer: MEDICARE

## 2025-02-06 NOTE — PROGRESS NOTES
CHW - Outreach Attempt    Community Health Worker left a voicemail message for 2nd attempt to contact patient regarding: sdoh  Community Health Worker to attempt to contact patient on: [phone]

## 2025-02-07 ENCOUNTER — TELEPHONE (OUTPATIENT)
Facility: CLINIC | Age: 65
End: 2025-02-07
Payer: MEDICARE

## 2025-02-07 NOTE — TELEPHONE ENCOUNTER
Spoke with pt to confirm 2/10 appt in the Discharge Clinic and Pt confirmed she saw GI today and will see PCP early next week and stated that she would not be attending this appt.

## 2025-02-11 ENCOUNTER — PATIENT OUTREACH (OUTPATIENT)
Dept: ADMINISTRATIVE | Facility: OTHER | Age: 65
End: 2025-02-11
Payer: MEDICARE

## 2025-03-18 ENCOUNTER — HOSPITAL ENCOUNTER (OUTPATIENT)
Dept: PREADMISSION TESTING | Facility: HOSPITAL | Age: 65
Discharge: HOME | End: 2025-03-18

## 2025-03-18 NOTE — PRE ADMISSION SCREENING
Spoke with pt, she is waiting to hear from Dr Vela office if procedure is still on. Will move preop to Thursday as phone assessment

## 2025-04-17 ENCOUNTER — HOSPITAL ENCOUNTER (OUTPATIENT)
Facility: HOSPITAL | Age: 65
Discharge: HOME | End: 2025-04-17
Attending: INTERNAL MEDICINE | Admitting: INTERNAL MEDICINE
Payer: MEDICARE

## 2025-04-17 ENCOUNTER — ANESTHESIA EVENT (OUTPATIENT)
Dept: SURGERY | Facility: HOSPITAL | Age: 65
End: 2025-04-17
Payer: MEDICARE

## 2025-04-17 ENCOUNTER — ANESTHESIA (OUTPATIENT)
Dept: SURGERY | Facility: HOSPITAL | Age: 65
End: 2025-04-17
Payer: MEDICARE

## 2025-04-17 VITALS
OXYGEN SATURATION: 100 % | TEMPERATURE: 98 F | SYSTOLIC BLOOD PRESSURE: 148 MMHG | HEART RATE: 54 BPM | RESPIRATION RATE: 16 BRPM | DIASTOLIC BLOOD PRESSURE: 63 MMHG

## 2025-04-17 DIAGNOSIS — K57.32 DIVERTICULITIS, COLON: ICD-10-CM

## 2025-04-17 PROCEDURE — 25000003 PHARM REV CODE 250

## 2025-04-17 PROCEDURE — 37000009 HC ANESTHESIA EA ADD 15 MINS: Performed by: INTERNAL MEDICINE

## 2025-04-17 PROCEDURE — 45380 COLONOSCOPY AND BIOPSY: CPT | Performed by: INTERNAL MEDICINE

## 2025-04-17 PROCEDURE — 37000008 HC ANESTHESIA 1ST 15 MINUTES: Performed by: INTERNAL MEDICINE

## 2025-04-17 PROCEDURE — 88305 TISSUE EXAM BY PATHOLOGIST: CPT | Mod: TC,59 | Performed by: PATHOLOGY

## 2025-04-17 PROCEDURE — 63600175 PHARM REV CODE 636 W HCPCS

## 2025-04-17 PROCEDURE — 27200043 HC FORCEPS, BIOPSY: Performed by: INTERNAL MEDICINE

## 2025-04-17 RX ORDER — PROPOFOL 10 MG/ML
VIAL (ML) INTRAVENOUS
Status: DISCONTINUED | OUTPATIENT
Start: 2025-04-17 | End: 2025-04-17

## 2025-04-17 RX ADMIN — PROPOFOL 50 MG: 10 INJECTION, EMULSION INTRAVENOUS at 07:04

## 2025-04-17 RX ADMIN — PROPOFOL 50 MG: 10 INJECTION, EMULSION INTRAVENOUS at 08:04

## 2025-04-17 RX ADMIN — SODIUM CHLORIDE: 0.9 INJECTION, SOLUTION INTRAVENOUS at 07:04

## 2025-04-17 NOTE — ANESTHESIA POSTPROCEDURE EVALUATION
Anesthesia Post Evaluation    Patient: Jonna Pearl    Procedure(s) Performed: Procedure(s) (LRB):  COLONOSCOPY (N/A)    Final Anesthesia Type: general      Patient location during evaluation: GI PACU  Patient participation: Yes- Able to Participate  Level of consciousness: awake and alert and oriented  Post-procedure vital signs: reviewed and stable  Pain management: adequate  Airway patency: patent    PONV status at discharge: No PONV  Anesthetic complications: no      Cardiovascular status: blood pressure returned to baseline, hemodynamically stable and stable  Respiratory status: unassisted, spontaneous ventilation and room air  Hydration status: euvolemic  Follow-up not needed.              Vitals Value Taken Time   /63 04/17/25 08:30   Temp 36.4 °C (97.5 °F) 04/17/25 08:24   Pulse 55 04/17/25 08:54   Resp 16 04/17/25 08:58   SpO2 100 % 04/17/25 08:54   Vitals shown include unfiled device data.      No case tracking events are documented in the log.      Pain/Escobar Score: No data recorded

## 2025-04-17 NOTE — ANESTHESIA PREPROCEDURE EVALUATION
04/17/2025  Jonna Pearl is a 65 y.o., female.           Results for orders placed or performed during the hospital encounter of 01/29/25   Repeat EKG 12-lead    Collection Time: 01/29/25  1:24 PM   Result Value Ref Range    QRS Duration 80 ms    OHS QTC Calculation 440 ms    Narrative    Test Reason : R07.9,    Vent. Rate :  49 BPM     Atrial Rate :  49 BPM     P-R Int : 150 ms          QRS Dur :  80 ms      QT Int : 488 ms       P-R-T Axes :  58  62  70 degrees    QTcB Int : 440 ms    Sinus bradycardia  Otherwise normal ECG  When compared with ECG of 29-Jan-2025 08:59,  No significant change was found  Confirmed by Jeff Taylor ([id number]) on 1/29/2025 9:17:02 PM    Referred By: AAAREFERRAL SELF           Confirmed By: Jeff Taylor             Lab Results   Component Value Date    WBC 12.80 (H) 02/01/2025    HGB 11.2 (L) 02/01/2025    HCT 34.9 (L) 02/01/2025    MCV 94 02/01/2025     02/01/2025     BMP  Lab Results   Component Value Date     02/01/2025    K 4.1 02/01/2025     02/01/2025    CO2 25 02/01/2025    BUN 22 02/01/2025    CREATININE 1.0 02/01/2025    CALCIUM 8.1 (L) 02/01/2025    ANIONGAP 4 (L) 02/01/2025    GLU 82 02/01/2025    GLU 93 01/31/2025     (H) 01/30/2025       No results found for this or any previous visit.            Pre-op Assessment    I have reviewed the Patient Summary Reports.     I have reviewed the Nursing Notes. I have reviewed the NPO Status.   I have reviewed the Medications.     Review of Systems  Anesthesia Hx:  No problems with previous Anesthesia             Denies Family Hx of Anesthesia complications.    Denies Personal Hx of Anesthesia complications.                    Social:  Non-Smoker, No Alcohol Use       Hematology/Oncology:       -- Anemia:                                  EENT/Dental:  chronic allergic rhinitis    Ears  General/Symptom(s) Hearing Impairment:             Cardiovascular:     Hypertension, poorly controlled              ECG has been reviewed. History of Bigeminy                           Pulmonary:        Sleep Apnea, CPAP           Education provided regarding risk of obstructive sleep apnea            Renal/:  Chronic Renal Disease (Stage III), CKD renal calculi       Kidney Function/Disease, Chronic Kidney Disease (CKD) , CKD Stage III (GFR 30-59)            Hepatic/GI:  Bowel Prep.  Hiatal Hernia,                 Musculoskeletal:  Arthritis (DJD knees)   Chronic bilateral low back pain without sciatica  Spinal enthesopathy     Bone Disorders:    Osteopenia   Spine Disorders: cervical and lumbar Chronic Pain, Degenerative disease and Disc disease           Neurological:  TIA,  Neuromuscular Disease,  Headaches (migraines)     Gait abnormality Dx of Headaches, Migraine Headache                           Endocrine:  Diabetes, poorly controlled, type 2         Morbid Obesity / BMI > 40  Psych:  Psychiatric History  depression Sleep Disorder and Insomnia.       Sleep Disorder and Insomnia.        Physical Exam  General: Well nourished, Cooperative, Alert and Oriented    Airway:  Mallampati: III / II  Mouth Opening: Normal  TM Distance: Normal  Tongue: Normal  Neck ROM: Normal ROM    Dental:  Intact    Chest/Lungs:  Clear to auscultation, Normal Respiratory Rate    Heart:  Rate: Normal  Rhythm: Regular Rhythm        Anesthesia Plan  Type of Anesthesia, risks & benefits discussed:    Anesthesia Type: Gen Natural Airway  Intra-op Monitoring Plan: Standard ASA Monitors  Post Op Pain Control Plan: IV/PO Opioids PRN  (medical reason for not using multimodal pain management)  Induction:  IV  Informed Consent: Informed consent signed with the Patient and all parties understand the risks and agree with anesthesia plan.  All questions answered.   ASA Score: 3  Anesthesia Plan Notes:       GNA  POM  Propofol  WES Precautions      Ready For Surgery From Anesthesia Perspective.     .

## 2025-04-17 NOTE — TRANSFER OF CARE
Anesthesia Transfer of Care Note    Patient: Jonna Pearl    Procedure(s) Performed: Procedure(s) (LRB):  COLONOSCOPY (N/A)    Patient location: GI    Anesthesia Type: general    Transport from OR: Transported from OR on room air with adequate spontaneous ventilation    Post pain: adequate analgesia    Post assessment: no apparent anesthetic complications    Post vital signs: stable    Level of consciousness: awake and alert    Nausea/Vomiting: no nausea/vomiting    Complications: none    Transfer of care protocol was followed      Last vitals: Visit Vitals  BP (!) 146/67 (BP Location: Left arm, Patient Position: Lying)   Pulse (!) 50   Temp 36.6 °C (97.8 °F) (Oral)   Resp 16   SpO2 100%   Breastfeeding No

## 2025-04-17 NOTE — H&P
GASTROENTEROLOGY PRE-PROCEDURE H&P NOTE  Patient Name: Jonna Pearl  Patient MRN: [id number]  Patient : 1960    Service date: 2025    PCP: Padmini Lackey FNP    No chief complaint on file.      HPI: Patient is a 65 y.o. female with PMHx as below here for evaluation of h/o diverticulitis      Jonna Pearl is a 64 year old female patient who is seen today for an office visit.  64 F with recurrent diverticulitis presenting post hospitalization for recurrent diverticulitis. Reports improvement in abdominal pain. No symptoms currently. Normal defecation. Last colonoscopy in 2019. Feeling well completed several days of Augmentin with several days to get      Chart Review      25 VT abd  Impression:    1. Mild relative long segment circumferential colonic wall thickening and adjacent fat stranding in the sigmoid colo, likely from mild infectious/inflammatory colitis, and or diverticulitis, without finding of obstruction, intra-abdominal free air or abscess (consider correlation with a history of colonoscopy in this age group and follow-up after treatment to document resolution as underlying malignancy is not excluded).    2. Numerous additional, and incidental findings as outlined above.     CT   MPRESSION:    1. Persistent mild inflammatory fat stranding about segment of proximal sigmoid colon where wall thickening and colonic diverticula coexist, unchanged. The appearance suggests persistent acute diverticulitis without perforation or other complication. As colon carcinoma can occasionally no acute acute diverticulitis on imaging alone, a follow-up CT abdomen and pelvis with IV contrast for endoscopy is recommended to exclude possibility of colon carcinoma.  2. Hyperdensity gallbladder could reflect cholelithiasis with slight haziness about the gallbladder wall, nonspecific. Clinical and laboratory correlation for acute cholecystitis is requested. If further imaging evaluation is desired, upper  quadrant ultrasound can be considered.  3. Coronary artery calcifications.     CT  IMPRESSION:  1. Sigmoid diverticulitis.  2. Gallstones in the gallbladder.     CT   Impression  Mild hazy stranding seen in the cecum ascending colon and proximal  transverse colon findings may be secondary to lack of distention  versus underlying inflammation. Recommend correlation to symptoms of  colitis.      25 04:44  WBC: 12.80 (H)  RBC: 3.71 (L)  Hemoglobin: 11.2 (L)  Hematocrit: 34.9 (L)  MCV: 94  MCH: 30.2  MCHC: 32.1  RDW: 14.1  Platelet Count: 277      (H): Data is abnormally high  (L): Data is abnormally low      25 04:44  Sodium: 139  Potassium: 4.1  Chloride: 110  CO2: 25  Anion Gap: 4 (L)  BUN: 22  Creatinine: 1.0  eGFR: >60.0  Glucose: 82  Calcium: 8.1 (L)  Magnesium: 2.3  ALP: 75  PROTEIN TOTAL: 5.1 (L)  Albumin: 3.0 (L)  BILIRUBIN TOTAL: 0.3  AST: 12  ALT: 11      (L): Data is abnormally low      Colon 2019  Findings:  The perianal and digital rectal examinations were normal.  Non-bleeding internal hemorrhoids were found during retroflexion.  The hemorrhoids were small.  The colon (entire examined portion) appeared normal.  The terminal ileum appeared normal.  Impression: - Non-bleeding internal hemorrhoids.  - The entire examined colon is normal.  - The examined portion of the ileum was normal.  - No specimens collected.     Past Medical History:  Past Medical History:   Diagnosis Date    Allergy     Arthritis     Atrophic kidney     left. probably childhood illness or disease    Chronic kidney disease     Chronic rhinitis     Colon polyps     Depression     Diverticulitis     HEARING LOSS     both ears - no hearing aids    Hiatal hernia     Hypertension     Insomnia     Migraines     Otitis media     Sleep apnea     mild uses cpap    TIA (transient ischemic attack)         Past Surgical History:  Past Surgical History:   Procedure Laterality Date     SECTION      COLONOSCOPY   2014    Dr Escobar 5 year yossi    COLONOSCOPY N/A 04/10/2019    Procedure: COLONOSCOPY;  Surgeon: Matt Orozco MD;  Location: [facility] ENDO;  Service: Endoscopy;  Laterality: N/A;    ESOPHAGOGASTRODUODENOSCOPY N/A 2023    Procedure: EGD (ESOPHAGOGASTRODUODENOSCOPY);  Surgeon: Merrill Vela MD;  Location: [facility] ENDO;  Service: Endoscopy;  Laterality: N/A;    HEMORRHOID SURGERY      HYSTERECTOMY      LEFT HEART CATHETERIZATION Left 2024    Procedure: Left heart cath;  Surgeon: Shoshana Mcmahon MD;  Location: [facility] CATH/EP LAB;  Service: Cardiology;  Laterality: Left;    left mandibular node resection      OOPHORECTOMY      URETEROSCOPY          Home Medications:  Prescriptions Prior to Admission[1]            Review of patient's allergies indicates:   Allergen Reactions    Neomycin-polymyxin Swelling    Cephalexin      Other reaction(s): Unknown    Doxycycline      Other reaction(s): Unknown    Iodinated contrast media      Other reaction(s): Unknown    Iodine Other (See Comments)     Due to kidney function    Valsartan      Other reaction(s): Unknown    Bacitracin-polymyxin b Rash       Social History:   Social History     Occupational History    Not on file   Tobacco Use    Smoking status: Former     Current packs/day: 0.00     Average packs/day: 1 pack/day for 18.0 years (18.0 ttl pk-yrs)     Types: Cigarettes     Start date: 2000     Quit date: 2018     Years since quittin.0    Smokeless tobacco: Never   Substance and Sexual Activity    Alcohol use: No    Drug use: No    Sexual activity: Not on file       Family History:   Family History   Problem Relation Name Age of Onset    Cancer Sister          breast    Diabetes Mother      Early death Father      Diabetes Paternal Grandmother      Breast cancer Maternal Aunt      Breast cancer Paternal Aunt         Review of Systems:  A 10 point review of systems was performed and was normal, except as mentioned in the HPI, including  "constitutional, HEENT, heme, lymph, cardiovascular, respiratory, gastrointestinal, genitourinary, neurologic, endocrine, psychiatric and musculoskeletal.      OBJECTIVE:    Physical Exam:  24 Hour Vital Sign Ranges: Temp:  [97.8 °F (36.6 °C)] 97.8 °F (36.6 °C)  Pulse:  [50] 50  Resp:  [16] 16  SpO2:  [100 %] 100 %  BP: (146)/(67) 146/67  Most recent vitals: BP (!) 146/67 (BP Location: Left arm, Patient Position: Lying)   Pulse (!) 50   Temp 97.8 °F (36.6 °C) (Oral)   Resp 16   SpO2 100% Comment: ra  Breastfeeding No    GEN: well-developed, well-nourished, awake and alert, non-toxic appearing adult  HEENT: PERRL, sclera anicteric, oral mucosa pink and moist without lesion  NECK: trachea midline; Good ROM  CV: regular rate and rhythm, no murmurs or gallops  RESP: clear to auscultation bilaterally, no wheezes, rhonci or rales  ABD: soft, non-tender, non-distended, normal bowel sounds  EXT: no swelling or edema, 2+ pulses distally  SKIN: no rashes or jaundice  PSYCH: normal affect    Labs:   No results for input(s): "WBC", "MCV", "PLT" in the last 72 hours.    Invalid input(s): "HGBAU"  No results for input(s): "NA", "K", "CL", "CO2", "BUN", "GLU" in the last 72 hours.    Invalid input(s): "CREA"  No results for input(s): "ALB" in the last 72 hours.    Invalid input(s): "ALKP", "SGOT", "SGPT", "TBIL", "DBIL", "TPRO"  No results for input(s): "PT", "INR", "PTT" in the last 72 hours.      IMPRESSION / RECOMMENDATIONS:  H/o diverticulitis  -cscope now  with interventions as warranted.   RIsks, benefits, alternatives discussed in detail regarding upcoming procedures and sedation. Some of the more common endoscopic complications include but not limited to immediate or delayed perforation, bleeding, infections, pain, inadvertent injury to surrounding tissue / organs and possible need for surgical evaluation. Patient expressed understanding, all questions answered and will proceed with procedure as planned.     Merrill " Dane  4/17/2025  7:55 AM           [1]   Medications Prior to Admission   Medication Sig Dispense Refill Last Dose/Taking    clobetasoL (TEMOVATE) 0.05 % external solution Apply topically 2 (two) times daily. (Patient taking differently: Apply 1 g topically 2 (two) times daily.) 50 mL 1     gabapentin (NEURONTIN) 600 MG tablet Take 600 mg by mouth 3 (three) times daily as needed.  9     ketoconazole (NIZORAL) 2 % shampoo Apply 1 Application topically Every 3 (three) days. 1 application as needed to wet hair every 3 to 4 days external once a day       ketoconazole (NIZORAL) 2 % shampoo Apply topically twice a week. 120 mL 5     latanoprost 0.005 % ophthalmic solution Place 1 drop into both eyes every evening.  6     lisinopriL (PRINIVIL,ZESTRIL) 40 MG tablet Take 1 tablet by mouth once daily.       ondansetron (ZOFRAN) 4 MG tablet Take 1 tablet (4 mg total) by mouth every 8 (eight) hours as needed for Nausea. 15 tablet 0     verapamiL (CALAN-SR) 240 MG CR tablet Take 1 tablet by mouth once daily.

## 2025-04-17 NOTE — [employer] PATIENT INSTRUCTIONS
Discharge Summary/Instructions after an Endoscopic Procedure  Patient Name: Jonna Pearl  Patient MRN: [id number]  Patient YOB: 1960  Thursday, April 17, 2025  Merrill Vela MD  RESTRICTIONS:  During your procedure today, you received medications for sedation.  These   medications may affect your judgment, balance and coordination.  Therefore,   for 24 hours, you have the following restrictions:   - DO NOT drive a car, operate machinery, make legal/financial decisions,   sign important papers or drink alcohol.    ACTIVITY:  Today: no heavy lifting, straining or running due to procedural   sedation/anesthesia.  The following day: return to full activity including work.  DIET:  Eat and drink normally unless instructed otherwise.     TREATMENT FOR COMMON SIDE EFFECTS:  - Mild abdominal pain, nausea, belching, bloating or excessive gas:  rest,   eat lightly and use a heating pad.  - Sore Throat: treat with throat lozenges and/or gargle with warm salt   water.  - Because air was used during the procedure, expelling large amounts of air   from your rectum or belching is normal.  - If a bowel prep was taken, you may not have a bowel movement for 1-3 days.    This is normal.  SYMPTOMS TO WATCH FOR AND REPORT TO YOUR PHYSICIAN:  1. Abdominal pain or bloating, other than gas cramps.  2. Chest pain.  3. Back pain.  4. Signs of infection such as: chills or fever occurring within 24 hours   after the procedure.  5. Rectal bleeding, which would show as bright red, maroon, or black stools.   (A tablespoon of blood from the rectum is not serious, especially if   hemorrhoids are present.)  6. Vomiting.  7. Weakness or dizziness.  GO DIRECTLY TO THE NEAREST EMERGENCY ROOM IF YOU HAVE ANY OF THE FOLLOWING:      Difficulty breathing              Chills and/or fever over 101 F   Persistent vomiting and/or vomiting blood   Severe abdominal pain   Severe chest pain   Black, tarry stools   Bleeding- more than one  tablespoon   Any other symptom or condition that you feel may need urgent attention  Your doctor recommends these additional instructions:  If any biopsies were taken, your doctors clinic will contact you in 1 to 2   weeks with any results.  - Patient has a contact number available for emergencies.  The signs and   symptoms of potential delayed complications were discussed with the   patient.  Return to normal activities tomorrow.  Written discharge   instructions were provided to the patient.   - Resume previous diet.   - Continue present medications.   - Await pathology results.   - Repeat colonoscopy in 6 months for surveillance.   - Discharge patient to home (with spouse).  For questions, problems or results please call your physician - Merrill Vela MD at Work:  [phone].  [facility], EMERGENCY ROOM PHONE NUMBER: [phone]  IF A COMPLICATION OR EMERGENCY SITUATION ARISES AND YOU ARE UNABLE TO REACH   YOUR PHYSICIAN - GO DIRECTLY TO THE EMERGENCY ROOM.  MD Merrill Carrillo MD  4/17/2025 8:27:40 AM  This report has been verified and signed electronically.  Dear patient,  As a result of recent federal legislation (The Federal Cures Act), you may   receive lab or pathology results from your procedure in your [facility]   account before your physician is able to contact you. Your physician or   their representative will relay the results to you with their   recommendations at their soonest availability.  Thank you,  [employer]

## 2025-04-22 ENCOUNTER — LAB VISIT (OUTPATIENT)
Dept: LAB | Facility: HOSPITAL | Age: 65
End: 2025-04-22
Attending: INTERNAL MEDICINE
Payer: MEDICARE

## 2025-04-22 DIAGNOSIS — K51.50 LEFT SIDED ULCERATIVE (CHRONIC) COLITIS: Primary | ICD-10-CM

## 2025-04-22 LAB
ALBUMIN SERPL-MCNC: 3.6 G/DL (ref 3.5–5.2)
ALP SERPL-CCNC: 109 UNIT/L (ref 55–135)
ALT SERPL-CCNC: 10 UNIT/L (ref 10–44)
ANION GAP (SMH): 8 MMOL/L (ref 8–16)
AST SERPL-CCNC: 11 UNIT/L (ref 10–40)
BILIRUB SERPL-MCNC: 0.4 MG/DL (ref 0.1–1)
BUN SERPL-MCNC: 14 MG/DL (ref 8–23)
C-REACTIVE PROTEIN (SMH): 0.3 MG/DL
CALCIUM SERPL-MCNC: 8.8 MG/DL (ref 8.7–10.5)
CHLORIDE SERPL-SCNC: 108 MMOL/L (ref 95–110)
CO2 SERPL-SCNC: 24 MMOL/L (ref 23–29)
CREAT SERPL-MCNC: 0.8 MG/DL (ref 0.5–1.4)
GFR SERPLBLD CREATININE-BSD FMLA CKD-EPI: >60 ML/MIN/1.73/M2
GLUCOSE SERPL-MCNC: 99 MG/DL (ref 70–110)
POTASSIUM SERPL-SCNC: 4.2 MMOL/L (ref 3.5–5.1)
PROT SERPL-MCNC: 6.4 GM/DL (ref 6–8.4)
SODIUM SERPL-SCNC: 140 MMOL/L (ref 136–145)

## 2025-04-22 PROCEDURE — 36415 COLL VENOUS BLD VENIPUNCTURE: CPT

## 2025-04-22 PROCEDURE — 86140 C-REACTIVE PROTEIN: CPT

## 2025-04-22 PROCEDURE — 80053 COMPREHEN METABOLIC PANEL: CPT

## 2025-04-22 PROCEDURE — 87340 HEPATITIS B SURFACE AG IA: CPT

## 2025-04-22 PROCEDURE — 86480 TB TEST CELL IMMUN MEASURE: CPT

## 2025-04-22 PROCEDURE — 86706 HEP B SURFACE ANTIBODY: CPT

## 2025-04-23 ENCOUNTER — LAB VISIT (OUTPATIENT)
Dept: LAB | Facility: HOSPITAL | Age: 65
End: 2025-04-23
Attending: INTERNAL MEDICINE
Payer: MEDICARE

## 2025-04-23 DIAGNOSIS — K51.50 LEFT SIDED ULCERATIVE (CHRONIC) COLITIS: Primary | ICD-10-CM

## 2025-04-23 LAB
HBV SURFACE AB SER QL: NON REACTIVE
HBV SURFACE AG SERPL QL IA: NEGATIVE

## 2025-04-23 PROCEDURE — 83993 ASSAY FOR CALPROTECTIN FECAL: CPT

## 2025-04-25 ENCOUNTER — HOSPITAL ENCOUNTER (EMERGENCY)
Facility: HOSPITAL | Age: 65
Discharge: HOME | End: 2025-04-25
Attending: EMERGENCY MEDICINE
Payer: MEDICARE

## 2025-04-25 VITALS
BODY MASS INDEX: 46.93 KG/M2 | WEIGHT: 255 LBS | HEIGHT: 62 IN | HEART RATE: 71 BPM | OXYGEN SATURATION: 95 % | TEMPERATURE: 98 F | DIASTOLIC BLOOD PRESSURE: 66 MMHG | SYSTOLIC BLOOD PRESSURE: 143 MMHG | RESPIRATION RATE: 17 BRPM

## 2025-04-25 DIAGNOSIS — K62.5 RECTAL BLEEDING: Primary | ICD-10-CM

## 2025-04-25 DIAGNOSIS — K92.2 GI BLEED: ICD-10-CM

## 2025-04-25 LAB
ABSOLUTE EOSINOPHIL (SMH): 0.24 K/UL
ABSOLUTE MONOCYTE (SMH): 1.32 K/UL (ref 0.3–1)
ABSOLUTE NEUTROPHIL COUNT (SMH): 14.3 K/UL (ref 1.8–7.7)
ALBUMIN SERPL-MCNC: 3.6 G/DL (ref 3.5–5.2)
ALP SERPL-CCNC: 108 UNIT/L (ref 55–135)
ALT SERPL-CCNC: 11 UNIT/L (ref 10–44)
ANION GAP (SMH): 10 MMOL/L (ref 8–16)
APTT PPP: 23.3 SECONDS (ref 21–32)
AST SERPL-CCNC: 13 UNIT/L (ref 10–40)
BASOPHILS # BLD AUTO: 0.04 K/UL
BASOPHILS NFR BLD AUTO: 0.2 %
BILIRUB SERPL-MCNC: 0.4 MG/DL (ref 0.1–1)
BNP SERPL-MCNC: 230 PG/ML
BUN SERPL-MCNC: 19 MG/DL (ref 8–23)
CALCIUM SERPL-MCNC: 8.7 MG/DL (ref 8.7–10.5)
CHLORIDE SERPL-SCNC: 105 MMOL/L (ref 95–110)
CO2 SERPL-SCNC: 25 MMOL/L (ref 23–29)
CREAT SERPL-MCNC: 1.1 MG/DL (ref 0.5–1.4)
ERYTHROCYTE [DISTWIDTH] IN BLOOD BY AUTOMATED COUNT: 13.6 % (ref 11.5–14.5)
GFR SERPLBLD CREATININE-BSD FMLA CKD-EPI: 56 ML/MIN/1.73/M2
GLUCOSE SERPL-MCNC: 95 MG/DL (ref 70–110)
HCT VFR BLD AUTO: 41.4 % (ref 37–48.5)
HGB BLD-MCNC: 13.1 GM/DL (ref 12–16)
IMM GRANULOCYTES # BLD AUTO: 0.08 K/UL (ref 0–0.04)
IMM GRANULOCYTES NFR BLD AUTO: 0.4 % (ref 0–0.5)
INDIRECT COOMBS: NORMAL
INR PPP: 1 (ref 0.8–1.2)
LYMPHOCYTES # BLD AUTO: 3.1 K/UL (ref 1–4.8)
MCH RBC QN AUTO: 29.6 PG (ref 27–31)
MCHC RBC AUTO-ENTMCNC: 31.6 G/DL (ref 32–36)
MCV RBC AUTO: 94 FL (ref 82–98)
NUCLEATED RBC (/100WBC) (SMH): 0 /100 WBC
OB PNL STL: POSITIVE
PLATELET # BLD AUTO: 456 K/UL (ref 150–450)
PMV BLD AUTO: 10.1 FL (ref 9.2–12.9)
POTASSIUM SERPL-SCNC: 3.5 MMOL/L (ref 3.5–5.1)
PROT SERPL-MCNC: 6.4 GM/DL (ref 6–8.4)
PROTHROMBIN TIME: 11.5 SECONDS (ref 9–12.5)
RBC # BLD AUTO: 4.42 M/UL (ref 4–5.4)
RELATIVE EOSINOPHIL (SMH): 1.3 % (ref 0–8)
RELATIVE LYMPHOCYTE (SMH): 16.3 % (ref 18–48)
RELATIVE MONOCYTE (SMH): 6.9 % (ref 4–15)
RELATIVE NEUTROPHIL (SMH): 74.9 % (ref 38–73)
RH BLD: NORMAL
SODIUM SERPL-SCNC: 140 MMOL/L (ref 136–145)
SPECIMEN OUTDATE: NORMAL
TROPONIN HIGH SENSITIVE (SMH): 4 PG/ML
WBC # BLD AUTO: 19.07 K/UL (ref 3.9–12.7)

## 2025-04-25 PROCEDURE — 82272 OCCULT BLD FECES 1-3 TESTS: CPT | Performed by: EMERGENCY MEDICINE

## 2025-04-25 PROCEDURE — 96375 TX/PRO/DX INJ NEW DRUG ADDON: CPT

## 2025-04-25 PROCEDURE — 85025 COMPLETE CBC W/AUTO DIFF WBC: CPT | Performed by: EMERGENCY MEDICINE

## 2025-04-25 PROCEDURE — 86850 RBC ANTIBODY SCREEN: CPT | Performed by: EMERGENCY MEDICINE

## 2025-04-25 PROCEDURE — 85610 PROTHROMBIN TIME: CPT | Performed by: EMERGENCY MEDICINE

## 2025-04-25 PROCEDURE — 96374 THER/PROPH/DIAG INJ IV PUSH: CPT

## 2025-04-25 PROCEDURE — 63600175 PHARM REV CODE 636 W HCPCS: Mod: JZ,TB | Performed by: EMERGENCY MEDICINE

## 2025-04-25 PROCEDURE — 99285 EMERGENCY DEPT VISIT HI MDM: CPT | Mod: 25

## 2025-04-25 PROCEDURE — 84450 TRANSFERASE (AST) (SGOT): CPT | Performed by: EMERGENCY MEDICINE

## 2025-04-25 PROCEDURE — 83880 ASSAY OF NATRIURETIC PEPTIDE: CPT | Performed by: EMERGENCY MEDICINE

## 2025-04-25 PROCEDURE — 85730 THROMBOPLASTIN TIME PARTIAL: CPT | Performed by: EMERGENCY MEDICINE

## 2025-04-25 PROCEDURE — 84484 ASSAY OF TROPONIN QUANT: CPT | Performed by: EMERGENCY MEDICINE

## 2025-04-25 RX ORDER — METHYLPREDNISOLONE SOD SUCC 125 MG
125 VIAL (EA) INJECTION
Status: COMPLETED | OUTPATIENT
Start: 2025-04-25 | End: 2025-04-25

## 2025-04-25 RX ORDER — PREDNISONE 20 MG/1
40 TABLET ORAL
Status: DISCONTINUED | OUTPATIENT
Start: 2025-04-25 | End: 2025-04-25

## 2025-04-25 RX ORDER — PANTOPRAZOLE SODIUM 40 MG/10ML
80 INJECTION, POWDER, LYOPHILIZED, FOR SOLUTION INTRAVENOUS
Status: COMPLETED | OUTPATIENT
Start: 2025-04-25 | End: 2025-04-25

## 2025-04-25 RX ADMIN — METHYLPREDNISOLONE SODIUM SUCCINATE 125 MG: 125 INJECTION, POWDER, FOR SOLUTION INTRAMUSCULAR; INTRAVENOUS at 05:04

## 2025-04-25 RX ADMIN — PANTOPRAZOLE SODIUM 80 MG: 40 INJECTION, POWDER, FOR SOLUTION INTRAVENOUS at 02:04

## 2025-04-25 NOTE — ED PROVIDER NOTES
Encounter Date: 2025       History     Chief Complaint   Patient presents with    Rectal Bleeding     Has been having rectal bleeding since Feb. Had Colonoscopy last week and dx with UC. States has been bleeding a lot more since procedure    Hypotension     Today after going to bathroom, had episode of hypotension. Given 400ml en route and bp is better     65-year-old female presented emergency department complaining of having rectal bleeding since the  when she had colonoscopy.  Patient has history of ulcerative colitis.  Patient said she was having bleeding since she had the procedure.  Bleeding got slightly worse today.  Patient felt dizzy.  Denies any chest pain or shortness of breath or abdominal pain or dysuria or hematuria.  Patient on steroids at this time.      Review of patient's allergies indicates:   Allergen Reactions    Neomycin-polymyxin Swelling    Cephalexin      Other reaction(s): Unknown    Doxycycline      Other reaction(s): Unknown    Iodinated contrast media      Other reaction(s): Unknown    Iodine Other (See Comments)     Due to kidney function    Valsartan      Other reaction(s): Unknown    Bacitracin-polymyxin b Rash     Past Medical History:   Diagnosis Date    Allergy     Arthritis     Atrophic kidney     left. probably childhood illness or disease    Chronic kidney disease     Chronic rhinitis     Colon polyps     Depression     Diverticulitis     HEARING LOSS     both ears - no hearing aids    Hiatal hernia     Hypertension     Insomnia     Migraines     Otitis media     Sleep apnea     mild uses cpap    TIA (transient ischemic attack)      Past Surgical History:   Procedure Laterality Date     SECTION      COLONOSCOPY  2014    Dr Escobar 5 year yossi    COLONOSCOPY N/A 04/10/2019    Procedure: COLONOSCOPY;  Surgeon: Matt Orozco MD;  Location: [facility];  Service: Endoscopy;  Laterality: N/A;    COLONOSCOPY N/A 2025    Procedure: COLONOSCOPY;   Surgeon: Merrill Vela MD;  Location: [facility] ENDO;  Service: Endoscopy;  Laterality: N/A;    ESOPHAGOGASTRODUODENOSCOPY N/A 01/12/2023    Procedure: EGD (ESOPHAGOGASTRODUODENOSCOPY);  Surgeon: Merrill Vela MD;  Location: [facility] ENDO;  Service: Endoscopy;  Laterality: N/A;    HEMORRHOID SURGERY      HYSTERECTOMY      LEFT HEART CATHETERIZATION Left 2/21/2024    Procedure: Left heart cath;  Surgeon: Shoshana Mcmahon MD;  Location: [facility] CATH/EP LAB;  Service: Cardiology;  Laterality: Left;    left mandibular node resection      OOPHORECTOMY      URETEROSCOPY       Family History   Problem Relation Name Age of Onset    Cancer Sister          breast    Diabetes Mother      Early death Father      Diabetes Paternal Grandmother      Breast cancer Maternal Aunt      Breast cancer Paternal Aunt       Social History[1]  Review of Systems   Constitutional:  Positive for fatigue.   HENT: Negative.     Eyes: Negative.    Respiratory: Negative.     Cardiovascular: Negative.  Negative for chest pain.   Gastrointestinal:  Positive for blood in stool.   Endocrine: Negative.    Genitourinary: Negative.    Musculoskeletal: Negative.    Skin: Negative.    Allergic/Immunologic: Negative.    Neurological:  Positive for dizziness.   Hematological: Negative.    Psychiatric/Behavioral: Negative.     All other systems reviewed and are negative.      Physical Exam     Initial Vitals [04/25/25 1327]   BP Pulse Resp Temp SpO2   124/68 78 16 98.3 °F (36.8 °C) 99 %      MAP       --         Physical Exam    Nursing note and vitals reviewed.  Constitutional: She appears well-developed and well-nourished.   HENT:   Head: Normocephalic and atraumatic.   Nose: Nose normal. Mouth/Throat: Oropharynx is clear and moist.   Eyes: Conjunctivae and EOM are normal. Pupils are equal, round, and reactive to light.   Neck: Neck supple. No thyromegaly present. No tracheal deviation present. No JVD present.   Normal range of motion.  Cardiovascular:  Normal  rate, regular rhythm, normal heart sounds and intact distal pulses.           No murmur heard.  Pulmonary/Chest: Breath sounds normal. No stridor. No respiratory distress. She has no wheezes. She has no rales.   Abdominal: Abdomen is soft. Bowel sounds are normal. There is no abdominal tenderness. There is no rebound.   Genitourinary: : Acceptable.   Genitourinary Comments: No gross blood on exam     Musculoskeletal:         General: No edema. Normal range of motion.      Cervical back: Normal range of motion and neck supple.     Neurological: She is alert and oriented to person, place, and time. She has normal strength. GCS score is 15. GCS eye subscore is 4. GCS verbal subscore is 5. GCS motor subscore is 6.   Skin: Skin is warm. Capillary refill takes less than 2 seconds.   Psychiatric: She has a normal mood and affect. Thought content normal.         ED Course   Procedures  Labs Reviewed   COMPREHENSIVE METABOLIC PANEL - Abnormal       Result Value    Sodium 140      Potassium 3.5      Chloride 105      CO2 25      Glucose 95      BUN 19      Creatinine 1.1      Calcium 8.7      Protein Total 6.4      Albumin 3.6      Bilirubin Total 0.4            AST 13      ALT 11      Anion Gap 10      eGFR 56 (*)    CBC WITH DIFFERENTIAL - Abnormal    WBC 19.07 (*)     RBC 4.42      Hgb 13.1      Hct 41.4      MCV 94      MCH 29.6      MCHC 31.6 (*)     RDW 13.6      Platelet Count 456 (*)     MPV 10.1      Nucleated RBC 0      Neut % 74.9 (*)     Lymph % 16.3 (*)     Mono % 6.9      Eos % 1.3      Basophil % 0.2      Imm Grans % 0.4      Neut # 14.3 (*)     Lymph # 3.10      Mono # 1.32 (*)     Eos # 0.24      Baso # 0.04      Imm Grans # 0.08 (*)    B-TYPE NATRIURETIC PEPTIDE - Abnormal     (*)    OCCULT BLOOD X 1, STOOL - Abnormal    OCCULT BLOOD STOOL Positive (*)    APTT - Normal    PTT 23.3     PROTIME-INR - Normal    PT 11.5      INR 1.0     TROPONIN I HIGH SENSITIVITY - Normal     Troponin High Sensitive 4.0     CBC W/ AUTO DIFFERENTIAL    Narrative:     The following orders were created for panel order CBC auto differential.  Procedure                               Abnormality         Status                     ---------                               -----------         ------                     CBC with Differential[[id number]]       Abnormal            Final result                 Please view results for these tests on the individual orders.   TYPE & SCREEN    Specimen Outdate 04/28/2025 23:59      Group & Rh O POS      Indirect Abbey NEG            Imaging Results              X-Ray Chest AP Portable (Final result)  Result time 04/25/25 13:59:54      Final result by Pavan Pierson MD (04/25/25 13:59:54)                   Impression:      No acute cardiac or pulmonary process.      Electronically signed by: Pavan Pierson  Date:    04/25/2025  Time:    13:59               Narrative:    CLINICAL HISTORY:  ([id number])64 y/o  (1960) F    CHF;    TECHNIQUE:  (A#[id number], exam time 4/25/2025 13:56)    XR CHEST AP PORTABLE [id number]    COMPARISON:  Radiograph from 01/29/2025    FINDINGS:  The lungs are clear. Costophrenic angles are seen without effusion. No pneumothorax is identified. The heart is top normal in size. The mediastinum is within normal limits. Osseous structures show degenerative disc disease and degenerative, with levoscoliosis changes in the shoulders. The visualized upper abdomen is unremarkable.                                       Medications   pantoprazole injection 80 mg (80 mg Intravenous Given 4/25/25 1418)   methylPREDNISolone sodium succinate injection 125 mg (125 mg Intravenous Given 4/25/25 1732)     Medical Decision Making  65-year-old female with ongoing rectal bleeding since she had a colonoscopy on the 17th of this month.  Patient otherwise nontoxic and hemodynamically stable at this time.  Patient said she felt dizzy earlier.  Patient has workup  which is unremarkable and patient's hemoglobin is stable and patient's blood pressure is stable as well.  Case discussed with Dr. Ruano, gastroenterologist on-call who recommended giving steroids and discharging her.  I discussed this with the patient.  Patient said she is already on steroids so will continue the steroid she is on.    Amount and/or Complexity of Data Reviewed  Labs: ordered.  Radiology: ordered.  Discussion of management or test interpretation with external provider(s): Patient verified that her bleeding did slow down and feels well.  Admission offered but as bleeding slow down and patient want to go home will discharged with return precautions and instructions and follow-up and this is also recommended by patient's gastroenterologist.    Risk  Prescription drug management.               ED Course as of 25   172 Patient has slightly elevated white count which is likely secondary to the steroid she is taking [UM]      ED Course User Index  [UM] Amaury Dillon MD                           Clinical Impression:  Final diagnoses:  [K92.2] GI bleed  [K62.5] Rectal bleeding (Primary)          ED Disposition Condition    Discharge Stable          ED Prescriptions    None       Follow-up Information       Follow up With Specialties Details Why Contact Info    Padmini Lackey FNP Family Medicine In 2 days  [address]  [address]  [facility] [id number] [phone]      Merrill Vela MD Gastroenterology In 2 days  [address] [facility] [address]  [phone]                 [1]   Social History  Tobacco Use    Smoking status: Former     Current packs/day: 0.00     Average packs/day: 1 pack/day for 18.0 years (18.0 ttl pk-yrs)     Types: Cigarettes     Start date: 2000     Quit date: 2018     Years since quittin.0    Smokeless tobacco: Never   Substance Use Topics    Alcohol use: No    Drug use: No        Amaury Dillon MD  25 194

## 2025-04-25 NOTE — DISCHARGE INSTRUCTIONS
Rest.  Drink lots of fluids.  Follow-up with your primary care provider and gastroenterologist.  Return to emergency department for worsening symptoms or any problems

## 2025-04-26 LAB — CALPROTECTIN STL-MCNT: 2770 UG/G (ref 0–120)

## 2025-04-29 LAB
QUANTIFERON TB GOLD (INCUBATED): YES
QUANTIFERON-TB GOLD PLUS: NORMAL

## 2025-05-14 ENCOUNTER — TELEPHONE (OUTPATIENT)
Dept: HEMATOLOGY/ONCOLOGY | Facility: CLINIC | Age: 65
End: 2025-05-14
Payer: MEDICARE

## 2025-05-14 DIAGNOSIS — R89.9 ABNORMAL LABORATORY TEST: Primary | ICD-10-CM

## 2025-05-14 NOTE — NURSING
Oncology Navigation   Intake  Date of Diagnosis: 05/14/25  Cancer Type: Benign hem  Type of Referral: External  Date of Referral: 05/14/25  Initial Nurse Navigator Contact: 05/14/25  Referral to Initial Contact Timeline (days): 0     Treatment                              Acuity      Follow Up  No follow-ups on file.     This RN Nurse Navigator called the pt to schedule a new patient Hematology/Oncology clinic appointment as requested from the providers referral. A voicemail message was left with a direct number [phone] for the patient to call back to schedule the appointment

## 2025-05-15 ENCOUNTER — TELEPHONE (OUTPATIENT)
Dept: HEMATOLOGY/ONCOLOGY | Facility: CLINIC | Age: 65
End: 2025-05-15
Payer: MEDICARE

## 2025-05-15 ENCOUNTER — TELEPHONE (OUTPATIENT)
Facility: CLINIC | Age: 65
End: 2025-05-15
Payer: MEDICARE

## 2025-05-15 NOTE — NURSING
Oncology Navigation   Intake  Date of Diagnosis: 05/14/25  Cancer Type: Benign hem  Type of Referral: External  Date of Referral: 05/14/25  Initial Nurse Navigator Contact: 05/14/25  Referral to Initial Contact Timeline (days): 0     Treatment                              Acuity      Follow Up  No follow-ups on file.     Pt was last seen in 2012 with Dr Pandya. Referral was sent to JULIEN Aranda. The pt states that she doesn't remember Dr Pandya and she would like to establish care in the clinic that Dr Vela recommends    Pt scheduled for new pt Hematology/Oncology clinic appt as requested in referral received in workqueue. Appt date, time and location reviewed with the pt. No questions at this time.

## 2025-05-15 NOTE — NURSING
Oncology Navigation   Intake  Date of Diagnosis: 05/14/25  Cancer Type: Benign hem  Type of Referral: External  Date of Referral: 05/14/25  Initial Nurse Navigator Contact: 05/14/25  Referral to Initial Contact Timeline (days): 0     Treatment                              Acuity      Follow Up  No follow-ups on file.     This RN Nurse Navigator called the pt to schedule a new patient Hematology/Oncology clinic appointment as requested from the providers referral. A voicemail message was left with a direct number [phone] for the patient to call back to schedule the appointment

## 2025-05-21 ENCOUNTER — TELEPHONE (OUTPATIENT)
Facility: CLINIC | Age: 65
End: 2025-05-21
Payer: MEDICARE

## 2025-05-21 NOTE — TELEPHONE ENCOUNTER
I left voice message for pt regarding confirming appt w/ Uma Aranda, on 6/2/2025. Left number for pt to contact the office, if they have any questions, would like to confirm or would like to reschedule appt

## 2025-06-04 ENCOUNTER — OFFICE VISIT (OUTPATIENT)
Facility: CLINIC | Age: 65
End: 2025-06-04
Payer: MEDICARE

## 2025-06-04 ENCOUNTER — LAB VISIT (OUTPATIENT)
Dept: LAB | Facility: HOSPITAL | Age: 65
End: 2025-06-04
Attending: NURSE PRACTITIONER
Payer: MEDICARE

## 2025-06-04 VITALS
SYSTOLIC BLOOD PRESSURE: 155 MMHG | BODY MASS INDEX: 48.58 KG/M2 | HEIGHT: 62 IN | HEART RATE: 70 BPM | OXYGEN SATURATION: 98 % | RESPIRATION RATE: 18 BRPM | WEIGHT: 264 LBS | DIASTOLIC BLOOD PRESSURE: 81 MMHG | TEMPERATURE: 97 F

## 2025-06-04 DIAGNOSIS — D75.839 THROMBOCYTOSIS: ICD-10-CM

## 2025-06-04 DIAGNOSIS — K62.5 RECTAL BLEEDING: ICD-10-CM

## 2025-06-04 DIAGNOSIS — D72.829 LEUKOCYTOSIS, UNSPECIFIED TYPE: Primary | ICD-10-CM

## 2025-06-04 DIAGNOSIS — D72.829 LEUKOCYTOSIS, UNSPECIFIED TYPE: ICD-10-CM

## 2025-06-04 DIAGNOSIS — R89.9 ABNORMAL LABORATORY TEST: ICD-10-CM

## 2025-06-04 LAB
ABSOLUTE EOSINOPHIL (SMH): 0.24 K/UL
ABSOLUTE MONOCYTE (SMH): 0.84 K/UL (ref 0.3–1)
ABSOLUTE NEUTROPHIL COUNT (SMH): 7.2 K/UL (ref 1.8–7.7)
BASOPHILS # BLD AUTO: 0.03 K/UL
BASOPHILS NFR BLD AUTO: 0.3 %
ERYTHROCYTE [DISTWIDTH] IN BLOOD BY AUTOMATED COUNT: 13.2 % (ref 11.5–14.5)
FERRITIN SERPL-MCNC: 7.1 NG/ML (ref 20–300)
HCT VFR BLD AUTO: 39.9 % (ref 37–48.5)
HGB BLD-MCNC: 12.4 GM/DL (ref 12–16)
IMM GRANULOCYTES # BLD AUTO: 0.04 K/UL (ref 0–0.04)
IMM GRANULOCYTES NFR BLD AUTO: 0.4 % (ref 0–0.5)
IRON SATN MFR SERPL: 10 % (ref 20–50)
IRON SERPL-MCNC: 43 UG/DL (ref 30–160)
LYMPHOCYTES # BLD AUTO: 2.86 K/UL (ref 1–4.8)
MCH RBC QN AUTO: 29 PG (ref 27–31)
MCHC RBC AUTO-ENTMCNC: 31.1 G/DL (ref 32–36)
MCV RBC AUTO: 93 FL (ref 82–98)
NUCLEATED RBC (/100WBC) (SMH): 0 /100 WBC
PLATELET # BLD AUTO: 352 K/UL (ref 150–450)
PMV BLD AUTO: 10.3 FL (ref 9.2–12.9)
RBC # BLD AUTO: 4.28 M/UL (ref 4–5.4)
RELATIVE EOSINOPHIL (SMH): 2.1 % (ref 0–8)
RELATIVE LYMPHOCYTE (SMH): 25.5 % (ref 18–48)
RELATIVE MONOCYTE (SMH): 7.5 % (ref 4–15)
RELATIVE NEUTROPHIL (SMH): 64.2 % (ref 38–73)
TIBC SERPL-MCNC: 437 UG/DL (ref 250–450)
TRANSFERRIN SERPL-MCNC: 312 MG/DL (ref 200–375)
WBC # BLD AUTO: 11.22 K/UL (ref 3.9–12.7)

## 2025-06-04 PROCEDURE — 84466 ASSAY OF TRANSFERRIN: CPT

## 2025-06-04 PROCEDURE — 3079F DIAST BP 80-89 MM HG: CPT | Mod: CPTII,S$GLB,, | Performed by: NURSE PRACTITIONER

## 2025-06-04 PROCEDURE — 1101F PT FALLS ASSESS-DOCD LE1/YR: CPT | Mod: CPTII,S$GLB,, | Performed by: NURSE PRACTITIONER

## 2025-06-04 PROCEDURE — 3077F SYST BP >= 140 MM HG: CPT | Mod: CPTII,S$GLB,, | Performed by: NURSE PRACTITIONER

## 2025-06-04 PROCEDURE — 1159F MED LIST DOCD IN RCRD: CPT | Mod: CPTII,S$GLB,, | Performed by: NURSE PRACTITIONER

## 2025-06-04 PROCEDURE — 3288F FALL RISK ASSESSMENT DOCD: CPT | Mod: CPTII,S$GLB,, | Performed by: NURSE PRACTITIONER

## 2025-06-04 PROCEDURE — 99999 PR PBB SHADOW E&M-EST. PATIENT-LVL V: CPT | Mod: PBBFAC,,, | Performed by: NURSE PRACTITIONER

## 2025-06-04 PROCEDURE — 1125F AMNT PAIN NOTED PAIN PRSNT: CPT | Mod: CPTII,S$GLB,, | Performed by: NURSE PRACTITIONER

## 2025-06-04 PROCEDURE — 4010F ACE/ARB THERAPY RXD/TAKEN: CPT | Mod: CPTII,S$GLB,, | Performed by: NURSE PRACTITIONER

## 2025-06-04 PROCEDURE — 99205 OFFICE O/P NEW HI 60 MIN: CPT | Mod: S$GLB,,, | Performed by: NURSE PRACTITIONER

## 2025-06-04 PROCEDURE — 3008F BODY MASS INDEX DOCD: CPT | Mod: CPTII,S$GLB,, | Performed by: NURSE PRACTITIONER

## 2025-06-04 PROCEDURE — 85025 COMPLETE CBC W/AUTO DIFF WBC: CPT

## 2025-06-04 PROCEDURE — 82728 ASSAY OF FERRITIN: CPT

## 2025-06-04 PROCEDURE — G2211 COMPLEX E/M VISIT ADD ON: HCPCS | Mod: S$GLB,,, | Performed by: NURSE PRACTITIONER

## 2025-06-04 PROCEDURE — 36415 COLL VENOUS BLD VENIPUNCTURE: CPT

## 2025-06-05 ENCOUNTER — RESULTS FOLLOW-UP (OUTPATIENT)
Facility: CLINIC | Age: 65
End: 2025-06-05

## 2025-06-05 ENCOUNTER — TELEPHONE (OUTPATIENT)
Facility: CLINIC | Age: 65
End: 2025-06-05
Payer: MEDICARE

## 2025-06-05 DIAGNOSIS — D50.0 IRON DEFICIENCY ANEMIA DUE TO CHRONIC BLOOD LOSS: Primary | ICD-10-CM

## 2025-06-05 LAB
LYMPHOCYTES NFR BLD MANUAL: 25 % (ref 18–48)
MONOCYTES NFR BLD MANUAL: 7 % (ref 4–15)
NEUTROPHILS NFR BLD MANUAL: 65 % (ref 38–73)
NEUTS BAND NFR BLD MANUAL: 3 %
PLATELET BLD QL SMEAR: NORMAL

## 2025-06-05 RX ORDER — HEPARIN 100 UNIT/ML
500 SYRINGE INTRAVENOUS
Status: CANCELLED | OUTPATIENT
Start: 2025-06-05

## 2025-06-05 RX ORDER — FAMOTIDINE 10 MG/ML
20 INJECTION, SOLUTION INTRAVENOUS
Status: CANCELLED | OUTPATIENT
Start: 2025-06-05 | End: 2025-06-05

## 2025-06-05 RX ORDER — SODIUM CHLORIDE 0.9 % (FLUSH) 0.9 %
10 SYRINGE (ML) INJECTION
Status: CANCELLED | OUTPATIENT
Start: 2025-06-05

## 2025-06-05 RX ORDER — ACETAMINOPHEN 325 MG/1
650 TABLET ORAL
Status: CANCELLED | OUTPATIENT
Start: 2025-06-05 | End: 2025-06-05

## 2025-06-05 RX ORDER — EPINEPHRINE 0.3 MG/.3ML
0.3 INJECTION SUBCUTANEOUS ONCE AS NEEDED
Status: CANCELLED | OUTPATIENT
Start: 2025-06-05

## 2025-06-10 ENCOUNTER — TELEPHONE (OUTPATIENT)
Facility: CLINIC | Age: 65
End: 2025-06-10
Payer: MEDICARE

## 2025-06-10 RX ORDER — SODIUM CHLORIDE 0.9 % (FLUSH) 0.9 %
10 SYRINGE (ML) INJECTION
OUTPATIENT
Start: 2025-06-10

## 2025-06-10 RX ORDER — EPINEPHRINE 0.3 MG/.3ML
0.3 INJECTION SUBCUTANEOUS ONCE AS NEEDED
OUTPATIENT
Start: 2025-06-10

## 2025-06-10 RX ORDER — ACETAMINOPHEN 325 MG/1
650 TABLET ORAL
OUTPATIENT
Start: 2025-06-10 | End: 2025-06-10

## 2025-06-10 RX ORDER — FAMOTIDINE 10 MG/ML
20 INJECTION, SOLUTION INTRAVENOUS
OUTPATIENT
Start: 2025-06-10 | End: 2025-06-10

## 2025-06-10 RX ORDER — HEPARIN 100 UNIT/ML
500 SYRINGE INTRAVENOUS
OUTPATIENT
Start: 2025-06-10

## 2025-06-10 NOTE — TELEPHONE ENCOUNTER
----- Message from Niecy sent at 6/10/2025  1:28 PM CDT -----  The patient was calling about the status of her iron infusions. Please check on this for the patient. # [phone]

## 2025-06-10 NOTE — TELEPHONE ENCOUNTER
Patient returned call and above reviewed with her. She verbalized understanding.    No/Unable to asses

## 2025-06-10 NOTE — TELEPHONE ENCOUNTER
Patient made aware that writer is working with MARCE Iglesias to update orders at the insurance has stated injectafer is non-preferred. Patient verbalized understanding of above.

## 2025-06-10 NOTE — TELEPHONE ENCOUNTER
Per GIOVANNI Iglesias-TUNDE, patient's orders for IV Iron switched to Venofer 200 mg x 5 doses. Attempted to call patient with above, no answer. Voicemail left with instructions to call back.

## 2025-06-11 ENCOUNTER — PATIENT MESSAGE (OUTPATIENT)
Dept: INFUSION THERAPY | Facility: HOSPITAL | Age: 65
End: 2025-06-11

## 2025-06-11 ENCOUNTER — TELEPHONE (OUTPATIENT)
Dept: INFUSION THERAPY | Facility: HOSPITAL | Age: 65
End: 2025-06-11

## 2025-06-11 ENCOUNTER — TELEPHONE (OUTPATIENT)
Facility: CLINIC | Age: 65
End: 2025-06-11
Payer: MEDICARE

## 2025-06-11 NOTE — TELEPHONE ENCOUNTER
Iron infusions still awaiting authorization at this time. Called to let patient know that once the iron infusions are authorized, she can call billing to discuss the copay with them. No answer at number listed. Voicemail left with instructions to call back.

## 2025-06-11 NOTE — TELEPHONE ENCOUNTER
----- Message from Soni sent at 6/11/2025  2:15 PM CDT -----  Pt is calling to f/u on iron infusions. She is also asking if she would have to pay a copay for each infusion [phone]

## 2025-06-11 NOTE — TELEPHONE ENCOUNTER
LVM for pt return call to the Infusion Department at [phone].  Attempting to schedule an appointment.  Will try again at a later time. amr

## 2025-06-24 ENCOUNTER — TELEPHONE (OUTPATIENT)
Dept: INFUSION THERAPY | Facility: HOSPITAL | Age: 65
End: 2025-06-24

## 2025-06-25 ENCOUNTER — PATIENT MESSAGE (OUTPATIENT)
Dept: INFUSION THERAPY | Facility: HOSPITAL | Age: 65
End: 2025-06-25

## 2025-06-26 ENCOUNTER — TELEPHONE (OUTPATIENT)
Facility: CLINIC | Age: 65
End: 2025-06-26
Payer: MEDICARE

## 2025-07-08 ENCOUNTER — PATIENT MESSAGE (OUTPATIENT)
Dept: INFUSION THERAPY | Facility: HOSPITAL | Age: 65
End: 2025-07-08

## 2025-07-18 ENCOUNTER — INFUSION (OUTPATIENT)
Dept: INFUSION THERAPY | Facility: HOSPITAL | Age: 65
End: 2025-07-18
Payer: MEDICARE

## 2025-07-18 VITALS
BODY MASS INDEX: 49.13 KG/M2 | RESPIRATION RATE: 17 BRPM | DIASTOLIC BLOOD PRESSURE: 65 MMHG | TEMPERATURE: 98 F | WEIGHT: 267 LBS | HEIGHT: 62 IN | OXYGEN SATURATION: 100 % | HEART RATE: 52 BPM | SYSTOLIC BLOOD PRESSURE: 151 MMHG

## 2025-07-18 DIAGNOSIS — D50.0 IRON DEFICIENCY ANEMIA DUE TO CHRONIC BLOOD LOSS: Primary | ICD-10-CM

## 2025-07-18 PROCEDURE — 96375 TX/PRO/DX INJ NEW DRUG ADDON: CPT

## 2025-07-18 PROCEDURE — A4216 STERILE WATER/SALINE, 10 ML: HCPCS

## 2025-07-18 PROCEDURE — 25000003 PHARM REV CODE 250

## 2025-07-18 PROCEDURE — 96374 THER/PROPH/DIAG INJ IV PUSH: CPT

## 2025-07-18 PROCEDURE — 63600175 PHARM REV CODE 636 W HCPCS

## 2025-07-18 RX ORDER — SODIUM CHLORIDE 0.9 % (FLUSH) 0.9 %
10 SYRINGE (ML) INJECTION
OUTPATIENT
Start: 2025-07-25

## 2025-07-18 RX ORDER — EPINEPHRINE 0.3 MG/.3ML
0.3 INJECTION SUBCUTANEOUS ONCE AS NEEDED
OUTPATIENT
Start: 2025-07-25

## 2025-07-18 RX ORDER — SODIUM CHLORIDE 0.9 % (FLUSH) 0.9 %
10 SYRINGE (ML) INJECTION
Status: DISCONTINUED | OUTPATIENT
Start: 2025-07-18 | End: 2025-07-18 | Stop reason: HOSPADM

## 2025-07-18 RX ORDER — METHYLPREDNISOLONE SOD SUCC 125 MG
40 VIAL (EA) INJECTION
OUTPATIENT
Start: 2025-07-25 | End: 2025-07-25

## 2025-07-18 RX ORDER — FAMOTIDINE 10 MG/ML
20 INJECTION, SOLUTION INTRAVENOUS
Status: COMPLETED | OUTPATIENT
Start: 2025-07-18 | End: 2025-07-18

## 2025-07-18 RX ORDER — EPINEPHRINE 0.3 MG/.3ML
0.3 INJECTION SUBCUTANEOUS ONCE AS NEEDED
Status: DISCONTINUED | OUTPATIENT
Start: 2025-07-18 | End: 2025-07-18 | Stop reason: HOSPADM

## 2025-07-18 RX ORDER — HEPARIN 100 UNIT/ML
500 SYRINGE INTRAVENOUS
Status: DISCONTINUED | OUTPATIENT
Start: 2025-07-18 | End: 2025-07-18 | Stop reason: HOSPADM

## 2025-07-18 RX ORDER — FAMOTIDINE 10 MG/ML
20 INJECTION, SOLUTION INTRAVENOUS
OUTPATIENT
Start: 2025-07-25 | End: 2025-07-25

## 2025-07-18 RX ORDER — HEPARIN 100 UNIT/ML
500 SYRINGE INTRAVENOUS
OUTPATIENT
Start: 2025-07-25

## 2025-07-18 RX ORDER — METHYLPREDNISOLONE SOD SUCC 125 MG
40 VIAL (EA) INJECTION
Status: COMPLETED | OUTPATIENT
Start: 2025-07-18 | End: 2025-07-18

## 2025-07-18 RX ORDER — ACETAMINOPHEN 325 MG/1
650 TABLET ORAL
Status: COMPLETED | OUTPATIENT
Start: 2025-07-18 | End: 2025-07-18

## 2025-07-18 RX ORDER — ACETAMINOPHEN 325 MG/1
650 TABLET ORAL
OUTPATIENT
Start: 2025-07-25 | End: 2025-07-25

## 2025-07-18 RX ADMIN — FAMOTIDINE 20 MG: 10 INJECTION, SOLUTION INTRAVENOUS at 11:07

## 2025-07-18 RX ADMIN — METHYLPREDNISOLONE SODIUM SUCCINATE 40 MG: 125 INJECTION, POWDER, FOR SOLUTION INTRAMUSCULAR; INTRAVENOUS at 11:07

## 2025-07-18 RX ADMIN — Medication 10 ML: at 11:07

## 2025-07-18 RX ADMIN — IRON SUCROSE 200 MG: 20 INJECTION, SOLUTION INTRAVENOUS at 11:07

## 2025-07-18 RX ADMIN — ACETAMINOPHEN 650 MG: 325 TABLET ORAL at 11:07

## 2025-07-25 ENCOUNTER — INFUSION (OUTPATIENT)
Dept: INFUSION THERAPY | Facility: HOSPITAL | Age: 65
End: 2025-07-25
Payer: MEDICARE

## 2025-07-25 VITALS
TEMPERATURE: 98 F | HEIGHT: 62 IN | WEIGHT: 266.88 LBS | SYSTOLIC BLOOD PRESSURE: 147 MMHG | DIASTOLIC BLOOD PRESSURE: 70 MMHG | HEART RATE: 62 BPM | OXYGEN SATURATION: 97 % | RESPIRATION RATE: 16 BRPM | BODY MASS INDEX: 49.11 KG/M2

## 2025-07-25 DIAGNOSIS — D50.0 IRON DEFICIENCY ANEMIA DUE TO CHRONIC BLOOD LOSS: Primary | ICD-10-CM

## 2025-07-25 PROCEDURE — 63600175 PHARM REV CODE 636 W HCPCS

## 2025-07-25 PROCEDURE — A4216 STERILE WATER/SALINE, 10 ML: HCPCS

## 2025-07-25 PROCEDURE — 25000003 PHARM REV CODE 250

## 2025-07-25 PROCEDURE — 96374 THER/PROPH/DIAG INJ IV PUSH: CPT

## 2025-07-25 PROCEDURE — 96375 TX/PRO/DX INJ NEW DRUG ADDON: CPT

## 2025-07-25 RX ORDER — ACETAMINOPHEN 325 MG/1
650 TABLET ORAL
Status: COMPLETED | OUTPATIENT
Start: 2025-07-25 | End: 2025-07-25

## 2025-07-25 RX ORDER — METHYLPREDNISOLONE SOD SUCC 125 MG
40 VIAL (EA) INJECTION
Status: COMPLETED | OUTPATIENT
Start: 2025-07-25 | End: 2025-07-25

## 2025-07-25 RX ORDER — EPINEPHRINE 0.3 MG/.3ML
0.3 INJECTION SUBCUTANEOUS ONCE AS NEEDED
OUTPATIENT
Start: 2025-08-01

## 2025-07-25 RX ORDER — HEPARIN 100 UNIT/ML
500 SYRINGE INTRAVENOUS
OUTPATIENT
Start: 2025-08-01

## 2025-07-25 RX ORDER — FAMOTIDINE 10 MG/ML
20 INJECTION, SOLUTION INTRAVENOUS
OUTPATIENT
Start: 2025-08-01 | End: 2025-08-01

## 2025-07-25 RX ORDER — ACETAMINOPHEN 325 MG/1
650 TABLET ORAL
OUTPATIENT
Start: 2025-08-01 | End: 2025-08-01

## 2025-07-25 RX ORDER — METHYLPREDNISOLONE SOD SUCC 125 MG
40 VIAL (EA) INJECTION
OUTPATIENT
Start: 2025-08-01 | End: 2025-08-01

## 2025-07-25 RX ORDER — SODIUM CHLORIDE 0.9 % (FLUSH) 0.9 %
10 SYRINGE (ML) INJECTION
Status: DISCONTINUED | OUTPATIENT
Start: 2025-07-25 | End: 2025-07-25 | Stop reason: HOSPADM

## 2025-07-25 RX ORDER — SODIUM CHLORIDE 0.9 % (FLUSH) 0.9 %
10 SYRINGE (ML) INJECTION
OUTPATIENT
Start: 2025-08-01

## 2025-07-25 RX ORDER — FAMOTIDINE 10 MG/ML
20 INJECTION, SOLUTION INTRAVENOUS
Status: COMPLETED | OUTPATIENT
Start: 2025-07-25 | End: 2025-07-25

## 2025-07-25 RX ADMIN — Medication 10 ML: at 11:07

## 2025-07-25 RX ADMIN — METHYLPREDNISOLONE SODIUM SUCCINATE 40 MG: 125 INJECTION, POWDER, FOR SOLUTION INTRAMUSCULAR; INTRAVENOUS at 11:07

## 2025-07-25 RX ADMIN — FAMOTIDINE 20 MG: 10 INJECTION, SOLUTION INTRAVENOUS at 11:07

## 2025-07-25 RX ADMIN — ACETAMINOPHEN 650 MG: 325 TABLET ORAL at 11:07

## 2025-07-25 RX ADMIN — IRON SUCROSE 200 MG: 20 INJECTION, SOLUTION INTRAVENOUS at 11:07

## 2025-07-25 NOTE — PLAN OF CARE
Problem: Adult Inpatient Plan of Care  Goal: Optimal Comfort and Wellbeing  Outcome: Progressing  Intervention: Provide Person-Centered Care  Flowsheets (Taken 7/25/2025 1147)  Trust Relationship/Rapport:   care explained   thoughts/feelings acknowledged   choices provided   emotional support provided   empathic listening provided   questions encouraged   questions answered   reassurance provided

## 2025-08-01 ENCOUNTER — INFUSION (OUTPATIENT)
Dept: INFUSION THERAPY | Facility: HOSPITAL | Age: 65
End: 2025-08-01
Payer: MEDICARE

## 2025-08-01 VITALS
WEIGHT: 266.75 LBS | OXYGEN SATURATION: 98 % | TEMPERATURE: 98 F | DIASTOLIC BLOOD PRESSURE: 67 MMHG | SYSTOLIC BLOOD PRESSURE: 150 MMHG | HEART RATE: 50 BPM | BODY MASS INDEX: 48.79 KG/M2

## 2025-08-01 DIAGNOSIS — D50.0 IRON DEFICIENCY ANEMIA DUE TO CHRONIC BLOOD LOSS: Primary | ICD-10-CM

## 2025-08-01 PROCEDURE — 96374 THER/PROPH/DIAG INJ IV PUSH: CPT

## 2025-08-01 PROCEDURE — 25000003 PHARM REV CODE 250

## 2025-08-01 PROCEDURE — 96375 TX/PRO/DX INJ NEW DRUG ADDON: CPT

## 2025-08-01 PROCEDURE — 63600175 PHARM REV CODE 636 W HCPCS

## 2025-08-01 RX ORDER — SODIUM CHLORIDE 0.9 % (FLUSH) 0.9 %
10 SYRINGE (ML) INJECTION
OUTPATIENT
Start: 2025-08-08

## 2025-08-01 RX ORDER — HEPARIN 100 UNIT/ML
500 SYRINGE INTRAVENOUS
Status: DISCONTINUED | OUTPATIENT
Start: 2025-08-01 | End: 2025-08-01 | Stop reason: HOSPADM

## 2025-08-01 RX ORDER — SODIUM CHLORIDE 0.9 % (FLUSH) 0.9 %
10 SYRINGE (ML) INJECTION
Status: DISCONTINUED | OUTPATIENT
Start: 2025-08-01 | End: 2025-08-01 | Stop reason: HOSPADM

## 2025-08-01 RX ORDER — ACETAMINOPHEN 325 MG/1
650 TABLET ORAL
Status: COMPLETED | OUTPATIENT
Start: 2025-08-01 | End: 2025-08-01

## 2025-08-01 RX ORDER — FAMOTIDINE 10 MG/ML
20 INJECTION, SOLUTION INTRAVENOUS
OUTPATIENT
Start: 2025-08-08 | End: 2025-08-08

## 2025-08-01 RX ORDER — METHYLPREDNISOLONE SOD SUCC 125 MG
40 VIAL (EA) INJECTION
OUTPATIENT
Start: 2025-08-08 | End: 2025-08-08

## 2025-08-01 RX ORDER — HEPARIN 100 UNIT/ML
500 SYRINGE INTRAVENOUS
OUTPATIENT
Start: 2025-08-08

## 2025-08-01 RX ORDER — ACETAMINOPHEN 325 MG/1
650 TABLET ORAL
OUTPATIENT
Start: 2025-08-08 | End: 2025-08-08

## 2025-08-01 RX ORDER — METHYLPREDNISOLONE SOD SUCC 125 MG
40 VIAL (EA) INJECTION
Status: COMPLETED | OUTPATIENT
Start: 2025-08-01 | End: 2025-08-01

## 2025-08-01 RX ORDER — EPINEPHRINE 0.3 MG/.3ML
0.3 INJECTION SUBCUTANEOUS ONCE AS NEEDED
OUTPATIENT
Start: 2025-08-08

## 2025-08-01 RX ADMIN — ACETAMINOPHEN 650 MG: 325 TABLET ORAL at 11:08

## 2025-08-01 RX ADMIN — METHYLPREDNISOLONE SODIUM SUCCINATE 40 MG: 125 INJECTION, POWDER, FOR SOLUTION INTRAMUSCULAR; INTRAVENOUS at 11:08

## 2025-08-01 RX ADMIN — IRON SUCROSE 200 MG: 20 INJECTION, SOLUTION INTRAVENOUS at 11:08

## 2025-08-01 NOTE — PLAN OF CARE
Problem: Adult Inpatient Plan of Care  Goal: Plan of Care Review  8/1/2025 1131 by Miranda Marrero RN  Outcome: Met  8/1/2025 1131 by Miranda Marrero RN  Outcome: Progressing  Goal: Patient-Specific Goal (Individualized)  8/1/2025 1131 by Miranda Marrero RN  Outcome: Met  8/1/2025 1131 by Miranda Marrero RN  Outcome: Progressing  Goal: Absence of Hospital-Acquired Illness or Injury  8/1/2025 1131 by Miranda Marrero RN  Outcome: Met  8/1/2025 1131 by Miranda Marrero RN  Outcome: Progressing  Goal: Optimal Comfort and Wellbeing  8/1/2025 1131 by Miranda Marrero RN  Outcome: Met  8/1/2025 1131 by Miranda Marrero RN  Outcome: Progressing  Goal: Readiness for Transition of Care  8/1/2025 1131 by Miranda Marrero RN  Outcome: Met  8/1/2025 1131 by Miranda Marrero RN  Outcome: Progressing

## 2025-08-08 ENCOUNTER — INFUSION (OUTPATIENT)
Dept: INFUSION THERAPY | Facility: HOSPITAL | Age: 65
End: 2025-08-08
Payer: MEDICARE

## 2025-08-08 VITALS
HEART RATE: 68 BPM | DIASTOLIC BLOOD PRESSURE: 78 MMHG | TEMPERATURE: 97 F | SYSTOLIC BLOOD PRESSURE: 148 MMHG | BODY MASS INDEX: 48.79 KG/M2 | WEIGHT: 266.75 LBS

## 2025-08-08 DIAGNOSIS — D50.0 IRON DEFICIENCY ANEMIA DUE TO CHRONIC BLOOD LOSS: Primary | ICD-10-CM

## 2025-08-08 PROCEDURE — 63600175 PHARM REV CODE 636 W HCPCS

## 2025-08-08 PROCEDURE — 25000003 PHARM REV CODE 250

## 2025-08-08 PROCEDURE — 96375 TX/PRO/DX INJ NEW DRUG ADDON: CPT

## 2025-08-08 PROCEDURE — 96374 THER/PROPH/DIAG INJ IV PUSH: CPT

## 2025-08-08 RX ORDER — SODIUM CHLORIDE 0.9 % (FLUSH) 0.9 %
10 SYRINGE (ML) INJECTION
Status: DISCONTINUED | OUTPATIENT
Start: 2025-08-08 | End: 2025-08-08 | Stop reason: HOSPADM

## 2025-08-08 RX ORDER — FAMOTIDINE 10 MG/ML
20 INJECTION, SOLUTION INTRAVENOUS
OUTPATIENT
Start: 2025-08-15 | End: 2025-08-15

## 2025-08-08 RX ORDER — HEPARIN 100 UNIT/ML
500 SYRINGE INTRAVENOUS
Status: DISCONTINUED | OUTPATIENT
Start: 2025-08-08 | End: 2025-08-08 | Stop reason: HOSPADM

## 2025-08-08 RX ORDER — METHYLPREDNISOLONE SOD SUCC 125 MG
40 VIAL (EA) INJECTION
Status: COMPLETED | OUTPATIENT
Start: 2025-08-08 | End: 2025-08-08

## 2025-08-08 RX ORDER — EPINEPHRINE 0.3 MG/.3ML
0.3 INJECTION SUBCUTANEOUS ONCE AS NEEDED
OUTPATIENT
Start: 2025-08-15

## 2025-08-08 RX ORDER — SODIUM CHLORIDE 0.9 % (FLUSH) 0.9 %
10 SYRINGE (ML) INJECTION
OUTPATIENT
Start: 2025-08-15

## 2025-08-08 RX ORDER — ACETAMINOPHEN 325 MG/1
650 TABLET ORAL
Status: COMPLETED | OUTPATIENT
Start: 2025-08-08 | End: 2025-08-08

## 2025-08-08 RX ORDER — ACETAMINOPHEN 325 MG/1
650 TABLET ORAL
OUTPATIENT
Start: 2025-08-15 | End: 2025-08-15

## 2025-08-08 RX ORDER — HEPARIN 100 UNIT/ML
500 SYRINGE INTRAVENOUS
OUTPATIENT
Start: 2025-08-15

## 2025-08-08 RX ORDER — METHYLPREDNISOLONE SOD SUCC 125 MG
40 VIAL (EA) INJECTION
OUTPATIENT
Start: 2025-08-15 | End: 2025-08-15

## 2025-08-08 RX ADMIN — METHYLPREDNISOLONE SODIUM SUCCINATE 40 MG: 125 INJECTION, POWDER, FOR SOLUTION INTRAMUSCULAR; INTRAVENOUS at 11:08

## 2025-08-08 RX ADMIN — ACETAMINOPHEN 650 MG: 325 TABLET ORAL at 11:08

## 2025-08-08 RX ADMIN — IRON SUCROSE 200 MG: 20 INJECTION, SOLUTION INTRAVENOUS at 11:08

## 2025-08-08 NOTE — PLAN OF CARE
Problem: Adult Inpatient Plan of Care  Goal: Plan of Care Review  8/8/2025 1128 by Miranda Marrero RN  Outcome: Met  8/8/2025 1128 by Miranda Marrero RN  Outcome: Progressing  Goal: Patient-Specific Goal (Individualized)  8/8/2025 1128 by Miranda Marrero RN  Outcome: Met  8/8/2025 1128 by Miranda Marrero RN  Outcome: Progressing  Goal: Absence of Hospital-Acquired Illness or Injury  8/8/2025 1128 by Miranda Marrero RN  Outcome: Met  8/8/2025 1128 by Miranda Marrero RN  Outcome: Progressing  Goal: Optimal Comfort and Wellbeing  8/8/2025 1128 by Miranda Marrero RN  Outcome: Met  8/8/2025 1128 by Miranda Marrero RN  Outcome: Progressing  Goal: Readiness for Transition of Care  8/8/2025 1128 by Miranda Marrero RN  Outcome: Met  8/8/2025 1128 by Miranda Marrero RN  Outcome: Progressing

## 2025-08-14 ENCOUNTER — TELEPHONE (OUTPATIENT)
Facility: CLINIC | Age: 65
End: 2025-08-14
Payer: MEDICARE

## 2025-08-14 DIAGNOSIS — D50.0 IRON DEFICIENCY ANEMIA DUE TO CHRONIC BLOOD LOSS: Primary | ICD-10-CM

## 2025-08-15 ENCOUNTER — INFUSION (OUTPATIENT)
Dept: INFUSION THERAPY | Facility: HOSPITAL | Age: 65
End: 2025-08-15
Payer: MEDICARE

## 2025-08-15 VITALS
WEIGHT: 269.31 LBS | TEMPERATURE: 98 F | HEART RATE: 58 BPM | BODY MASS INDEX: 49.56 KG/M2 | OXYGEN SATURATION: 96 % | SYSTOLIC BLOOD PRESSURE: 132 MMHG | RESPIRATION RATE: 16 BRPM | DIASTOLIC BLOOD PRESSURE: 76 MMHG | HEIGHT: 62 IN

## 2025-08-15 DIAGNOSIS — D50.0 IRON DEFICIENCY ANEMIA DUE TO CHRONIC BLOOD LOSS: Primary | ICD-10-CM

## 2025-08-15 PROCEDURE — A4216 STERILE WATER/SALINE, 10 ML: HCPCS

## 2025-08-15 PROCEDURE — 63600175 PHARM REV CODE 636 W HCPCS: Mod: TB

## 2025-08-15 PROCEDURE — 96374 THER/PROPH/DIAG INJ IV PUSH: CPT

## 2025-08-15 PROCEDURE — 96375 TX/PRO/DX INJ NEW DRUG ADDON: CPT

## 2025-08-15 PROCEDURE — 25000003 PHARM REV CODE 250

## 2025-08-15 RX ORDER — HEPARIN 100 UNIT/ML
500 SYRINGE INTRAVENOUS
Status: DISCONTINUED | OUTPATIENT
Start: 2025-08-15 | End: 2025-08-15

## 2025-08-15 RX ORDER — SODIUM CHLORIDE 0.9 % (FLUSH) 0.9 %
10 SYRINGE (ML) INJECTION
Status: DISCONTINUED | OUTPATIENT
Start: 2025-08-15 | End: 2025-08-15

## 2025-08-15 RX ORDER — ACETAMINOPHEN 325 MG/1
650 TABLET ORAL
Status: COMPLETED | OUTPATIENT
Start: 2025-08-15 | End: 2025-08-15

## 2025-08-15 RX ORDER — SODIUM CHLORIDE 0.9 % (FLUSH) 0.9 %
10 SYRINGE (ML) INJECTION
Status: CANCELLED | OUTPATIENT
Start: 2025-08-15

## 2025-08-15 RX ORDER — HEPARIN 100 UNIT/ML
500 SYRINGE INTRAVENOUS
Status: CANCELLED | OUTPATIENT
Start: 2025-08-15

## 2025-08-15 RX ORDER — ACETAMINOPHEN 325 MG/1
650 TABLET ORAL
Status: CANCELLED | OUTPATIENT
Start: 2025-08-15 | End: 2025-08-15

## 2025-08-15 RX ORDER — FAMOTIDINE 10 MG/ML
20 INJECTION, SOLUTION INTRAVENOUS
Status: COMPLETED | OUTPATIENT
Start: 2025-08-15 | End: 2025-08-15

## 2025-08-15 RX ORDER — FAMOTIDINE 10 MG/ML
20 INJECTION, SOLUTION INTRAVENOUS
Status: CANCELLED | OUTPATIENT
Start: 2025-08-15 | End: 2025-08-15

## 2025-08-15 RX ORDER — METHYLPREDNISOLONE SOD SUCC 125 MG
40 VIAL (EA) INJECTION
Status: COMPLETED | OUTPATIENT
Start: 2025-08-15 | End: 2025-08-15

## 2025-08-15 RX ORDER — EPINEPHRINE 0.3 MG/.3ML
0.3 INJECTION SUBCUTANEOUS ONCE AS NEEDED
Status: CANCELLED | OUTPATIENT
Start: 2025-08-15

## 2025-08-15 RX ORDER — METHYLPREDNISOLONE SOD SUCC 125 MG
40 VIAL (EA) INJECTION
Status: CANCELLED | OUTPATIENT
Start: 2025-08-15 | End: 2025-08-15

## 2025-08-15 RX ORDER — EPINEPHRINE 0.3 MG/.3ML
0.3 INJECTION SUBCUTANEOUS ONCE AS NEEDED
Status: DISCONTINUED | OUTPATIENT
Start: 2025-08-15 | End: 2025-08-15

## 2025-08-15 RX ADMIN — ACETAMINOPHEN 650 MG: 325 TABLET ORAL at 11:08

## 2025-08-15 RX ADMIN — Medication 10 ML: at 11:08

## 2025-08-15 RX ADMIN — METHYLPREDNISOLONE SODIUM SUCCINATE 40 MG: 125 INJECTION, POWDER, FOR SOLUTION INTRAMUSCULAR; INTRAVENOUS at 11:08

## 2025-08-15 RX ADMIN — FAMOTIDINE 20 MG: 10 INJECTION, SOLUTION INTRAVENOUS at 11:08

## 2025-08-15 RX ADMIN — IRON SUCROSE 200 MG: 20 INJECTION, SOLUTION INTRAVENOUS at 11:08

## 2025-08-29 ENCOUNTER — LAB VISIT (OUTPATIENT)
Dept: LAB | Facility: HOSPITAL | Age: 65
End: 2025-08-29
Attending: NURSE PRACTITIONER
Payer: MEDICARE

## 2025-08-29 DIAGNOSIS — D50.0 IRON DEFICIENCY ANEMIA DUE TO CHRONIC BLOOD LOSS: ICD-10-CM

## 2025-08-29 LAB
ABSOLUTE EOSINOPHIL (SMH): 0.05 K/UL
ABSOLUTE MONOCYTE (SMH): 0.57 K/UL (ref 0.3–1)
ABSOLUTE NEUTROPHIL COUNT (SMH): 6 K/UL (ref 1.8–7.7)
ALBUMIN SERPL-MCNC: 3.8 G/DL (ref 3.5–5.2)
ALP SERPL-CCNC: 117 UNIT/L (ref 55–135)
ALT SERPL-CCNC: 14 UNIT/L (ref 10–44)
ANION GAP (SMH): 6 MMOL/L (ref 8–16)
AST SERPL-CCNC: 15 UNIT/L (ref 10–40)
BASOPHILS # BLD AUTO: 0.04 K/UL
BASOPHILS NFR BLD AUTO: 0.4 %
BILIRUB SERPL-MCNC: 0.4 MG/DL (ref 0.1–1)
BUN SERPL-MCNC: 12 MG/DL (ref 8–23)
CALCIUM SERPL-MCNC: 9.2 MG/DL (ref 8.7–10.5)
CHLORIDE SERPL-SCNC: 109 MMOL/L (ref 95–110)
CO2 SERPL-SCNC: 25 MMOL/L (ref 23–29)
CREAT SERPL-MCNC: 0.9 MG/DL (ref 0.5–1.4)
ERYTHROCYTE [DISTWIDTH] IN BLOOD BY AUTOMATED COUNT: 17.6 % (ref 11.5–14.5)
FERRITIN SERPL-MCNC: 169.1 NG/ML (ref 20–300)
GFR SERPLBLD CREATININE-BSD FMLA CKD-EPI: >60 ML/MIN/1.73/M2
GLUCOSE SERPL-MCNC: 117 MG/DL (ref 70–110)
HCT VFR BLD AUTO: 40.9 % (ref 37–48.5)
HGB BLD-MCNC: 13.5 GM/DL (ref 12–16)
IMM GRANULOCYTES # BLD AUTO: 0.03 K/UL (ref 0–0.04)
IMM GRANULOCYTES NFR BLD AUTO: 0.3 % (ref 0–0.5)
IRON SATN MFR SERPL: 33 % (ref 20–50)
IRON SERPL-MCNC: 92 UG/DL (ref 30–160)
LYMPHOCYTES # BLD AUTO: 3.08 K/UL (ref 1–4.8)
MCH RBC QN AUTO: 29.8 PG (ref 27–31)
MCHC RBC AUTO-ENTMCNC: 33 G/DL (ref 32–36)
MCV RBC AUTO: 90 FL (ref 82–98)
NUCLEATED RBC (/100WBC) (SMH): 0 /100 WBC
PLATELET # BLD AUTO: 367 K/UL (ref 150–450)
PMV BLD AUTO: 9.9 FL (ref 9.2–12.9)
POTASSIUM SERPL-SCNC: 3.8 MMOL/L (ref 3.5–5.1)
PROT SERPL-MCNC: 6.4 GM/DL (ref 6–8.4)
RBC # BLD AUTO: 4.53 M/UL (ref 4–5.4)
RELATIVE EOSINOPHIL (SMH): 0.5 % (ref 0–8)
RELATIVE LYMPHOCYTE (SMH): 31.6 % (ref 18–48)
RELATIVE MONOCYTE (SMH): 5.9 % (ref 4–15)
RELATIVE NEUTROPHIL (SMH): 61.3 % (ref 38–73)
SODIUM SERPL-SCNC: 140 MMOL/L (ref 136–145)
TIBC SERPL-MCNC: 280 UG/DL (ref 250–450)
TRANSFERRIN SERPL-MCNC: 200 MG/DL (ref 200–375)
WBC # BLD AUTO: 9.74 K/UL (ref 3.9–12.7)

## 2025-08-29 PROCEDURE — 82728 ASSAY OF FERRITIN: CPT

## 2025-08-29 PROCEDURE — 83540 ASSAY OF IRON: CPT

## 2025-08-29 PROCEDURE — 85025 COMPLETE CBC W/AUTO DIFF WBC: CPT

## 2025-08-29 PROCEDURE — 36415 COLL VENOUS BLD VENIPUNCTURE: CPT

## 2025-08-29 PROCEDURE — 82374 ASSAY BLOOD CARBON DIOXIDE: CPT

## 2025-09-03 ENCOUNTER — OFFICE VISIT (OUTPATIENT)
Facility: CLINIC | Age: 65
End: 2025-09-03
Payer: MEDICARE

## 2025-09-03 VITALS
HEART RATE: 63 BPM | TEMPERATURE: 97 F | SYSTOLIC BLOOD PRESSURE: 113 MMHG | DIASTOLIC BLOOD PRESSURE: 76 MMHG | RESPIRATION RATE: 16 BRPM | BODY MASS INDEX: 49.5 KG/M2 | OXYGEN SATURATION: 97 % | HEIGHT: 62 IN | WEIGHT: 269 LBS

## 2025-09-03 DIAGNOSIS — D50.0 IRON DEFICIENCY ANEMIA DUE TO CHRONIC BLOOD LOSS: Primary | ICD-10-CM

## 2025-09-03 DIAGNOSIS — D72.829 LEUKOCYTOSIS, UNSPECIFIED TYPE: ICD-10-CM

## 2025-09-03 PROCEDURE — 99999 PR PBB SHADOW E&M-EST. PATIENT-LVL IV: CPT | Mod: PBBFAC,,, | Performed by: NURSE PRACTITIONER

## 2025-09-03 RX ORDER — IRON,CARBONYL/ASCORBIC ACID 100-250 MG
1 TABLET ORAL DAILY
Qty: 30 EACH | Refills: 11 | Status: SHIPPED | OUTPATIENT
Start: 2025-09-03

## (undated) DEVICE — HEMOSTAT VASC BAND LONG 27CM

## (undated) DEVICE — CATH DXTERITY JR40 100CM 5FR

## (undated) DEVICE — CATH DXTERITY JL35 100CM 5FR

## (undated) DEVICE — KIT GLIDESHEATH SLEND 6FR 10CM

## (undated) DEVICE — GUIDEWIRE INQWIRE SE 3MM JTIP